# Patient Record
Sex: FEMALE | Race: WHITE | Employment: OTHER | URBAN - METROPOLITAN AREA
[De-identification: names, ages, dates, MRNs, and addresses within clinical notes are randomized per-mention and may not be internally consistent; named-entity substitution may affect disease eponyms.]

---

## 2017-01-01 ENCOUNTER — TELEPHONE (OUTPATIENT)
Dept: INTERNAL MEDICINE | Facility: CLINIC | Age: 77
End: 2017-01-01

## 2017-01-01 ENCOUNTER — DOCUMENTATION ONLY (OUTPATIENT)
Dept: CARE COORDINATION | Facility: CLINIC | Age: 77
End: 2017-01-01

## 2017-01-01 ENCOUNTER — ANTICOAGULATION THERAPY VISIT (OUTPATIENT)
Dept: NURSING | Facility: CLINIC | Age: 77
End: 2017-01-01

## 2017-01-01 ENCOUNTER — TELEPHONE (OUTPATIENT)
Dept: FAMILY MEDICINE | Facility: CLINIC | Age: 77
End: 2017-01-01

## 2017-01-01 ENCOUNTER — CARE COORDINATION (OUTPATIENT)
Dept: NEUROLOGY | Facility: CLINIC | Age: 77
End: 2017-01-01

## 2017-01-01 ENCOUNTER — APPOINTMENT (OUTPATIENT)
Dept: OCCUPATIONAL THERAPY | Facility: CLINIC | Age: 77
DRG: 092 | End: 2017-01-01
Payer: COMMERCIAL

## 2017-01-01 ENCOUNTER — CARE COORDINATION (OUTPATIENT)
Dept: ONCOLOGY | Facility: CLINIC | Age: 77
End: 2017-01-01

## 2017-01-01 ENCOUNTER — TELEPHONE (OUTPATIENT)
Dept: PHARMACY | Facility: OTHER | Age: 77
End: 2017-01-01

## 2017-01-01 ENCOUNTER — MEDICAL CORRESPONDENCE (OUTPATIENT)
Dept: HEALTH INFORMATION MANAGEMENT | Facility: CLINIC | Age: 77
End: 2017-01-01

## 2017-01-01 ENCOUNTER — APPOINTMENT (OUTPATIENT)
Dept: GENERAL RADIOLOGY | Facility: CLINIC | Age: 77
DRG: 092 | End: 2017-01-01
Attending: EMERGENCY MEDICINE
Payer: COMMERCIAL

## 2017-01-01 ENCOUNTER — TELEPHONE (OUTPATIENT)
Dept: ONCOLOGY | Facility: CLINIC | Age: 77
End: 2017-01-01

## 2017-01-01 ENCOUNTER — CARE COORDINATION (OUTPATIENT)
Dept: CASE MANAGEMENT | Facility: CLINIC | Age: 77
End: 2017-01-01

## 2017-01-01 ENCOUNTER — DOCUMENTATION ONLY (OUTPATIENT)
Dept: NEUROLOGY | Facility: CLINIC | Age: 77
End: 2017-01-01

## 2017-01-01 ENCOUNTER — APPOINTMENT (OUTPATIENT)
Dept: LAB | Facility: CLINIC | Age: 77
End: 2017-01-01
Attending: PHYSICIAN ASSISTANT
Payer: COMMERCIAL

## 2017-01-01 ENCOUNTER — TELEPHONE (OUTPATIENT)
Dept: NEUROLOGY | Facility: CLINIC | Age: 77
End: 2017-01-01

## 2017-01-01 ENCOUNTER — CARE COORDINATION (OUTPATIENT)
Dept: CARDIOLOGY | Facility: CLINIC | Age: 77
End: 2017-01-01

## 2017-01-01 ENCOUNTER — TRANSFERRED RECORDS (OUTPATIENT)
Dept: HEALTH INFORMATION MANAGEMENT | Facility: CLINIC | Age: 77
End: 2017-01-01

## 2017-01-01 ENCOUNTER — TELEPHONE (OUTPATIENT)
Dept: PEDIATRICS | Facility: CLINIC | Age: 77
End: 2017-01-01

## 2017-01-01 ENCOUNTER — APPOINTMENT (OUTPATIENT)
Dept: PHYSICAL THERAPY | Facility: CLINIC | Age: 77
DRG: 092 | End: 2017-01-01
Payer: COMMERCIAL

## 2017-01-01 ENCOUNTER — MYC MEDICAL ADVICE (OUTPATIENT)
Dept: ONCOLOGY | Facility: CLINIC | Age: 77
End: 2017-01-01

## 2017-01-01 ENCOUNTER — CARE COORDINATION (OUTPATIENT)
Dept: CARE COORDINATION | Facility: CLINIC | Age: 77
End: 2017-01-01

## 2017-01-01 ENCOUNTER — ANTICOAGULATION THERAPY VISIT (OUTPATIENT)
Dept: NURSING | Facility: CLINIC | Age: 77
End: 2017-01-01
Payer: COMMERCIAL

## 2017-01-01 ENCOUNTER — ONCOLOGY VISIT (OUTPATIENT)
Dept: ONCOLOGY | Facility: CLINIC | Age: 77
End: 2017-01-01
Attending: PHYSICIAN ASSISTANT
Payer: COMMERCIAL

## 2017-01-01 ENCOUNTER — APPOINTMENT (OUTPATIENT)
Dept: LAB | Facility: CLINIC | Age: 77
End: 2017-01-01
Attending: INTERNAL MEDICINE
Payer: COMMERCIAL

## 2017-01-01 ENCOUNTER — PRE VISIT (OUTPATIENT)
Dept: NEUROLOGY | Facility: CLINIC | Age: 77
End: 2017-01-01

## 2017-01-01 ENCOUNTER — HOSPITAL ENCOUNTER (OUTPATIENT)
Facility: CLINIC | Age: 77
Setting detail: OBSERVATION
Discharge: HOME | End: 2017-05-04
Attending: EMERGENCY MEDICINE | Admitting: INTERNAL MEDICINE
Payer: COMMERCIAL

## 2017-01-01 ENCOUNTER — OFFICE VISIT (OUTPATIENT)
Dept: NEUROLOGY | Facility: CLINIC | Age: 77
End: 2017-01-01

## 2017-01-01 ENCOUNTER — TELEPHONE (OUTPATIENT)
Dept: CARDIOLOGY | Facility: CLINIC | Age: 77
End: 2017-01-01

## 2017-01-01 ENCOUNTER — MYC MEDICAL ADVICE (OUTPATIENT)
Dept: FAMILY MEDICINE | Facility: CLINIC | Age: 77
End: 2017-01-01

## 2017-01-01 ENCOUNTER — APPOINTMENT (OUTPATIENT)
Dept: PHYSICAL THERAPY | Facility: CLINIC | Age: 77
End: 2017-01-01
Payer: COMMERCIAL

## 2017-01-01 ENCOUNTER — APPOINTMENT (OUTPATIENT)
Dept: PHYSICAL THERAPY | Facility: CLINIC | Age: 77
End: 2017-01-01
Attending: NURSE PRACTITIONER
Payer: COMMERCIAL

## 2017-01-01 ENCOUNTER — HOSPITAL ENCOUNTER (OUTPATIENT)
Dept: PET IMAGING | Facility: CLINIC | Age: 77
Discharge: HOME | End: 2017-05-22
Attending: PHYSICIAN ASSISTANT | Admitting: PHYSICIAN ASSISTANT
Payer: COMMERCIAL

## 2017-01-01 ENCOUNTER — HOSPITAL ENCOUNTER (OUTPATIENT)
Facility: CLINIC | Age: 77
Discharge: HOME | End: 2017-09-08
Attending: PSYCHIATRY & NEUROLOGY | Admitting: PSYCHIATRY & NEUROLOGY
Payer: COMMERCIAL

## 2017-01-01 ENCOUNTER — ANTICOAGULATION THERAPY VISIT (OUTPATIENT)
Dept: FAMILY MEDICINE | Facility: CLINIC | Age: 77
End: 2017-01-01
Payer: COMMERCIAL

## 2017-01-01 ENCOUNTER — APPOINTMENT (OUTPATIENT)
Dept: CT IMAGING | Facility: CLINIC | Age: 77
DRG: 092 | End: 2017-01-01
Payer: COMMERCIAL

## 2017-01-01 ENCOUNTER — ONCOLOGY VISIT (OUTPATIENT)
Dept: ONCOLOGY | Facility: CLINIC | Age: 77
End: 2017-01-01
Attending: INTERNAL MEDICINE
Payer: COMMERCIAL

## 2017-01-01 ENCOUNTER — DOCUMENTATION ONLY (OUTPATIENT)
Dept: OTHER | Facility: CLINIC | Age: 77
End: 2017-01-01

## 2017-01-01 ENCOUNTER — APPOINTMENT (OUTPATIENT)
Dept: CT IMAGING | Facility: CLINIC | Age: 77
End: 2017-01-01
Attending: NURSE PRACTITIONER
Payer: COMMERCIAL

## 2017-01-01 ENCOUNTER — APPOINTMENT (OUTPATIENT)
Dept: CT IMAGING | Facility: CLINIC | Age: 77
End: 2017-01-01
Attending: EMERGENCY MEDICINE
Payer: COMMERCIAL

## 2017-01-01 ENCOUNTER — APPOINTMENT (OUTPATIENT)
Dept: CARDIOLOGY | Facility: CLINIC | Age: 77
End: 2017-01-01
Attending: NURSE PRACTITIONER
Payer: COMMERCIAL

## 2017-01-01 ENCOUNTER — APPOINTMENT (OUTPATIENT)
Dept: CARDIOLOGY | Facility: CLINIC | Age: 77
End: 2017-01-01
Attending: INTERNAL MEDICINE
Payer: COMMERCIAL

## 2017-01-01 ENCOUNTER — HOSPITAL ENCOUNTER (INPATIENT)
Facility: CLINIC | Age: 77
LOS: 8 days | Discharge: HOSPICE | DRG: 092 | End: 2017-10-07
Attending: EMERGENCY MEDICINE | Admitting: PSYCHIATRY & NEUROLOGY
Payer: COMMERCIAL

## 2017-01-01 ENCOUNTER — HOSPITAL ENCOUNTER (OUTPATIENT)
Facility: CLINIC | Age: 77
Setting detail: OBSERVATION
Discharge: NURSING, REHAB OR LONG-TERM CARE | End: 2017-06-26
Attending: EMERGENCY MEDICINE | Admitting: EMERGENCY MEDICINE
Payer: COMMERCIAL

## 2017-01-01 ENCOUNTER — APPOINTMENT (OUTPATIENT)
Dept: MEDSURG UNIT | Facility: CLINIC | Age: 77
End: 2017-01-01
Attending: PSYCHIATRY & NEUROLOGY
Payer: COMMERCIAL

## 2017-01-01 ENCOUNTER — ANTICOAGULATION THERAPY VISIT (OUTPATIENT)
Dept: FAMILY MEDICINE | Facility: CLINIC | Age: 77
End: 2017-01-01

## 2017-01-01 ENCOUNTER — ALLIED HEALTH/NURSE VISIT (OUTPATIENT)
Dept: NEUROLOGY | Facility: CLINIC | Age: 77
End: 2017-01-01
Attending: PSYCHIATRY & NEUROLOGY
Payer: COMMERCIAL

## 2017-01-01 VITALS
OXYGEN SATURATION: 98 % | WEIGHT: 182.1 LBS | DIASTOLIC BLOOD PRESSURE: 61 MMHG | BODY MASS INDEX: 33.31 KG/M2 | RESPIRATION RATE: 16 BRPM | HEART RATE: 88 BPM | SYSTOLIC BLOOD PRESSURE: 98 MMHG | TEMPERATURE: 98.2 F

## 2017-01-01 VITALS — HEIGHT: 62 IN | SYSTOLIC BLOOD PRESSURE: 94 MMHG | HEART RATE: 60 BPM | DIASTOLIC BLOOD PRESSURE: 67 MMHG

## 2017-01-01 VITALS
WEIGHT: 179 LBS | TEMPERATURE: 97.7 F | HEART RATE: 68 BPM | BODY MASS INDEX: 32.94 KG/M2 | OXYGEN SATURATION: 97 % | HEIGHT: 62 IN | RESPIRATION RATE: 16 BRPM | SYSTOLIC BLOOD PRESSURE: 166 MMHG | DIASTOLIC BLOOD PRESSURE: 95 MMHG

## 2017-01-01 VITALS
BODY MASS INDEX: 32.94 KG/M2 | SYSTOLIC BLOOD PRESSURE: 100 MMHG | HEIGHT: 62 IN | RESPIRATION RATE: 15 BRPM | WEIGHT: 179 LBS | HEART RATE: 78 BPM | TEMPERATURE: 98 F | DIASTOLIC BLOOD PRESSURE: 69 MMHG | OXYGEN SATURATION: 97 %

## 2017-01-01 VITALS
BODY MASS INDEX: 32.58 KG/M2 | DIASTOLIC BLOOD PRESSURE: 70 MMHG | RESPIRATION RATE: 16 BRPM | HEIGHT: 62 IN | TEMPERATURE: 99.3 F | OXYGEN SATURATION: 97 % | SYSTOLIC BLOOD PRESSURE: 104 MMHG | HEART RATE: 60 BPM | WEIGHT: 177.03 LBS

## 2017-01-01 VITALS
BODY MASS INDEX: 32.86 KG/M2 | TEMPERATURE: 97 F | DIASTOLIC BLOOD PRESSURE: 73 MMHG | WEIGHT: 178.57 LBS | SYSTOLIC BLOOD PRESSURE: 132 MMHG | HEIGHT: 62 IN | OXYGEN SATURATION: 96 % | HEART RATE: 57 BPM | RESPIRATION RATE: 16 BRPM

## 2017-01-01 VITALS
TEMPERATURE: 98.1 F | RESPIRATION RATE: 16 BRPM | WEIGHT: 180.13 LBS | SYSTOLIC BLOOD PRESSURE: 166 MMHG | OXYGEN SATURATION: 96 % | BODY MASS INDEX: 32.95 KG/M2 | HEART RATE: 67 BPM | DIASTOLIC BLOOD PRESSURE: 86 MMHG

## 2017-01-01 VITALS
RESPIRATION RATE: 18 BRPM | TEMPERATURE: 98.8 F | HEART RATE: 68 BPM | DIASTOLIC BLOOD PRESSURE: 60 MMHG | WEIGHT: 171.6 LBS | HEIGHT: 62 IN | BODY MASS INDEX: 31.58 KG/M2 | OXYGEN SATURATION: 97 % | SYSTOLIC BLOOD PRESSURE: 102 MMHG

## 2017-01-01 VITALS
OXYGEN SATURATION: 98 % | RESPIRATION RATE: 24 BRPM | HEIGHT: 62 IN | HEART RATE: 56 BPM | BODY MASS INDEX: 32.76 KG/M2 | WEIGHT: 178 LBS | DIASTOLIC BLOOD PRESSURE: 69 MMHG | TEMPERATURE: 98.4 F | SYSTOLIC BLOOD PRESSURE: 110 MMHG

## 2017-01-01 VITALS
OXYGEN SATURATION: 95 % | DIASTOLIC BLOOD PRESSURE: 89 MMHG | WEIGHT: 183.9 LBS | BODY MASS INDEX: 33.64 KG/M2 | SYSTOLIC BLOOD PRESSURE: 132 MMHG | TEMPERATURE: 98.7 F | HEART RATE: 73 BPM

## 2017-01-01 VITALS
DIASTOLIC BLOOD PRESSURE: 85 MMHG | RESPIRATION RATE: 18 BRPM | HEART RATE: 63 BPM | SYSTOLIC BLOOD PRESSURE: 119 MMHG | OXYGEN SATURATION: 95 % | TEMPERATURE: 98.1 F

## 2017-01-01 DIAGNOSIS — C50.919 MALIGNANT NEOPLASM OF FEMALE BREAST (H): Primary | ICD-10-CM

## 2017-01-01 DIAGNOSIS — C50.919 METASTATIC BREAST CANCER: ICD-10-CM

## 2017-01-01 DIAGNOSIS — Z17.0 MALIGNANT NEOPLASM OF BREAST IN FEMALE, ESTROGEN RECEPTOR POSITIVE, UNSPECIFIED LATERALITY, UNSPECIFIED SITE OF BREAST (H): Primary | ICD-10-CM

## 2017-01-01 DIAGNOSIS — C50.919 MALIGNANT NEOPLASM OF FEMALE BREAST (H): ICD-10-CM

## 2017-01-01 DIAGNOSIS — I63.9 CEREBRAL INFARCTION (H): ICD-10-CM

## 2017-01-01 DIAGNOSIS — Z79.01 LONG-TERM (CURRENT) USE OF ANTICOAGULANTS: ICD-10-CM

## 2017-01-01 DIAGNOSIS — J91.0 MALIGNANT PLEURAL EFFUSION (H): ICD-10-CM

## 2017-01-01 DIAGNOSIS — R29.6 FALLS FREQUENTLY: ICD-10-CM

## 2017-01-01 DIAGNOSIS — I63.9 CEREBRAL INFARCTION, UNSPECIFIED MECHANISM (H): ICD-10-CM

## 2017-01-01 DIAGNOSIS — C50.919 MALIGNANT NEOPLASM OF FEMALE BREAST, UNSPECIFIED LATERALITY, UNSPECIFIED SITE OF BREAST: ICD-10-CM

## 2017-01-01 DIAGNOSIS — Z86.73 HISTORY OF STROKE: ICD-10-CM

## 2017-01-01 DIAGNOSIS — J90 PLEURAL EFFUSION: Primary | ICD-10-CM

## 2017-01-01 DIAGNOSIS — Z86.73 PERSONAL HISTORY OF TRANSIENT CEREBRAL ISCHEMIA: ICD-10-CM

## 2017-01-01 DIAGNOSIS — C50.519 MALIGNANT NEOPLASM OF LOWER-OUTER QUADRANT OF FEMALE BREAST, ESTROGEN RECEPTOR NEGATIVE, UNSPECIFIED LATERALITY (H): ICD-10-CM

## 2017-01-01 DIAGNOSIS — R53.81 PHYSICAL DECONDITIONING: ICD-10-CM

## 2017-01-01 DIAGNOSIS — I95.1 ORTHOSTATIC HYPOTENSION: ICD-10-CM

## 2017-01-01 DIAGNOSIS — I63.9 CEREBRAL INFARCTION, UNSPECIFIED MECHANISM (H): Primary | ICD-10-CM

## 2017-01-01 DIAGNOSIS — M79.621 PAIN OF RIGHT UPPER ARM: ICD-10-CM

## 2017-01-01 DIAGNOSIS — N17.9 ACUTE RENAL FAILURE SUPERIMPOSED ON CHRONIC KIDNEY DISEASE, UNSPECIFIED CKD STAGE, UNSPECIFIED ACUTE RENAL FAILURE TYPE: ICD-10-CM

## 2017-01-01 DIAGNOSIS — Z17.1 MALIGNANT NEOPLASM OF LOWER-OUTER QUADRANT OF FEMALE BREAST, ESTROGEN RECEPTOR NEGATIVE, UNSPECIFIED LATERALITY (H): ICD-10-CM
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
## 2017-01-01 DIAGNOSIS — G40.109 SPECIAL SENSORY ATTACKS (H): ICD-10-CM

## 2017-01-01 DIAGNOSIS — Z79.01 LONG-TERM (CURRENT) USE OF ANTICOAGULANTS: Primary | ICD-10-CM

## 2017-01-01 DIAGNOSIS — I10 HYPERTENSION GOAL BP (BLOOD PRESSURE) < 140/90: Primary | ICD-10-CM

## 2017-01-01 DIAGNOSIS — J90 PLEURAL EFFUSION: ICD-10-CM

## 2017-01-01 DIAGNOSIS — G90.1 DYSAUTONOMIA (H): ICD-10-CM

## 2017-01-01 DIAGNOSIS — R42 DIZZINESS: Primary | ICD-10-CM

## 2017-01-01 DIAGNOSIS — K59.00 CONSTIPATION, UNSPECIFIED CONSTIPATION TYPE: Primary | ICD-10-CM

## 2017-01-01 DIAGNOSIS — I16.0 HYPERTENSIVE URGENCY: ICD-10-CM

## 2017-01-01 DIAGNOSIS — N18.9 CHRONIC KIDNEY DISEASE, UNSPECIFIED: ICD-10-CM

## 2017-01-01 DIAGNOSIS — N30.00 ACUTE CYSTITIS WITHOUT HEMATURIA: ICD-10-CM

## 2017-01-01 DIAGNOSIS — G90.1 DYSAUTONOMIA (H): Primary | ICD-10-CM

## 2017-01-01 DIAGNOSIS — I89.0 LYMPHEDEMA: ICD-10-CM

## 2017-01-01 DIAGNOSIS — C50.919 MALIGNANT NEOPLASM OF BREAST IN FEMALE, ESTROGEN RECEPTOR POSITIVE, UNSPECIFIED LATERALITY, UNSPECIFIED SITE OF BREAST (H): Primary | ICD-10-CM

## 2017-01-01 DIAGNOSIS — K59.00 CONSTIPATION: ICD-10-CM

## 2017-01-01 DIAGNOSIS — C50.919 MALIGNANT NEOPLASM OF FEMALE BREAST, UNSPECIFIED LATERALITY, UNSPECIFIED SITE OF BREAST: Primary | ICD-10-CM

## 2017-01-01 DIAGNOSIS — R06.02 SOB (SHORTNESS OF BREATH): Chronic | ICD-10-CM

## 2017-01-01 DIAGNOSIS — E78.5 HYPERLIPIDEMIA LDL GOAL <100: ICD-10-CM

## 2017-01-01 DIAGNOSIS — Z79.01 LONG TERM (CURRENT) USE OF ANTICOAGULANTS: ICD-10-CM

## 2017-01-01 DIAGNOSIS — R42 DIZZINESS AND GIDDINESS: ICD-10-CM

## 2017-01-01 DIAGNOSIS — R42 DIZZINESS: ICD-10-CM

## 2017-01-01 DIAGNOSIS — C50.919 MALIGNANT NEOPLASM OF FEMALE BREAST, UNSPECIFIED ESTROGEN RECEPTOR STATUS, UNSPECIFIED LATERALITY, UNSPECIFIED SITE OF BREAST (H): ICD-10-CM

## 2017-01-01 DIAGNOSIS — N18.9 ACUTE RENAL FAILURE SUPERIMPOSED ON CHRONIC KIDNEY DISEASE, UNSPECIFIED CKD STAGE, UNSPECIFIED ACUTE RENAL FAILURE TYPE: ICD-10-CM

## 2017-01-01 DIAGNOSIS — Z71.89 ADVANCED DIRECTIVES, COUNSELING/DISCUSSION: Chronic | ICD-10-CM

## 2017-01-01 DIAGNOSIS — R53.1 WEAKNESS: ICD-10-CM

## 2017-01-01 DIAGNOSIS — I95.1 DYSAUTONOMIA ORTHOSTATIC HYPOTENSION SYNDROME: ICD-10-CM

## 2017-01-01 DIAGNOSIS — R82.90 ABNORMAL URINALYSIS: ICD-10-CM

## 2017-01-01 DIAGNOSIS — H10.31 ACUTE CONJUNCTIVITIS OF RIGHT EYE, UNSPECIFIED ACUTE CONJUNCTIVITIS TYPE: Primary | ICD-10-CM

## 2017-01-01 DIAGNOSIS — E11.8 TYPE 2 DIABETES MELLITUS WITH COMPLICATION, WITHOUT LONG-TERM CURRENT USE OF INSULIN (H): ICD-10-CM

## 2017-01-01 DIAGNOSIS — I10 ESSENTIAL HYPERTENSION, MALIGNANT: ICD-10-CM

## 2017-01-01 DIAGNOSIS — R29.6 FALLING: ICD-10-CM

## 2017-01-01 LAB
ALBUMIN SERPL-MCNC: 3.3 G/DL (ref 3.4–5)
ALBUMIN SERPL-MCNC: 3.4 G/DL (ref 3.4–5)
ALBUMIN SERPL-MCNC: 3.4 G/DL (ref 3.4–5)
ALBUMIN SERPL-MCNC: 3.5 G/DL (ref 3.4–5)
ALBUMIN SERPL-MCNC: 3.7 G/DL (ref 3.4–5)
ALBUMIN SERPL-MCNC: 3.9 G/DL (ref 3.4–5)
ALBUMIN UR-MCNC: 10 MG/DL
ALBUMIN UR-MCNC: 30 MG/DL
ALBUMIN UR-MCNC: NEGATIVE MG/DL
ALP SERPL-CCNC: 36 U/L (ref 40–150)
ALP SERPL-CCNC: 37 U/L (ref 40–150)
ALP SERPL-CCNC: 37 U/L (ref 40–150)
ALP SERPL-CCNC: 40 U/L (ref 40–150)
ALP SERPL-CCNC: 40 U/L (ref 40–150)
ALP SERPL-CCNC: 41 U/L (ref 40–150)
ALP SERPL-CCNC: 43 U/L (ref 40–150)
ALP SERPL-CCNC: 44 U/L (ref 40–150)
ALT SERPL W P-5'-P-CCNC: 12 U/L (ref 0–50)
ALT SERPL W P-5'-P-CCNC: 14 U/L (ref 0–50)
ALT SERPL W P-5'-P-CCNC: 15 U/L (ref 0–50)
ALT SERPL W P-5'-P-CCNC: 16 U/L (ref 0–50)
ANION GAP SERPL CALCULATED.3IONS-SCNC: 11 MMOL/L (ref 3–14)
ANION GAP SERPL CALCULATED.3IONS-SCNC: 12 MMOL/L (ref 3–14)
ANION GAP SERPL CALCULATED.3IONS-SCNC: 13 MMOL/L (ref 3–14)
ANION GAP SERPL CALCULATED.3IONS-SCNC: 2 MMOL/L (ref 3–14)
ANION GAP SERPL CALCULATED.3IONS-SCNC: 6 MMOL/L (ref 3–14)
ANION GAP SERPL CALCULATED.3IONS-SCNC: 7 MMOL/L (ref 3–14)
ANION GAP SERPL CALCULATED.3IONS-SCNC: 8 MMOL/L (ref 3–14)
ANISOCYTOSIS BLD QL SMEAR: SLIGHT
APPEARANCE UR: ABNORMAL
APPEARANCE UR: CLEAR
APPEARANCE UR: CLEAR
AST SERPL W P-5'-P-CCNC: 10 U/L (ref 0–45)
AST SERPL W P-5'-P-CCNC: 12 U/L (ref 0–45)
AST SERPL W P-5'-P-CCNC: 13 U/L (ref 0–45)
AST SERPL W P-5'-P-CCNC: 13 U/L (ref 0–45)
AST SERPL W P-5'-P-CCNC: 15 U/L (ref 0–45)
AST SERPL W P-5'-P-CCNC: 16 U/L (ref 0–45)
AST SERPL W P-5'-P-CCNC: 17 U/L (ref 0–45)
AST SERPL W P-5'-P-CCNC: 18 U/L (ref 0–45)
BACTERIA #/AREA URNS HPF: ABNORMAL /HPF
BACTERIA #/AREA URNS HPF: ABNORMAL /HPF
BACTERIA SPEC CULT: ABNORMAL
BACTERIA SPEC CULT: NO GROWTH
BACTERIA SPEC CULT: NORMAL
BASOPHILS # BLD AUTO: 0 10E9/L (ref 0–0.2)
BASOPHILS NFR BLD AUTO: 0.2 %
BASOPHILS NFR BLD AUTO: 0.4 %
BASOPHILS NFR BLD AUTO: 0.5 %
BASOPHILS NFR BLD AUTO: 0.5 %
BASOPHILS NFR BLD AUTO: 0.7 %
BASOPHILS NFR BLD AUTO: 0.8 %
BASOPHILS NFR BLD AUTO: 0.9 %
BASOPHILS NFR BLD AUTO: 1.1 %
BILIRUB DIRECT SERPL-MCNC: 0.1 MG/DL (ref 0–0.2)
BILIRUB SERPL-MCNC: 0.4 MG/DL (ref 0.2–1.3)
BILIRUB SERPL-MCNC: 0.5 MG/DL (ref 0.2–1.3)
BILIRUB SERPL-MCNC: 0.6 MG/DL (ref 0.2–1.3)
BILIRUB SERPL-MCNC: 0.6 MG/DL (ref 0.2–1.3)
BILIRUB SERPL-MCNC: 0.7 MG/DL (ref 0.2–1.3)
BILIRUB SERPL-MCNC: 0.8 MG/DL (ref 0.2–1.3)
BILIRUB SERPL-MCNC: 1 MG/DL (ref 0.2–1.3)
BILIRUB SERPL-MCNC: 1.5 MG/DL (ref 0.2–1.3)
BILIRUB UR QL STRIP: NEGATIVE
BUN SERPL-MCNC: 17 MG/DL (ref 7–30)
BUN SERPL-MCNC: 17 MG/DL (ref 7–30)
BUN SERPL-MCNC: 18 MG/DL (ref 7–30)
BUN SERPL-MCNC: 18 MG/DL (ref 7–30)
BUN SERPL-MCNC: 19 MG/DL (ref 7–30)
BUN SERPL-MCNC: 19 MG/DL (ref 7–30)
BUN SERPL-MCNC: 21 MG/DL (ref 7–30)
BUN SERPL-MCNC: 22 MG/DL (ref 7–30)
BUN SERPL-MCNC: 23 MG/DL (ref 7–30)
BUN SERPL-MCNC: 23 MG/DL (ref 7–30)
BUN SERPL-MCNC: 24 MG/DL (ref 7–30)
BUN SERPL-MCNC: 28 MG/DL (ref 7–30)
CALCIUM SERPL-MCNC: 10 MG/DL (ref 8.5–10.1)
CALCIUM SERPL-MCNC: 10.3 MG/DL
CALCIUM SERPL-MCNC: 10.3 MG/DL (ref 8.5–10.1)
CALCIUM SERPL-MCNC: 8.6 MG/DL (ref 8.5–10.1)
CALCIUM SERPL-MCNC: 9 MG/DL (ref 8.5–10.1)
CALCIUM SERPL-MCNC: 9.1 MG/DL (ref 8.5–10.1)
CALCIUM SERPL-MCNC: 9.2 MG/DL (ref 8.5–10.1)
CALCIUM SERPL-MCNC: 9.4 MG/DL (ref 8.5–10.1)
CALCIUM SERPL-MCNC: 9.4 MG/DL (ref 8.5–10.1)
CANCER AG27-29 SERPL-ACNC: 31 U/ML (ref 0–39)
CANCER AG27-29 SERPL-ACNC: 35 U/ML (ref 0–39)
CANCER AG27-29 SERPL-ACNC: 35 U/ML (ref 0–39)
CANCER AG27-29 SERPL-ACNC: 36 U/ML (ref 0–39)
CANCER AG27-29 SERPL-ACNC: 40 U/ML (ref 0–39)
CEA SERPL-MCNC: 1.8 UG/L (ref 0–2.5)
CEA SERPL-MCNC: 1.9 UG/L (ref 0–2.5)
CEA SERPL-MCNC: 1.9 UG/L (ref 0–2.5)
CEA SERPL-MCNC: 2.1 UG/L (ref 0–2.5)
CEA SERPL-MCNC: 2.2 UG/L (ref 0–2.5)
CHLORIDE SERPL-SCNC: 102 MMOL/L (ref 94–109)
CHLORIDE SERPL-SCNC: 103 MMOL/L (ref 94–109)
CHLORIDE SERPL-SCNC: 104 MMOL/L (ref 94–109)
CHLORIDE SERPL-SCNC: 105 MMOL/L (ref 94–109)
CHLORIDE SERPL-SCNC: 105 MMOL/L (ref 94–109)
CHLORIDE SERPL-SCNC: 106 MMOL/L (ref 94–109)
CHLORIDE SERPL-SCNC: 106 MMOL/L (ref 94–109)
CHLORIDE SERPL-SCNC: 108 MMOL/L (ref 94–109)
CHLORIDE SERPL-SCNC: 110 MMOL/L (ref 94–109)
CO2 SERPL-SCNC: 22 MMOL/L (ref 20–32)
CO2 SERPL-SCNC: 22 MMOL/L (ref 20–32)
CO2 SERPL-SCNC: 25 MMOL/L (ref 20–32)
CO2 SERPL-SCNC: 26 MMOL/L (ref 20–32)
CO2 SERPL-SCNC: 26 MMOL/L (ref 20–32)
CO2 SERPL-SCNC: 27 MMOL/L (ref 20–32)
CO2 SERPL-SCNC: 27 MMOL/L (ref 20–32)
CO2 SERPL-SCNC: 28 MMOL/L (ref 20–32)
CO2 SERPL-SCNC: 28 MMOL/L (ref 20–32)
CO2 SERPL-SCNC: 29 MMOL/L (ref 20–32)
CO2 SERPL-SCNC: 30 MMOL/L (ref 20–32)
CO2 SERPL-SCNC: 31 MMOL/L (ref 20–32)
COLOR UR AUTO: ABNORMAL
COLOR UR AUTO: YELLOW
COLOR UR AUTO: YELLOW
CREAT BLD-MCNC: 1.1 MG/DL (ref 0.52–1.04)
CREAT SERPL-MCNC: 0.88 MG/DL (ref 0.52–1.04)
CREAT SERPL-MCNC: 0.99 MG/DL (ref 0.52–1.04)
CREAT SERPL-MCNC: 1.02 MG/DL (ref 0.52–1.04)
CREAT SERPL-MCNC: 1.03 MG/DL (ref 0.52–1.04)
CREAT SERPL-MCNC: 1.11 MG/DL (ref 0.52–1.04)
CREAT SERPL-MCNC: 1.13 MG/DL (ref 0.52–1.04)
CREAT SERPL-MCNC: 1.15 MG/DL (ref 0.52–1.04)
CREAT SERPL-MCNC: 1.2 MG/DL (ref 0.52–1.04)
CREAT SERPL-MCNC: 1.21 MG/DL (ref 0.52–1.04)
CREAT SERPL-MCNC: 1.21 MG/DL (ref 0.52–1.04)
CREAT SERPL-MCNC: 1.27 MG/DL (ref 0.52–1.04)
CREAT SERPL-MCNC: 1.33 MG/DL (ref 0.52–1.04)
DIFFERENTIAL METHOD BLD: ABNORMAL
DIFFERENTIAL METHOD BLD: NORMAL
EOSINOPHIL # BLD AUTO: 0 10E9/L (ref 0–0.7)
EOSINOPHIL # BLD AUTO: 0.1 10E9/L (ref 0–0.7)
EOSINOPHIL NFR BLD AUTO: 0 %
EOSINOPHIL NFR BLD AUTO: 0.4 %
EOSINOPHIL NFR BLD AUTO: 0.5 %
EOSINOPHIL NFR BLD AUTO: 0.8 %
EOSINOPHIL NFR BLD AUTO: 0.8 %
EOSINOPHIL NFR BLD AUTO: 1.5 %
ERYTHROCYTE [DISTWIDTH] IN BLOOD BY AUTOMATED COUNT: 13.4 % (ref 10–15)
ERYTHROCYTE [DISTWIDTH] IN BLOOD BY AUTOMATED COUNT: 14.1 % (ref 10–15)
ERYTHROCYTE [DISTWIDTH] IN BLOOD BY AUTOMATED COUNT: 14.5 % (ref 10–15)
ERYTHROCYTE [DISTWIDTH] IN BLOOD BY AUTOMATED COUNT: 14.5 % (ref 10–15)
ERYTHROCYTE [DISTWIDTH] IN BLOOD BY AUTOMATED COUNT: 15.1 % (ref 10–15)
ERYTHROCYTE [DISTWIDTH] IN BLOOD BY AUTOMATED COUNT: 15.2 % (ref 10–15)
ERYTHROCYTE [DISTWIDTH] IN BLOOD BY AUTOMATED COUNT: 15.8 % (ref 10–15)
ERYTHROCYTE [DISTWIDTH] IN BLOOD BY AUTOMATED COUNT: 16.4 % (ref 10–15)
ERYTHROCYTE [DISTWIDTH] IN BLOOD BY AUTOMATED COUNT: 16.7 % (ref 10–15)
ERYTHROCYTE [DISTWIDTH] IN BLOOD BY AUTOMATED COUNT: 16.8 % (ref 10–15)
ERYTHROCYTE [DISTWIDTH] IN BLOOD BY AUTOMATED COUNT: 17.9 % (ref 10–15)
ERYTHROCYTE [DISTWIDTH] IN BLOOD BY AUTOMATED COUNT: NORMAL % (ref 10–15)
GFR SERPL CREATININE-BSD FRML MDRD: 39 ML/MIN/1.7M2
GFR SERPL CREATININE-BSD FRML MDRD: 41 ML/MIN/1.7M2
GFR SERPL CREATININE-BSD FRML MDRD: 43 ML/MIN/1.7M2
GFR SERPL CREATININE-BSD FRML MDRD: 43 ML/MIN/1.7M2
GFR SERPL CREATININE-BSD FRML MDRD: 44 ML/MIN/1.7M2
GFR SERPL CREATININE-BSD FRML MDRD: 46 ML/MIN/1.7M2
GFR SERPL CREATININE-BSD FRML MDRD: 47 ML/MIN/1.7M2
GFR SERPL CREATININE-BSD FRML MDRD: 48 ML/MIN/1.7M2
GFR SERPL CREATININE-BSD FRML MDRD: 48 ML/MIN/1.7M2
GFR SERPL CREATININE-BSD FRML MDRD: 52 ML/MIN/1.7M2
GFR SERPL CREATININE-BSD FRML MDRD: 53 ML/MIN/1.7M2
GFR SERPL CREATININE-BSD FRML MDRD: 54 ML/MIN/1.7M2
GFR SERPL CREATININE-BSD FRML MDRD: 62 ML/MIN/1.7M2
GLUCOSE BLDC GLUCOMTR-MCNC: 128 MG/DL (ref 70–99)
GLUCOSE BLDC GLUCOMTR-MCNC: 128 MG/DL (ref 70–99)
GLUCOSE BLDC GLUCOMTR-MCNC: 134 MG/DL (ref 70–99)
GLUCOSE BLDC GLUCOMTR-MCNC: 134 MG/DL (ref 70–99)
GLUCOSE BLDC GLUCOMTR-MCNC: 142 MG/DL (ref 70–99)
GLUCOSE BLDC GLUCOMTR-MCNC: 145 MG/DL (ref 70–99)
GLUCOSE BLDC GLUCOMTR-MCNC: 167 MG/DL (ref 70–99)
GLUCOSE BLDC GLUCOMTR-MCNC: 191 MG/DL (ref 70–99)
GLUCOSE BLDC GLUCOMTR-MCNC: 199 MG/DL (ref 70–99)
GLUCOSE SERPL-MCNC: 133 MG/DL (ref 70–99)
GLUCOSE SERPL-MCNC: 133 MG/DL (ref 70–99)
GLUCOSE SERPL-MCNC: 146 MG/DL (ref 70–99)
GLUCOSE SERPL-MCNC: 151 MG/DL (ref 70–99)
GLUCOSE SERPL-MCNC: 152 MG/DL (ref 70–99)
GLUCOSE SERPL-MCNC: 156 MG/DL (ref 70–99)
GLUCOSE SERPL-MCNC: 162 MG/DL (ref 70–99)
GLUCOSE SERPL-MCNC: 179 MG/DL (ref 70–99)
GLUCOSE SERPL-MCNC: 180 MG/DL (ref 70–99)
GLUCOSE SERPL-MCNC: 182 MG/DL (ref 70–99)
GLUCOSE SERPL-MCNC: 194 MG/DL (ref 70–99)
GLUCOSE SERPL-MCNC: 208 MG/DL (ref 70–99)
GLUCOSE UR STRIP-MCNC: 70 MG/DL
GLUCOSE UR STRIP-MCNC: NEGATIVE MG/DL
GLUCOSE UR STRIP-MCNC: NEGATIVE MG/DL
GRAN CASTS #/AREA URNS LPF: 1 /LPF
HBA1C MFR BLD: 6.4 % (ref 4.3–6)
HCT VFR BLD AUTO: 29.1 % (ref 35–47)
HCT VFR BLD AUTO: 29.3 % (ref 35–47)
HCT VFR BLD AUTO: 29.4 % (ref 35–47)
HCT VFR BLD AUTO: 30.4 % (ref 35–47)
HCT VFR BLD AUTO: 30.9 % (ref 35–47)
HCT VFR BLD AUTO: 31.3 % (ref 35–47)
HCT VFR BLD AUTO: 31.4 % (ref 35–47)
HCT VFR BLD AUTO: 31.8 % (ref 35–47)
HCT VFR BLD AUTO: 32.7 % (ref 35–47)
HCT VFR BLD AUTO: 34.1 % (ref 35–47)
HCT VFR BLD AUTO: 35.5 % (ref 35–47)
HCT VFR BLD AUTO: NORMAL % (ref 35–47)
HGB BLD-MCNC: 10.1 G/DL (ref 11.7–15.7)
HGB BLD-MCNC: 10.2 G/DL (ref 11.7–15.7)
HGB BLD-MCNC: 10.4 G/DL (ref 11.7–15.7)
HGB BLD-MCNC: 10.4 G/DL (ref 11.7–15.7)
HGB BLD-MCNC: 10.8 G/DL (ref 11.7–15.7)
HGB BLD-MCNC: 10.9 G/DL (ref 11.7–15.7)
HGB BLD-MCNC: 11.3 G/DL (ref 11.7–15.7)
HGB BLD-MCNC: 9.3 G/DL (ref 11.7–15.7)
HGB BLD-MCNC: 9.6 G/DL (ref 11.7–15.7)
HGB BLD-MCNC: 9.8 G/DL (ref 11.7–15.7)
HGB BLD-MCNC: 9.8 G/DL (ref 11.7–15.7)
HGB BLD-MCNC: NORMAL G/DL (ref 11.7–15.7)
HGB UR QL STRIP: ABNORMAL
HGB UR QL STRIP: NEGATIVE
HGB UR QL STRIP: NEGATIVE
HYALINE CASTS #/AREA URNS LPF: 6 /LPF (ref 0–2)
IMM GRANULOCYTES # BLD: 0 10E9/L (ref 0–0.4)
IMM GRANULOCYTES # BLD: 0.1 10E9/L (ref 0–0.4)
IMM GRANULOCYTES NFR BLD: 0 %
IMM GRANULOCYTES NFR BLD: 0.4 %
IMM GRANULOCYTES NFR BLD: 0.8 %
INR PPP: 2
INR PPP: 2
INR PPP: 2.1
INR PPP: 2.1 (ref 0.86–1.14)
INR PPP: 2.1 (ref 0.86–1.14)
INR PPP: 2.12 (ref 0.86–1.14)
INR PPP: 2.2
INR PPP: 2.25 (ref 0.86–1.14)
INR PPP: 2.3
INR PPP: 2.3 (ref 0.86–1.14)
INR PPP: 2.3 (ref 0.86–1.14)
INR PPP: 2.33 (ref 0.86–1.14)
INR PPP: 2.35 (ref 0.86–1.14)
INR PPP: 2.35 (ref 0.86–1.14)
INR PPP: 2.38 (ref 0.86–1.14)
INR PPP: 2.4
INR PPP: 2.42 (ref 0.86–1.14)
INR PPP: 2.42 (ref 0.86–1.14)
INR PPP: 2.46 (ref 0.86–1.14)
INR PPP: 2.47 (ref 0.86–1.14)
INR PPP: 2.48 (ref 0.86–1.14)
INR PPP: 2.5
INR PPP: 2.54 (ref 0.86–1.14)
INR PPP: 2.56 (ref 0.86–1.14)
INR PPP: 2.6
INR PPP: 2.62 (ref 0.86–1.14)
INR PPP: 3.1
INR PPP: NORMAL (ref 0.86–1.14)
INTERPRETATION ECG - MUSE: NORMAL
INTERPRETATION ECG - MUSE: NORMAL
KETONES UR STRIP-MCNC: 40 MG/DL
KETONES UR STRIP-MCNC: 5 MG/DL
KETONES UR STRIP-MCNC: NEGATIVE MG/DL
LACTATE BLD-SCNC: 1.7 MMOL/L (ref 0.7–2.1)
LEUKOCYTE ESTERASE UR QL STRIP: ABNORMAL
LEUKOCYTE ESTERASE UR QL STRIP: ABNORMAL
LEUKOCYTE ESTERASE UR QL STRIP: NEGATIVE
LYMPHOCYTES # BLD AUTO: 0.6 10E9/L (ref 0.8–5.3)
LYMPHOCYTES # BLD AUTO: 0.7 10E9/L (ref 0.8–5.3)
LYMPHOCYTES # BLD AUTO: 0.9 10E9/L (ref 0.8–5.3)
LYMPHOCYTES # BLD AUTO: 0.9 10E9/L (ref 0.8–5.3)
LYMPHOCYTES # BLD AUTO: 1 10E9/L (ref 0.8–5.3)
LYMPHOCYTES # BLD AUTO: 1.1 10E9/L (ref 0.8–5.3)
LYMPHOCYTES # BLD AUTO: 1.2 10E9/L (ref 0.8–5.3)
LYMPHOCYTES # BLD AUTO: 1.3 10E9/L (ref 0.8–5.3)
LYMPHOCYTES NFR BLD AUTO: 17.2 %
LYMPHOCYTES NFR BLD AUTO: 23.1 %
LYMPHOCYTES NFR BLD AUTO: 27.9 %
LYMPHOCYTES NFR BLD AUTO: 31.9 %
LYMPHOCYTES NFR BLD AUTO: 32.4 %
LYMPHOCYTES NFR BLD AUTO: 40 %
LYMPHOCYTES NFR BLD AUTO: 41.3 %
LYMPHOCYTES NFR BLD AUTO: 41.7 %
Lab: NORMAL
MACROCYTES BLD QL SMEAR: PRESENT
MAGNESIUM SERPL-MCNC: 1.9 MG/DL (ref 1.6–2.3)
MAGNESIUM SERPL-MCNC: 2 MG/DL (ref 1.6–2.3)
MCH RBC QN AUTO: 32.8 PG (ref 26.5–33)
MCH RBC QN AUTO: 32.9 PG (ref 26.5–33)
MCH RBC QN AUTO: 33.2 PG (ref 26.5–33)
MCH RBC QN AUTO: 33.6 PG (ref 26.5–33)
MCH RBC QN AUTO: 34.2 PG (ref 26.5–33)
MCH RBC QN AUTO: 34.3 PG (ref 26.5–33)
MCH RBC QN AUTO: 34.4 PG (ref 26.5–33)
MCH RBC QN AUTO: 35.4 PG (ref 26.5–33)
MCH RBC QN AUTO: 36.2 PG (ref 26.5–33)
MCH RBC QN AUTO: 36.6 PG (ref 26.5–33)
MCH RBC QN AUTO: 37 PG (ref 26.5–33)
MCH RBC QN AUTO: NORMAL PG (ref 26.5–33)
MCHC RBC AUTO-ENTMCNC: 31.6 G/DL (ref 31.5–36.5)
MCHC RBC AUTO-ENTMCNC: 31.8 G/DL (ref 31.5–36.5)
MCHC RBC AUTO-ENTMCNC: 32 G/DL (ref 31.5–36.5)
MCHC RBC AUTO-ENTMCNC: 32.2 G/DL (ref 31.5–36.5)
MCHC RBC AUTO-ENTMCNC: 32.5 G/DL (ref 31.5–36.5)
MCHC RBC AUTO-ENTMCNC: 32.7 G/DL (ref 31.5–36.5)
MCHC RBC AUTO-ENTMCNC: 32.7 G/DL (ref 31.5–36.5)
MCHC RBC AUTO-ENTMCNC: 33 G/DL (ref 31.5–36.5)
MCHC RBC AUTO-ENTMCNC: 33 G/DL (ref 31.5–36.5)
MCHC RBC AUTO-ENTMCNC: 33.2 G/DL (ref 31.5–36.5)
MCHC RBC AUTO-ENTMCNC: 33.4 G/DL (ref 31.5–36.5)
MCHC RBC AUTO-ENTMCNC: NORMAL G/DL (ref 31.5–36.5)
MCV RBC AUTO: 100 FL (ref 78–100)
MCV RBC AUTO: 102 FL (ref 78–100)
MCV RBC AUTO: 104 FL (ref 78–100)
MCV RBC AUTO: 105 FL (ref 78–100)
MCV RBC AUTO: 107 FL (ref 78–100)
MCV RBC AUTO: 108 FL (ref 78–100)
MCV RBC AUTO: 108 FL (ref 78–100)
MCV RBC AUTO: 111 FL (ref 78–100)
MCV RBC AUTO: 113 FL (ref 78–100)
MCV RBC AUTO: NORMAL FL (ref 78–100)
MICRO REPORT STATUS: NORMAL
MICRO REPORT STATUS: NORMAL
MISCELLANEOUS TEST: NORMAL
MONOCYTES # BLD AUTO: 0.1 10E9/L (ref 0–1.3)
MONOCYTES # BLD AUTO: 0.2 10E9/L (ref 0–1.3)
MONOCYTES # BLD AUTO: 0.3 10E9/L (ref 0–1.3)
MONOCYTES # BLD AUTO: 0.3 10E9/L (ref 0–1.3)
MONOCYTES # BLD AUTO: 0.5 10E9/L (ref 0–1.3)
MONOCYTES # BLD AUTO: 0.8 10E9/L (ref 0–1.3)
MONOCYTES NFR BLD AUTO: 10.6 %
MONOCYTES NFR BLD AUTO: 11.2 %
MONOCYTES NFR BLD AUTO: 17.5 %
MONOCYTES NFR BLD AUTO: 4.9 %
MONOCYTES NFR BLD AUTO: 5 %
MONOCYTES NFR BLD AUTO: 5 %
MONOCYTES NFR BLD AUTO: 5.3 %
MONOCYTES NFR BLD AUTO: 6.5 %
MUCOUS THREADS #/AREA URNS LPF: PRESENT /LPF
NEUTROPHILS # BLD AUTO: 1.1 10E9/L (ref 1.6–8.3)
NEUTROPHILS # BLD AUTO: 1.2 10E9/L (ref 1.6–8.3)
NEUTROPHILS # BLD AUTO: 1.5 10E9/L (ref 1.6–8.3)
NEUTROPHILS # BLD AUTO: 1.6 10E9/L (ref 1.6–8.3)
NEUTROPHILS # BLD AUTO: 3.5 10E9/L (ref 1.6–8.3)
NEUTROPHILS # BLD AUTO: 5.2 10E9/L (ref 1.6–8.3)
NEUTROPHILS NFR BLD AUTO: 40.6 %
NEUTROPHILS NFR BLD AUTO: 51.4 %
NEUTROPHILS NFR BLD AUTO: 52.3 %
NEUTROPHILS NFR BLD AUTO: 55.8 %
NEUTROPHILS NFR BLD AUTO: 61.4 %
NEUTROPHILS NFR BLD AUTO: 65.2 %
NEUTROPHILS NFR BLD AUTO: 69.4 %
NEUTROPHILS NFR BLD AUTO: 70.5 %
NITRATE UR QL: NEGATIVE
NITRATE UR QL: NEGATIVE
NITRATE UR QL: POSITIVE
NRBC # BLD AUTO: 0 10*3/UL
NRBC BLD AUTO-RTO: 0 /100
NT-PROBNP SERPL-MCNC: 815 PG/ML (ref 0–1800)
PH UR STRIP: 6 PH (ref 5–7)
PH UR STRIP: 6.5 PH (ref 5–7)
PH UR STRIP: 7 PH (ref 5–7)
PHOSPHATE SERPL-MCNC: 2.2 MG/DL (ref 2.5–4.5)
PHOSPHATE SERPL-MCNC: 2.4 MG/DL (ref 2.5–4.5)
PLATELET # BLD AUTO: 108 10E9/L (ref 150–450)
PLATELET # BLD AUTO: 113 10E9/L (ref 150–450)
PLATELET # BLD AUTO: 179 10E9/L (ref 150–450)
PLATELET # BLD AUTO: 189 10E9/L (ref 150–450)
PLATELET # BLD AUTO: 195 10E9/L (ref 150–450)
PLATELET # BLD AUTO: 219 10E9/L (ref 150–450)
PLATELET # BLD AUTO: 224 10E9/L (ref 150–450)
PLATELET # BLD AUTO: 288 10E9/L (ref 150–450)
PLATELET # BLD AUTO: 293 10E9/L (ref 150–450)
PLATELET # BLD AUTO: 382 10E9/L (ref 150–450)
PLATELET # BLD AUTO: 99 10E9/L (ref 150–450)
PLATELET # BLD AUTO: NORMAL 10E9/L (ref 150–450)
POIKILOCYTOSIS BLD QL SMEAR: SLIGHT
POTASSIUM SERPL-SCNC: 3.4 MMOL/L (ref 3.4–5.3)
POTASSIUM SERPL-SCNC: 3.4 MMOL/L (ref 3.4–5.3)
POTASSIUM SERPL-SCNC: 3.5 MMOL/L (ref 3.4–5.3)
POTASSIUM SERPL-SCNC: 3.6 MMOL/L (ref 3.4–5.3)
POTASSIUM SERPL-SCNC: 3.7 MMOL/L (ref 3.4–5.3)
POTASSIUM SERPL-SCNC: 3.7 MMOL/L (ref 3.4–5.3)
POTASSIUM SERPL-SCNC: 3.9 MMOL/L (ref 3.4–5.3)
POTASSIUM SERPL-SCNC: 4 MMOL/L (ref 3.4–5.3)
POTASSIUM SERPL-SCNC: 4.4 MMOL/L (ref 3.4–5.3)
PROT SERPL-MCNC: 6.4 G/DL (ref 6.8–8.8)
PROT SERPL-MCNC: 6.6 G/DL (ref 6.8–8.8)
PROT SERPL-MCNC: 6.8 G/DL (ref 6.8–8.8)
PROT SERPL-MCNC: 6.9 G/DL (ref 6.8–8.8)
PROT SERPL-MCNC: 7.1 G/DL (ref 6.8–8.8)
PROT SERPL-MCNC: 7.2 G/DL (ref 6.8–8.8)
PROT SERPL-MCNC: 7.2 G/DL (ref 6.8–8.8)
PROT SERPL-MCNC: 7.7 G/DL (ref 6.8–8.8)
RBC # BLD AUTO: 2.71 10E12/L (ref 3.8–5.2)
RBC # BLD AUTO: 2.71 10E12/L (ref 3.8–5.2)
RBC # BLD AUTO: 2.79 10E12/L (ref 3.8–5.2)
RBC # BLD AUTO: 2.8 10E12/L (ref 3.8–5.2)
RBC # BLD AUTO: 2.81 10E12/L (ref 3.8–5.2)
RBC # BLD AUTO: 2.98 10E12/L (ref 3.8–5.2)
RBC # BLD AUTO: 3.03 10E12/L (ref 3.8–5.2)
RBC # BLD AUTO: 3.08 10E12/L (ref 3.8–5.2)
RBC # BLD AUTO: 3.14 10E12/L (ref 3.8–5.2)
RBC # BLD AUTO: 3.24 10E12/L (ref 3.8–5.2)
RBC # BLD AUTO: 3.3 10E12/L (ref 3.8–5.2)
RBC # BLD AUTO: NORMAL 10E12/L (ref 3.8–5.2)
RBC #/AREA URNS AUTO: 4 /HPF (ref 0–2)
RBC #/AREA URNS AUTO: >182 /HPF (ref 0–2)
RESULT: NORMAL
SEND OUTS MISC TEST CODE: NORMAL
SEND OUTS MISC TEST SPECIMEN: NORMAL
SODIUM SERPL-SCNC: 138 MMOL/L (ref 133–144)
SODIUM SERPL-SCNC: 138 MMOL/L (ref 133–144)
SODIUM SERPL-SCNC: 139 MMOL/L (ref 133–144)
SODIUM SERPL-SCNC: 140 MMOL/L (ref 133–144)
SODIUM SERPL-SCNC: 141 MMOL/L (ref 133–144)
SODIUM SERPL-SCNC: 141 MMOL/L (ref 133–144)
SODIUM SERPL-SCNC: 142 MMOL/L (ref 133–144)
SODIUM SERPL-SCNC: 143 MMOL/L (ref 133–144)
SODIUM SERPL-SCNC: 144 MMOL/L (ref 133–144)
SOURCE: ABNORMAL
SP GR UR STRIP: 1.01 (ref 1–1.03)
SP GR UR STRIP: 1.01 (ref 1–1.03)
SP GR UR STRIP: 1.02 (ref 1–1.03)
SPECIMEN SOURCE: ABNORMAL
SPECIMEN SOURCE: NORMAL
SPECIMEN SOURCE: NORMAL
TEST NAME: NORMAL
TRANS CELLS #/AREA URNS HPF: 1 /HPF (ref 0–1)
TRANS CELLS #/AREA URNS HPF: 1 /HPF (ref 0–1)
TROPONIN I SERPL-MCNC: 0.02 UG/L (ref 0–0.04)
TROPONIN I SERPL-MCNC: NORMAL UG/L (ref 0–0.04)
TROPONIN I SERPL-MCNC: NORMAL UG/L (ref 0–0.04)
URN SPEC COLLECT METH UR: ABNORMAL
URN SPEC COLLECT METH UR: ABNORMAL
UROBILINOGEN UR STRIP-MCNC: NORMAL MG/DL (ref 0–2)
WBC # BLD AUTO: 2 10E9/L (ref 4–11)
WBC # BLD AUTO: 2.2 10E9/L (ref 4–11)
WBC # BLD AUTO: 2.3 10E9/L (ref 4–11)
WBC # BLD AUTO: 2.4 10E9/L (ref 4–11)
WBC # BLD AUTO: 2.7 10E9/L (ref 4–11)
WBC # BLD AUTO: 2.8 10E9/L (ref 4–11)
WBC # BLD AUTO: 2.9 10E9/L (ref 4–11)
WBC # BLD AUTO: 3 10E9/L (ref 4–11)
WBC # BLD AUTO: 3.4 10E9/L (ref 4–11)
WBC # BLD AUTO: 5 10E9/L (ref 4–11)
WBC # BLD AUTO: 7.4 10E9/L (ref 4–11)
WBC # BLD AUTO: NORMAL 10E9/L (ref 4–11)
WBC #/AREA URNS AUTO: 25 /HPF (ref 0–2)
WBC #/AREA URNS AUTO: >182 /HPF (ref 0–2)
WBC CLUMPS #/AREA URNS HPF: PRESENT /HPF

## 2017-01-01 PROCEDURE — 97162 PT EVAL MOD COMPLEX 30 MIN: CPT | Mod: GP | Performed by: PHYSICAL THERAPIST

## 2017-01-01 PROCEDURE — 25000132 ZZH RX MED GY IP 250 OP 250 PS 637: Performed by: STUDENT IN AN ORGANIZED HEALTH CARE EDUCATION/TRAINING PROGRAM

## 2017-01-01 PROCEDURE — 83520 IMMUNOASSAY QUANT NOS NONAB: CPT | Performed by: STUDENT IN AN ORGANIZED HEALTH CARE EDUCATION/TRAINING PROGRAM

## 2017-01-01 PROCEDURE — 40000193 ZZH STATISTIC PT WARD VISIT: Performed by: PHYSICAL THERAPIST

## 2017-01-01 PROCEDURE — 93010 ELECTROCARDIOGRAM REPORT: CPT | Mod: Z6 | Performed by: EMERGENCY MEDICINE

## 2017-01-01 PROCEDURE — 83735 ASSAY OF MAGNESIUM: CPT | Performed by: NURSE PRACTITIONER

## 2017-01-01 PROCEDURE — 25000132 ZZH RX MED GY IP 250 OP 250 PS 637: Performed by: INTERNAL MEDICINE

## 2017-01-01 PROCEDURE — 25000132 ZZH RX MED GY IP 250 OP 250 PS 637: Performed by: NURSE PRACTITIONER

## 2017-01-01 PROCEDURE — 25000125 ZZHC RX 250: Performed by: INTERNAL MEDICINE

## 2017-01-01 PROCEDURE — 99221 1ST HOSP IP/OBS SF/LOW 40: CPT | Mod: GC | Performed by: INTERNAL MEDICINE

## 2017-01-01 PROCEDURE — 80053 COMPREHEN METABOLIC PANEL: CPT | Performed by: NURSE PRACTITIONER

## 2017-01-01 PROCEDURE — 25000125 ZZHC RX 250: Performed by: NURSE PRACTITIONER

## 2017-01-01 PROCEDURE — 96361 HYDRATE IV INFUSION ADD-ON: CPT

## 2017-01-01 PROCEDURE — 36415 COLL VENOUS BLD VENIPUNCTURE: CPT | Performed by: STUDENT IN AN ORGANIZED HEALTH CARE EDUCATION/TRAINING PROGRAM

## 2017-01-01 PROCEDURE — 85025 COMPLETE CBC W/AUTO DIFF WBC: CPT | Performed by: PHYSICIAN ASSISTANT

## 2017-01-01 PROCEDURE — 70450 CT HEAD/BRAIN W/O DYE: CPT

## 2017-01-01 PROCEDURE — 36415 COLL VENOUS BLD VENIPUNCTURE: CPT

## 2017-01-01 PROCEDURE — 82378 CARCINOEMBRYONIC ANTIGEN: CPT | Performed by: PHYSICIAN ASSISTANT

## 2017-01-01 PROCEDURE — 80048 BASIC METABOLIC PNL TOTAL CA: CPT | Performed by: EMERGENCY MEDICINE

## 2017-01-01 PROCEDURE — 85610 PROTHROMBIN TIME: CPT | Performed by: PHYSICIAN ASSISTANT

## 2017-01-01 PROCEDURE — 96366 THER/PROPH/DIAG IV INF ADDON: CPT

## 2017-01-01 PROCEDURE — 12000008 ZZH R&B INTERMEDIATE UMMC

## 2017-01-01 PROCEDURE — 25000132 ZZH RX MED GY IP 250 OP 250 PS 637

## 2017-01-01 PROCEDURE — 84484 ASSAY OF TROPONIN QUANT: CPT | Mod: 91 | Performed by: NURSE PRACTITIONER

## 2017-01-01 PROCEDURE — 97535 SELF CARE MNGMENT TRAINING: CPT | Mod: GO

## 2017-01-01 PROCEDURE — G0179 MD RECERTIFICATION HHA PT: HCPCS | Performed by: INTERNAL MEDICINE

## 2017-01-01 PROCEDURE — 81001 URINALYSIS AUTO W/SCOPE: CPT | Performed by: EMERGENCY MEDICINE

## 2017-01-01 PROCEDURE — 85610 PROTHROMBIN TIME: CPT | Performed by: EMERGENCY MEDICINE

## 2017-01-01 PROCEDURE — 40000193 ZZH STATISTIC PT WARD VISIT

## 2017-01-01 PROCEDURE — 99285 EMERGENCY DEPT VISIT HI MDM: CPT | Mod: 25 | Performed by: EMERGENCY MEDICINE

## 2017-01-01 PROCEDURE — 25000128 H RX IP 250 OP 636: Performed by: EMERGENCY MEDICINE

## 2017-01-01 PROCEDURE — 97530 THERAPEUTIC ACTIVITIES: CPT | Mod: GP

## 2017-01-01 PROCEDURE — 80048 BASIC METABOLIC PNL TOTAL CA: CPT | Performed by: INTERNAL MEDICINE

## 2017-01-01 PROCEDURE — G0378 HOSPITAL OBSERVATION PER HR: HCPCS

## 2017-01-01 PROCEDURE — 27210995 ZZH RX 272

## 2017-01-01 PROCEDURE — 25000132 ZZH RX MED GY IP 250 OP 250 PS 637: Performed by: EMERGENCY MEDICINE

## 2017-01-01 PROCEDURE — 86300 IMMUNOASSAY TUMOR CA 15-3: CPT | Performed by: PHYSICIAN ASSISTANT

## 2017-01-01 PROCEDURE — 97116 GAIT TRAINING THERAPY: CPT | Mod: GP

## 2017-01-01 PROCEDURE — 99207 ZZC NO CHARGE NURSE ONLY: CPT | Performed by: INTERNAL MEDICINE

## 2017-01-01 PROCEDURE — 85610 PROTHROMBIN TIME: CPT | Performed by: STUDENT IN AN ORGANIZED HEALTH CARE EDUCATION/TRAINING PROGRAM

## 2017-01-01 PROCEDURE — 87086 URINE CULTURE/COLONY COUNT: CPT | Performed by: EMERGENCY MEDICINE

## 2017-01-01 PROCEDURE — 36415 COLL VENOUS BLD VENIPUNCTURE: CPT | Performed by: NURSE PRACTITIONER

## 2017-01-01 PROCEDURE — 96374 THER/PROPH/DIAG INJ IV PUSH: CPT | Performed by: EMERGENCY MEDICINE

## 2017-01-01 PROCEDURE — 99212 OFFICE O/P EST SF 10 MIN: CPT | Mod: ZF

## 2017-01-01 PROCEDURE — 34300033 ZZH RX 343: Performed by: PHYSICIAN ASSISTANT

## 2017-01-01 PROCEDURE — 71010 XR CHEST PORT 1 VW: CPT

## 2017-01-01 PROCEDURE — 84100 ASSAY OF PHOSPHORUS: CPT | Performed by: EMERGENCY MEDICINE

## 2017-01-01 PROCEDURE — 85610 PROTHROMBIN TIME: CPT | Performed by: NURSE PRACTITIONER

## 2017-01-01 PROCEDURE — 40000556 ZZH STATISTIC PERIPHERAL IV START W US GUIDANCE

## 2017-01-01 PROCEDURE — 74177 CT ABD & PELVIS W/CONTRAST: CPT | Mod: PS

## 2017-01-01 PROCEDURE — 80053 COMPREHEN METABOLIC PANEL: CPT | Performed by: PHYSICIAN ASSISTANT

## 2017-01-01 PROCEDURE — 87088 URINE BACTERIA CULTURE: CPT | Performed by: EMERGENCY MEDICINE

## 2017-01-01 PROCEDURE — 99207 ZZC NO BILLABLE SERVICE THIS VISIT: CPT | Mod: ZP | Performed by: PHYSICIAN ASSISTANT

## 2017-01-01 PROCEDURE — 97110 THERAPEUTIC EXERCISES: CPT | Mod: GP

## 2017-01-01 PROCEDURE — 95921 AUTONOMIC NRV PARASYM INERVJ: CPT

## 2017-01-01 PROCEDURE — 93005 ELECTROCARDIOGRAM TRACING: CPT | Performed by: EMERGENCY MEDICINE

## 2017-01-01 PROCEDURE — 83735 ASSAY OF MAGNESIUM: CPT | Performed by: EMERGENCY MEDICINE

## 2017-01-01 PROCEDURE — 85025 COMPLETE CBC W/AUTO DIFF WBC: CPT | Performed by: EMERGENCY MEDICINE

## 2017-01-01 PROCEDURE — 40000802 ZZH SITE CHECK

## 2017-01-01 PROCEDURE — A9552 F18 FDG: HCPCS | Performed by: PHYSICIAN ASSISTANT

## 2017-01-01 PROCEDURE — 99225 ZZC SUBSEQUENT OBSERVATION CARE,LEVEL II: CPT | Mod: GC | Performed by: INTERNAL MEDICINE

## 2017-01-01 PROCEDURE — 84100 ASSAY OF PHOSPHORUS: CPT | Performed by: NURSE PRACTITIONER

## 2017-01-01 PROCEDURE — 85027 COMPLETE CBC AUTOMATED: CPT | Performed by: INTERNAL MEDICINE

## 2017-01-01 PROCEDURE — 25500064 ZZH RX 255 OP 636: Performed by: EMERGENCY MEDICINE

## 2017-01-01 PROCEDURE — 99212 OFFICE O/P EST SF 10 MIN: CPT | Mod: 25,ZF

## 2017-01-01 PROCEDURE — 25000132 ZZH RX MED GY IP 250 OP 250 PS 637: Performed by: PSYCHIATRY & NEUROLOGY

## 2017-01-01 PROCEDURE — 87040 BLOOD CULTURE FOR BACTERIA: CPT | Performed by: INTERNAL MEDICINE

## 2017-01-01 PROCEDURE — G8979 MOBILITY GOAL STATUS: HCPCS | Mod: GP

## 2017-01-01 PROCEDURE — 85610 PROTHROMBIN TIME: CPT | Performed by: PSYCHIATRY & NEUROLOGY

## 2017-01-01 PROCEDURE — G8979 MOBILITY GOAL STATUS: HCPCS | Mod: GP,CJ | Performed by: PHYSICAL THERAPIST

## 2017-01-01 PROCEDURE — 80048 BASIC METABOLIC PNL TOTAL CA: CPT | Performed by: STUDENT IN AN ORGANIZED HEALTH CARE EDUCATION/TRAINING PROGRAM

## 2017-01-01 PROCEDURE — 99214 OFFICE O/P EST MOD 30 MIN: CPT | Mod: GC | Performed by: INTERNAL MEDICINE

## 2017-01-01 PROCEDURE — 25000128 H RX IP 250 OP 636: Performed by: NURSE PRACTITIONER

## 2017-01-01 PROCEDURE — 83605 ASSAY OF LACTIC ACID: CPT | Performed by: INTERNAL MEDICINE

## 2017-01-01 PROCEDURE — 25000128 H RX IP 250 OP 636: Performed by: PHYSICIAN ASSISTANT

## 2017-01-01 PROCEDURE — 99214 OFFICE O/P EST MOD 30 MIN: CPT | Mod: ZP | Performed by: PHYSICIAN ASSISTANT

## 2017-01-01 PROCEDURE — G8978 MOBILITY CURRENT STATUS: HCPCS | Mod: CI,GP

## 2017-01-01 PROCEDURE — 00000146 ZZHCL STATISTIC GLUCOSE BY METER IP

## 2017-01-01 PROCEDURE — 71260 CT THORAX DX C+: CPT | Mod: PS

## 2017-01-01 PROCEDURE — 99220 ZZC INITIAL OBSERVATION CARE,LEVL III: CPT | Mod: AI | Performed by: INTERNAL MEDICINE

## 2017-01-01 PROCEDURE — 97162 PT EVAL MOD COMPLEX 30 MIN: CPT | Mod: GP

## 2017-01-01 PROCEDURE — 93660 TILT TABLE EVALUATION: CPT

## 2017-01-01 PROCEDURE — 25000128 H RX IP 250 OP 636: Performed by: INTERNAL MEDICINE

## 2017-01-01 PROCEDURE — 25000128 H RX IP 250 OP 636: Performed by: STUDENT IN AN ORGANIZED HEALTH CARE EDUCATION/TRAINING PROGRAM

## 2017-01-01 PROCEDURE — 99217 ZZC OBSERVATION CARE DISCHARGE: CPT | Mod: GC | Performed by: INTERNAL MEDICINE

## 2017-01-01 PROCEDURE — 81003 URINALYSIS AUTO W/O SCOPE: CPT | Performed by: EMERGENCY MEDICINE

## 2017-01-01 PROCEDURE — G8978 MOBILITY CURRENT STATUS: HCPCS | Mod: GP,CJ | Performed by: PHYSICAL THERAPIST

## 2017-01-01 PROCEDURE — 96365 THER/PROPH/DIAG IV INF INIT: CPT | Performed by: EMERGENCY MEDICINE

## 2017-01-01 PROCEDURE — G8980 MOBILITY D/C STATUS: HCPCS | Mod: GP,CJ | Performed by: PHYSICAL THERAPIST

## 2017-01-01 PROCEDURE — 95921 AUTONOMIC NRV PARASYM INERVJ: CPT | Mod: 26 | Performed by: INTERNAL MEDICINE

## 2017-01-01 PROCEDURE — 40000264 ECHO COMPLETE WITH OPTISON

## 2017-01-01 PROCEDURE — 99217 ZZC OBSERVATION CARE DISCHARGE: CPT | Mod: Z6 | Performed by: INTERNAL MEDICINE

## 2017-01-01 PROCEDURE — 86255 FLUORESCENT ANTIBODY SCREEN: CPT | Performed by: STUDENT IN AN ORGANIZED HEALTH CARE EDUCATION/TRAINING PROGRAM

## 2017-01-01 PROCEDURE — 36415 COLL VENOUS BLD VENIPUNCTURE: CPT | Performed by: INTERNAL MEDICINE

## 2017-01-01 PROCEDURE — 82962 GLUCOSE BLOOD TEST: CPT

## 2017-01-01 PROCEDURE — 40000133 ZZH STATISTIC OT WARD VISIT

## 2017-01-01 PROCEDURE — 80076 HEPATIC FUNCTION PANEL: CPT | Performed by: EMERGENCY MEDICINE

## 2017-01-01 PROCEDURE — 84484 ASSAY OF TROPONIN QUANT: CPT | Performed by: EMERGENCY MEDICINE

## 2017-01-01 PROCEDURE — 96375 TX/PRO/DX INJ NEW DRUG ADDON: CPT

## 2017-01-01 PROCEDURE — 84999 UNLISTED CHEMISTRY PROCEDURE: CPT | Performed by: STUDENT IN AN ORGANIZED HEALTH CARE EDUCATION/TRAINING PROGRAM

## 2017-01-01 PROCEDURE — 83880 ASSAY OF NATRIURETIC PEPTIDE: CPT | Performed by: EMERGENCY MEDICINE

## 2017-01-01 PROCEDURE — 97530 THERAPEUTIC ACTIVITIES: CPT | Mod: GP | Performed by: PHYSICAL THERAPIST

## 2017-01-01 PROCEDURE — 86341 ISLET CELL ANTIBODY: CPT | Performed by: STUDENT IN AN ORGANIZED HEALTH CARE EDUCATION/TRAINING PROGRAM

## 2017-01-01 PROCEDURE — 96361 HYDRATE IV INFUSION ADD-ON: CPT | Performed by: EMERGENCY MEDICINE

## 2017-01-01 PROCEDURE — 85027 COMPLETE CBC AUTOMATED: CPT | Performed by: STUDENT IN AN ORGANIZED HEALTH CARE EDUCATION/TRAINING PROGRAM

## 2017-01-01 PROCEDURE — 40000166 ZZH STATISTIC PP CARE STAGE 1

## 2017-01-01 PROCEDURE — 83036 HEMOGLOBIN GLYCOSYLATED A1C: CPT | Performed by: NURSE PRACTITIONER

## 2017-01-01 PROCEDURE — 4A03XB1 MEASUREMENT OF ARTERIAL PRESSURE, PERIPHERAL, EXTERNAL APPROACH: ICD-10-PCS | Performed by: INTERNAL MEDICINE

## 2017-01-01 PROCEDURE — 93660 TILT TABLE EVALUATION: CPT | Mod: 26 | Performed by: INTERNAL MEDICINE

## 2017-01-01 PROCEDURE — 97165 OT EVAL LOW COMPLEX 30 MIN: CPT | Mod: GO

## 2017-01-01 PROCEDURE — 85610 PROTHROMBIN TIME: CPT | Performed by: INTERNAL MEDICINE

## 2017-01-01 PROCEDURE — 36415 COLL VENOUS BLD VENIPUNCTURE: CPT | Performed by: EMERGENCY MEDICINE

## 2017-01-01 PROCEDURE — 99214 OFFICE O/P EST MOD 30 MIN: CPT | Mod: ZP | Performed by: INTERNAL MEDICINE

## 2017-01-01 PROCEDURE — 83519 RIA NONANTIBODY: CPT | Performed by: STUDENT IN AN ORGANIZED HEALTH CARE EDUCATION/TRAINING PROGRAM

## 2017-01-01 PROCEDURE — 99225 ZZC SUBSEQUENT OBSERVATION CARE,LEVEL II: CPT | Mod: Z6 | Performed by: NURSE PRACTITIONER

## 2017-01-01 PROCEDURE — 85025 COMPLETE CBC W/AUTO DIFF WBC: CPT | Performed by: NURSE PRACTITIONER

## 2017-01-01 PROCEDURE — 96375 TX/PRO/DX INJ NEW DRUG ADDON: CPT | Performed by: EMERGENCY MEDICINE

## 2017-01-01 PROCEDURE — 40000559 ZZH STATISTIC FAILED PERIPHERAL IV START

## 2017-01-01 PROCEDURE — 93306 TTE W/DOPPLER COMPLETE: CPT | Mod: 26 | Performed by: INTERNAL MEDICINE

## 2017-01-01 PROCEDURE — 99225 ZZC SUBSEQUENT OBSERVATION CARE,LEVEL II: CPT | Mod: Z6 | Performed by: FAMILY MEDICINE

## 2017-01-01 PROCEDURE — 99220 ZZC INITIAL OBSERVATION CARE,LEVL III: CPT | Mod: Z6 | Performed by: NURSE PRACTITIONER

## 2017-01-01 PROCEDURE — 85025 COMPLETE CBC W/AUTO DIFF WBC: CPT | Performed by: STUDENT IN AN ORGANIZED HEALTH CARE EDUCATION/TRAINING PROGRAM

## 2017-01-01 PROCEDURE — 95951 ZZHC EEG VIDEO < 12 HR: CPT | Mod: 52,ZF

## 2017-01-01 PROCEDURE — 4A02XFZ MEASUREMENT OF CARDIAC RHYTHM, EXTERNAL APPROACH: ICD-10-PCS | Performed by: INTERNAL MEDICINE

## 2017-01-01 PROCEDURE — 96365 THER/PROPH/DIAG IV INF INIT: CPT

## 2017-01-01 PROCEDURE — 80053 COMPREHEN METABOLIC PANEL: CPT | Performed by: STUDENT IN AN ORGANIZED HEALTH CARE EDUCATION/TRAINING PROGRAM

## 2017-01-01 PROCEDURE — 82565 ASSAY OF CREATININE: CPT | Mod: 59

## 2017-01-01 PROCEDURE — 97161 PT EVAL LOW COMPLEX 20 MIN: CPT | Mod: GP

## 2017-01-01 PROCEDURE — 87186 SC STD MICRODIL/AGAR DIL: CPT | Performed by: EMERGENCY MEDICINE

## 2017-01-01 PROCEDURE — 99207 ZZC APP CREDIT; MD BILLING SHARED VISIT: CPT | Mod: 25 | Performed by: EMERGENCY MEDICINE

## 2017-01-01 PROCEDURE — 4A00X2Z MEASUREMENT OF CENTRAL NERVOUS CONDUCTIVITY, EXTERNAL APPROACH: ICD-10-PCS | Performed by: INTERNAL MEDICINE

## 2017-01-01 RX ORDER — NALOXONE HYDROCHLORIDE 0.4 MG/ML
.1-.4 INJECTION, SOLUTION INTRAMUSCULAR; INTRAVENOUS; SUBCUTANEOUS
Status: DISCONTINUED | OUTPATIENT
Start: 2017-01-01 | End: 2017-01-01 | Stop reason: HOSPADM

## 2017-01-01 RX ORDER — MIDODRINE HYDROCHLORIDE 5 MG/1
5 TABLET ORAL 2 TIMES DAILY
Qty: 60 TABLET | Refills: 3 | Status: SHIPPED | OUTPATIENT
Start: 2017-01-01 | End: 2017-01-01

## 2017-01-01 RX ORDER — ATROPINE SULFATE 10 MG/ML
2-4 SOLUTION/ DROPS OPHTHALMIC
Qty: 1 BOTTLE | Refills: 1 | Status: SHIPPED | OUTPATIENT
Start: 2017-01-01

## 2017-01-01 RX ORDER — WARFARIN SODIUM 2 MG/1
2 TABLET ORAL
Status: DISCONTINUED | OUTPATIENT
Start: 2017-01-01 | End: 2017-01-01 | Stop reason: HOSPADM

## 2017-01-01 RX ORDER — HYDRALAZINE HYDROCHLORIDE 10 MG/1
10 TABLET, FILM COATED ORAL EVERY 4 HOURS PRN
Status: DISCONTINUED | OUTPATIENT
Start: 2017-01-01 | End: 2017-01-01

## 2017-01-01 RX ORDER — BISACODYL 10 MG
10 SUPPOSITORY, RECTAL RECTAL 2 TIMES DAILY PRN
Qty: 30 SUPPOSITORY | Refills: 1 | Status: SHIPPED | OUTPATIENT
Start: 2017-01-01

## 2017-01-01 RX ORDER — MORPHINE SULFATE 10 MG/5ML
2.5-5 SOLUTION ORAL
Qty: 15 ML | Refills: 0 | Status: SHIPPED | OUTPATIENT
Start: 2017-01-01

## 2017-01-01 RX ORDER — ACETAMINOPHEN 650 MG/1
650 SUPPOSITORY RECTAL EVERY 4 HOURS PRN
Qty: 60 SUPPOSITORY | Refills: 1 | Status: SHIPPED | OUTPATIENT
Start: 2017-01-01

## 2017-01-01 RX ORDER — ONDANSETRON 2 MG/ML
4 INJECTION INTRAMUSCULAR; INTRAVENOUS EVERY 6 HOURS PRN
Status: DISCONTINUED | OUTPATIENT
Start: 2017-01-01 | End: 2017-01-01 | Stop reason: HOSPADM

## 2017-01-01 RX ORDER — POLYMYXIN B SULFATE AND TRIMETHOPRIM 1; 10000 MG/ML; [USP'U]/ML
1 SOLUTION OPHTHALMIC
Qty: 1 BOTTLE | Refills: 0 | Status: SHIPPED | OUTPATIENT
Start: 2017-01-01 | End: 2017-01-01

## 2017-01-01 RX ORDER — WARFARIN SODIUM 1 MG/1
1 TABLET ORAL
Status: DISCONTINUED | OUTPATIENT
Start: 2017-01-01 | End: 2017-01-01

## 2017-01-01 RX ORDER — LIDOCAINE HYDROCHLORIDE 10 MG/ML
INJECTION, SOLUTION EPIDURAL; INFILTRATION; INTRACAUDAL; PERINEURAL
Status: DISCONTINUED
Start: 2017-01-01 | End: 2017-01-01 | Stop reason: HOSPADM

## 2017-01-01 RX ORDER — DEXTROSE MONOHYDRATE 25 G/50ML
25-50 INJECTION, SOLUTION INTRAVENOUS
Status: DISCONTINUED | OUTPATIENT
Start: 2017-01-01 | End: 2017-01-01 | Stop reason: HOSPADM

## 2017-01-01 RX ORDER — WARFARIN SODIUM 2 MG/1
2 TABLET ORAL
Status: COMPLETED | OUTPATIENT
Start: 2017-01-01 | End: 2017-01-01

## 2017-01-01 RX ORDER — SENNOSIDES 8.6 MG
1-2 TABLET ORAL 2 TIMES DAILY PRN
Status: DISCONTINUED | OUTPATIENT
Start: 2017-01-01 | End: 2017-01-01

## 2017-01-01 RX ORDER — AMOXICILLIN 250 MG
1-2 CAPSULE ORAL 2 TIMES DAILY PRN
Status: DISCONTINUED | OUTPATIENT
Start: 2017-01-01 | End: 2017-01-01 | Stop reason: HOSPADM

## 2017-01-01 RX ORDER — LORAZEPAM 2 MG/ML
1 INJECTION INTRAMUSCULAR ONCE
Status: COMPLETED | OUTPATIENT
Start: 2017-01-01 | End: 2017-01-01

## 2017-01-01 RX ORDER — CEFTRIAXONE 1 G/1
1 INJECTION, POWDER, FOR SOLUTION INTRAMUSCULAR; INTRAVENOUS EVERY 24 HOURS
Status: DISCONTINUED | OUTPATIENT
Start: 2017-01-01 | End: 2017-01-01

## 2017-01-01 RX ORDER — MIDODRINE HYDROCHLORIDE 5 MG/1
10 TABLET ORAL 2 TIMES DAILY
Qty: 60 TABLET | Refills: 3 | Status: SHIPPED | OUTPATIENT
Start: 2017-01-01 | End: 2017-01-01

## 2017-01-01 RX ORDER — FLUDROCORTISONE ACETATE 0.1 MG/1
0.05 TABLET ORAL DAILY
Status: ON HOLD
Start: 2017-01-01 | End: 2017-01-01

## 2017-01-01 RX ORDER — LIDOCAINE 40 MG/G
CREAM TOPICAL
Status: DISCONTINUED | OUTPATIENT
Start: 2017-01-01 | End: 2017-01-01 | Stop reason: HOSPADM

## 2017-01-01 RX ORDER — DOCUSATE SODIUM 100 MG/1
100 CAPSULE, LIQUID FILLED ORAL 2 TIMES DAILY
Status: DISCONTINUED | OUTPATIENT
Start: 2017-01-01 | End: 2017-01-01 | Stop reason: HOSPADM

## 2017-01-01 RX ORDER — IOPAMIDOL 755 MG/ML
20-135 INJECTION, SOLUTION INTRAVASCULAR ONCE
Status: COMPLETED | OUTPATIENT
Start: 2017-01-01 | End: 2017-01-01

## 2017-01-01 RX ORDER — PYRIDOSTIGMINE BROMIDE 60 MG/1
120 TABLET ORAL 3 TIMES DAILY
Status: DISCONTINUED | OUTPATIENT
Start: 2017-01-01 | End: 2017-01-01 | Stop reason: HOSPADM

## 2017-01-01 RX ORDER — MIDODRINE HYDROCHLORIDE 5 MG/1
10 TABLET ORAL
Status: DISCONTINUED | OUTPATIENT
Start: 2017-01-01 | End: 2017-01-01

## 2017-01-01 RX ORDER — FLUDROCORTISONE ACETATE 0.1 MG/1
0.1 TABLET ORAL DAILY
Qty: 20 TABLET | Refills: 0 | Status: SHIPPED | OUTPATIENT
Start: 2017-01-01 | End: 2017-01-01

## 2017-01-01 RX ORDER — LETROZOLE 2.5 MG/1
2.5 TABLET, FILM COATED ORAL DAILY
Qty: 90 TABLET | Refills: 3 | DISCHARGE
Start: 2017-01-01

## 2017-01-01 RX ORDER — POTASSIUM CHLORIDE 750 MG/1
20-40 TABLET, EXTENDED RELEASE ORAL
Status: DISCONTINUED | OUTPATIENT
Start: 2017-01-01 | End: 2017-01-01 | Stop reason: HOSPADM

## 2017-01-01 RX ORDER — MIDODRINE HYDROCHLORIDE 5 MG/1
5 TABLET ORAL
Qty: 90 TABLET | Refills: 0 | Status: SHIPPED | OUTPATIENT
Start: 2017-01-01

## 2017-01-01 RX ORDER — PYRIDOSTIGMINE BROMIDE 60 MG/1
120 TABLET ORAL 3 TIMES DAILY
Qty: 90 TABLET | Refills: 1 | Status: SHIPPED | OUTPATIENT
Start: 2017-01-01

## 2017-01-01 RX ORDER — FLUDROCORTISONE ACETATE 0.1 MG/1
0.1 TABLET ORAL DAILY
Status: DISCONTINUED | OUTPATIENT
Start: 2017-01-01 | End: 2017-01-01 | Stop reason: HOSPADM

## 2017-01-01 RX ORDER — HYDRALAZINE HYDROCHLORIDE 20 MG/ML
10 INJECTION INTRAMUSCULAR; INTRAVENOUS ONCE
Status: COMPLETED | OUTPATIENT
Start: 2017-01-01 | End: 2017-01-01

## 2017-01-01 RX ORDER — OXYBUTYNIN CHLORIDE 5 MG/1
5 TABLET ORAL 2 TIMES DAILY
Status: DISCONTINUED | OUTPATIENT
Start: 2017-01-01 | End: 2017-01-01 | Stop reason: HOSPADM

## 2017-01-01 RX ORDER — BISACODYL 5 MG/1
5 TABLET, DELAYED RELEASE ORAL DAILY
Status: ON HOLD | COMMUNITY
End: 2017-01-01

## 2017-01-01 RX ORDER — POTASSIUM CHLORIDE 750 MG/1
TABLET, FILM COATED, EXTENDED RELEASE ORAL
Qty: 90 TABLET | Refills: 3 | Status: SHIPPED | OUTPATIENT
Start: 2017-01-01

## 2017-01-01 RX ORDER — SODIUM CHLORIDE 9 MG/ML
INJECTION, SOLUTION INTRAVENOUS CONTINUOUS
Status: CANCELLED | OUTPATIENT
Start: 2017-01-01

## 2017-01-01 RX ORDER — ONDANSETRON 4 MG/1
4 TABLET, ORALLY DISINTEGRATING ORAL EVERY 6 HOURS PRN
Status: DISCONTINUED | OUTPATIENT
Start: 2017-01-01 | End: 2017-01-01 | Stop reason: HOSPADM

## 2017-01-01 RX ORDER — SENNOSIDES 8.6 MG
1 TABLET ORAL 2 TIMES DAILY PRN
Qty: 120 EACH | Refills: 0 | Status: ON HOLD | DISCHARGE
Start: 2017-01-01 | End: 2017-01-01

## 2017-01-01 RX ORDER — POTASSIUM CHLORIDE 750 MG/1
TABLET, FILM COATED, EXTENDED RELEASE ORAL
Refills: 0 | COMMUNITY
Start: 2017-01-01 | End: 2017-01-01

## 2017-01-01 RX ORDER — SIMVASTATIN 20 MG
20 TABLET ORAL AT BEDTIME
Status: DISCONTINUED | OUTPATIENT
Start: 2017-01-01 | End: 2017-01-01 | Stop reason: HOSPADM

## 2017-01-01 RX ORDER — BISACODYL 5 MG/1
5 TABLET, DELAYED RELEASE ORAL DAILY PRN
Qty: 30 TABLET | Status: ON HOLD | DISCHARGE
Start: 2017-01-01 | End: 2017-01-01

## 2017-01-01 RX ORDER — HALOPERIDOL 0.5 MG/1
.5-1 TABLET ORAL EVERY 6 HOURS PRN
Qty: 60 TABLET | Refills: 0 | Status: SHIPPED | OUTPATIENT
Start: 2017-01-01

## 2017-01-01 RX ORDER — SIMVASTATIN 20 MG
20 TABLET ORAL AT BEDTIME
Qty: 90 TABLET | Refills: 1 | Status: ON HOLD | DISCHARGE
Start: 2017-01-01 | End: 2017-01-01

## 2017-01-01 RX ORDER — PROCHLORPERAZINE MALEATE 5 MG
5 TABLET ORAL EVERY 6 HOURS PRN
Status: DISCONTINUED | OUTPATIENT
Start: 2017-01-01 | End: 2017-01-01 | Stop reason: HOSPADM

## 2017-01-01 RX ORDER — ATROPINE SULFATE 10 MG/ML
2-4 SOLUTION/ DROPS OPHTHALMIC
Status: DISCONTINUED | OUTPATIENT
Start: 2017-01-01 | End: 2017-01-01 | Stop reason: HOSPADM

## 2017-01-01 RX ORDER — MIDODRINE HYDROCHLORIDE 5 MG/1
10 TABLET ORAL 2 TIMES DAILY
Qty: 120 TABLET | Refills: 3 | Status: ON HOLD | OUTPATIENT
Start: 2017-01-01 | End: 2017-01-01

## 2017-01-01 RX ORDER — POTASSIUM CHLORIDE 29.8 MG/ML
20 INJECTION INTRAVENOUS
Status: DISCONTINUED | OUTPATIENT
Start: 2017-01-01 | End: 2017-01-01 | Stop reason: HOSPADM

## 2017-01-01 RX ORDER — AMLODIPINE BESYLATE 5 MG/1
5 TABLET ORAL DAILY
Qty: 90 TABLET | Refills: 3 | Status: ON HOLD | OUTPATIENT
Start: 2017-01-01 | End: 2017-01-01

## 2017-01-01 RX ORDER — SODIUM CHLORIDE 9 MG/ML
1000 INJECTION, SOLUTION INTRAVENOUS CONTINUOUS
Status: DISCONTINUED | OUTPATIENT
Start: 2017-01-01 | End: 2017-01-01

## 2017-01-01 RX ORDER — ACETAMINOPHEN 325 MG/1
650 TABLET ORAL EVERY 4 HOURS PRN
Status: DISCONTINUED | OUTPATIENT
Start: 2017-01-01 | End: 2017-01-01 | Stop reason: HOSPADM

## 2017-01-01 RX ORDER — NICOTINE POLACRILEX 4 MG
15-30 LOZENGE BUCCAL
Status: DISCONTINUED | OUTPATIENT
Start: 2017-01-01 | End: 2017-01-01 | Stop reason: HOSPADM

## 2017-01-01 RX ORDER — PYRIDOSTIGMINE BROMIDE 60 MG/1
60 TABLET ORAL EVERY 8 HOURS SCHEDULED
Status: DISCONTINUED | OUTPATIENT
Start: 2017-01-01 | End: 2017-01-01

## 2017-01-01 RX ORDER — MORPHINE SULFATE 10 MG/5ML
2.5-5 SOLUTION ORAL
Status: DISCONTINUED | OUTPATIENT
Start: 2017-01-01 | End: 2017-01-01 | Stop reason: HOSPADM

## 2017-01-01 RX ORDER — CARVEDILOL 3.12 MG/1
3.12 TABLET ORAL DAILY
Qty: 30 TABLET | Refills: 4 | Status: ON HOLD | OUTPATIENT
Start: 2017-01-01 | End: 2017-01-01

## 2017-01-01 RX ORDER — HEPARIN SODIUM (PORCINE) LOCK FLUSH IV SOLN 100 UNIT/ML 100 UNIT/ML
5 SOLUTION INTRAVENOUS
Status: DISCONTINUED | OUTPATIENT
Start: 2017-01-01 | End: 2017-01-01 | Stop reason: CLARIF

## 2017-01-01 RX ORDER — WARFARIN SODIUM 1 MG/1
2 TABLET ORAL DAILY
Qty: 20 TABLET | DISCHARGE
Start: 2017-01-01

## 2017-01-01 RX ORDER — SODIUM CHLORIDE 1 G/1
1 TABLET ORAL DAILY
Qty: 30 TABLET | Refills: 3 | Status: SHIPPED | OUTPATIENT
Start: 2017-01-01

## 2017-01-01 RX ORDER — LETROZOLE 2.5 MG/1
2.5 TABLET, FILM COATED ORAL DAILY
Status: DISCONTINUED | OUTPATIENT
Start: 2017-01-01 | End: 2017-01-01 | Stop reason: HOSPADM

## 2017-01-01 RX ORDER — AMOXICILLIN 250 MG
1-2 CAPSULE ORAL 2 TIMES DAILY PRN
Qty: 100 TABLET | Refills: 1 | Status: SHIPPED | OUTPATIENT
Start: 2017-01-01

## 2017-01-01 RX ORDER — LETROZOLE 2.5 MG/1
2.5 TABLET, FILM COATED ORAL DAILY
Status: DISCONTINUED | OUTPATIENT
Start: 2017-01-01 | End: 2017-01-01

## 2017-01-01 RX ORDER — POTASSIUM CHLORIDE 7.45 MG/ML
10 INJECTION INTRAVENOUS
Status: DISCONTINUED | OUTPATIENT
Start: 2017-01-01 | End: 2017-01-01 | Stop reason: HOSPADM

## 2017-01-01 RX ORDER — MAGNESIUM SULFATE HEPTAHYDRATE 40 MG/ML
4 INJECTION, SOLUTION INTRAVENOUS EVERY 4 HOURS PRN
Status: DISCONTINUED | OUTPATIENT
Start: 2017-01-01 | End: 2017-01-01 | Stop reason: HOSPADM

## 2017-01-01 RX ORDER — CARVEDILOL 3.12 MG/1
6.25 TABLET ORAL DAILY
Qty: 30 TABLET | Refills: 4 | Status: SHIPPED | OUTPATIENT
Start: 2017-01-01 | End: 2017-01-01

## 2017-01-01 RX ORDER — SODIUM CHLORIDE 9 MG/ML
INJECTION, SOLUTION INTRAVENOUS CONTINUOUS
Status: DISCONTINUED | OUTPATIENT
Start: 2017-01-01 | End: 2017-01-01

## 2017-01-01 RX ORDER — HYDRALAZINE HCL 10 MG
5 TABLET ORAL ONCE
Status: DISCONTINUED | OUTPATIENT
Start: 2017-01-01 | End: 2017-01-01

## 2017-01-01 RX ORDER — LORAZEPAM 0.5 MG/1
.25-.5 TABLET ORAL EVERY 4 HOURS PRN
Status: DISCONTINUED | OUTPATIENT
Start: 2017-01-01 | End: 2017-01-01 | Stop reason: HOSPADM

## 2017-01-01 RX ORDER — WARFARIN SODIUM 3 MG/1
3 TABLET ORAL
Status: COMPLETED | OUTPATIENT
Start: 2017-01-01 | End: 2017-01-01

## 2017-01-01 RX ORDER — CEFTRIAXONE 1 G/1
1 INJECTION, POWDER, FOR SOLUTION INTRAMUSCULAR; INTRAVENOUS ONCE
Status: COMPLETED | OUTPATIENT
Start: 2017-01-01 | End: 2017-01-01

## 2017-01-01 RX ORDER — POTASSIUM CHLORIDE 1.5 G/1.58G
20-40 POWDER, FOR SOLUTION ORAL
Status: DISCONTINUED | OUTPATIENT
Start: 2017-01-01 | End: 2017-01-01 | Stop reason: HOSPADM

## 2017-01-01 RX ORDER — HYDRALAZINE HYDROCHLORIDE 25 MG/1
25 TABLET, FILM COATED ORAL ONCE
Status: DISCONTINUED | OUTPATIENT
Start: 2017-01-01 | End: 2017-01-01

## 2017-01-01 RX ORDER — WARFARIN SODIUM 1 MG/1
1 TABLET ORAL
Status: COMPLETED | OUTPATIENT
Start: 2017-01-01 | End: 2017-01-01

## 2017-01-01 RX ORDER — ONDANSETRON 2 MG/ML
4 INJECTION INTRAMUSCULAR; INTRAVENOUS EVERY 30 MIN PRN
Status: DISCONTINUED | OUTPATIENT
Start: 2017-01-01 | End: 2017-01-01 | Stop reason: CLARIF

## 2017-01-01 RX ORDER — POTASSIUM CL/LIDO/0.9 % NACL 10MEQ/0.1L
10 INTRAVENOUS SOLUTION, PIGGYBACK (ML) INTRAVENOUS
Status: DISCONTINUED | OUTPATIENT
Start: 2017-01-01 | End: 2017-01-01 | Stop reason: HOSPADM

## 2017-01-01 RX ORDER — AMLODIPINE BESYLATE 5 MG/1
5 TABLET ORAL DAILY
Refills: 0 | COMMUNITY
Start: 2017-01-01 | End: 2017-01-01

## 2017-01-01 RX ORDER — CEFDINIR 300 MG/1
300 CAPSULE ORAL 2 TIMES DAILY
Status: DISCONTINUED | OUTPATIENT
Start: 2017-01-01 | End: 2017-01-01 | Stop reason: HOSPADM

## 2017-01-01 RX ORDER — HYDRALAZINE HYDROCHLORIDE 25 MG/1
25 TABLET, FILM COATED ORAL EVERY 4 HOURS PRN
Status: DISCONTINUED | OUTPATIENT
Start: 2017-01-01 | End: 2017-01-01

## 2017-01-01 RX ORDER — SODIUM CHLORIDE 9 MG/ML
INJECTION, SOLUTION INTRAVENOUS CONTINUOUS
Status: DISCONTINUED | OUTPATIENT
Start: 2017-01-01 | End: 2017-01-01 | Stop reason: HOSPADM

## 2017-01-01 RX ORDER — SODIUM CHLORIDE 1 G/1
1 TABLET ORAL DAILY
Status: DISCONTINUED | OUTPATIENT
Start: 2017-01-01 | End: 2017-01-01 | Stop reason: HOSPADM

## 2017-01-01 RX ORDER — PROCHLORPERAZINE 25 MG
12.5 SUPPOSITORY, RECTAL RECTAL EVERY 12 HOURS PRN
Status: DISCONTINUED | OUTPATIENT
Start: 2017-01-01 | End: 2017-01-01 | Stop reason: HOSPADM

## 2017-01-01 RX ORDER — LORAZEPAM 0.5 MG/1
.25-.5 TABLET ORAL EVERY 4 HOURS PRN
Qty: 60 TABLET | Refills: 0 | Status: SHIPPED | OUTPATIENT
Start: 2017-01-01

## 2017-01-01 RX ORDER — BISACODYL 5 MG
5 TABLET, DELAYED RELEASE (ENTERIC COATED) ORAL DAILY
Status: DISCONTINUED | OUTPATIENT
Start: 2017-01-01 | End: 2017-01-01

## 2017-01-01 RX ORDER — ACETAMINOPHEN 650 MG/1
650 SUPPOSITORY RECTAL EVERY 4 HOURS PRN
Status: DISCONTINUED | OUTPATIENT
Start: 2017-01-01 | End: 2017-01-01 | Stop reason: HOSPADM

## 2017-01-01 RX ORDER — POTASSIUM CHLORIDE 750 MG/1
10 TABLET, EXTENDED RELEASE ORAL 2 TIMES DAILY
Status: DISCONTINUED | OUTPATIENT
Start: 2017-01-01 | End: 2017-01-01 | Stop reason: HOSPADM

## 2017-01-01 RX ORDER — HALOPERIDOL 0.5 MG/1
.5-1 TABLET ORAL EVERY 6 HOURS PRN
Status: DISCONTINUED | OUTPATIENT
Start: 2017-01-01 | End: 2017-01-01 | Stop reason: HOSPADM

## 2017-01-01 RX ORDER — WARFARIN SODIUM 2 MG/1
2 TABLET ORAL DAILY
Status: DISCONTINUED | OUTPATIENT
Start: 2017-01-01 | End: 2017-01-01

## 2017-01-01 RX ORDER — LIDOCAINE 40 MG/G
CREAM TOPICAL
Status: CANCELLED | OUTPATIENT
Start: 2017-01-01

## 2017-01-01 RX ORDER — BISACODYL 10 MG
10 SUPPOSITORY, RECTAL RECTAL 2 TIMES DAILY PRN
Status: DISCONTINUED | OUTPATIENT
Start: 2017-01-01 | End: 2017-01-01 | Stop reason: HOSPADM

## 2017-01-01 RX ORDER — SENNOSIDES 8.6 MG
1 TABLET ORAL 2 TIMES DAILY
Status: DISCONTINUED | OUTPATIENT
Start: 2017-01-01 | End: 2017-01-01 | Stop reason: HOSPADM

## 2017-01-01 RX ORDER — HYDRALAZINE HYDROCHLORIDE 20 MG/ML
10 INJECTION INTRAMUSCULAR; INTRAVENOUS EVERY 4 HOURS PRN
Status: DISCONTINUED | OUTPATIENT
Start: 2017-01-01 | End: 2017-01-01

## 2017-01-01 RX ORDER — MIDODRINE HYDROCHLORIDE 5 MG/1
5 TABLET ORAL
Status: DISCONTINUED | OUTPATIENT
Start: 2017-01-01 | End: 2017-01-01 | Stop reason: HOSPADM

## 2017-01-01 RX ADMIN — MIDODRINE HYDROCHLORIDE 5 MG: 5 TABLET ORAL at 09:11

## 2017-01-01 RX ADMIN — LETROZOLE 2.5 MG: 2.5 TABLET, FILM COATED ORAL at 09:11

## 2017-01-01 RX ADMIN — FLUDROCORTISONE ACETATE 0.1 MG: 0.1 TABLET ORAL at 08:08

## 2017-01-01 RX ADMIN — LORAZEPAM 1 MG: 2 INJECTION INTRAMUSCULAR; INTRAVENOUS at 20:45

## 2017-01-01 RX ADMIN — CEFTRIAXONE 1 G: 1 INJECTION, POWDER, FOR SOLUTION INTRAMUSCULAR; INTRAVENOUS at 01:50

## 2017-01-01 RX ADMIN — FLUDROCORTISONE ACETATE 50 MCG: 0.1 TABLET ORAL at 08:57

## 2017-01-01 RX ADMIN — CEFDINIR 300 MG: 300 CAPSULE ORAL at 20:11

## 2017-01-01 RX ADMIN — Medication 90 MG: at 17:02

## 2017-01-01 RX ADMIN — DOCUSATE SODIUM 100 MG: 100 CAPSULE, LIQUID FILLED ORAL at 21:24

## 2017-01-01 RX ADMIN — FLUDROCORTISONE ACETATE 50 MCG: 0.1 TABLET ORAL at 13:11

## 2017-01-01 RX ADMIN — Medication 120 MG: at 09:11

## 2017-01-01 RX ADMIN — WARFARIN SODIUM 2 MG: 2 TABLET ORAL at 18:05

## 2017-01-01 RX ADMIN — LETROZOLE 2.5 MG: 2.5 TABLET, FILM COATED ORAL at 09:49

## 2017-01-01 RX ADMIN — CARVEDILOL 1 MG: 25 TABLET, FILM COATED ORAL at 20:26

## 2017-01-01 RX ADMIN — LETROZOLE 2.5 MG: 2.5 TABLET, FILM COATED ORAL at 08:57

## 2017-01-01 RX ADMIN — MIDODRINE HYDROCHLORIDE 10 MG: 5 TABLET ORAL at 09:35

## 2017-01-01 RX ADMIN — DOCUSATE SODIUM 100 MG: 100 CAPSULE, LIQUID FILLED ORAL at 09:13

## 2017-01-01 RX ADMIN — MIDODRINE HYDROCHLORIDE 5 MG: 5 TABLET ORAL at 09:48

## 2017-01-01 RX ADMIN — CEFDINIR 300 MG: 300 CAPSULE ORAL at 08:10

## 2017-01-01 RX ADMIN — SIMVASTATIN 20 MG: 20 TABLET, FILM COATED ORAL at 22:11

## 2017-01-01 RX ADMIN — SIMVASTATIN 20 MG: 20 TABLET, FILM COATED ORAL at 21:53

## 2017-01-01 RX ADMIN — MIDODRINE HYDROCHLORIDE 5 MG: 5 TABLET ORAL at 12:40

## 2017-01-01 RX ADMIN — MIDODRINE HYDROCHLORIDE 5 MG: 5 TABLET ORAL at 17:15

## 2017-01-01 RX ADMIN — BISACODYL 5 MG: 5 TABLET, COATED ORAL at 09:11

## 2017-01-01 RX ADMIN — CEFDINIR 300 MG: 300 CAPSULE ORAL at 09:05

## 2017-01-01 RX ADMIN — IOPAMIDOL 98 ML: 755 INJECTION, SOLUTION INTRAVENOUS at 15:23

## 2017-01-01 RX ADMIN — LETROZOLE 2.5 MG: 2.5 TABLET, FILM COATED ORAL at 09:20

## 2017-01-01 RX ADMIN — CEFDINIR 300 MG: 300 CAPSULE ORAL at 20:09

## 2017-01-01 RX ADMIN — SODIUM CHLORIDE: 9 INJECTION, SOLUTION INTRAVENOUS at 02:11

## 2017-01-01 RX ADMIN — OXYBUTYNIN CHLORIDE 5 MG: 5 TABLET ORAL at 08:06

## 2017-01-01 RX ADMIN — WARFARIN SODIUM 2 MG: 2 TABLET ORAL at 17:34

## 2017-01-01 RX ADMIN — CEFDINIR 300 MG: 300 CAPSULE ORAL at 09:11

## 2017-01-01 RX ADMIN — DOCUSATE SODIUM 100 MG: 100 CAPSULE, LIQUID FILLED ORAL at 08:06

## 2017-01-01 RX ADMIN — SENNOSIDES 1 TABLET: 8.6 TABLET, FILM COATED ORAL at 21:24

## 2017-01-01 RX ADMIN — POTASSIUM CHLORIDE 10 MEQ: 750 TABLET, EXTENDED RELEASE ORAL at 19:26

## 2017-01-01 RX ADMIN — CEFTRIAXONE 1 G: 1 INJECTION, POWDER, FOR SOLUTION INTRAMUSCULAR; INTRAVENOUS at 22:03

## 2017-01-01 RX ADMIN — POTASSIUM CHLORIDE 10 MEQ: 750 TABLET, EXTENDED RELEASE ORAL at 20:13

## 2017-01-01 RX ADMIN — SODIUM CHLORIDE TAB 1 GM 1 G: 1 TAB at 07:54

## 2017-01-01 RX ADMIN — SODIUM CHLORIDE 500 ML: 9 INJECTION, SOLUTION INTRAVENOUS at 12:22

## 2017-01-01 RX ADMIN — Medication 120 MG: at 14:30

## 2017-01-01 RX ADMIN — SODIUM CHLORIDE 250 ML: 9 INJECTION, SOLUTION INTRAVENOUS at 02:30

## 2017-01-01 RX ADMIN — CEFDINIR 300 MG: 300 CAPSULE ORAL at 19:46

## 2017-01-01 RX ADMIN — LETROZOLE 2.5 MG: 2.5 TABLET, FILM COATED ORAL at 08:10

## 2017-01-01 RX ADMIN — WARFARIN SODIUM 2 MG: 2 TABLET ORAL at 19:16

## 2017-01-01 RX ADMIN — PYRIDOSTIGMINE BROMIDE 60 MG: 60 TABLET ORAL at 22:12

## 2017-01-01 RX ADMIN — SODIUM CHLORIDE TAB 1 GM 1 G: 1 TAB at 09:17

## 2017-01-01 RX ADMIN — CEFTRIAXONE 1 G: 1 INJECTION, POWDER, FOR SOLUTION INTRAMUSCULAR; INTRAVENOUS at 00:41

## 2017-01-01 RX ADMIN — CEFTRIAXONE 1 G: 1 INJECTION, POWDER, FOR SOLUTION INTRAMUSCULAR; INTRAVENOUS at 00:59

## 2017-01-01 RX ADMIN — CEFDINIR 300 MG: 300 CAPSULE ORAL at 07:54

## 2017-01-01 RX ADMIN — ONDANSETRON 4 MG: 2 INJECTION INTRAMUSCULAR; INTRAVENOUS at 22:13

## 2017-01-01 RX ADMIN — SODIUM CHLORIDE 500 ML: 9 INJECTION, SOLUTION INTRAVENOUS at 19:43

## 2017-01-01 RX ADMIN — MIDODRINE HYDROCHLORIDE 5 MG: 5 TABLET ORAL at 09:07

## 2017-01-01 RX ADMIN — CEFDINIR 300 MG: 300 CAPSULE ORAL at 20:53

## 2017-01-01 RX ADMIN — WARFARIN SODIUM 2 MG: 2 TABLET ORAL at 17:27

## 2017-01-01 RX ADMIN — HYDRALAZINE HYDROCHLORIDE 10 MG: 20 INJECTION INTRAMUSCULAR; INTRAVENOUS at 00:30

## 2017-01-01 RX ADMIN — POTASSIUM CHLORIDE 10 MEQ: 750 TABLET, EXTENDED RELEASE ORAL at 08:09

## 2017-01-01 RX ADMIN — WARFARIN SODIUM 1 MG: 1 TABLET ORAL at 18:40

## 2017-01-01 RX ADMIN — CEFTRIAXONE 1 G: 1 INJECTION, POWDER, FOR SOLUTION INTRAMUSCULAR; INTRAVENOUS at 23:19

## 2017-01-01 RX ADMIN — CEFDINIR 300 MG: 300 CAPSULE ORAL at 19:17

## 2017-01-01 RX ADMIN — LETROZOLE 2.5 MG: 2.5 TABLET, FILM COATED ORAL at 07:53

## 2017-01-01 RX ADMIN — CEFDINIR 300 MG: 300 CAPSULE ORAL at 19:26

## 2017-01-01 RX ADMIN — Medication 30 MG: at 14:09

## 2017-01-01 RX ADMIN — SENNOSIDES 1 TABLET: 8.6 TABLET, FILM COATED ORAL at 09:13

## 2017-01-01 RX ADMIN — MIDODRINE HYDROCHLORIDE 10 MG: 5 TABLET ORAL at 07:51

## 2017-01-01 RX ADMIN — MIDODRINE HYDROCHLORIDE 5 MG: 5 TABLET ORAL at 19:16

## 2017-01-01 RX ADMIN — SODIUM CHLORIDE TAB 1 GM 1 G: 1 TAB at 07:51

## 2017-01-01 RX ADMIN — MIDODRINE HYDROCHLORIDE 5 MG: 5 TABLET ORAL at 17:34

## 2017-01-01 RX ADMIN — SODIUM CHLORIDE TAB 1 GM 1 G: 1 TAB at 09:11

## 2017-01-01 RX ADMIN — LETROZOLE 2.5 MG: 2.5 TABLET, FILM COATED ORAL at 09:22

## 2017-01-01 RX ADMIN — DOCUSATE SODIUM 100 MG: 100 CAPSULE, LIQUID FILLED ORAL at 08:57

## 2017-01-01 RX ADMIN — LETROZOLE 2.5 MG: 2.5 TABLET, FILM COATED ORAL at 08:06

## 2017-01-01 RX ADMIN — POTASSIUM CHLORIDE 10 MEQ: 750 TABLET, EXTENDED RELEASE ORAL at 19:46

## 2017-01-01 RX ADMIN — PYRIDOSTIGMINE BROMIDE 60 MG: 60 TABLET ORAL at 15:27

## 2017-01-01 RX ADMIN — SODIUM CHLORIDE TAB 1 GM 1 G: 1 TAB at 09:48

## 2017-01-01 RX ADMIN — MIDODRINE HYDROCHLORIDE 10 MG: 5 TABLET ORAL at 08:09

## 2017-01-01 RX ADMIN — MIDODRINE HYDROCHLORIDE 5 MG: 5 TABLET ORAL at 14:29

## 2017-01-01 RX ADMIN — SODIUM CHLORIDE TAB 1 GM 1 G: 1 TAB at 09:07

## 2017-01-01 RX ADMIN — SODIUM PHOSPHATE, MONOBASIC, MONOHYDRATE AND SODIUM PHOSPHATE, DIBASIC, ANHYDROUS 15 MMOL: 276; 142 INJECTION, SOLUTION INTRAVENOUS at 15:47

## 2017-01-01 RX ADMIN — MIDODRINE HYDROCHLORIDE 10 MG: 5 TABLET ORAL at 17:52

## 2017-01-01 RX ADMIN — SIMVASTATIN 20 MG: 20 TABLET, FILM COATED ORAL at 22:32

## 2017-01-01 RX ADMIN — Medication 120 MG: at 19:26

## 2017-01-01 RX ADMIN — Medication 120 MG: at 09:15

## 2017-01-01 RX ADMIN — LETROZOLE 2.5 MG: 2.5 TABLET, FILM COATED ORAL at 09:07

## 2017-01-01 RX ADMIN — MIDODRINE HYDROCHLORIDE 5 MG: 5 TABLET ORAL at 13:14

## 2017-01-01 RX ADMIN — MIDODRINE HYDROCHLORIDE 10 MG: 5 TABLET ORAL at 18:51

## 2017-01-01 RX ADMIN — POTASSIUM CHLORIDE 10 MEQ: 750 TABLET, EXTENDED RELEASE ORAL at 07:51

## 2017-01-01 RX ADMIN — OXYBUTYNIN CHLORIDE 5 MG: 5 TABLET ORAL at 09:13

## 2017-01-01 RX ADMIN — OXYBUTYNIN CHLORIDE 5 MG: 5 TABLET ORAL at 21:24

## 2017-01-01 RX ADMIN — FLUDEOXYGLUCOSE F-18 13.58 MCI.: 500 INJECTION, SOLUTION INTRAVENOUS at 14:26

## 2017-01-01 RX ADMIN — SIMVASTATIN 20 MG: 20 TABLET, FILM COATED ORAL at 21:50

## 2017-01-01 RX ADMIN — POTASSIUM CHLORIDE 10 MEQ: 750 TABLET, EXTENDED RELEASE ORAL at 20:53

## 2017-01-01 RX ADMIN — LETROZOLE 2.5 MG: 2.5 TABLET, FILM COATED ORAL at 09:04

## 2017-01-01 RX ADMIN — Medication 30 MG: at 07:01

## 2017-01-01 RX ADMIN — FLUDROCORTISONE ACETATE 0.1 MG: 0.1 TABLET ORAL at 09:20

## 2017-01-01 RX ADMIN — MIDODRINE HYDROCHLORIDE 10 MG: 5 TABLET ORAL at 13:32

## 2017-01-01 RX ADMIN — POTASSIUM CHLORIDE 10 MEQ: 750 TABLET, EXTENDED RELEASE ORAL at 07:54

## 2017-01-01 RX ADMIN — WARFARIN SODIUM 1 MG: 1 TABLET ORAL at 17:15

## 2017-01-01 RX ADMIN — POTASSIUM CHLORIDE 10 MEQ: 750 TABLET, EXTENDED RELEASE ORAL at 08:56

## 2017-01-01 RX ADMIN — LETROZOLE 2.5 MG: 2.5 TABLET, FILM COATED ORAL at 08:08

## 2017-01-01 RX ADMIN — CEFDINIR 300 MG: 300 CAPSULE ORAL at 09:48

## 2017-01-01 RX ADMIN — CARVEDILOL 1 MG: 25 TABLET, FILM COATED ORAL at 07:51

## 2017-01-01 RX ADMIN — FLUDROCORTISONE ACETATE 0.1 MG: 0.1 TABLET ORAL at 13:51

## 2017-01-01 RX ADMIN — MIDODRINE HYDROCHLORIDE 5 MG: 5 TABLET ORAL at 18:05

## 2017-01-01 RX ADMIN — SODIUM CHLORIDE TAB 1 GM 1 G: 1 TAB at 08:57

## 2017-01-01 RX ADMIN — SENNOSIDES 1 TABLET: 8.6 TABLET, FILM COATED ORAL at 08:57

## 2017-01-01 RX ADMIN — MIDODRINE HYDROCHLORIDE 5 MG: 5 TABLET ORAL at 09:17

## 2017-01-01 RX ADMIN — POTASSIUM CHLORIDE 10 MEQ: 750 TABLET, EXTENDED RELEASE ORAL at 09:11

## 2017-01-01 RX ADMIN — POTASSIUM CHLORIDE 10 MEQ: 750 TABLET, EXTENDED RELEASE ORAL at 09:07

## 2017-01-01 RX ADMIN — POTASSIUM CHLORIDE 10 MEQ: 750 TABLET, EXTENDED RELEASE ORAL at 09:17

## 2017-01-01 RX ADMIN — WARFARIN SODIUM 2 MG: 2 TABLET ORAL at 18:51

## 2017-01-01 RX ADMIN — SODIUM CHLORIDE 1000 ML: 9 INJECTION, SOLUTION INTRAVENOUS at 20:00

## 2017-01-01 RX ADMIN — POTASSIUM CHLORIDE 10 MEQ: 750 TABLET, EXTENDED RELEASE ORAL at 09:04

## 2017-01-01 RX ADMIN — WARFARIN SODIUM 2 MG: 2 TABLET ORAL at 18:18

## 2017-01-01 RX ADMIN — WARFARIN SODIUM 2 MG: 2 TABLET ORAL at 17:52

## 2017-01-01 RX ADMIN — CARVEDILOL 1 MG: 25 TABLET, FILM COATED ORAL at 09:35

## 2017-01-01 RX ADMIN — SIMVASTATIN 20 MG: 20 TABLET, FILM COATED ORAL at 22:49

## 2017-01-01 RX ADMIN — MIDODRINE HYDROCHLORIDE 5 MG: 5 TABLET ORAL at 12:51

## 2017-01-01 RX ADMIN — POTASSIUM CHLORIDE 10 MEQ: 750 TABLET, EXTENDED RELEASE ORAL at 20:09

## 2017-01-01 RX ADMIN — MIDODRINE HYDROCHLORIDE 5 MG: 5 TABLET ORAL at 18:19

## 2017-01-01 RX ADMIN — MIDODRINE HYDROCHLORIDE 10 MG: 5 TABLET ORAL at 18:40

## 2017-01-01 RX ADMIN — POTASSIUM CHLORIDE 10 MEQ: 750 TABLET, EXTENDED RELEASE ORAL at 20:11

## 2017-01-01 RX ADMIN — LETROZOLE 2.5 MG: 2.5 TABLET, FILM COATED ORAL at 07:51

## 2017-01-01 RX ADMIN — POTASSIUM CHLORIDE 10 MEQ: 750 TABLET, EXTENDED RELEASE ORAL at 20:26

## 2017-01-01 RX ADMIN — SIMVASTATIN 20 MG: 20 TABLET, FILM COATED ORAL at 21:08

## 2017-01-01 RX ADMIN — LETROZOLE 2.5 MG: 2.5 TABLET, FILM COATED ORAL at 08:58

## 2017-01-01 RX ADMIN — SENNOSIDES 1 TABLET: 8.6 TABLET, FILM COATED ORAL at 19:43

## 2017-01-01 RX ADMIN — OXYBUTYNIN CHLORIDE 5 MG: 5 TABLET ORAL at 08:58

## 2017-01-01 RX ADMIN — WARFARIN SODIUM 2 MG: 2 TABLET ORAL at 18:00

## 2017-01-01 RX ADMIN — SIMVASTATIN 20 MG: 20 TABLET, FILM COATED ORAL at 20:54

## 2017-01-01 RX ADMIN — OXYBUTYNIN CHLORIDE 5 MG: 5 TABLET ORAL at 19:43

## 2017-01-01 RX ADMIN — DOCUSATE SODIUM 100 MG: 100 CAPSULE, LIQUID FILLED ORAL at 19:43

## 2017-01-01 RX ADMIN — SODIUM CHLORIDE 1000 ML: 9 INJECTION, SOLUTION INTRAVENOUS at 12:21

## 2017-01-01 RX ADMIN — SODIUM CHLORIDE TAB 1 GM 1 G: 1 TAB at 09:05

## 2017-01-01 RX ADMIN — BISACODYL 5 MG: 5 TABLET, COATED ORAL at 09:19

## 2017-01-01 RX ADMIN — LETROZOLE 2.5 MG: 2.5 TABLET, FILM COATED ORAL at 09:14

## 2017-01-01 RX ADMIN — SENNOSIDES 1 TABLET: 8.6 TABLET, FILM COATED ORAL at 08:06

## 2017-01-01 RX ADMIN — SIMVASTATIN 20 MG: 20 TABLET, FILM COATED ORAL at 21:40

## 2017-01-01 RX ADMIN — CEFDINIR 300 MG: 300 CAPSULE ORAL at 09:07

## 2017-01-01 RX ADMIN — SODIUM CHLORIDE TAB 1 GM 1 G: 1 TAB at 08:09

## 2017-01-01 RX ADMIN — MIDODRINE HYDROCHLORIDE 10 MG: 5 TABLET ORAL at 07:53

## 2017-01-01 RX ADMIN — POTASSIUM CHLORIDE 10 MEQ: 750 TABLET, EXTENDED RELEASE ORAL at 09:49

## 2017-01-01 RX ADMIN — HYDRALAZINE HYDROCHLORIDE 10 MG: 20 INJECTION INTRAMUSCULAR; INTRAVENOUS at 21:15

## 2017-01-01 RX ADMIN — Medication 30 MG: at 22:32

## 2017-01-01 RX ADMIN — CEFDINIR 300 MG: 300 CAPSULE ORAL at 09:17

## 2017-01-01 RX ADMIN — HUMAN ALBUMIN MICROSPHERES AND PERFLUTREN 4 ML: 10; .22 INJECTION, SOLUTION INTRAVENOUS at 09:00

## 2017-01-01 RX ADMIN — MIDODRINE HYDROCHLORIDE 5 MG: 5 TABLET ORAL at 09:05

## 2017-01-01 RX ADMIN — WARFARIN SODIUM 3 MG: 3 TABLET ORAL at 17:26

## 2017-01-01 ASSESSMENT — VISUAL ACUITY
OU: NORMAL ACUITY

## 2017-01-01 ASSESSMENT — ENCOUNTER SYMPTOMS
SPUTUM PRODUCTION: 0
EXERCISE INTOLERANCE: 0
EYE REDNESS: 0
SORE THROAT: 0
LEG SWELLING: 0
TREMORS: 0
NUMBNESS: 0
DIFFICULTY URINATING: 0
DIZZINESS: 0
HEADACHES: 0
DIZZINESS: 0
INCREASED ENERGY: 1
DIZZINESS: 1
HEARTBURN: 0
LOSS OF CONSCIOUSNESS: 1
HEMOPTYSIS: 0
SHORTNESS OF BREATH: 0
DISTURBANCES IN COORDINATION: 0
NAUSEA: 0
FLANK PAIN: 0
FEVER: 0
BLOOD IN STOOL: 0
CONSTIPATION: 0
DIFFICULTY URINATING: 0
ARTHRALGIAS: 0
PARALYSIS: 0
NAUSEA: 1
EYE WATERING: 0
LIGHT-HEADEDNESS: 1
WEAKNESS: 0
CLAUDICATION: 0
SLEEP DISTURBANCES DUE TO BREATHING: 0
PALPITATIONS: 0
HYPOTENSION: 1
HYPOTENSION: 1
TINGLING: 0
DYSURIA: 0
FLANK PAIN: 0
ACTIVITY IMPAIRMENT: IMPAIRED DUE TO WEAKNESS
HEADACHES: 0
POLYPHAGIA: 0
DIARRHEA: 0
PALPITATIONS: 0
DYSURIA: 0
EYE PAIN: 0
PALPITATIONS: 0
DIARRHEA: 0
CHILLS: 0
NECK PAIN: 0
CHILLS: 0
DOUBLE VISION: 0
COUGH: 0
SHORTNESS OF BREATH: 0
FATIGUE: 0
COLOR CHANGE: 0
BACK PAIN: 0
FEVER: 0
ALTERED TEMPERATURE REGULATION: 0
LOSS OF CONSCIOUSNESS: 0
FOCAL WEAKNESS: 0
POLYDIPSIA: 0
VOMITING: 0
HYPERTENSION: 0
TINGLING: 0
CLAUDICATION: 0
ORTHOPNEA: 0
WEAKNESS: 0
SPEECH CHANGE: 0
DOUBLE VISION: 0
ABDOMINAL PAIN: 0
WEAKNESS: 1
FEVER: 0
HEADACHES: 0
FREQUENCY: 0
NUMBNESS: 0
NECK STIFFNESS: 0
NIGHT SWEATS: 0
TINGLING: 0
SPEECH CHANGE: 0
LEG PAIN: 0
CHILLS: 0
EYE REDNESS: 0
EYE IRRITATION: 0
FEVER: 0
FEVER: 0
HEMATURIA: 0
TACHYCARDIA: 0
ABDOMINAL PAIN: 0
HEADACHES: 0
NO PATIENT REPORTED PAIN: 1
WEAKNESS: 1
STRIDOR: 0
HEMATURIA: 0
PALPITATIONS: 0
HYPERTENSION: 1
DIETARY ISSUES: ADEQUATE INTAKE
LEG PAIN: 0
MYALGIAS: 0
WHEEZING: 0
NIGHT SWEATS: 0
INCREASED ENERGY: 0
POLYPHAGIA: 0
VOMITING: 0
HEMATURIA: 0
WEIGHT LOSS: 0
ALTERED TEMPERATURE REGULATION: 0
TREMORS: 0
COUGH: 0
DECREASED APPETITE: 0
DIAPHORESIS: 0
BACK PAIN: 0
SYNCOPE: 1
ORTHOPNEA: 0
FATIGUE: 1
DISTURBANCES IN COORDINATION: 1
SLEEP DISTURBANCES DUE TO BREATHING: 0
PHOTOPHOBIA: 0
HALLUCINATIONS: 0
SHORTNESS OF BREATH: 0
COUGH: 0
NAUSEA: 0
PALPITATIONS: 0
CONSTIPATION: 0
HEADACHES: 0
DIZZINESS: 1
WEIGHT GAIN: 0
TACHYCARDIA: 0
DIZZINESS: 1
SENSORY CHANGE: 0
LEG SWELLING: 0
POLYDIPSIA: 0
DECREASED APPETITE: 0
ORTHOPNEA: 0
WEIGHT GAIN: 0
WEIGHT LOSS: 0
TREMORS: 1
NERVOUS/ANXIOUS: 0
CLAUDICATION: 0
VOMITING: 0
BLURRED VISION: 0
PARALYSIS: 0

## 2017-01-01 ASSESSMENT — PAIN SCALES - GENERAL
PAINLEVEL: NO PAIN (0)
PAINLEVEL: SEVERE PAIN (7)
PAINLEVEL: NO PAIN (0)
PAINLEVEL: NO PAIN (0)

## 2017-01-01 ASSESSMENT — LIFESTYLE VARIABLES: SUBSTANCE_ABUSE: 0

## 2017-01-10 ENCOUNTER — ANTICOAGULATION THERAPY VISIT (OUTPATIENT)
Dept: NURSING | Facility: CLINIC | Age: 77
End: 2017-01-10
Payer: COMMERCIAL

## 2017-01-10 DIAGNOSIS — Z79.01 LONG-TERM (CURRENT) USE OF ANTICOAGULANTS: Primary | ICD-10-CM

## 2017-01-10 DIAGNOSIS — I63.9 CEREBRAL INFARCTION, UNSPECIFIED MECHANISM (H): ICD-10-CM

## 2017-01-10 LAB — INR POINT OF CARE: 3.5 (ref 0.86–1.14)

## 2017-01-10 PROCEDURE — 99207 ZZC NO CHARGE NURSE ONLY: CPT

## 2017-01-10 PROCEDURE — 36416 COLLJ CAPILLARY BLOOD SPEC: CPT

## 2017-01-10 PROCEDURE — 85610 PROTHROMBIN TIME: CPT | Mod: QW

## 2017-01-10 NOTE — PROGRESS NOTES
ANTICOAGULATION FOLLOW-UP CLINIC VISIT    Patient Name:  Helen Younger  Date:  1/10/2017  Contact Type:  Face to Face    SUBJECTIVE: Patient is here in w/c with  today. She reports a little bleeding gums recently.  States she has a little cough that is not related to cold/flu.  She has 2 INR readings now above 3.0.  The warfarin dose has been the same and working for her for quite sometime, but we will decrease the dose after holding today's warfarin.  She denies changes in medications.  Reports she continues to have same chemo regimen.  States she thinks her weight is down, but  reports it is staying around 185 pounds.  We will coordinate her INR recheck with outside upcoming lab appointments as able.   to call to schedule.     Patient Findings     Positives No Problem Findings           OBJECTIVE    INR PROTIME   Date Value Ref Range Status   01/10/2017 3.5* 0.86 - 1.14 Final       ASSESSMENT / PLAN  INR assessment SUPRA    Recheck INR In: 2 WEEKS    INR Location Outside lab      Anticoagulation Summary as of 1/10/2017     INR goal 2.0-3.0   Selected INR 3.5! (1/10/2017)   Maintenance plan 2 mg (1 mg x 2) every day   Full instructions 1/10: Hold; Otherwise 2 mg every day   Weekly total 14 mg   Plan last modified Soumya Garza, RN (1/10/2017)   Next INR check 1/24/2017   Target end date     Indications   Long-term (current) use of anticoagulants [Z79.01] [Z79.01]  Cerebral infarction (H) [I63.9]         Anticoagulation Episode Summary     INR check location     Preferred lab     Send INR reminders to [facility]    Comments       Anticoagulation Care Providers     Provider Role Specialty Phone number    Arin Shahid MD  Internal Medicine [phone]            See the Encounter Report to view Anticoagulation Flowsheet and Dosing Calendar (Go to Encounters tab in chart review, and find the Anticoagulation Therapy Visit)    Dosage adjustment made based on physician directed  care plan.    Soumya Garza RN

## 2017-01-10 NOTE — MR AVS SNAPSHOT
Helen LONDON Younger   1/10/2017 2:00 PM   Anticoagulation Therapy Visit    Description:  76 year old female   Provider:  [facility]   Department:  [facility]           INR as of 1/10/2017     Selected INR 3.5! (1/10/2017)      Anticoagulation Summary as of 1/10/2017     INR goal 2.0-3.0   Selected INR 3.5! (1/10/2017)   Full instructions 1/10: Hold; Otherwise 2 mg every day   Next INR check 1/24/2017    Indications   Long-term (current) use of anticoagulants [Z79.01] [Z79.01]  Cerebral infarction (H) [I63.9]         Contact Numbers     [facility]  Please call [phone] or [phone]  to cancel and/or reschedule your appointment.   Please call [phone] with any problems or questions regarding your therapy          January 2017 Details    Sun Mon Tue Wed Thu Fri Sat     1               2               3               4               5               6               7                 8               9               10      Hold   See details      11      2 mg         12      2 mg         13      2 mg         14      2 mg           15      2 mg         16      2 mg         17      2 mg         18      2 mg         19      2 mg         20      2 mg         21      2 mg           22      2 mg         23      2 mg         24            25               26               27               28                 29               30               31                    Date Details   01/10 This INR check       Date of next INR:  1/24/2017         How to take your warfarin dose     To take:  2 mg Take 2 of the 1 mg tablets.    Hold Do not take your warfarin dose. See the Details table to the right for additional instructions.

## 2017-01-12 ENCOUNTER — MYC MEDICAL ADVICE (OUTPATIENT)
Dept: ONCOLOGY | Facility: CLINIC | Age: 77
End: 2017-01-12

## 2017-01-24 ENCOUNTER — APPOINTMENT (OUTPATIENT)
Dept: LAB | Facility: CLINIC | Age: 77
End: 2017-01-24
Attending: PHYSICIAN ASSISTANT
Payer: COMMERCIAL

## 2017-01-24 ENCOUNTER — ONCOLOGY VISIT (OUTPATIENT)
Dept: ONCOLOGY | Facility: CLINIC | Age: 77
End: 2017-01-24
Attending: PHYSICIAN ASSISTANT
Payer: COMMERCIAL

## 2017-01-24 ENCOUNTER — ANTICOAGULATION THERAPY VISIT (OUTPATIENT)
Dept: NURSING | Facility: CLINIC | Age: 77
End: 2017-01-24

## 2017-01-24 VITALS
DIASTOLIC BLOOD PRESSURE: 65 MMHG | TEMPERATURE: 99 F | HEIGHT: 62 IN | OXYGEN SATURATION: 96 % | SYSTOLIC BLOOD PRESSURE: 88 MMHG | WEIGHT: 186.3 LBS | RESPIRATION RATE: 20 BRPM | HEART RATE: 82 BPM | BODY MASS INDEX: 34.28 KG/M2

## 2017-01-24 DIAGNOSIS — Z79.01 LONG-TERM (CURRENT) USE OF ANTICOAGULANTS: Primary | ICD-10-CM

## 2017-01-24 DIAGNOSIS — I63.9 CEREBRAL INFARCTION, UNSPECIFIED MECHANISM (H): ICD-10-CM

## 2017-01-24 DIAGNOSIS — Z79.01 LONG-TERM (CURRENT) USE OF ANTICOAGULANTS: ICD-10-CM

## 2017-01-24 DIAGNOSIS — C50.919 MALIGNANT NEOPLASM OF FEMALE BREAST, UNSPECIFIED LATERALITY, UNSPECIFIED SITE OF BREAST: Primary | ICD-10-CM

## 2017-01-24 LAB
ALBUMIN SERPL-MCNC: 3.4 G/DL (ref 3.4–5)
ALP SERPL-CCNC: 38 U/L (ref 40–150)
ALT SERPL W P-5'-P-CCNC: 14 U/L (ref 0–50)
ANION GAP SERPL CALCULATED.3IONS-SCNC: 9 MMOL/L (ref 3–14)
AST SERPL W P-5'-P-CCNC: 11 U/L (ref 0–45)
BASOPHILS # BLD AUTO: 0.1 10E9/L (ref 0–0.2)
BASOPHILS NFR BLD AUTO: 1.4 %
BILIRUB SERPL-MCNC: 0.6 MG/DL (ref 0.2–1.3)
BUN SERPL-MCNC: 18 MG/DL (ref 7–30)
CALCIUM SERPL-MCNC: 9 MG/DL (ref 8.5–10.1)
CANCER AG27-29 SERPL-ACNC: 34 U/ML (ref 0–39)
CEA SERPL-MCNC: 1.9 UG/L (ref 0–2.5)
CHLORIDE SERPL-SCNC: 106 MMOL/L (ref 94–109)
CO2 SERPL-SCNC: 25 MMOL/L (ref 20–32)
CREAT SERPL-MCNC: 1.08 MG/DL (ref 0.52–1.04)
DIFFERENTIAL METHOD BLD: ABNORMAL
EOSINOPHIL # BLD AUTO: 0 10E9/L (ref 0–0.7)
EOSINOPHIL NFR BLD AUTO: 0.6 %
ERYTHROCYTE [DISTWIDTH] IN BLOOD BY AUTOMATED COUNT: 15.1 % (ref 10–15)
GFR SERPL CREATININE-BSD FRML MDRD: 49 ML/MIN/1.7M2
GLUCOSE SERPL-MCNC: 178 MG/DL (ref 70–99)
HCT VFR BLD AUTO: 31 % (ref 35–47)
HGB BLD-MCNC: 10.5 G/DL (ref 11.7–15.7)
IMM GRANULOCYTES # BLD: 0 10E9/L (ref 0–0.4)
IMM GRANULOCYTES NFR BLD: 0.3 %
INR PPP: 2.69
INR PPP: 2.69 (ref 0.86–1.14)
LYMPHOCYTES # BLD AUTO: 0.8 10E9/L (ref 0.8–5.3)
LYMPHOCYTES NFR BLD AUTO: 22 %
MCH RBC QN AUTO: 36.1 PG (ref 26.5–33)
MCHC RBC AUTO-ENTMCNC: 33.9 G/DL (ref 31.5–36.5)
MCV RBC AUTO: 107 FL (ref 78–100)
MONOCYTES # BLD AUTO: 0.2 10E9/L (ref 0–1.3)
MONOCYTES NFR BLD AUTO: 4.4 %
NEUTROPHILS # BLD AUTO: 2.6 10E9/L (ref 1.6–8.3)
NEUTROPHILS NFR BLD AUTO: 71.3 %
NRBC # BLD AUTO: 0 10*3/UL
NRBC BLD AUTO-RTO: 0 /100
PLATELET # BLD AUTO: 211 10E9/L (ref 150–450)
POTASSIUM SERPL-SCNC: 3.6 MMOL/L (ref 3.4–5.3)
PROT SERPL-MCNC: 6.8 G/DL (ref 6.8–8.8)
RBC # BLD AUTO: 2.91 10E12/L (ref 3.8–5.2)
SODIUM SERPL-SCNC: 141 MMOL/L (ref 133–144)
WBC # BLD AUTO: 3.6 10E9/L (ref 4–11)

## 2017-01-24 PROCEDURE — 99212 OFFICE O/P EST SF 10 MIN: CPT

## 2017-01-24 PROCEDURE — 85025 COMPLETE CBC W/AUTO DIFF WBC: CPT | Performed by: PHYSICIAN ASSISTANT

## 2017-01-24 PROCEDURE — 80053 COMPREHEN METABOLIC PANEL: CPT | Performed by: PHYSICIAN ASSISTANT

## 2017-01-24 PROCEDURE — 85610 PROTHROMBIN TIME: CPT | Performed by: PHYSICIAN ASSISTANT

## 2017-01-24 PROCEDURE — 36415 COLL VENOUS BLD VENIPUNCTURE: CPT

## 2017-01-24 PROCEDURE — 82378 CARCINOEMBRYONIC ANTIGEN: CPT | Performed by: PHYSICIAN ASSISTANT

## 2017-01-24 PROCEDURE — 99214 OFFICE O/P EST MOD 30 MIN: CPT | Mod: ZP | Performed by: PHYSICIAN ASSISTANT

## 2017-01-24 PROCEDURE — 86300 IMMUNOASSAY TUMOR CA 15-3: CPT | Performed by: PHYSICIAN ASSISTANT

## 2017-01-24 NOTE — NURSING NOTE
"Helen Younger is a 76 year old female who presents for:  Chief Complaint   Patient presents with     Blood Draw     lab drawn from right arm by RN and vitals taken by [facility]      Oncology Clinic Visit     1 mo F/U        Initial Vitals:  BP 88/65 mmHg  Pulse 82  Temp(Src) 99  F (37.2  C) (Oral)  Resp 20  Ht 1.575 m (5' 2.01\")  Wt 84.505 kg (186 lb 4.8 oz)  BMI 34.07 kg/m2  SpO2 96% Estimated body mass index is 34.07 kg/(m^2) as calculated from the following:    Height as of this encounter: 1.575 m (5' 2.01\").    Weight as of this encounter: 84.505 kg (186 lb 4.8 oz).. Body surface area is 1.92 meters squared. BP completed using cuff size: NA (Not Taken)  Data Unavailable No LMP recorded. Patient is postmenopausal. Allergies and medications reviewed.     Medications: MEDICATION REFILLS NEEDED TODAY.  Pharmacy name entered into Review[facility]:    [id number] - [place], [place] - [address]  [facility] - [place], [place] - [address] [facility] - [place], [place] - [address] [facility]    Comments: Pt would like a refill of the IBRANCE only if that is the path she is continuing on.    7 minutes for nursing intake (face to face time)   Annamaria Arora MA          "

## 2017-01-24 NOTE — PROGRESS NOTES
HEMATOLOGY/ONCOLOGY PROGRESS NOTE  Jan 24, 2017    REASON FOR VISIT: follow-up of metastatic breast cancer    DIAGNOSIS:   The patient is a 76-year-old woman who, in March of 2006, was diagnosed with a left-sided breast cancer. She presented with pain in the left breast, and a diagnostic mammogram demonstrated abnormalities in the left breast. The region of abnormality was biopsied, and she was found to have an infiltrating ductal carcinoma. She subsequently went on to undergo a left-sided lumpectomy and sentinel lymph node biopsy at the [facility] by Dr. Salas Cook in April of 2006. The patient had a 2.5 x 2.1 cm tumor excised. It was a grade 2 infiltrating ductal carcinoma. Her sentinel lymph node biopsy was negative on frozen section, and final pathology did demonstrate micrometastasis measuring 0.2 cm. The tumor itself was ER-positive, AZ-positive, and HER2-negative. The patient did have a positive margin for which she underwent a reexcision in June of 2006. The subsequent pathology was negative.     The patient was then seen by Dr. Jong Dacosta, and she was encouraged to receive adjuvant chemotherapy. She received three cycles of FEC every three weeks followed by Taxotere single-agent administered every three weeks for three cycles. She completed chemotherapy in mid to late October of 2006. Her only complication was neutropenic fever following her last dose, for which she was briefly hospitalized. She otherwise had no issues with residual neuropathies or any other known complications from her chemo.     The patient was started on adjuvant Arimidex on November 6, 2006. She also received whole breast radiotherapy with boost to the left breast and tumor bed from November 15, 2006, through January 5, 2007. This was performed by Dr. Nicholas at [facility]. It is unknown if she received kevon radiation. The patient' portacath was removed in July of 2007. She completed five years of arimidex and  remained off therapy.     She represented in December 2015 with shortness of breath and pleural effusion, requiring hospitalization. She had three thoracenteses while inpatient, with cytology revealing for a metastatic breast cancer, ER/OH-positive, HER-2 negative. On her workup, she also has disease in her bones after CT scan of the chest, abdomen and pelvis. She met with Dr. Landis on 1/11/2016 and was started on endocrine therapy with AI. She started on Ibrance on 2/3/2016. She was dose reduced after cycle 1 to 100 mg for cytopenias.    INTERVAL HISTORY:   Helen presents today with her  for follow-up. No new concerns for Helen today. She feels she is managing very well at home. The nurse no longer comes to their home. She has been eating and drinking well, German is making the meals. Breathing has been stable and she hasn't found herself more SOB. No cough. Getting around the house with use of the walker. She has a rare fall, which is not new for her. She just reports her legs are weak and sometimes she just needs to stop, so she falls. It is usually just a situation where she stops, assists to the ground, and then is able to get up on her own. She hasn't had chest pains, dizziness, lightheadedness, or falls to the head. Both patient and  agree this hasn't increased in frequency over the last year. Otherwise, Helen feels at her baseline today. She continues to tolerate Ibrance well without side effects. She denies any fevers, chills, mouth sores, nausea, vomiting, pain anywhere, abdominal pain, diarrhea, constipation, swelling, bleeding.    Otherwise, 10 point ROS negative     Current Outpatient Prescriptions   Medication Sig Dispense Refill     palbociclib (IBRANCE) 100 MG capsule CHEMO Take 1 capsule (100 mg) by mouth daily with food Take for 21 days, then 7 days off. Avoid grapefruit. Do not open/crush/chew capsule. 21 capsule 0     palbociclib (IBRANCE) 100 MG capsule Take 1 capsule (100 mg) by  "mouth daily with food Take for 21 days, then 7 days off. Avoid grapefruit. Do not open/crush/chew capsule. 21 capsule 0     letrozole (FEMARA) 2.5 MG tablet Take 1 tablet (2.5 mg) by mouth daily 90 tablet 3     palbociclib (IBRANCE) 100 MG capsule Take 1 capsule (100 mg) by mouth daily with food Take for 21 days, then 7 days off. Avoid grapefruit. Do not open/crush/chew capsule. 21 capsule 0     Nystatin (NYSTOP) 677566 UNIT/GM POWD daily as needed       oxybutynin (DITROPAN) 5 MG tablet TAKE 1 TABLET (5 MG) BY MOUTH 2 TIMES DAILY  11     order for DME Equipment being ordered:  Incontinence pads   Patient uses these tid 100 each 3     palbociclib (IBRANCE) 100 MG capsule Take 1 capsule (100 mg) by mouth daily with food Take for 21 days, then 7 days off. Avoid grapefruit. Do not open/crush/chew capsule. 21 capsule 0     order for DME Equipment being ordered: wheeled walker 1 each 0     warfarin (JANTOVEN) 1 MG tablet As directed by INR Clinic (2mg MWF / 3mg TuThSaSu) (Patient taking differently: As directed by INR Clinic (2mg MWFSaSu / 3mg TuTh)) 400 tablet 3     simvastatin (ZOCOR) 20 MG tablet Take 1 tablet (20 mg) by mouth At Bedtime 90 tablet 3     DOCUSATE SODIUM 1 tablet 2 times daily        Multiple Vitamins-Minerals (OCUVITE ADULT FORMULA PO) Take 1 tablet by mouth daily.       VIACTIV OR Take 1 chew tab by mouth 2 times daily. One in the morning and one at bedtime.             Allergies   Allergen Reactions     Diuretic      Don't remember side effect     Zyrtec [Cetirizine Hcl]      Elevated blood pressure       PHYSICAL EXAMINATION  BP 88/65 mmHg  Pulse 82  Temp(Src) 99  F (37.2  C) (Oral)  Resp 20  Ht 1.575 m (5' 2.01\")  Wt 84.505 kg (186 lb 4.8 oz)  BMI 34.07 kg/m2  SpO2 96%   Wt Readings from Last 4 Encounters:   01/24/17 84.505 kg (186 lb 4.8 oz)   12/26/16 83.915 kg (185 lb)   11/30/16 83.326 kg (183 lb 11.2 oz)   10/26/16 84.142 kg (185 lb 8 oz)     Constitutional: Alert, oriented female in no " visible distress. +Expressive aphasia. Examined in wheelchair today.  Eyes: PERRL. Anicteric sclerae.  ENT/Mouth: OM moist and pink without thrush.  CV: RRR, no murmurs appreciated.  Resp: Decreased breath sounds on the left base, stable. Breathing comfortably without accessory muscle usage.  Abdomen: Soft, non-tender, non-distended.  Extremities: No lower extremity edema appreciated.   Skin: Warm, dry.   Lymph: No cervical or supraclavicular lymphadenopathy appreciated.   Neuro: CN II-XII grossly intact.    LABS:  Results for RYLEE PURCELL (MRN [id number]) as of 2017 14:39   Ref. Range 2017 14:23   WBC Latest Ref Range: 4.0-11.0 10e9/L 3.6 (L)   Hemoglobin Latest Ref Range: 11.7-15.7 g/dL 10.5 (L)   Hematocrit Latest Ref Range: 35.0-47.0 % 31.0 (L)   Platelet Count Latest Ref Range: 150-450 10e9/L 211     Results for RYLEE PURCELL (MRN [id number]) as of 2017 15:08   Ref. Range 2017 14:23   Sodium Latest Ref Range: 133-144 mmol/L 141   Potassium Latest Ref Range: 3.4-5.3 mmol/L 3.6   Chloride Latest Ref Range:  mmol/L 106   Carbon Dioxide Latest Ref Range: 20-32 mmol/L 25   Urea Nitrogen Latest Ref Range: 7-30 mg/dL 18   Creatinine Latest Ref Range: 0.52-1.04 mg/dL 1.08 (H)   GFR Estimate Latest Ref Range: >60 mL/min/1.7m2 49 (L)   GFR Estimate If Black Latest Ref Range: >60 mL/min/1.7m2 60 (L)   Calcium Latest Ref Range: 8.5-10.1 mg/dL 9.0   Anion Gap Latest Ref Range: 3-14 mmol/L 9   Albumin Latest Ref Range: 3.4-5.0 g/dL 3.4   Protein Total Latest Ref Range: 6.8-8.8 g/dL 6.8   Bilirubin Total Latest Ref Range: 0.2-1.3 mg/dL 0.6   Alkaline Phosphatase Latest Ref Range:  U/L 38 (L)   ALT Latest Ref Range: 0-50 U/L 14   AST Latest Ref Range: 0-45 U/L 11   Glucose Latest Ref Range: 70-99 mg/dL 178 (H)     IMAGING:  PET/CT 2016  IMPRESSION: In this patient with breast carcinoma, restagin. No hypermetabolic changes within the body to indicate metastatic  disease.  2. Old  left MCA stroke, unchanged.  3. No hypermetabolic activity noted within the adrenal glands at this  time.  4. Left-sided pleural effusion with presumed left lower lobe collapse  which is chronic and unchanged.    5. See dedicated neuroradiology report for the results of the high  resolution PET CT of the neck.         IMPRESSION/PLAN:  Helen Younger is a 75 year old female with history of ER/CT-positive, HER-2 negative breast cancer treated with lumpectomy, adjuvant chemotherapy with Taxotere, whole breast radiotherapy, and 5 years of adjuvant Arimidex, completed in 2012. She represented in December 2015 with shortness of breath and pleural effusion, found to have developed metastatic disease involving the bones and pleural cavity.    Metastatic breast cancer: Currently on Letrozole and palbociclib with overall stable disease on imaging 12/23/2016. She tolerates this regimen well. She will continue with scans every 4-5 months as long as her tumor markers remain stable, and she doesn't had any signs of progressive disease.    Bone mets: Previously on monthly Xgeva, switched to every 3 months due to large co-pay, last received 11/30/2016. Due in Feb.    Pleural effusions:  This has been stable on subsequent imaging, asymptomatic. Exam is stable today.    CKD/FEN: Encouraged adequate hydration with about 64 oz fluids daily. Creat, lytes stable. She has been doing better with eating and drinking.    Hypotension: now off all anti-hypertensives.No dizziness or lightheadedness. BP continues to be softer, but stable. She was advised to continue to strive for 64 oz fluids daily.    Urinary incontinence: trial of oxybutinin as recommended by PCP, this has been helpful.    Social: Helen and  no longer have the RN coming to their home once weekly. They feel they are managing well at this time.     Deconditioning: Using walker at home. She continues to have a rare mechanical falls, not new for her, and not preceded by any  prodromal symptoms. Not increasing in frequency. Helen remains uninterested in PT.    Jess Avalos PA-C  Hematology, Oncology, and Transplant  [facility] [address] [address] [phone]

## 2017-01-24 NOTE — Clinical Note
1/24/2017      RE: Helen Machadoirina  [address]  [facility] [address]       HEMATOLOGY/ONCOLOGY PROGRESS NOTE  Jan 24, 2017    REASON FOR VISIT: follow-up of metastatic breast cancer    DIAGNOSIS:   The patient is a 76-year-old woman who, in March of 2006, was diagnosed with a left-sided breast cancer. She presented with pain in the left breast, and a diagnostic mammogram demonstrated abnormalities in the left breast. The region of abnormality was biopsied, and she was found to have an infiltrating ductal carcinoma. She subsequently went on to undergo a left-sided lumpectomy and sentinel lymph node biopsy at the [facility] by Dr. Salas Cook in April of 2006. The patient had a 2.5 x 2.1 cm tumor excised. It was a grade 2 infiltrating ductal carcinoma. Her sentinel lymph node biopsy was negative on frozen section, and final pathology did demonstrate micrometastasis measuring 0.2 cm. The tumor itself was ER-positive, MN-positive, and HER2-negative. The patient did have a positive margin for which she underwent a reexcision in June of 2006. The subsequent pathology was negative.     The patient was then seen by Dr. Jong Dacosta, and she was encouraged to receive adjuvant chemotherapy. She received three cycles of FEC every three weeks followed by Taxotere single-agent administered every three weeks for three cycles. She completed chemotherapy in mid to late October of 2006. Her only complication was neutropenic fever following her last dose, for which she was briefly hospitalized. She otherwise had no issues with residual neuropathies or any other known complications from her chemo.     The patient was started on adjuvant Arimidex on November 6, 2006. She also received whole breast radiotherapy with boost to the left breast and tumor bed from November 15, 2006, through January 5, 2007. This was performed by Dr. Nicholas at [facility]. It is unknown if she received kevon radiation. The  patient' portacath was removed in July of 2007. She completed five years of arimidex and remained off therapy.     She represented in December 2015 with shortness of breath and pleural effusion, requiring hospitalization. She had three thoracenteses while inpatient, with cytology revealing for a metastatic breast cancer, ER/NE-positive, HER-2 negative. On her workup, she also has disease in her bones after CT scan of the chest, abdomen and pelvis. She met with Dr. Landis on 1/11/2016 and was started on endocrine therapy with AI. She started on Ibrance on 2/3/2016. She was dose reduced after cycle 1 to 100 mg for cytopenias.    INTERVAL HISTORY:   Helen presents today with her  for follow-up. No new concerns for Helen today. She feels she is managing very well at home. The nurse no longer comes to their home. She has been eating and drinking well, German is making the meals. Breathing has been stable and she hasn't found herself more SOB. No cough. Getting around the house with use of the walker. She has a rare fall, which is not new for her. She just reports her legs are weak and sometimes she just needs to stop, so she falls. It is usually just a situation where she stops, assists to the ground, and then is able to get up on her own. She hasn't had chest pains, dizziness, lightheadedness, or falls to the head. Both patient and  agree this hasn't increased in frequency over the last year. Otherwise, Helen feels at her baseline today. She continues to tolerate Ibrance well without side effects. She denies any fevers, chills, mouth sores, nausea, vomiting, pain anywhere, abdominal pain, diarrhea, constipation, swelling, bleeding.    Otherwise, 10 point ROS negative     Current Outpatient Prescriptions   Medication Sig Dispense Refill     palbociclib (IBRANCE) 100 MG capsule CHEMO Take 1 capsule (100 mg) by mouth daily with food Take for 21 days, then 7 days off. Avoid grapefruit. Do not open/crush/chew  "capsule. 21 capsule 0     palbociclib (IBRANCE) 100 MG capsule Take 1 capsule (100 mg) by mouth daily with food Take for 21 days, then 7 days off. Avoid grapefruit. Do not open/crush/chew capsule. 21 capsule 0     letrozole (FEMARA) 2.5 MG tablet Take 1 tablet (2.5 mg) by mouth daily 90 tablet 3     palbociclib (IBRANCE) 100 MG capsule Take 1 capsule (100 mg) by mouth daily with food Take for 21 days, then 7 days off. Avoid grapefruit. Do not open/crush/chew capsule. 21 capsule 0     Nystatin (NYSTOP) 344126 UNIT/GM POWD daily as needed       oxybutynin (DITROPAN) 5 MG tablet TAKE 1 TABLET (5 MG) BY MOUTH 2 TIMES DAILY  11     order for DME Equipment being ordered:  Incontinence pads   Patient uses these tid 100 each 3     palbociclib (IBRANCE) 100 MG capsule Take 1 capsule (100 mg) by mouth daily with food Take for 21 days, then 7 days off. Avoid grapefruit. Do not open/crush/chew capsule. 21 capsule 0     order for DME Equipment being ordered: wheeled walker 1 each 0     warfarin (JANTOVEN) 1 MG tablet As directed by INR Clinic (2mg MWF / 3mg TuThSaSu) (Patient taking differently: As directed by INR Clinic (2mg MWFSaSu / 3mg TuTh)) 400 tablet 3     simvastatin (ZOCOR) 20 MG tablet Take 1 tablet (20 mg) by mouth At Bedtime 90 tablet 3     DOCUSATE SODIUM 1 tablet 2 times daily        Multiple Vitamins-Minerals (OCUVITE ADULT FORMULA PO) Take 1 tablet by mouth daily.       VIACTIV OR Take 1 chew tab by mouth 2 times daily. One in the morning and one at bedtime.             Allergies   Allergen Reactions     Diuretic      Don't remember side effect     Zyrtec [Cetirizine Hcl]      Elevated blood pressure       PHYSICAL EXAMINATION  BP 88/65 mmHg  Pulse 82  Temp(Src) 99  F (37.2  C) (Oral)  Resp 20  Ht 1.575 m (5' 2.01\")  Wt 84.505 kg (186 lb 4.8 oz)  BMI 34.07 kg/m2  SpO2 96%   Wt Readings from Last 4 Encounters:   01/24/17 84.505 kg (186 lb 4.8 oz)   12/26/16 83.915 kg (185 lb)   11/30/16 83.326 kg (183 lb " 11.2 oz)   10/26/16 84.142 kg (185 lb 8 oz)     Constitutional: Alert, oriented female in no visible distress. +Expressive aphasia. Examined in wheelchair today.  Eyes: PERRL. Anicteric sclerae.  ENT/Mouth: OM moist and pink without thrush.  CV: RRR, no murmurs appreciated.  Resp: Decreased breath sounds on the left base, stable. Breathing comfortably without accessory muscle usage.  Abdomen: Soft, non-tender, non-distended.  Extremities: No lower extremity edema appreciated.   Skin: Warm, dry.   Lymph: No cervical or supraclavicular lymphadenopathy appreciated.   Neuro: CN II-XII grossly intact.    LABS:  Results for RYLEE PURCELL (MRN [id number]) as of 1/24/2017 14:39   Ref. Range 1/24/2017 14:23   WBC Latest Ref Range: 4.0-11.0 10e9/L 3.6 (L)   Hemoglobin Latest Ref Range: 11.7-15.7 g/dL 10.5 (L)   Hematocrit Latest Ref Range: 35.0-47.0 % 31.0 (L)   Platelet Count Latest Ref Range: 150-450 10e9/L 211     Results for RYLEE PURCELL (MRN [id number]) as of 1/24/2017 15:08   Ref. Range 1/24/2017 14:23   Sodium Latest Ref Range: 133-144 mmol/L 141   Potassium Latest Ref Range: 3.4-5.3 mmol/L 3.6   Chloride Latest Ref Range:  mmol/L 106   Carbon Dioxide Latest Ref Range: 20-32 mmol/L 25   Urea Nitrogen Latest Ref Range: 7-30 mg/dL 18   Creatinine Latest Ref Range: 0.52-1.04 mg/dL 1.08 (H)   GFR Estimate Latest Ref Range: >60 mL/min/1.7m2 49 (L)   GFR Estimate If Black Latest Ref Range: >60 mL/min/1.7m2 60 (L)   Calcium Latest Ref Range: 8.5-10.1 mg/dL 9.0   Anion Gap Latest Ref Range: 3-14 mmol/L 9   Albumin Latest Ref Range: 3.4-5.0 g/dL 3.4   Protein Total Latest Ref Range: 6.8-8.8 g/dL 6.8   Bilirubin Total Latest Ref Range: 0.2-1.3 mg/dL 0.6   Alkaline Phosphatase Latest Ref Range:  U/L 38 (L)   ALT Latest Ref Range: 0-50 U/L 14   AST Latest Ref Range: 0-45 U/L 11   Glucose Latest Ref Range: 70-99 mg/dL 178 (H)     IMAGING:  PET/CT 12/23/2016  IMPRESSION: In this patient with breast carcinoma,  restagin. No hypermetabolic changes within the body to indicate metastatic  disease.  2. Old left MCA stroke, unchanged.  3. No hypermetabolic activity noted within the adrenal glands at this  time.  4. Left-sided pleural effusion with presumed left lower lobe collapse  which is chronic and unchanged.    5. See dedicated neuroradiology report for the results of the high  resolution PET CT of the neck.         IMPRESSION/PLAN:  Helen Younger is a 75 year old female with history of ER/IL-positive, HER-2 negative breast cancer treated with lumpectomy, adjuvant chemotherapy with Taxotere, whole breast radiotherapy, and 5 years of adjuvant Arimidex, completed in . She represented in 2015 with shortness of breath and pleural effusion, found to have developed metastatic disease involving the bones and pleural cavity.    Metastatic breast cancer: Currently on Letrozole and palbociclib with overall stable disease on imaging 2016. She tolerates this regimen well. She will continue with scans every 4-5 months as long as her tumor markers remain stable, and she doesn't had any signs of progressive disease.    Bone mets: Previously on monthly Xgeva, switched to every 3 months due to large co-pay, last received 2016. Due in Feb.    Pleural effusions:  This has been stable on subsequent imaging, asymptomatic. Exam is stable today.    CKD/FEN: Encouraged adequate hydration with about 64 oz fluids daily. Creat, lytes stable. She has been doing better with eating and drinking.    Hypotension: now off all anti-hypertensives.No dizziness or lightheadedness. BP continues to be softer, but stable. She was advised to continue to strive for 64 oz fluids daily.    Urinary incontinence: trial of oxybutinin as recommended by PCP, this has been helpful.    Social: Helen and  no longer have the RN coming to their home once weekly. They feel they are managing well at this time.     Deconditioning: Using  walker at home. She continues to have a rare mechanical falls, not new for her, and not preceded by any prodromal symptoms. Not increasing in frequency. Helen remains uninterested in PT.    Jess Avalos PA-C  Hematology, Oncology, and Transplant  [facility]  [address] [place] [id number] [phone]

## 2017-01-24 NOTE — NURSING NOTE
Chief Complaint   Patient presents with     Blood Draw     lab drawn from right arm by RN and vitals taken by DEBRA      Regular cuff size was used to obtain blood pressure.     Jaelyn Bell CMA January 24, 2017

## 2017-01-24 NOTE — PROGRESS NOTES
ANTICOAGULATION FOLLOW-UP CLINIC VISIT    Patient Name:  Helen Youngre  Date:  1/24/2017  Contact Type:  Telephone/ called patient / spouse in follow up to INR drawn at outside lab.  plan next INR with future labs and/or sooner if changes to medications or problems.    SUBJECTIVE:     Patient Findings     Positives No Problem Findings           OBJECTIVE    INR   Date Value Ref Range Status   01/24/2017 2.69* 0.86 - 1.14 Final       ASSESSMENT / PLAN  INR assessment THER    Recheck INR In: 4 WEEKS    INR Location Outside lab      Anticoagulation Summary as of 1/24/2017     INR goal 2.0-3.0   Selected INR 2.69 (1/24/2017)   Maintenance plan 2 mg (1 mg x 2) every day   Full instructions 2 mg every day   Weekly total 14 mg   No change documented Soumya Garza RN   Plan last modified Soumya Garza RN (1/10/2017)   Next INR check 2/27/2017   [facility] end date     Indications   Long-term (current) use of anticoagulants [Z79.01] [Z79.01]  Cerebral infarction (H) [I63.9]         Anticoagulation Episode Summary     INR check location     Preferred lab     Send INR reminders to [facility]    Comments       Anticoagulation Care Providers     Provider Role Specialty Phone number    Arin Shahid MD  Internal Medicine [phone]            See the Encounter Report to view Anticoagulation Flowsheet and Dosing Calendar (Go to Encounters tab in chart review, and find the Anticoagulation Therapy Visit)    Dosage adjustment made based on physician directed care plan.    Soumya Garza RN

## 2017-01-25 DIAGNOSIS — J90 PLEURAL EFFUSION: Primary | ICD-10-CM

## 2017-01-25 DIAGNOSIS — C50.919 MALIGNANT NEOPLASM OF FEMALE BREAST, UNSPECIFIED LATERALITY, UNSPECIFIED SITE OF BREAST: ICD-10-CM

## 2017-02-20 ENCOUNTER — TELEPHONE (OUTPATIENT)
Dept: ONCOLOGY | Facility: CLINIC | Age: 77
End: 2017-02-20

## 2017-02-20 NOTE — TELEPHONE ENCOUNTER
Oral Chemotherapy Monitoring Program    Primary Oncologist: Dr. Landis  Primary Oncology Clinic: [facility]  Cancer Diagnosis: Breast     Therapy History: Confirmed patient takes Ibrance 100mg once daily for 21 days then 7 days off, cycle q28 days  Start Date: 2/3/16       Drug Interaction Assessment: No new drug interactions.        Lab Monitoring Plan               Monitoring Plan:      CBC, CMP   C2D1+   Call, CBC, CMP   C3D1+   Call, CBC, CMP   C4D1+   Call, CBC, CMP   C5D1+   Call, CBC, CMP   C6D1+   Call, CBC, CMP        C2D8+      C3D8+      C4D8+      C5D8+      C6D8+        CBC   C2D15+   CBC   C3D15+      C4D15+      C5D15+      C6D15+           C2D22+      C3D22+      C4D22+      C5D22+      C6D22+                            Subjective/Objective:  Helen Younger is a 76 year old female contacted by phone for a follow-up visit for oral chemotherapy.  I spoke with her  German. He reports she feels increased fatigue towards the end of the cycle. She has a good appetite. We discussed drinking fluids during the day, eating smaller and more frequent meals, and not drinking fluids about 30 minutes before eating. She denies all other side effects and no missed doses or medication changes. MPR = 0.95, 12 fills in 12 months, patient is adherent.     ORAL CHEMOTHERAPY 2/10/2016 4/27/2016 5/26/2016 7/7/2016 10/6/2016 2/20/2017   Drug Name (No Data) Ibrance (Palbociclib) Ibrance (Palbociclib) Ibrance (Palbociclib) Ibrance (Palbociclib) Ibrance (Palbociclib)   Current Dosage (No Data) 100mg 100mg 100mg 100mg 100mg   Current Schedule Daily Daily Daily Daily Daily Daily   Cycle Details 3 weeks on 1 week off 3 weeks on 1 week off 3 weeks on 1 week off 3 weeks on 1 week off 3 weeks on 1 week off 3 weeks on 1 week off   Start Date of Last Cycle 2/3/2016 4/12/2016 5/11/2016 7/7/2016 9/29/2016 2/2/2017   Planned next cycle start date - - - - - 3/2/2017   Doses missed in last 2 weeks 0 0 0 0 0 0   Adherence Assessment - - - -  "- Adherent   Adverse Effects None Mucositis/mouth sores/ mouth pain Mucositis/mouth sores/ mouth pain Mucositis/mouth sores/ mouth pain Oral mucositis;Fatigue Fatigue   Oral Mucositis - mild mild mild - -   Fatigue - - - - - Grade 1   Pharmacist Intervention(fatigue) - - - - - Yes   Intervention(s) - - - - - Patient education   Home BPs not needed not needed not done - not needed not needed   Any new drug interactions? - - - - - No   Is the dose as ordered appropriate for the patient? - - - - - Yes   Is the patient currently in pain? - - - - - No   Has the patient been assessed within the past 6 months for depression? - - - - - Yes   Has the patient missed any days of school, work, or other routine activity? - - - - - No       Last PHQ-2 Score on record:   PHQ-2 ( 1999 [employer]) 12/26/2016 10/26/2016   Q1: Little interest or pleasure in doing things 0 0   Q2: Feeling down, depressed or hopeless 0 0   PHQ-2 Score 0 0       Patient does not report depression symptoms.      Vitals:  BP:   BP Readings from Last 1 Encounters:   01/24/17 (!) 88/65     Wt Readings from Last 1 Encounters:   01/24/17 84.5 kg (186 lb 4.8 oz)     Estimated body surface area is 1.92 meters squared as calculated from the following:    Height as of 1/24/17: 1.575 m (5' 2.01\").    Weight as of 1/24/17: 84.5 kg (186 lb 4.8 oz).    Labs:  Lab Results   Component Value Date     01/24/2017      Lab Results   Component Value Date    POTASSIUM 3.6 01/24/2017     Lab Results   Component Value Date    SHERYL 9.0 01/24/2017     Lab Results   Component Value Date    ALBUMIN 3.4 01/24/2017     Lab Results   Component Value Date    MAG 2.4 01/03/2016     No results found for: PHOS  Lab Results   Component Value Date    BUN 18 01/24/2017     Lab Results   Component Value Date    CR 1.08 01/24/2017       Lab Results   Component Value Date    AST 11 01/24/2017     Lab Results   Component Value Date    ALT 14 01/24/2017     Lab Results   Component Value Date    " BILITOTAL 0.6 01/24/2017       Lab Results   Component Value Date    WBC 3.6 01/24/2017     Lab Results   Component Value Date    HGB 10.5 01/24/2017     Lab Results   Component Value Date     01/24/2017     Lab Results   Component Value Date    ANEU 2.6 01/24/2017           Assessment:  Helen is tolerating Ibrance well and does not require any pharmacological interventions at this time.     Plan:  Continue Ibrance therapy, next cycle starts 3/2/17    Follow-Up:  Clinic appointment + labs 2/27/17  Oral chemotherapy management in 4 weeks     Refill Due:  Next Rx release date 2/20/17    Rio Freeman, PharmD  Therapy Management Oncology Pharmacist  [facility]   Phone: [phone]

## 2017-02-22 DIAGNOSIS — E78.5 HYPERLIPIDEMIA LDL GOAL <100: ICD-10-CM

## 2017-02-22 RX ORDER — SIMVASTATIN 20 MG
20 TABLET ORAL AT BEDTIME
Qty: 90 TABLET | Refills: 1 | Status: ON HOLD | OUTPATIENT
Start: 2017-02-22 | End: 2017-01-01

## 2017-02-22 NOTE — TELEPHONE ENCOUNTER
simvastatin (ZOCOR) 20 MG tablet     Last Written Prescription Date: 1-13-16  Last Fill Quantity: 90, # refills: 3  Last Office Visit with G, P or [facility] prescribing provider: 7-14-16       Lab Results   Component Value Date    CHOL 126 07/14/2016     Lab Results   Component Value Date    HDL 50 07/14/2016     Lab Results   Component Value Date    LDL 51 07/14/2016     Lab Results   Component Value Date    TRIG 125 07/14/2016     Lab Results   Component Value Date    CHOLHDLRATIO 2.3 01/13/2015

## 2017-02-22 NOTE — TELEPHONE ENCOUNTER
Prescription approved per [facility] Refill Protocol.     Meredith Sherwood RN   February 22, 2017 2:57 PM  [facility] Triage   [phone]

## 2017-02-23 ENCOUNTER — TELEPHONE (OUTPATIENT)
Dept: ONCOLOGY | Facility: CLINIC | Age: 77
End: 2017-02-23

## 2017-02-23 DIAGNOSIS — K59.00 CONSTIPATION: Primary | ICD-10-CM

## 2017-02-23 RX ORDER — SENNOSIDES 8.6 MG
1 TABLET ORAL 2 TIMES DAILY
Qty: 120 EACH | Refills: 0 | Status: ON HOLD | OUTPATIENT
Start: 2017-02-23 | End: 2017-01-01

## 2017-02-23 NOTE — TELEPHONE ENCOUNTER
Spoke with patient's spouse today, whom calls concerned as his wife has not had a  Bowel movement for 4 days.   She generally has regular bowel movements and managed with docusate 1-2 tabs a day.  He figures about 6 months ago, she had an incident of constipation.  Today, patient is experiencing some discomfort on her bottom when sitting.  Spoke with Jess Avalos PA-C,  Whom advised patient to take a dose of Milk of Magnesia today,  And can add in Senna tabs, start with 1 tab twice daily and increase if necessary.  Jess Avalos mentions patient's mobility is somewhat limited.  Spouse verbalized understanding and will go to local [employer] to  medication.  Advised spouse to call in no movement within 24 hours.

## 2017-02-27 ENCOUNTER — APPOINTMENT (OUTPATIENT)
Dept: LAB | Facility: CLINIC | Age: 77
End: 2017-02-27
Attending: PHYSICIAN ASSISTANT
Payer: COMMERCIAL

## 2017-02-27 ENCOUNTER — ONCOLOGY VISIT (OUTPATIENT)
Dept: ONCOLOGY | Facility: CLINIC | Age: 77
End: 2017-02-27
Attending: PHYSICIAN ASSISTANT
Payer: COMMERCIAL

## 2017-02-27 ENCOUNTER — ANTICOAGULATION THERAPY VISIT (OUTPATIENT)
Dept: NURSING | Facility: CLINIC | Age: 77
End: 2017-02-27

## 2017-02-27 ENCOUNTER — CARE COORDINATION (OUTPATIENT)
Dept: ONCOLOGY | Facility: CLINIC | Age: 77
End: 2017-02-27

## 2017-02-27 VITALS
OXYGEN SATURATION: 96 % | BODY MASS INDEX: 34.85 KG/M2 | RESPIRATION RATE: 18 BRPM | WEIGHT: 190.6 LBS | TEMPERATURE: 97.4 F | SYSTOLIC BLOOD PRESSURE: 107 MMHG | DIASTOLIC BLOOD PRESSURE: 60 MMHG | HEART RATE: 67 BPM

## 2017-02-27 DIAGNOSIS — Z86.73 HISTORY OF STROKE: ICD-10-CM

## 2017-02-27 DIAGNOSIS — C50.919 MALIGNANT NEOPLASM OF FEMALE BREAST, UNSPECIFIED LATERALITY, UNSPECIFIED SITE OF BREAST: Primary | ICD-10-CM

## 2017-02-27 DIAGNOSIS — Z79.01 LONG-TERM (CURRENT) USE OF ANTICOAGULANTS: ICD-10-CM

## 2017-02-27 DIAGNOSIS — R53.81 PHYSICAL DECONDITIONING: ICD-10-CM

## 2017-02-27 DIAGNOSIS — J90 PLEURAL EFFUSION: ICD-10-CM

## 2017-02-27 DIAGNOSIS — K59.00 CONSTIPATION, UNSPECIFIED CONSTIPATION TYPE: ICD-10-CM

## 2017-02-27 DIAGNOSIS — H11.32 SUBCONJUNCTIVAL HEMORRHAGE, LEFT: ICD-10-CM

## 2017-02-27 DIAGNOSIS — I63.9 CEREBRAL INFARCTION, UNSPECIFIED MECHANISM (H): ICD-10-CM

## 2017-02-27 LAB
ALBUMIN SERPL-MCNC: 3.4 G/DL (ref 3.4–5)
ALP SERPL-CCNC: 34 U/L (ref 40–150)
ALT SERPL W P-5'-P-CCNC: 13 U/L (ref 0–50)
ANION GAP SERPL CALCULATED.3IONS-SCNC: 9 MMOL/L (ref 3–14)
AST SERPL W P-5'-P-CCNC: 12 U/L (ref 0–45)
BASOPHILS # BLD AUTO: 0 10E9/L (ref 0–0.2)
BASOPHILS NFR BLD AUTO: 1.8 %
BILIRUB SERPL-MCNC: 0.6 MG/DL (ref 0.2–1.3)
BUN SERPL-MCNC: 24 MG/DL (ref 7–30)
CALCIUM SERPL-MCNC: 9 MG/DL (ref 8.5–10.1)
CANCER AG27-29 SERPL-ACNC: 38 U/ML (ref 0–39)
CEA SERPL-MCNC: 1.9 UG/L (ref 0–2.5)
CHLORIDE SERPL-SCNC: 107 MMOL/L (ref 94–109)
CO2 SERPL-SCNC: 27 MMOL/L (ref 20–32)
CREAT SERPL-MCNC: 1.09 MG/DL (ref 0.52–1.04)
DIFFERENTIAL METHOD BLD: ABNORMAL
EOSINOPHIL # BLD AUTO: 0 10E9/L (ref 0–0.7)
EOSINOPHIL NFR BLD AUTO: 0 %
ERYTHROCYTE [DISTWIDTH] IN BLOOD BY AUTOMATED COUNT: 14.6 % (ref 10–15)
GFR SERPL CREATININE-BSD FRML MDRD: 49 ML/MIN/1.7M2
GLUCOSE SERPL-MCNC: 180 MG/DL (ref 70–99)
HCT VFR BLD AUTO: 31.1 % (ref 35–47)
HGB BLD-MCNC: 10.4 G/DL (ref 11.7–15.7)
INR PPP: 2.94
INR PPP: 2.94 (ref 0.86–1.14)
LYMPHOCYTES # BLD AUTO: 0.7 10E9/L (ref 0.8–5.3)
LYMPHOCYTES NFR BLD AUTO: 26.3 %
MACROCYTES BLD QL SMEAR: PRESENT
MCH RBC QN AUTO: 36.6 PG (ref 26.5–33)
MCHC RBC AUTO-ENTMCNC: 33.4 G/DL (ref 31.5–36.5)
MCV RBC AUTO: 110 FL (ref 78–100)
MONOCYTES # BLD AUTO: 0.1 10E9/L (ref 0–1.3)
MONOCYTES NFR BLD AUTO: 2.6 %
NEUTROPHILS # BLD AUTO: 1.8 10E9/L (ref 1.6–8.3)
NEUTROPHILS NFR BLD AUTO: 69.3 %
OVALOCYTES BLD QL SMEAR: ABNORMAL
PLATELET # BLD AUTO: 155 10E9/L (ref 150–450)
POIKILOCYTOSIS BLD QL SMEAR: ABNORMAL
POTASSIUM SERPL-SCNC: 3.7 MMOL/L (ref 3.4–5.3)
PROT SERPL-MCNC: 6.7 G/DL (ref 6.8–8.8)
RBC # BLD AUTO: 2.84 10E12/L (ref 3.8–5.2)
SODIUM SERPL-SCNC: 144 MMOL/L (ref 133–144)
WBC # BLD AUTO: 2.6 10E9/L (ref 4–11)

## 2017-02-27 PROCEDURE — 96372 THER/PROPH/DIAG INJ SC/IM: CPT

## 2017-02-27 PROCEDURE — 82378 CARCINOEMBRYONIC ANTIGEN: CPT | Performed by: PHYSICIAN ASSISTANT

## 2017-02-27 PROCEDURE — 36415 COLL VENOUS BLD VENIPUNCTURE: CPT

## 2017-02-27 PROCEDURE — 86300 IMMUNOASSAY TUMOR CA 15-3: CPT | Performed by: PHYSICIAN ASSISTANT

## 2017-02-27 PROCEDURE — 99212 OFFICE O/P EST SF 10 MIN: CPT | Mod: ZF

## 2017-02-27 PROCEDURE — 85025 COMPLETE CBC W/AUTO DIFF WBC: CPT | Performed by: PHYSICIAN ASSISTANT

## 2017-02-27 PROCEDURE — 96401 CHEMO ANTI-NEOPL SQ/IM: CPT

## 2017-02-27 PROCEDURE — 25000128 H RX IP 250 OP 636: Mod: ZF | Performed by: PHYSICIAN ASSISTANT

## 2017-02-27 PROCEDURE — 80053 COMPREHEN METABOLIC PANEL: CPT | Performed by: PHYSICIAN ASSISTANT

## 2017-02-27 PROCEDURE — 99214 OFFICE O/P EST MOD 30 MIN: CPT | Mod: ZP | Performed by: PHYSICIAN ASSISTANT

## 2017-02-27 PROCEDURE — 85610 PROTHROMBIN TIME: CPT | Performed by: PHYSICIAN ASSISTANT

## 2017-02-27 RX ORDER — LETROZOLE 2.5 MG/1
2.5 TABLET, FILM COATED ORAL DAILY
Qty: 90 TABLET | Refills: 3 | Status: ON HOLD | OUTPATIENT
Start: 2017-02-27 | End: 2017-01-01

## 2017-02-27 RX ORDER — WARFARIN SODIUM 1 MG/1
TABLET ORAL
Qty: 400 TABLET | Refills: 3 | Status: ON HOLD | OUTPATIENT
Start: 2017-02-27 | End: 2017-03-27

## 2017-02-27 RX ADMIN — DENOSUMAB 120 MG: 120 INJECTION SUBCUTANEOUS at 15:48

## 2017-02-27 NOTE — PROGRESS NOTES
ANTICOAGULATION FOLLOW-UP CLINIC VISIT    Patient Name:  Helen Younger  Date:  2/27/2017  Contact Type:  Telephone/ Jolie from Oncology called to report below findings for patient.  INR in range, however; with reported symptoms plan to decrease warfarin weekly dose and recheck in 2 weeks.  call placed to patient / spouse.    SUBJECTIVE:     Patient Findings     Positives Bleeding gums (Jolie called from Oncology to report bleeding gums and left eye blood spot.)           OBJECTIVE    INR   Date Value Ref Range Status   02/27/2017 2.94 (H) 0.86 - 1.14 Final       ASSESSMENT / PLAN  INR assessment THER    Recheck INR In: 2 WEEKS    INR Location Clinic      Anticoagulation Summary as of 2/27/2017     INR goal 2.0-3.0   Today's INR    Maintenance plan 1 mg (1 mg x 1) on Mon, Wed; 2 mg (1 mg x 2) all other days   Full instructions 1 mg on Mon, Wed; 2 mg all other days   Weekly total 12 mg   Plan last modified Soumya Garza RN (2/27/2017)   Next INR check 3/13/2017   [facility] end date     Indications   Long-term (current) use of anticoagulants [Z79.01] [Z79.01]  Cerebral infarction (H) [I63.9]         Anticoagulation Episode Summary     INR check location     Preferred lab     Send INR reminders to [facility]    Comments       Anticoagulation Care Providers     Provider Role Specialty Phone number    Arin Shahid MD  Internal Medicine [phone]            See the Encounter Report to view Anticoagulation Flowsheet and Dosing Calendar (Go to Encounters tab in chart review, and find the Anticoagulation Therapy Visit)    Dosage adjustment made based on physician directed care plan.    Soumya Garza RN

## 2017-02-27 NOTE — MR AVS SNAPSHOT
Helen Yoomaxine   2/27/2017   Anticoagulation Therapy Visit    Description:  76 year old female   Provider:  Arin Shahid MD   Department:  [facility]           INR as of 2/27/2017     Today's INR 2.94      Anticoagulation Summary as of 2/27/2017     INR goal 2.0-3.0   Today's INR 2.94   Full instructions 2 mg every day   Next INR check 3/27/2017    Indications   Long-term (current) use of anticoagulants [Z79.01] [Z79.01]  Cerebral infarction (H) [I63.9]         Contact Numbers     [facility]  Please call [phone] or [phone]  to cancel and/or reschedule your appointment.   Please call [phone] with any problems or questions regarding your therapy          February 2017 Details    Sun Mon Tue Wed Thu Fri Sat        1               2               3               4                 5               6               7               8               9               10               11                 12               13               14               15               16               17               18                 19               20               21               22               23               24               25                 26               27      2 mg   See details      28      2 mg              Date Details   02/27 This INR check               How to take your warfarin dose     To take:  2 mg Take 2 of the 1 mg tablets.           March 2017 Details    Sun Mon Tue Wed Thu Fri Sat        1      2 mg         2      2 mg         3      2 mg         4      2 mg           5      2 mg         6      2 mg         7      2 mg         8      2 mg         9      2 mg         10      2 mg         11      2 mg           12      2 mg         13      2 mg         14      2 mg         15      2 mg         16      2 mg         17      2 mg         18      2 mg           19      2 mg         20      2 mg         21      2 mg         22      2 mg         23      2 mg         24      2 mg          25      2 mg           26      2 mg         27            28               29               30               31                 Date Details   No additional details    Date of next INR:  3/27/2017         How to take your warfarin dose     To take:  2 mg Take 2 of the 1 mg tablets.

## 2017-02-27 NOTE — MR AVS SNAPSHOT
Helen LONDON Younger   2/27/2017   Anticoagulation Therapy Visit    Description:  76 year old female   Provider:  Arin Shahid MD   Department:  [facility]           INR as of 2/27/2017     Today's INR 2.94      Anticoagulation Summary as of 2/27/2017     INR goal 2.0-3.0   Today's INR 2.94   Full instructions 1 mg on Mon, Wed; 2 mg all other days   Next INR check 3/13/2017    Indications   Long-term (current) use of anticoagulants [Z79.01] [Z79.01]  Cerebral infarction (H) [I63.9]         Your next Anticoagulation Clinic appointment(s)     Mar 13, 2017  2:00 PM CDT   [facility] with CP [facility]    [address] [facility] [address] [phone]              Contact Numbers     [facility]  Please call [phone] or [phone]  to cancel and/or reschedule your appointment.   Please call [phone] with any problems or questions regarding your therapy          February 2017 Details    Sun Mon Tue Wed Thu Fri Sat        1               2               3               4                 5               6               7               8               9               10               11                 12               13               14               15               16               17               18                 19               20               21               22               23               24               25                 26               27      1 mg   See details      28      2 mg              Date Details   02/27 This INR check               How to take your warfarin dose     To take:  1 mg Take 1 of the 1 mg tablets.    To take:  2 mg Take 2 of the 1 mg tablets.           March 2017 Details    Sun Mon Tue Wed Thu Fri Sat        1      1 mg         2      2 mg         3      2 mg         4      2 mg           5      2 mg         6      1 mg         7      2 mg          8      1 mg         9      2 mg         10      2 mg         11      2 mg           12      2 mg         13            14               15               16               17               18                 19               20               21               22               23               24               25                 26               27               28               29               30               31                 Date Details   No additional details    Date of next INR:  3/13/2017         How to take your warfarin dose     To take:  1 mg Take 1 of the 1 mg tablets.    To take:  2 mg Take 2 of the 1 mg tablets.

## 2017-02-27 NOTE — MR AVS SNAPSHOT
After Visit Summary   2/27/2017    Helen Younger    MRN: [id number]           Patient Information     Date Of Birth          1940        Visit Information        Provider Department      2/27/2017 2:40 PM Padmini Brunson PA-C [facility]        Today's Diagnoses     Malignant neoplasm of female breast, unspecified laterality, unspecified site of breast (H)    -  1    Long-term (current) use of anticoagulants        Pleural effusion        Physical deconditioning        Constipation, unspecified constipation type        Subconjunctival hemorrhage, left           Follow-ups after your visit        Additional Services     HOME CARE NURSING REFERRAL       **Order classes of: [facility], [facility] and [facility] will route to the Home Care and Hospice Referral Pool.  Home Care or Hospice will then contact the patient to schedule their appointment.**    If you do not hear from Home Care and Hospice, or you would like to call to schedule, please call the referring place of service that your provider has listed below.  ______________________________________________________________________    Your provider has referred you to: [facility]: [facility] [phone]   [email or web address]    Extended Emergency Contact Information  Primary Emergency Contact: BINHARLEEN  Address: [address] [place] [address] [facility]  Home Phone: [phone]  Mobile Phone: [phone]  Relation: Spouse  Secondary Emergency Contact: MARNIKASIARADHAINEZ           [facility]  Home Phone: [phone]  Mobile Phone: [phone]  Relation: Daughter    Patient Anticipated Discharge Date: unti kathy notice   RN, PT, HHA to begin 24 - 48 hours after discharge.  PLEASE EVALUATE AND TREAT (Evaluation timeline is 24 - 48 hrs. Please call if there is need for a variance to this  timeline).    REASON FOR REFERRAL: Assessment & Treatment: PT    ADDITIONAL SERVICES NEEDED: none    OTHER PERTINENT INFORMATION: Patient was last seen by provider on 2/27/17 for metastatic breast cancer.    Current Outpatient Prescriptions:  warfarin (JANTOVEN) 1 MG tablet, Take 2-3 tablets (2-3mg) by mouth daily as directed by INR, Disp: 400 tablet, Rfl: 3  palbociclib (IBRANCE) 100 MG capsule CHEMO, Take 1 capsule (100 mg) by mouth daily with food Take for 21 days, then 7 days off. Avoid grapefruit. Do not open/crush/chew capsule., Disp: 21 capsule, Rfl: 0  letrozole (FEMARA) 2.5 MG tablet, Take 1 tablet (2.5 mg) by mouth daily, Disp: 90 tablet, Rfl: 3  magnesium hydroxide (MILK OF MAGNESIA) 400 MG/5ML suspension, Take 30-60 mLs by mouth daily as needed for constipation or heartburn, Disp: 360 mL, Rfl: 0  sennosides (SENOKOT) 8.6 MG tablet, Take 1 tablet by mouth 2 times daily May increase to 2 tabs twice daily if needed, Disp: 120 each, Rfl: 0  simvastatin (ZOCOR) 20 MG tablet, Take 1 tablet (20 mg) by mouth At Bedtime, Disp: 90 tablet, Rfl: 1  palbociclib (IBRANCE) 100 MG capsule CHEMO, Take 1 capsule (100 mg) by mouth daily with food Take for 21 days, then 7 days off. Avoid grapefruit. Do not open/crush/chew capsule., Disp: 21 capsule, Rfl: 0  palbociclib (IBRANCE) 100 MG capsule, Take 1 capsule (100 mg) by mouth daily with food Take for 21 days, then 7 days off. Avoid grapefruit. Do not open/crush/chew capsule., Disp: 21 capsule, Rfl: 0  palbociclib (IBRANCE) 100 MG capsule, Take 1 capsule (100 mg) by mouth daily with food Take for 21 days, then 7 days off. Avoid grapefruit. Do not open/crush/chew capsule., Disp: 21 capsule, Rfl: 0  Nystatin (NYSTOP) 874519 UNIT/GM POWD, daily as needed, Disp: , Rfl:   oxybutynin (DITROPAN) 5 MG tablet, TAKE 1 TABLET (5 MG) BY MOUTH 2 TIMES DAILY, Disp: , Rfl: 11  order for DME, Equipment being ordered:  Incontinence pads   Patient uses these tid, Disp: 100 each, Rfl:  3  palbociclib (IBRANCE) 100 MG capsule, Take 1 capsule (100 mg) by mouth daily with food Take for 21 days, then 7 days off. Avoid grapefruit. Do not open/crush/chew capsule., Disp: 21 capsule, Rfl: 0  order for DME, Equipment being ordered: wheeled walker, Disp: 1 each, Rfl: 0  DOCUSATE SODIUM, 1 tablet 2 times daily , Disp: , Rfl:   Multiple Vitamins-Minerals (OCUVITE ADULT FORMULA PO), Take 1 tablet by mouth daily., Disp: , Rfl:   VIACTIV OR, Take 1 chew tab by mouth 2 times daily. One in the morning and one at bedtime. , Disp: , Rfl:   [DISCONTINUED] letrozole (FEMARA) 2.5 MG tablet, Take 1 tablet (2.5 mg) by mouth daily, Disp: 90 tablet, Rfl: 3  [DISCONTINUED] warfarin (JANTOVEN) 1 MG tablet, As directed by INR Clinic (2mg MWF / 3mg TuThSaSu) (Patient taking differently: As directed by INR Clinic (2mg MWFSaSu / 3mg TuTh)), Disp: 400 tablet, Rfl: 3      Patient Active Problem List:     Hyperlipidemia LDL goal <100     Breast cancer,left     Cerebral infarction (H)     Hypertension goal BP (blood pressure) < 140/90     Advance Care Planning     CKD (chronic kidney disease) stage 3, GFR 30-59 ml/min     Health Care Home     Cataract, mild, ou     Melanocytic nevus     Type 2 diabetes, HbA1C goal < 8% (H)     Macular degeneration (senile) of retina     Posterior vitreous detachment     Peripheral vascular disease (H)     Obesity     Type 2 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)     Type 2 diabetes mellitus with autonomic neuropathy (H)     SOB (shortness of breath)     Pleural effusion on left     Pleural effusion     Malignant neoplasm of female breast, unspecified laterality, unspecified site of breast (H)     Malignant pleural effusion     Long-term (current) use of anticoagulants [Z79.01]      Documentation of Face to Face and Certification for Home Health Services    I certify that patient, Helen Younger is under my care and that I, or a Nurse Practitioner or Physician's Assistant working  with me, had a face-to-face encounter that meets the physician face-to-face encounter requirements with this patient on: 2/27/2017.    This encounter with the patient was in whole, or in part, for the following medical condition, which is the primary reason for Home Health Care: deconditioning and mechanical falls.    I certify that, based on my findings, the following services are medically necessary Home Health Services: Physical Therapy    My clinical findings support the need for the above services because: Physical Therapy Services are needed to assess and treat the following functional impairments: weakness leading to mechanical falls, deconditioning.    Further, I certify that my clinical findings support that this patient is homebound (i.e. absences from home require considerable and taxing effort and are for medical reasons or [employer] services or infrequently or of short duration when for other reasons) because: Requires assistance of another person or specialized equipment to access medical services because patient:  Requires supervision to leave the home-she uses a walker for short distances and is prone to falls, she uses a wheelchair for distances and when leaving the home    Based on the above findings, I certify that this patient is confined to the home and needs intermittent skilled nursing care, physical therapy and/or speech therapy.  The patient is under my care, and I have initiated the establishment of the plan of care.  This patient will be followed by a physician who will periodically review the plan of care.    Physician/Provider to provide follow up care: Arin Shahid certified Physician at time of discharge: Padmini Brunson PA-C     Please be aware that coverage of these services is subject to the terms and limitations of your health insurance plan.  Call member services at your health plan with any benefit or coverage questions.                  Your next 10  appointments already scheduled     Mar 13, 2017  2:00 PM CDT   Anticoagulation Visit with CP ANTI COAG   [facility]    [address] [facility] [id number] [phone]            Mar 27, 2017  1:45 PM CDT   [facility] Draw with  [facility] DRAW   [facility] ([facility])    [address] [id number] [phone]            Mar 27, 2017  2:30 PM CDT   (Arrive by 2:15 PM)   Return Visit with Keisha Landis MD   [facility] ([facility])    [address] [address] [phone]              Future tests that were ordered for you today     Open Future Orders        Priority Expected Expires Ordered    *CBC with platelets differential Routine 3/27/2017 4/27/2017 2/27/2017    Comprehensive metabolic panel Routine 3/27/2017 4/27/2017 2/27/2017    CEA Routine 3/27/2017 4/27/2017 2/27/2017    CA27.29  BREAST TUMOR MARKER Routine 3/27/2017 4/27/2017 2/27/2017            Who to contact     If you have questions or need follow up information about today's clinic visit or your schedule please contact [facility] directly at [phone].  Normal or non-critical lab and imaging results will be communicated to you by MyChart, letter or phone within 4 business days after the clinic has received the results. If you do not hear from us within 7 days, please contact the clinic through [employer] or phone. If you have a critical or abnormal lab result, we will notify you by phone as soon as possible.  Submit refill requests through needmade or call your pharmacy and they will forward the refill request to us. Please allow 3 business days for your refill to be completed.          Additional Information About Your Visit        [employer] Information     needmade gives you secure  access to your electronic health record. If you see a primary care provider, you can also send messages to your care team and make appointments. If you have questions, please call your primary care clinic.  If you do not have a primary care provider, please call [phone] and they will assist you.        Care EveryWhere ID     This is your Care EveryWhere ID. This could be used by other organizations to access your [place] medical records  [id number]        Your Vitals Were     Pulse Temperature Respirations Pulse Oximetry Breastfeeding? BMI (Body Mass Index)    67 97.4  F (36.3  C) (Oral) 18 96% No 34.85 kg/m2       Blood Pressure from Last 3 Encounters:   02/27/17 107/60   01/24/17 (!) 88/65   12/26/16 116/63    Weight from Last 3 Encounters:   02/27/17 86.5 kg (190 lb 9.6 oz)   01/24/17 84.5 kg (186 lb 4.8 oz)   12/26/16 83.9 kg (185 lb)              We Performed the Following     Ca27.29  breast tumor marker     CBC with platelets differential     CEA     Comprehensive metabolic panel     HOME CARE NURSING REFERRAL     INR          Today's Medication Changes          These changes are accurate as of: 2/27/17  6:22 PM.  If you have any questions, ask your nurse or doctor.               These medicines have changed or have updated prescriptions.        Dose/Directions    warfarin 1 MG tablet   Commonly known as:  JANTOVEN   This may have changed:  additional instructions   Used for:  History of stroke   Changed by:  Arin Shahid MD        Take 2-3 tablets (2-3mg) by mouth daily as directed by INR   Quantity:  400 tablet   Refills:  3            Where to get your medicines      These medications were sent to [place] [id number] [facility] - [facility]  [address]  [address] [facility] [address]     Phone:  [phone]     warfarin 1 MG tablet         These medications were sent to [facility] - [place], [place] - [address]   [address] [facility] [address]     Hours:  Mon-Fri 8:30am-5:00pm Toll Free [phone] Phone:  [phone]     letrozole 2.5 MG tablet    palbociclib 100 MG capsule CHEMO                Primary Care Provider Office Phone # Fax #    Arin Shahid [phone]       [facility] [address] [facility] [address]        Thank you!     Thank you for choosing [facility]  for your care. Our goal is always to provide you with excellent care. Hearing back from our patients is one way we can continue to improve our services. Please take a few minutes to complete the written survey that you may receive in the mail after your visit with us. Thank you!             Your Updated Medication List - Protect others around you: Learn how to safely use, store and throw away your medicines at www.disposemymeds.org.          This list is accurate as of: 2/27/17  6:22 PM.  Always use your most recent med list.                   Brand Name Dispense Instructions for use    DOCUSATE SODIUM      1 tablet 2 times daily       letrozole 2.5 MG tablet    FEMARA    90 tablet    Take 1 tablet (2.5 mg) by mouth daily       magnesium hydroxide 400 MG/5ML suspension    MILK OF MAGNESIA    360 mL    Take 30-60 mLs by mouth daily as needed for constipation or heartburn       NYSTOP 559409 UNIT/GM Powd powder      daily as needed       OCUVITE ADULT FORMULA PO      Take 1 tablet by mouth daily.       * order for DME     1 each    Equipment being ordered: wheeled walker       * order for DME     100 each    Equipment being ordered:  Incontinence pads   Patient uses these tid       oxybutynin 5 MG tablet    DITROPAN     TAKE 1 TABLET (5 MG) BY MOUTH 2 TIMES DAILY       * palbociclib 100 MG capsule CHEMO    IBRANCE    21 capsule    Take 1 capsule (100 mg) by mouth daily with food Take for 21 days, then 7 days off. Avoid grapefruit. Do not open/crush/chew capsule.       * palbociclib 100 MG capsule CHEMO    IBRANCE     21 capsule    Take 1 capsule (100 mg) by mouth daily with food Take for 21 days, then 7 days off. Avoid grapefruit. Do not open/crush/chew capsule.       * palbociclib 100 MG capsule CHEMO    IBRANCE    21 capsule    Take 1 capsule (100 mg) by mouth daily with food Take for 21 days, then 7 days off. Avoid grapefruit. Do not open/crush/chew capsule.       * palbociclib 100 MG capsule CHEMO    IBRANCE    21 capsule    Take 1 capsule (100 mg) by mouth daily with food Take for 21 days, then 7 days off. Avoid grapefruit. Do not open/crush/chew capsule.       * palbociclib 100 MG capsule CHEMO    IBRANCE    21 capsule    Take 1 capsule (100 mg) by mouth daily with food Take for 21 days, then 7 days off. Avoid grapefruit. Do not open/crush/chew capsule.       sennosides 8.6 MG tablet    SENOKOT    120 each    Take 1 tablet by mouth 2 times daily May increase to 2 tabs twice daily if needed       simvastatin 20 MG tablet    ZOCOR    90 tablet    Take 1 tablet (20 mg) by mouth At Bedtime       VIACTIV PO      Take 1 chew tab by mouth 2 times daily. One in the morning and one at bedtime.       warfarin 1 MG tablet    JANTOVEN    400 tablet    Take 2-3 tablets (2-3mg) by mouth daily as directed by INR       * Notice:  This list has 7 medication(s) that are the same as other medications prescribed for you. Read the directions carefully, and ask your doctor or other care provider to review them with you.

## 2017-02-27 NOTE — TELEPHONE ENCOUNTER
Anticoagulation Monitoring INR Value INR Value INR Goal Range   Latest Ref Rng & Units 0.86 - 1.14 0.86 - 1.14    1/24/2017  2.69 (H) 2.0-3.0     Anticoagulation Monitoring Therapeutic? Last Week Total Next Week Total   Latest Ref Rng & Units      1/24/2017  14 mg 14 mg     Anticoagulation Monitoring Weekly Dose Return Date Recheck Instructions   Latest Ref Rng & Units       1/24/2017 2/27/2017  2 mg every day     Anticoagulation Monitoring Instructions INR location Visit Report   Latest Ref Rng & Units      1/24/2017        warfarin (JANTOVEN) 1 MG tablet 400 tablet 3 1/29/2016  No   Sig: As directed by INR Clinic (2mg MWF / 3mg TuThSaSu)   Patient taking differently: As directed by INR Clinic (2mg MWFSaSu / 3mg TuTh)          Patient is [facility] at [facility].  Patient is compliant with INR blood tests.  Chart review shows she has been taking 1mg warfarin/jantoven tablets per above schedule.

## 2017-02-27 NOTE — TELEPHONE ENCOUNTER
warfarin (JANTOVEN) 1 MG tablet    Last Written Prescription Date: 1-29-16  Last Fill Qty: 400, # refills: 3  Last Office Visit with G, P or [facility] prescribing provider: 7-14-16       Date and Result of Last PT/INR:   Lab Results   Component Value Date    INR 2.69 01/24/2017    INR 2.69 01/24/2017    INR 3.5 01/10/2017    INR 2.3 11/25/2016    INR 1.0 02/18/2014    PT NA 09/03/2010    PT NA 07/02/2010

## 2017-02-27 NOTE — NURSING NOTE
Chief Complaint   Patient presents with     Lab Only     venipuncture blood draw    Vitals done and labs drawn peripherally by ultrasound RN

## 2017-02-27 NOTE — PROGRESS NOTES
HEMATOLOGY/ONCOLOGY PROGRESS NOTE  Feb 27, 2017    REASON FOR VISIT: follow-up of metastatic breast cancer    DIAGNOSIS:   The patient is a 76-year-old woman who, in March of 2006, was diagnosed with a left-sided breast cancer. She presented with pain in the left breast, and a diagnostic mammogram demonstrated abnormalities in the left breast. The region of abnormality was biopsied, and she was found to have an infiltrating ductal carcinoma. She subsequently went on to undergo a left-sided lumpectomy and sentinel lymph node biopsy at the [facility] by Dr. Salas Cook in April of 2006. The patient had a 2.5 x 2.1 cm tumor excised. It was a grade 2 infiltrating ductal carcinoma. Her sentinel lymph node biopsy was negative on frozen section, and final pathology did demonstrate micrometastasis measuring 0.2 cm. The tumor itself was ER-positive, CA-positive, and HER2-negative. The patient did have a positive margin for which she underwent a reexcision in June of 2006. The subsequent pathology was negative.     The patient was then seen by Dr. Jong Dacosta, and she was encouraged to receive adjuvant chemotherapy. She received three cycles of FEC every three weeks followed by Taxotere single-agent administered every three weeks for three cycles. She completed chemotherapy in mid to late October of 2006. Her only complication was neutropenic fever following her last dose, for which she was briefly hospitalized. She otherwise had no issues with residual neuropathies or any other known complications from her chemo.     The patient was started on adjuvant Arimidex on November 6, 2006. She also received whole breast radiotherapy with boost to the left breast and tumor bed from November 15, 2006, through January 5, 2007. This was performed by Dr. Nicholas at [facility]. It is unknown if she received kevon radiation. The patient' portacath was removed in July of 2007. She completed five years of arimidex and  remained off therapy.     She represented in December 2015 with shortness of breath and pleural effusion, requiring hospitalization. She had three thoracenteses while inpatient, with cytology revealing for a metastatic breast cancer, ER/WV-positive, HER-2 negative. On her workup, she also has disease in her bones after CT scan of the chest, abdomen and pelvis. She met with Dr. Landis on 1/11/2016 and was started on endocrine therapy with AI. She started on Ibrance on 2/3/2016. She was dose reduced after cycle 1 to 100 mg for cytopenias.    INTERVAL HISTORY: Ms. Younger is here with her  today. There have not been any real changes this past month. Two mornings ago she work up with a bloodshot eye. She has not pain or vision changes. She is on coumadin for her hx of stroke and is having an INR today. She has also had some gum bleeding periodically this past week, primarily at the top R tooth. She denies any other bleeding. She reports continuing to have periodic falls. About 2x/month. The falls have been occurring for several months without change and have been described as mechanical falls where her legs will start to give out and she slowly lowers to the floor. The most recent episode happened this past Sunday. She was in the bathroom and her  was not with her. She landed on her bottom and backside and did not sustain any injuries or hit her head. She has declined PT in the past. She gets around using a wheeled walker at home and uses a wheelchair for long distances. Denies HA or dizziness. Reports an episode of constipation last week which resolved with pushing fluids and adding prune juice and milk of mag to her regimen of docusate 2 tab daily. Last BM was on Sunday. Eating okay. Has been drinking more fluids.     No fevers/chills/sweats, HA, dizziness, cough, congestion, sore throat, chest pain, SOB, vision changes, HUERTA, n/v, abdominal pain, urinary changes, swelling, rash, pain, change in  activity.    Current Outpatient Prescriptions   Medication Sig Dispense Refill     warfarin (JANTOVEN) 1 MG tablet Take 2-3 tablets (2-3mg) by mouth daily as directed by  tablet 3     palbociclib (IBRANCE) 100 MG capsule CHEMO Take 1 capsule (100 mg) by mouth daily with food Take for 21 days, then 7 days off. Avoid grapefruit. Do not open/crush/chew capsule. 21 capsule 0     letrozole (FEMARA) 2.5 MG tablet Take 1 tablet (2.5 mg) by mouth daily 90 tablet 3     magnesium hydroxide (MILK OF MAGNESIA) 400 MG/5ML suspension Take 30-60 mLs by mouth daily as needed for constipation or heartburn 360 mL 0     sennosides (SENOKOT) 8.6 MG tablet Take 1 tablet by mouth 2 times daily May increase to 2 tabs twice daily if needed 120 each 0     simvastatin (ZOCOR) 20 MG tablet Take 1 tablet (20 mg) by mouth At Bedtime 90 tablet 1     palbociclib (IBRANCE) 100 MG capsule CHEMO Take 1 capsule (100 mg) by mouth daily with food Take for 21 days, then 7 days off. Avoid grapefruit. Do not open/crush/chew capsule. 21 capsule 0     palbociclib (IBRANCE) 100 MG capsule Take 1 capsule (100 mg) by mouth daily with food Take for 21 days, then 7 days off. Avoid grapefruit. Do not open/crush/chew capsule. 21 capsule 0     palbociclib (IBRANCE) 100 MG capsule Take 1 capsule (100 mg) by mouth daily with food Take for 21 days, then 7 days off. Avoid grapefruit. Do not open/crush/chew capsule. 21 capsule 0     Nystatin (NYSTOP) 936511 UNIT/GM POWD daily as needed       oxybutynin (DITROPAN) 5 MG tablet TAKE 1 TABLET (5 MG) BY MOUTH 2 TIMES DAILY  11     order for DME Equipment being ordered:  Incontinence pads   Patient uses these tid 100 each 3     palbociclib (IBRANCE) 100 MG capsule Take 1 capsule (100 mg) by mouth daily with food Take for 21 days, then 7 days off. Avoid grapefruit. Do not open/crush/chew capsule. 21 capsule 0     order for DME Equipment being ordered: wheeled walker 1 each 0     DOCUSATE SODIUM 1 tablet 2 times daily         Multiple Vitamins-Minerals (OCUVITE ADULT FORMULA PO) Take 1 tablet by mouth daily.       VIACTIV OR Take 1 chew tab by mouth 2 times daily. One in the morning and one at bedtime.        [DISCONTINUED] letrozole (FEMARA) 2.5 MG tablet Take 1 tablet (2.5 mg) by mouth daily 90 tablet 3     [DISCONTINUED] warfarin (JANTOVEN) 1 MG tablet As directed by INR Clinic (2mg MWF / 3mg TuThSaSu) (Patient taking differently: As directed by INR Clinic (2mg MWFSaSu / 3mg TuTh)) 400 tablet 3          Allergies   Allergen Reactions     Diuretic      Don't remember side effect     Zyrtec [Cetirizine Hcl]      Elevated blood pressure       PHYSICAL EXAMINATION  /60 (BP Location: Left arm, Cuff Size: Adult Regular)  Pulse 67  Temp 97.4  F (36.3  C) (Oral)  Resp 18  Wt 86.5 kg (190 lb 9.6 oz)  SpO2 96%  Breastfeeding? No  BMI 34.85 kg/m2   Wt Readings from Last 4 Encounters:   02/27/17 86.5 kg (190 lb 9.6 oz)   01/24/17 84.5 kg (186 lb 4.8 oz)   12/26/16 83.9 kg (185 lb)   11/30/16 83.3 kg (183 lb 11.2 oz)     Constitutional: Alert, oriented female in no visible distress. +Expressive aphasia. Examined in wheelchair today.  Eyes: PERRL. Anicteric sclerae. L eye with subconjunctival hemorrhage  ENT/Mouth: OM moist and pink without thrush. Some bleeding near gum of R front tooth, no clear gum inflammation and minimally tender to the touch   CV: RRR, no murmurs appreciated.  Resp: Decreased breath sounds on the left base, stable. Breathing comfortably without accessory muscle usage.  Abdomen: Soft, non-tender, non-distended.  Extremities: No lower extremity edema appreciated.   Skin: Warm, dry.   Lymph: No cervical or supraclavicular lymphadenopathy appreciated.   Neuro: CN II-XII grossly intact.    LABS:  Results for RYLEE PURCELL (MRN [id number]) as of 2/27/2017 18:05   Ref. Range 1/24/2017 14:23 2/27/2017 00:00 2/27/2017 14:17   Sodium Latest Ref Range: 133 - 144 mmol/L 141  144   Potassium Latest Ref Range: 3.4 - 5.3 mmol/L  3.6  3.7   Chloride Latest Ref Range: 94 - 109 mmol/L 106  107   Carbon Dioxide Latest Ref Range: 20 - 32 mmol/L 25  27   Urea Nitrogen Latest Ref Range: 7 - 30 mg/dL 18  24   Creatinine Latest Ref Range: 0.52 - 1.04 mg/dL 1.08 (H)  1.09 (H)   GFR Estimate Latest Ref Range: >60 mL/min/1.7m2 49 (L)  49 (L)   GFR Estimate If Black Latest Ref Range: >60 mL/min/1.7m2 60 (L)  59 (L)   Calcium Latest Ref Range: 8.5 - 10.1 mg/dL 9.0  9.0   Anion Gap Latest Ref Range: 3 - 14 mmol/L 9  9   Albumin Latest Ref Range: 3.4 - 5.0 g/dL 3.4  3.4   Protein Total Latest Ref Range: 6.8 - 8.8 g/dL 6.8  6.7 (L)   Bilirubin Total Latest Ref Range: 0.2 - 1.3 mg/dL 0.6  0.6   Alkaline Phosphatase Latest Ref Range: 40 - 150 U/L 38 (L)  34 (L)   ALT Latest Ref Range: 0 - 50 U/L 14  13   AST Latest Ref Range: 0 - 45 U/L 11  12   CA 27-29 Latest Ref Range: 0 - 39 U/mL 34  38   Glucose Latest Ref Range: 70 - 99 mg/dL 178 (H)  180 (H)   WBC Latest Ref Range: 4.0 - 11.0 10e9/L 3.6 (L)  2.6 (L)   Hemoglobin Latest Ref Range: 11.7 - 15.7 g/dL 10.5 (L)  10.4 (L)   Hematocrit Latest Ref Range: 35.0 - 47.0 % 31.0 (L)  31.1 (L)   Platelet Count Latest Ref Range: 150 - 450 10e9/L 211  155   RBC Count Latest Ref Range: 3.8 - 5.2 10e12/L 2.91 (L)  2.84 (L)   MCV Latest Ref Range: 78 - 100 fl 107 (H)  110 (H)   MCH Latest Ref Range: 26.5 - 33.0 pg 36.1 (H)  36.6 (H)   MCHC Latest Ref Range: 31.5 - 36.5 g/dL 33.9  33.4   RDW Latest Ref Range: 10.0 - 15.0 % 15.1 (H)  14.6   Diff Method Unknown Automated Method  Manual Differential   % Neutrophils Latest Units: % 71.3  69.3   % Lymphocytes Latest Units: % 22.0  26.3   % Monocytes Latest Units: % 4.4  2.6   % Eosinophils Latest Units: % 0.6  0.0   % Basophils Latest Units: % 1.4  1.8   % Immature Granulocytes Latest Units: % 0.3     Nucleated RBCs Latest Ref Range: 0 /100 0     Absolute Neutrophil Latest Ref Range: 1.6 - 8.3 10e9/L 2.6  1.8   Absolute Lymphocytes Latest Ref Range: 0.8 - 5.3 10e9/L 0.8  0.7 (L)    Absolute Monocytes Latest Ref Range: 0.0 - 1.3 10e9/L 0.2  0.1   Absolute Eosinophils Latest Ref Range: 0.0 - 0.7 10e9/L 0.0  0.0   Absolute Basophils Latest Ref Range: 0.0 - 0.2 10e9/L 0.1  0.0   Abs Immature Granulocytes Latest Ref Range: 0 - 0.4 10e9/L 0.0     Absolute Nucleated RBC Unknown 0.0     Poikilocytosis Unknown   Moderate   Ovalocytes Unknown   Moderate   Macrocytes Unknown   Present   INR Latest Ref Range: 0.86 - 1.14  2.69 (H) 2.94 2.94 (H)   CEA Latest Ref Range: 0 - 2.5 ug/L 1.9  1.9         IMPRESSION/PLAN:  Helen Younger is a 75 year old female with history of ER/FL-positive, HER-2 negative breast cancer treated with lumpectomy, adjuvant chemotherapy with Taxotere, whole breast radiotherapy, and 5 years of adjuvant Arimidex, completed in 2012. She represented in December 2015 with shortness of breath and pleural effusion, found to have developed metastatic disease involving the bones and pleural cavity.    Metastatic breast cancer: Currently on Letrozole and palbociclib with overall stable disease on imaging 12/23/2016. She tolerates this regimen well. She will continue with scans every 4-5 months as long as her tumor markers remain stable, and she doesn't had any signs of progressive disease. Refilled both meds today. Next cycle of palbociclib to start on Wednesday. She will return to see Dr. Landis in one month unless interval concerns arise.     Bone mets: Previously on monthly Xgeva, switched to every 3 months due to large co-pay, last received 11/30/2016. Due again today. She is taking calcium and vitamin D supplementation.     Pleural effusions:  This has been stable on subsequent imaging, asymptomatic. Exam is stable today.    CKD/FEN: Encouraged adequate hydration with about 64 oz fluids daily. Creat, lytes stable. She has been doing better with eating and drinking. BP is improved today compared to prior, although still low.     Hypotension: now off all anti-hypertensives.No dizziness or  lightheadedness. BP continues to be softer, but stable. She was advised to continue to strive for 64 oz fluids daily.    Deconditioning: Using walker at home. She continues to have a rare mechanical falls, not new for her, and not preceded by any prodromal symptoms. Not increasing in frequency. Helen has previously been uninterested in PT. We discussed this at length today. Discussed her and her  may need to consider activity limitations-her not being alone in a room, not using the bathroom alone etc. She was agreeable to trying home PT. Also reminded her to push fluids-not sure whether any of this could be from hypotension at times as historically her BP is quite low, although improved today.     Coumadin: on coumadin for hx of stroke. INR managed by local clinic and drawn today at 2.94. Has new L subconjunctival hemorrhage, stable since it started yesterday. Also with some gum bleeding with manipulation near R front tooth, no clear gum inflammation. My nurse called the coumadin clinic to alert them to this as I recommend her INR be keep at the lower end of therapeutic with these symptoms. To monitor the eye-has happened previously and was self limited. Also to start mouth rinses per her dentist and if bleeding intermittently continues she will make a dental appt for the end of the week.     Constipation: push fluids. Continued docusate 2 tabs daily and consider adding senna or daily prune juice to keep her regular.     It is my privilege to be involved in the care of the above patient.     Padmini Brunson PA-C  [facility]  [address] [place] [id number]  [phone]

## 2017-02-27 NOTE — PROGRESS NOTES
Padmini Brunson recommended after seeing patient today and having left eye bleeding of sclera and bleeding gums with an INR of 2.94. Spoke to Soumya in the [place] [facility] clinic patient is followed by with symptoms observed today and will contact patient to lower the INR. Jolie Farley RN, BSN

## 2017-02-27 NOTE — NURSING NOTE
"Helen Younger is a 76 year old female who presents for:  Chief Complaint   Patient presents with     Lab Only     venipuncture blood draw      Oncology Clinic Visit     Pt is here for a FU appt 1 month check        Initial Vitals:  /60 (BP Location: Left arm, Cuff Size: Adult Regular)  Pulse 67  Temp 97.4  F (36.3  C) (Oral)  Resp 18  Wt 86.5 kg (190 lb 9.6 oz)  SpO2 96%  Breastfeeding? No  BMI 34.85 kg/m2 Estimated body mass index is 34.85 kg/(m^2) as calculated from the following:    Height as of 1/24/17: 1.575 m (5' 2.01\").    Weight as of this encounter: 86.5 kg (190 lb 9.6 oz).. Body surface area is 1.95 meters squared. BP completed using cuff size: NA (Not Taken)  Data Unavailable No LMP recorded. Patient is postmenopausal. Allergies and medications reviewed.     Medications: Medication refills not needed today.  Pharmacy name entered into [employer]:    [facility]  [id number] - [place], [place] - [address]  [facility] - [place], [place] - [address] [facility] - [place], [place] - [address]     Comments: Pt vitals taken in lab.    6 minutes for nursing intake (face to face time)   Mounika Kamara CMA        "

## 2017-02-27 NOTE — LETTER
2/27/2017      RE: Helen CALLAHAN Aren  [address]  [facility] [address]          HEMATOLOGY/ONCOLOGY PROGRESS NOTE  Feb 27, 2017    REASON FOR VISIT: follow-up of metastatic breast cancer    DIAGNOSIS:   The patient is a 76-year-old woman who, in March of 2006, was diagnosed with a left-sided breast cancer. She presented with pain in the left breast, and a diagnostic mammogram demonstrated abnormalities in the left breast. The region of abnormality was biopsied, and she was found to have an infiltrating ductal carcinoma. She subsequently went on to undergo a left-sided lumpectomy and sentinel lymph node biopsy at the [facility] by Dr. Salas Cook in April of 2006. The patient had a 2.5 x 2.1 cm tumor excised. It was a grade 2 infiltrating ductal carcinoma. Her sentinel lymph node biopsy was negative on frozen section, and final pathology did demonstrate micrometastasis measuring 0.2 cm. The tumor itself was ER-positive, MN-positive, and HER2-negative. The patient did have a positive margin for which she underwent a reexcision in June of 2006. The subsequent pathology was negative.     The patient was then seen by Dr. Jong Dacosta, and she was encouraged to receive adjuvant chemotherapy. She received three cycles of FEC every three weeks followed by Taxotere single-agent administered every three weeks for three cycles. She completed chemotherapy in mid to late October of 2006. Her only complication was neutropenic fever following her last dose, for which she was briefly hospitalized. She otherwise had no issues with residual neuropathies or any other known complications from her chemo.     The patient was started on adjuvant Arimidex on November 6, 2006. She also received whole breast radiotherapy with boost to the left breast and tumor bed from November 15, 2006, through January 5, 2007. This was performed by Dr. Nicholas at [facility]. It is unknown if she received kevon radiation. The  patient' portacath was removed in July of 2007. She completed five years of arimidex and remained off therapy.     She represented in December 2015 with shortness of breath and pleural effusion, requiring hospitalization. She had three thoracenteses while inpatient, with cytology revealing for a metastatic breast cancer, ER/GA-positive, HER-2 negative. On her workup, she also has disease in her bones after CT scan of the chest, abdomen and pelvis. She met with Dr. Landis on 1/11/2016 and was started on endocrine therapy with AI. She started on Ibrance on 2/3/2016. She was dose reduced after cycle 1 to 100 mg for cytopenias.    INTERVAL HISTORY: Ms. Younger is here with her  today. There have not been any real changes this past month. Two mornings ago she work up with a bloodshot eye. She has not pain or vision changes. She is on coumadin for her hx of stroke and is having an INR today. She has also had some gum bleeding periodically this past week, primarily at the top R tooth. She denies any other bleeding. She reports continuing to have periodic falls. About 2x/month. The falls have been occurring for several months without change and have been described as mechanical falls where her legs will start to give out and she slowly lowers to the floor. The most recent episode happened this past Sunday. She was in the bathroom and her  was not with her. She landed on her bottom and backside and did not sustain any injuries or hit her head. She has declined PT in the past. She gets around using a wheeled walker at home and uses a wheelchair for long distances. Denies HA or dizziness. Reports an episode of constipation last week which resolved with pushing fluids and adding prune juice and milk of mag to her regimen of docusate 2 tab daily. Last BM was on Sunday. Eating okay. Has been drinking more fluids.     No fevers/chills/sweats, HA, dizziness, cough, congestion, sore throat, chest pain, SOB, vision  changes, HUERTA, n/v, abdominal pain, urinary changes, swelling, rash, pain, change in activity.    Current Outpatient Prescriptions   Medication Sig Dispense Refill     warfarin (JANTOVEN) 1 MG tablet Take 2-3 tablets (2-3mg) by mouth daily as directed by  tablet 3     palbociclib (IBRANCE) 100 MG capsule CHEMO Take 1 capsule (100 mg) by mouth daily with food Take for 21 days, then 7 days off. Avoid grapefruit. Do not open/crush/chew capsule. 21 capsule 0     letrozole (FEMARA) 2.5 MG tablet Take 1 tablet (2.5 mg) by mouth daily 90 tablet 3     magnesium hydroxide (MILK OF MAGNESIA) 400 MG/5ML suspension Take 30-60 mLs by mouth daily as needed for constipation or heartburn 360 mL 0     sennosides (SENOKOT) 8.6 MG tablet Take 1 tablet by mouth 2 times daily May increase to 2 tabs twice daily if needed 120 each 0     simvastatin (ZOCOR) 20 MG tablet Take 1 tablet (20 mg) by mouth At Bedtime 90 tablet 1     palbociclib (IBRANCE) 100 MG capsule CHEMO Take 1 capsule (100 mg) by mouth daily with food Take for 21 days, then 7 days off. Avoid grapefruit. Do not open/crush/chew capsule. 21 capsule 0     palbociclib (IBRANCE) 100 MG capsule Take 1 capsule (100 mg) by mouth daily with food Take for 21 days, then 7 days off. Avoid grapefruit. Do not open/crush/chew capsule. 21 capsule 0     palbociclib (IBRANCE) 100 MG capsule Take 1 capsule (100 mg) by mouth daily with food Take for 21 days, then 7 days off. Avoid grapefruit. Do not open/crush/chew capsule. 21 capsule 0     Nystatin (NYSTOP) 786252 UNIT/GM POWD daily as needed       oxybutynin (DITROPAN) 5 MG tablet TAKE 1 TABLET (5 MG) BY MOUTH 2 TIMES DAILY  11     order for DME Equipment being ordered:  Incontinence pads   Patient uses these tid 100 each 3     palbociclib (IBRANCE) 100 MG capsule Take 1 capsule (100 mg) by mouth daily with food Take for 21 days, then 7 days off. Avoid grapefruit. Do not open/crush/chew capsule. 21 capsule 0     order for DME Equipment  being ordered: wheeled walker 1 each 0     DOCUSATE SODIUM 1 tablet 2 times daily        Multiple Vitamins-Minerals (OCUVITE ADULT FORMULA PO) Take 1 tablet by mouth daily.       VIACTIV OR Take 1 chew tab by mouth 2 times daily. One in the morning and one at bedtime.        [DISCONTINUED] letrozole (FEMARA) 2.5 MG tablet Take 1 tablet (2.5 mg) by mouth daily 90 tablet 3     [DISCONTINUED] warfarin (JANTOVEN) 1 MG tablet As directed by INR Clinic (2mg MWF / 3mg TuThSaSu) (Patient taking differently: As directed by INR Clinic (2mg MWFSaSu / 3mg TuTh)) 400 tablet 3          Allergies   Allergen Reactions     Diuretic      Don't remember side effect     Zyrtec [Cetirizine Hcl]      Elevated blood pressure       PHYSICAL EXAMINATION  /60 (BP Location: Left arm, Cuff Size: Adult Regular)  Pulse 67  Temp 97.4  F (36.3  C) (Oral)  Resp 18  Wt 86.5 kg (190 lb 9.6 oz)  SpO2 96%  Breastfeeding? No  BMI 34.85 kg/m2   Wt Readings from Last 4 Encounters:   02/27/17 86.5 kg (190 lb 9.6 oz)   01/24/17 84.5 kg (186 lb 4.8 oz)   12/26/16 83.9 kg (185 lb)   11/30/16 83.3 kg (183 lb 11.2 oz)     Constitutional: Alert, oriented female in no visible distress. +Expressive aphasia. Examined in wheelchair today.  Eyes: PERRL. Anicteric sclerae. L eye with subconjunctival hemorrhage  ENT/Mouth: OM moist and pink without thrush. Some bleeding near gum of R front tooth, no clear gum inflammation and minimally tender to the touch   CV: RRR, no murmurs appreciated.  Resp: Decreased breath sounds on the left base, stable. Breathing comfortably without accessory muscle usage.  Abdomen: Soft, non-tender, non-distended.  Extremities: No lower extremity edema appreciated.   Skin: Warm, dry.   Lymph: No cervical or supraclavicular lymphadenopathy appreciated.   Neuro: CN II-XII grossly intact.    LABS:  Results for RYLEE PURCELL (MRN [id number]) as of 2/27/2017 18:05   Ref. Range 1/24/2017 14:23 2/27/2017 00:00 2/27/2017 14:17   Sodium Latest  Ref Range: 133 - 144 mmol/L 141  144   Potassium Latest Ref Range: 3.4 - 5.3 mmol/L 3.6  3.7   Chloride Latest Ref Range: 94 - 109 mmol/L 106  107   Carbon Dioxide Latest Ref Range: 20 - 32 mmol/L 25  27   Urea Nitrogen Latest Ref Range: 7 - 30 mg/dL 18  24   Creatinine Latest Ref Range: 0.52 - 1.04 mg/dL 1.08 (H)  1.09 (H)   GFR Estimate Latest Ref Range: >60 mL/min/1.7m2 49 (L)  49 (L)   GFR Estimate If Black Latest Ref Range: >60 mL/min/1.7m2 60 (L)  59 (L)   Calcium Latest Ref Range: 8.5 - 10.1 mg/dL 9.0  9.0   Anion Gap Latest Ref Range: 3 - 14 mmol/L 9  9   Albumin Latest Ref Range: 3.4 - 5.0 g/dL 3.4  3.4   Protein Total Latest Ref Range: 6.8 - 8.8 g/dL 6.8  6.7 (L)   Bilirubin Total Latest Ref Range: 0.2 - 1.3 mg/dL 0.6  0.6   Alkaline Phosphatase Latest Ref Range: 40 - 150 U/L 38 (L)  34 (L)   ALT Latest Ref Range: 0 - 50 U/L 14  13   AST Latest Ref Range: 0 - 45 U/L 11  12   CA 27-29 Latest Ref Range: 0 - 39 U/mL 34  38   Glucose Latest Ref Range: 70 - 99 mg/dL 178 (H)  180 (H)   WBC Latest Ref Range: 4.0 - 11.0 10e9/L 3.6 (L)  2.6 (L)   Hemoglobin Latest Ref Range: 11.7 - 15.7 g/dL 10.5 (L)  10.4 (L)   Hematocrit Latest Ref Range: 35.0 - 47.0 % 31.0 (L)  31.1 (L)   Platelet Count Latest Ref Range: 150 - 450 10e9/L 211  155   RBC Count Latest Ref Range: 3.8 - 5.2 10e12/L 2.91 (L)  2.84 (L)   MCV Latest Ref Range: 78 - 100 fl 107 (H)  110 (H)   MCH Latest Ref Range: 26.5 - 33.0 pg 36.1 (H)  36.6 (H)   MCHC Latest Ref Range: 31.5 - 36.5 g/dL 33.9  33.4   RDW Latest Ref Range: 10.0 - 15.0 % 15.1 (H)  14.6   Diff Method Unknown Automated Method  Manual Differential   % Neutrophils Latest Units: % 71.3  69.3   % Lymphocytes Latest Units: % 22.0  26.3   % Monocytes Latest Units: % 4.4  2.6   % Eosinophils Latest Units: % 0.6  0.0   % Basophils Latest Units: % 1.4  1.8   % Immature Granulocytes Latest Units: % 0.3     Nucleated RBCs Latest Ref Range: 0 /100 0     Absolute Neutrophil Latest Ref Range: 1.6 - 8.3  10e9/L 2.6  1.8   Absolute Lymphocytes Latest Ref Range: 0.8 - 5.3 10e9/L 0.8  0.7 (L)   Absolute Monocytes Latest Ref Range: 0.0 - 1.3 10e9/L 0.2  0.1   Absolute Eosinophils Latest Ref Range: 0.0 - 0.7 10e9/L 0.0  0.0   Absolute Basophils Latest Ref Range: 0.0 - 0.2 10e9/L 0.1  0.0   Abs Immature Granulocytes Latest Ref Range: 0 - 0.4 10e9/L 0.0     Absolute Nucleated RBC Unknown 0.0     Poikilocytosis Unknown   Moderate   Ovalocytes Unknown   Moderate   Macrocytes Unknown   Present   INR Latest Ref Range: 0.86 - 1.14  2.69 (H) 2.94 2.94 (H)   CEA Latest Ref Range: 0 - 2.5 ug/L 1.9  1.9         IMPRESSION/PLAN:  Helen Younger is a 75 year old female with history of ER/DC-positive, HER-2 negative breast cancer treated with lumpectomy, adjuvant chemotherapy with Taxotere, whole breast radiotherapy, and 5 years of adjuvant Arimidex, completed in 2012. She represented in December 2015 with shortness of breath and pleural effusion, found to have developed metastatic disease involving the bones and pleural cavity.    Metastatic breast cancer: Currently on Letrozole and palbociclib with overall stable disease on imaging 12/23/2016. She tolerates this regimen well. She will continue with scans every 4-5 months as long as her tumor markers remain stable, and she doesn't had any signs of progressive disease. Refilled both meds today. Next cycle of palbociclib to start on Wednesday. She will return to see Dr. Landis in one month unless interval concerns arise.     Bone mets: Previously on monthly Xgeva, switched to every 3 months due to large co-pay, last received 11/30/2016. Due again today. She is taking calcium and vitamin D supplementation.     Pleural effusions:  This has been stable on subsequent imaging, asymptomatic. Exam is stable today.    CKD/FEN: Encouraged adequate hydration with about 64 oz fluids daily. Creat, lytes stable. She has been doing better with eating and drinking. BP is improved today compared to  prior, although still low.     Hypotension: now off all anti-hypertensives.No dizziness or lightheadedness. BP continues to be softer, but stable. She was advised to continue to strive for 64 oz fluids daily.    Deconditioning: Using walker at home. She continues to have a rare mechanical falls, not new for her, and not preceded by any prodromal symptoms. Not increasing in frequency. Helen has previously been uninterested in PT. We discussed this at length today. Discussed her and her  may need to consider activity limitations-her not being alone in a room, not using the bathroom alone etc. She was agreeable to trying home PT. Also reminded her to push fluids-not sure whether any of this could be from hypotension at times as historically her BP is quite low, although improved today.     Coumadin: on coumadin for hx of stroke. INR managed by local clinic and drawn today at 2.94. Has new L subconjunctival hemorrhage, stable since it started yesterday. Also with some gum bleeding with manipulation near R front tooth, no clear gum inflammation. My nurse called the coumadin clinic to alert them to this as I recommend her INR be keep at the lower end of therapeutic with these symptoms. To monitor the eye-has happened previously and was self limited. Also to start mouth rinses per her dentist and if bleeding intermittently continues she will make a dental appt for the end of the week.     Constipation: push fluids. Continued docusate 2 tabs daily and consider adding senna or daily prune juice to keep her regular.     It is my privilege to be involved in the care of the above patient.     Padmini Brunson PA-C  [facility]  [address] [place] [id number]  [phone]

## 2017-02-27 NOTE — PROGRESS NOTES
ANTICOAGULATION FOLLOW-UP CLINIC VISIT    Patient Name:  Helen Younger  Date:  2/27/2017  Contact Type:  Telephone/ called patient / spouse and left detailed voicemail with results and plan of care.    SUBJECTIVE:        OBJECTIVE    INR   Date Value Ref Range Status   02/27/2017 2.94 (H) 0.86 - 1.14 Final       ASSESSMENT / PLAN  INR assessment THER    Recheck INR In: 4 WEEKS    INR Location Outside lab      Anticoagulation Summary as of 2/27/2017     INR goal 2.0-3.0   Today's INR 2.94   Maintenance plan 2 mg (1 mg x 2) every day   Full instructions 2 mg every day   Weekly total 14 mg   Plan last modified Soumya Garza, RN (2/27/2017)   Next INR check 3/27/2017   Target end date     Indications   Long-term (current) use of anticoagulants [Z79.01] [Z79.01]  Cerebral infarction (H) [I63.9]         Anticoagulation Episode Summary     INR check location     Preferred lab     Send INR reminders to [facility]    Comments       Anticoagulation Care Providers     Provider Role Specialty Phone number    Arin Shahid MD  Internal Medicine [phone]            See the Encounter Report to view Anticoagulation Flowsheet and Dosing Calendar (Go to Encounters tab in chart review, and find the Anticoagulation Therapy Visit)    Dosage adjustment made based on physician directed care plan.    Soumya Garza, RN

## 2017-02-27 NOTE — NURSING NOTE
1 Xgeva injection given in stomach subcutaneously. Patient tolerated injection well.  Mounika Kamara, CMA - 2/27/2017 3:51 PM

## 2017-02-28 ENCOUNTER — TELEPHONE (OUTPATIENT)
Dept: INTERNAL MEDICINE | Facility: CLINIC | Age: 77
End: 2017-02-28

## 2017-02-28 NOTE — TELEPHONE ENCOUNTER
SKN ordered as requested per Nyssa Dyad 5 standing orders for Home Care, Assisted Living or Nursing Home Evaluations and Treatments, Mammogram (Reflex diagnostic), FIT test, Colonoscopy.  Called [facility] and advised of VO, left RN Triage line # in case of questions.    Kamla Fofana, RN  [facility]

## 2017-02-28 NOTE — TELEPHONE ENCOUNTER
Reason for Call: Request for an order or referral:    Order or referral being requested: [facility] calling for an order for SN Start of Care.    Date needed: as soon as possible    Has the patient been seen by the PCP for this problem? Not Applicable    Additional comments:     Phone number Patient can be reached at:  Other phone number:  [phone]    Best Time:  any    Can we leave a detailed message on this number?  YES    Call taken on 2/28/2017 at 10:13 AM by Romina Pierce

## 2017-03-02 ENCOUNTER — DOCUMENTATION ONLY (OUTPATIENT)
Dept: CARE COORDINATION | Facility: CLINIC | Age: 77
End: 2017-03-02

## 2017-03-02 NOTE — PROGRESS NOTES
[facility] now requests orders and shares plan of care/discharge summaries for some patients through [employer].  Please REPLY TO THIS MESSAGE in order to give authorization for orders when needed.  This is considered a verbal order, you will still receive a faxed copy of orders for signature.  Thank you for your assistance in improving collaboration for our patients.    ORDER  SN 1xwx1, 2xwx3, 1xwx3, 3 PRN and PT eval and treat.  Pt had recent fall and has large bruise on L hip. Pt c/o mild left hip pain. CMS intact and swelling per baseline. Pt able to bear weight on LLE.      Thank you,  Blessing Hinojosa, RN  [phone]

## 2017-03-03 ENCOUNTER — TELEPHONE (OUTPATIENT)
Dept: INTERNAL MEDICINE | Facility: CLINIC | Age: 77
End: 2017-03-03

## 2017-03-03 NOTE — TELEPHONE ENCOUNTER
Forms received from: [facility]   Phone number listed: [phone]   Fax listed: [phone]  Date received: 03/03/2017  Form description: Orders-SN 1 Day 2, INR, RN to eval need for PT eval and treat  Once forms are completed, please return to [facility] via fax.  Is patient requesting to be contacted when forms are completed: NA    Form placed: In provider's basket  Romina Pierce

## 2017-03-07 ENCOUNTER — TELEPHONE (OUTPATIENT)
Dept: INTERNAL MEDICINE | Facility: CLINIC | Age: 77
End: 2017-03-07

## 2017-03-07 NOTE — TELEPHONE ENCOUNTER
If BP elevated above 160/90 persistently by tomorrow, then start amlodipine.  If it still persists over the next few days, then start losartan.     Offer BP follow up appt for this in a week or two, bring BP diary.

## 2017-03-07 NOTE — TELEPHONE ENCOUNTER
Called and spoke with Rony, who takes care of patient's medications.  RN advised of message as below.  He verbalized understanding.    He declined scheduling appt, he states they have home health RNs that are coming in the next 3 weeks, so they will utilize this service.    Called Luh @ [phone], left detailed message on confidential line explaining plan of care.  Gave RN Triage line # in case of call back/questions.    Kamla Fofana RN  [facility]

## 2017-03-07 NOTE — TELEPHONE ENCOUNTER
Called and spoke with Rony.  Helen is currently not experiencing any s/sx of HTN still.  She does have some supply of meds:    Amlodipine 10mg  Metoprolol 25mg  Losartan 50mg    Routed to PCP.    Kamla Fofana RN  [facility]

## 2017-03-07 NOTE — TELEPHONE ENCOUNTER
Called and spoke with Luh.  Patient denies red flag symptoms such as chest pain, SOB, dizziness/lightheadedness, headache, nosebleed, vision changes.  No other symptoms are noted.      Patient does have BP cuff at home, she will take BP again in 4 hours and another 4 hours after that.  If readings continue to be high or if patient experiences any s/sx of hypertension she is to call the clinic back.    Routed to PCP for FYI.    Kamla Fofana RN  [facility]

## 2017-03-07 NOTE — TELEPHONE ENCOUNTER
Reason for call:  Patient reporting a symptom    Symptom or request: Luh with [facility] calling to report a blood pressure 1st reading at 1125 160/100, 2nd time at 1140 168/97. No headaches, no dizziness, patient not having any pain.     Duration (how long have symptoms been present): Today    Have you been treated for this before? No    Additional comments: Patient uses [facility] in [place]    Phone Number patient can be reached at:  Other phone number:  [phone]    Best Time:  any    Can we leave a detailed message on this number:  YES    Call taken on 3/7/2017 at 11:48 AM by Romina Pierce

## 2017-03-10 ENCOUNTER — TELEPHONE (OUTPATIENT)
Dept: INTERNAL MEDICINE | Facility: CLINIC | Age: 77
End: 2017-03-10

## 2017-03-10 ENCOUNTER — TELEPHONE (OUTPATIENT)
Dept: FAMILY MEDICINE | Facility: CLINIC | Age: 77
End: 2017-03-10

## 2017-03-10 DIAGNOSIS — R55 PRE-SYNCOPE: ICD-10-CM

## 2017-03-10 DIAGNOSIS — R00.1 BRADYCARDIA: Primary | ICD-10-CM

## 2017-03-10 NOTE — TELEPHONE ENCOUNTER
Team 1;  Please help coordinate cardiac monitor placement for patient .  (ZioPatch order)        MD spoke with Jeremy: agreeable to another Ziopatch.  During the monitoring in 2015 patient was not symptomatic

## 2017-03-10 NOTE — TELEPHONE ENCOUNTER
"Jeremy called back.      She will be walking around and will feel very weak and tired, then she needs to sit down.  It appears her pulse drops during these moments, then will come back up WNL fairly quickly.      First time was at [facility] 8-10 months ago.  This has been going on for at least a year and a half.  It almost appears like she goes into \"seizure-like\" mode.    They've been to a Cardio, Neuro, Onco, ER with no improvement.  PT has been started again, which is what brings it on.      What should they do about this rapid drop in pulse d/t exertion.  He is looking for an explanation of why this happens.        Routed to PCP, Jeremy would like to speak with you.    Kamla Fofana RN  [facility]    "

## 2017-03-10 NOTE — TELEPHONE ENCOUNTER
Called [phone], no answer.  Left message to call RN Triage line.  Will also route to PCP.    Kamla Fofana RN  [facility]

## 2017-03-10 NOTE — TELEPHONE ENCOUNTER
Forms received from: [facility]   Phone number listed: [phone]   Fax listed: [phone]  Date received: 03/10/2017  Form description: PT 2 Week 1, 3 Week 1, 2 Week 2  Once forms are completed, please return to [facility] via fax.  Is patient requesting to be contacted when forms are completed: NA    Form placed: In provider's basket  Romina Pierce

## 2017-03-10 NOTE — TELEPHONE ENCOUNTER
MD left message with patient and .  Watch VS over w/e.  If symptoms develop, go to ED    TEAM 1:  Please hold message and then call patient on Monday for update. Thank you

## 2017-03-10 NOTE — TELEPHONE ENCOUNTER
Patients  is calling clinic back after having spoken with patients pcp. The patient symptoms do not get worse, they ease up in between. Patients pulse drops then recovers several minutes later. Please advise ASAP.      . Roxanna Willis  Patient Representative

## 2017-03-13 ENCOUNTER — TELEPHONE (OUTPATIENT)
Dept: INTERNAL MEDICINE | Facility: CLINIC | Age: 77
End: 2017-03-13

## 2017-03-13 NOTE — TELEPHONE ENCOUNTER
Reason for Call:  Other     Detailed comments: [facility] RN calling in to update on blood pressure results of 112/72 Heart rate 60, patient wanted to report results from 3/10 /80 hear rate 67 at 10am, please call Mahendra with any questions    Phone Number Patient can be reached at: Other phone number:      Best Time: anytime    Can we leave a detailed message on this number? Not Applicable    Call taken on 3/13/2017 at 2:21 PM by Rea Bates

## 2017-03-13 NOTE — TELEPHONE ENCOUNTER
TC contacted [facility] and spoke with Skye. She will contact the patient to schedule the Ziopatch.

## 2017-03-14 ENCOUNTER — ALLIED HEALTH/NURSE VISIT (OUTPATIENT)
Dept: NURSING | Facility: CLINIC | Age: 77
End: 2017-03-14
Payer: COMMERCIAL

## 2017-03-14 PROCEDURE — 0298T ZZC EXT ECG > 48HR TO 21 DAY REVIEW AND INTERPRETATN: CPT | Performed by: INTERNAL MEDICINE

## 2017-03-23 ENCOUNTER — TELEPHONE (OUTPATIENT)
Dept: INTERNAL MEDICINE | Facility: CLINIC | Age: 77
End: 2017-03-23

## 2017-03-23 ENCOUNTER — TELEPHONE (OUTPATIENT)
Dept: FAMILY MEDICINE | Facility: CLINIC | Age: 77
End: 2017-03-23

## 2017-03-23 NOTE — TELEPHONE ENCOUNTER
Luh called and wondered if provider had addressed this.  She has not yet.  uLh states patient also has a sore tooth so could be bleeding from there, apparently she had blood dried to side of face/lips when she awoke this am, had not been brushing her teeth.  Routed to covering provider to advise on early INR, patient not due for scheduled INR until Monday 3/27.  Ani Burnett RN  [facility]

## 2017-03-23 NOTE — TELEPHONE ENCOUNTER
Reason for Call: Request for an order or referral:    Order or referral being requested: INR    Date needed: as soon as possible    Additional comments: Requesting to check INR tomorrow. Reason: patient is having bleeding through her gums.    Phone number Patient can be reached at:    [facility] [phone]         Best Time:  anytime    Can we leave a detailed message on this number?  YES    Call taken on 3/23/2017 at 3:41 PM by Sondra Rodriguez

## 2017-03-23 NOTE — TELEPHONE ENCOUNTER
Reason for Call:  Other     Detailed comments: Luh from [facility] is calling to report patients decreased heart rate, heart rate 46 and then 55 -5 minutes apart; post activity patient was walking legs gave out and when sat down increased shortness of breath with decreased heart rate.    Phone Number Patient can be reached at: Other phone number:  Luh/[facility]--[phone]    Best Time: anytime    Can we leave a detailed message on this number? YES    Call taken on 3/23/2017 at 11:22 AM by Sherry Coley

## 2017-03-23 NOTE — TELEPHONE ENCOUNTER
I see recent issues with heart rate, was to have heart rate monitor (Ziopatch) started 3/14/17.  We had update from home care on 3/13/17.   Pulse 60-67 with normal BP.    I called Luh who reports this is an ongoing issue, she has the Ziopatch still in place, due to turn this monitor on 3/28/17 for analysis.  She states the episodes as reported below are somewhat frequent.  Patient uses a walker and has not fainted or fallen.  Her BP today was 130/80, sp02 92 or more.     Patient felt better after pulse increased.    I advised Luh that if patient has severe weakness, falling, fainting, shortness of breath not resolving quickly, then needs to go to ER.  I do see patient is DNR/DNI, Luh is not aware of this.  I looked at POLST, patient does not have check by one of two required boxes to indicate DNI or trial of intubation choice, POLST not valid.    I sent message to [facility] liason to advise.    Routed note to [facility] as AMBAR.  Ain Burnett RN  [facility]

## 2017-03-23 NOTE — TELEPHONE ENCOUNTER
Routed to [facility] to address request for INR check tomorrow for bleeding gums.  Ani Burnett RN  [facility]

## 2017-03-24 ENCOUNTER — TELEPHONE (OUTPATIENT)
Dept: ONCOLOGY | Facility: CLINIC | Age: 77
End: 2017-03-24

## 2017-03-24 ENCOUNTER — ANTICOAGULATION THERAPY VISIT (OUTPATIENT)
Dept: INTERNAL MEDICINE | Facility: CLINIC | Age: 77
End: 2017-03-24
Payer: COMMERCIAL

## 2017-03-24 DIAGNOSIS — I63.9 CEREBRAL INFARCTION (H): ICD-10-CM

## 2017-03-24 DIAGNOSIS — Z79.01 LONG-TERM (CURRENT) USE OF ANTICOAGULANTS: ICD-10-CM

## 2017-03-24 LAB — INR POINT OF CARE: 2.1 (ref 0.86–1.14)

## 2017-03-24 PROCEDURE — 85610 PROTHROMBIN TIME: CPT | Mod: QW | Performed by: INTERNAL MEDICINE

## 2017-03-24 PROCEDURE — 36416 COLLJ CAPILLARY BLOOD SPEC: CPT | Performed by: INTERNAL MEDICINE

## 2017-03-24 PROCEDURE — 99207 ZZC NO CHARGE NURSE ONLY: CPT | Performed by: INTERNAL MEDICINE

## 2017-03-24 NOTE — TELEPHONE ENCOUNTER
"Luh returned call, they have not yet done the INR as her schedule is full.   They may or may not be able to get someone out there today, they definitely could do it tomorrow (at which point the on-call internal medicine provider will need to address result).  Luh states the bleeding gums is a new issue.  I spoke with [facility] who advised okay for actual order:  \"okay to check INR today 3/24 or tomorrow 3/25\".  Would prefer it be done today.    I called Luh back, they will try to do this today, if unable to staff will have it done tomorrow.  Ani Burnett RN  [facility]          "

## 2017-03-24 NOTE — TELEPHONE ENCOUNTER
Called Sua at number listed below and left a detailed message on her confidential VM that patient can do an early INR and if it had been checked yet. Left RN triage line number to call back to discuss.    Clarita Rose RN  [facility]

## 2017-03-24 NOTE — PROGRESS NOTES
ANTICOAGULATION FOLLOW-UP CLINIC VISIT    Patient Name:  Helen Younger  Date:  3/24/2017  Contact Type:  Telephone/ [place] [phone]    SUBJECTIVE:        OBJECTIVE    INR Protime   Date Value Ref Range Status   03/24/2017 2.1 (A) 0.86 - 1.14 Final       ASSESSMENT / PLAN  INR assessment THER    Recheck INR In: 4 DAYS    INR Location Clinic      Anticoagulation Summary as of 3/24/2017     INR goal 2.0-3.0   Today's INR 2.1   Maintenance plan 1 mg (1 mg x 1) on Mon, Wed, Fri; 2 mg (1 mg x 2) all other days   Full instructions 1 mg on Mon, Wed, Fri; 2 mg all other days   Weekly total 11 mg   No change documented Kamla Fofana RN   Plan last modified Soumya Garza RN (3/13/2017)   Next INR check 3/27/2017   Target end date     Indications   Long-term (current) use of anticoagulants [Z79.01] [Z79.01]  Cerebral infarction (H) [I63.9]         Anticoagulation Episode Summary     INR check location     Preferred lab     Send INR reminders to [facility]    Comments       Anticoagulation Care Providers     Provider Role Specialty Phone number    Arin Shahid MD  Internal Medicine [phone]            See the Encounter Report to view Anticoagulation Flowsheet and Dosing Calendar (Go to Encounters tab in chart review, and find the Anticoagulation Therapy Visit)    Dosage adjustment made based on physician directed care plan.    Kamla Fofana RN

## 2017-03-24 NOTE — TELEPHONE ENCOUNTER
"Oral Chemotherapy Monitoring Program    Primary Oncologist: Dr. Landis  Primary Oncology Clinic: [facility]  Cancer Diagnosis: Breast      Therapy History: Confirmed patient takes Ibrance 100mg once daily for 21 days then 7 days off, cycle q28 days  Start Date: 2/3/16        Drug Interaction Assessment: No new drug interactions.      Lab Monitoring Plan    Monitoring Plan:      CBC, CMP   C2D1+   Call, CBC, CMP   C3D1+   Call, CBC, CMP   C4D1+   Call, CBC, CMP   C5D1+   Call, CBC, CMP   C6D1+   Call, CBC, CMP        C2D8+      C3D8+      C4D8+      C5D8+      C6D8+        CBC   C2D15+   CBC   C3D15+      C4D15+      C5D15+      C6D15+           C2D22+      C3D22+      C4D22+      C5D22+      C6D22+            Subjective/Objective:  Helen Younger is a 76 year old female contacted by phone for a follow-up visit for oral chemotherapy.  Spoke with  German. When Helen is \"moving spiritedly pulse drops down to 40's and gets fatigued, then bounces back.\" This occurs when walking between multiple rooms. Ziopatch still in place, due to turn this monitor on 3/28/17 for analysis. She does not experience dizziness when she stands after laying down or sitting. She has some increased fatigue towards the end of the cycle, but this has been ongoing throughout the treatment. He does not have questions or concerns about therapy and appreciate the phone call.    ORAL CHEMOTHERAPY 2/10/2016 4/27/2016 5/26/2016 7/7/2016 10/6/2016 2/20/2017 3/24/2017   Drug Name (No Data) Ibrance (Palbociclib) Ibrance (Palbociclib) Ibrance (Palbociclib) Ibrance (Palbociclib) Ibrance (Palbociclib) Ibrance (Palbociclib)   Current Dosage (No Data) 100mg 100mg 100mg 100mg 100mg 100mg   Current Schedule Daily Daily Daily Daily Daily Daily Daily   Cycle Details 3 weeks on 1 week off 3 weeks on 1 week off 3 weeks on 1 week off 3 weeks on 1 week off 3 weeks on 1 week off 3 weeks on 1 week off 3 weeks on 1 week off   Start Date of Last Cycle 2/3/2016 4/12/2016 " "5/11/2016 7/7/2016 9/29/2016 2/2/2017 3/2/2017   Planned next cycle start date - - - - - 3/2/2017 3/30/2017   Doses missed in last 2 weeks 0 0 0 0 0 0 0   Adherence Assessment - - - - - Adherent Adherent   Adverse Effects None Mucositis/mouth sores/ mouth pain Mucositis/mouth sores/ mouth pain Mucositis/mouth sores/ mouth pain Oral mucositis;Fatigue Fatigue Fatigue;Other (see note for details)   Oral Mucositis - mild mild mild - - -   Fatigue - - - - - Grade 1 Grade 1   Pharmacist Intervention(fatigue) - - - - - Yes Yes   Intervention(s) - - - - - Patient education Patient education   Other (see note for details) - - - - - - Low pulse   Pharmacist intervention? - - - - - - No   Home BPs not needed not needed not done - not needed not needed all BPs<140/90   Any new drug interactions? - - - - - No No   Is the dose as ordered appropriate for the patient? - - - - - Yes Yes   Is the patient currently in pain? - - - - - No No   Has the patient been assessed within the past 6 months for depression? - - - - - Yes -   Has the patient missed any days of school, work, or other routine activity? - - - - - No No       Last PHQ-2 Score on record:   PHQ-2 ( 1999 [employer]) 2/27/2017 12/26/2016   Q1: Little interest or pleasure in doing things 0 0   Q2: Feeling down, depressed or hopeless 0 0   PHQ-2 Score 0 0       Patient does not report depression symptoms.      Vitals:  BP:   BP Readings from Last 1 Encounters:   02/27/17 107/60     Wt Readings from Last 1 Encounters:   02/27/17 86.5 kg (190 lb 9.6 oz)     Estimated body surface area is 1.95 meters squared as calculated from the following:    Height as of 1/24/17: 1.575 m (5' 2.01\").    Weight as of 2/27/17: 86.5 kg (190 lb 9.6 oz).    Labs:  Lab Results   Component Value Date     02/27/2017      Lab Results   Component Value Date    POTASSIUM 3.7 02/27/2017     Lab Results   Component Value Date    SHERYL 9.0 02/27/2017     Lab Results   Component Value Date    ALBUMIN 3.4 " 02/27/2017     Lab Results   Component Value Date    MAG 2.4 01/03/2016     No results found for: PHOS  Lab Results   Component Value Date    BUN 24 02/27/2017     Lab Results   Component Value Date    CR 1.09 02/27/2017       Lab Results   Component Value Date    AST 12 02/27/2017     Lab Results   Component Value Date    ALT 13 02/27/2017     Lab Results   Component Value Date    BILITOTAL 0.6 02/27/2017       Lab Results   Component Value Date    WBC 2.6 02/27/2017     Lab Results   Component Value Date    HGB 10.4 02/27/2017     Lab Results   Component Value Date     02/27/2017     Lab Results   Component Value Date    ANEU 1.8 02/27/2017           Assessment:  Helen is tolerating Ibrance well and does not require any pharmacological interventions at this time.      Plan:  Continue Ibrance therapy, next 28-day cycle starts 3/30/17     Follow-Up:  Clinic appointment + labs 3/27/17  Oral chemotherapy management in 4 weeks      Refill Due:  Next Rx release date 3/27/17     Thank you for the opportunity to participate in the care of the above patient,    Rio Freeman, PharmD  Therapy Management Oncology Pharmacist  [facility]   Phone: [phone]

## 2017-03-24 NOTE — MR AVS SNAPSHOT
Helen Younger   3/24/2017   Anticoagulation Therapy Visit    Description:  76 year old female   Provider:  Arin Shahid MD   Department:  [facility]           INR as of 3/24/2017     Today's INR 2.1      Anticoagulation Summary as of 3/24/2017     INR goal 2.0-3.0   Today's INR 2.1   Full instructions 1 mg on Mon, Wed, Fri; 2 mg all other days   Next INR check 3/27/2017    Indications   Long-term (current) use of anticoagulants [Z79.01] [Z79.01]  Cerebral infarction (H) [I63.9]         March 2017 Details    Sun Mon Tue Wed Thu Fri Sat        1               2               3               4                 5               6               7               8               9               10               11                 12               13               14               15               16               17               18                 19               20               21               22               23               24      1 mg   See details      25      2 mg           26      2 mg         27            28               29               30               31                 Date Details   03/24 This INR check       Date of next INR:  3/27/2017         How to take your warfarin dose     To take:  1 mg Take 1 of the 1 mg tablets.    To take:  2 mg Take 2 of the 1 mg tablets.

## 2017-03-25 ENCOUNTER — HOSPITAL ENCOUNTER (INPATIENT)
Facility: CLINIC | Age: 77
LOS: 2 days | Discharge: HOME HEALTH | DRG: 193 | End: 2017-03-27
Attending: EMERGENCY MEDICINE | Admitting: INTERNAL MEDICINE
Payer: COMMERCIAL

## 2017-03-25 ENCOUNTER — APPOINTMENT (OUTPATIENT)
Dept: GENERAL RADIOLOGY | Facility: CLINIC | Age: 77
DRG: 193 | End: 2017-03-25
Attending: EMERGENCY MEDICINE
Payer: COMMERCIAL

## 2017-03-25 ENCOUNTER — APPOINTMENT (OUTPATIENT)
Dept: CT IMAGING | Facility: CLINIC | Age: 77
DRG: 193 | End: 2017-03-25
Attending: EMERGENCY MEDICINE
Payer: COMMERCIAL

## 2017-03-25 DIAGNOSIS — Z86.73 HISTORY OF STROKE: ICD-10-CM

## 2017-03-25 DIAGNOSIS — J91.0 MALIGNANT PLEURAL EFFUSION (H): ICD-10-CM

## 2017-03-25 DIAGNOSIS — Z79.01 LONG-TERM (CURRENT) USE OF ANTICOAGULANTS: Primary | ICD-10-CM

## 2017-03-25 DIAGNOSIS — R35.0 URINARY FREQUENCY: ICD-10-CM

## 2017-03-25 LAB
ALBUMIN SERPL-MCNC: 3.4 G/DL (ref 3.4–5)
ALBUMIN UR-MCNC: 10 MG/DL
ALP SERPL-CCNC: 40 U/L (ref 40–150)
ALT SERPL W P-5'-P-CCNC: 13 U/L (ref 0–50)
AMMONIA PLAS-SCNC: 11 UMOL/L (ref 10–50)
ANION GAP SERPL CALCULATED.3IONS-SCNC: 8 MMOL/L (ref 3–14)
APPEARANCE UR: CLEAR
AST SERPL W P-5'-P-CCNC: 10 U/L (ref 0–45)
BASOPHILS # BLD AUTO: 0 10E9/L (ref 0–0.2)
BASOPHILS NFR BLD AUTO: 1.3 %
BILIRUB SERPL-MCNC: 0.6 MG/DL (ref 0.2–1.3)
BILIRUB UR QL STRIP: NEGATIVE
BUN SERPL-MCNC: 21 MG/DL (ref 7–30)
CALCIUM SERPL-MCNC: 9.1 MG/DL (ref 8.5–10.1)
CHLORIDE SERPL-SCNC: 103 MMOL/L (ref 94–109)
CO2 BLDCOV-SCNC: 23 MMOL/L (ref 21–28)
CO2 SERPL-SCNC: 29 MMOL/L (ref 20–32)
COLOR UR AUTO: YELLOW
CREAT SERPL-MCNC: 1.23 MG/DL (ref 0.52–1.04)
CRP SERPL-MCNC: 4.3 MG/L (ref 0–8)
DIFFERENTIAL METHOD BLD: ABNORMAL
EOSINOPHIL # BLD AUTO: 0 10E9/L (ref 0–0.7)
EOSINOPHIL NFR BLD AUTO: 0 %
ERYTHROCYTE [DISTWIDTH] IN BLOOD BY AUTOMATED COUNT: 14.3 % (ref 10–15)
GFR SERPL CREATININE-BSD FRML MDRD: 43 ML/MIN/1.7M2
GLUCOSE SERPL-MCNC: 232 MG/DL (ref 70–99)
GLUCOSE UR STRIP-MCNC: 70 MG/DL
HCT VFR BLD AUTO: 30.5 % (ref 35–47)
HGB BLD-MCNC: 10.3 G/DL (ref 11.7–15.7)
HGB UR QL STRIP: NEGATIVE
HYALINE CASTS #/AREA URNS LPF: 14 /LPF (ref 0–2)
IMM GRANULOCYTES # BLD: 0 10E9/L (ref 0–0.4)
IMM GRANULOCYTES NFR BLD: 0.4 %
INR PPP: 1.79 (ref 0.86–1.14)
KETONES UR STRIP-MCNC: NEGATIVE MG/DL
LACTATE BLD-SCNC: 2.4 MMOL/L (ref 0.7–2.1)
LEUKOCYTE ESTERASE UR QL STRIP: ABNORMAL
LYMPHOCYTES # BLD AUTO: 0.6 10E9/L (ref 0.8–5.3)
LYMPHOCYTES NFR BLD AUTO: 25 %
MCH RBC QN AUTO: 36.3 PG (ref 26.5–33)
MCHC RBC AUTO-ENTMCNC: 33.8 G/DL (ref 31.5–36.5)
MCV RBC AUTO: 107 FL (ref 78–100)
MONOCYTES # BLD AUTO: 0.2 10E9/L (ref 0–1.3)
MONOCYTES NFR BLD AUTO: 6.4 %
MUCOUS THREADS #/AREA URNS LPF: PRESENT /LPF
NEUTROPHILS # BLD AUTO: 1.6 10E9/L (ref 1.6–8.3)
NEUTROPHILS NFR BLD AUTO: 66.9 %
NITRATE UR QL: NEGATIVE
NRBC # BLD AUTO: 0 10*3/UL
NRBC BLD AUTO-RTO: 0 /100
NT-PROBNP SERPL-MCNC: 1103 PG/ML (ref 0–900)
PCO2 BLDV: 22 MM HG (ref 40–50)
PH BLDV: 7.61 PH (ref 7.32–7.43)
PH UR STRIP: 6.5 PH (ref 5–7)
PLATELET # BLD AUTO: 166 10E9/L (ref 150–450)
PO2 BLDV: 77 MM HG (ref 25–47)
POTASSIUM SERPL-SCNC: 3.6 MMOL/L (ref 3.4–5.3)
PROT SERPL-MCNC: 6.9 G/DL (ref 6.8–8.8)
RBC # BLD AUTO: 2.84 10E12/L (ref 3.8–5.2)
RBC #/AREA URNS AUTO: 3 /HPF (ref 0–2)
SAO2 % BLDV FROM PO2: 98 %
SODIUM SERPL-SCNC: 140 MMOL/L (ref 133–144)
SP GR UR STRIP: 1.01 (ref 1–1.03)
SQUAMOUS #/AREA URNS AUTO: <1 /HPF (ref 0–1)
TROPONIN I SERPL-MCNC: NORMAL UG/L (ref 0–0.04)
TSH SERPL DL<=0.005 MIU/L-ACNC: 1.97 MU/L (ref 0.4–4)
URN SPEC COLLECT METH UR: ABNORMAL
UROBILINOGEN UR STRIP-MCNC: NORMAL MG/DL (ref 0–2)
WBC # BLD AUTO: 2.4 10E9/L (ref 4–11)
WBC #/AREA URNS AUTO: 7 /HPF (ref 0–2)

## 2017-03-25 PROCEDURE — 80053 COMPREHEN METABOLIC PANEL: CPT | Performed by: EMERGENCY MEDICINE

## 2017-03-25 PROCEDURE — 83880 ASSAY OF NATRIURETIC PEPTIDE: CPT | Performed by: EMERGENCY MEDICINE

## 2017-03-25 PROCEDURE — 40000556 ZZH STATISTIC PERIPHERAL IV START W US GUIDANCE

## 2017-03-25 PROCEDURE — 82803 BLOOD GASES ANY COMBINATION: CPT

## 2017-03-25 PROCEDURE — 85610 PROTHROMBIN TIME: CPT | Performed by: EMERGENCY MEDICINE

## 2017-03-25 PROCEDURE — 99285 EMERGENCY DEPT VISIT HI MDM: CPT | Mod: 25 | Performed by: EMERGENCY MEDICINE

## 2017-03-25 PROCEDURE — 99285 EMERGENCY DEPT VISIT HI MDM: CPT | Mod: Z6 | Performed by: EMERGENCY MEDICINE

## 2017-03-25 PROCEDURE — 83605 ASSAY OF LACTIC ACID: CPT

## 2017-03-25 PROCEDURE — 87040 BLOOD CULTURE FOR BACTERIA: CPT | Performed by: EMERGENCY MEDICINE

## 2017-03-25 PROCEDURE — 84443 ASSAY THYROID STIM HORMONE: CPT | Performed by: EMERGENCY MEDICINE

## 2017-03-25 PROCEDURE — 93010 ELECTROCARDIOGRAM REPORT: CPT | Mod: Z6 | Performed by: EMERGENCY MEDICINE

## 2017-03-25 PROCEDURE — 12000008 ZZH R&B INTERMEDIATE UMMC

## 2017-03-25 PROCEDURE — 85025 COMPLETE CBC W/AUTO DIFF WBC: CPT | Performed by: EMERGENCY MEDICINE

## 2017-03-25 PROCEDURE — 84484 ASSAY OF TROPONIN QUANT: CPT | Performed by: EMERGENCY MEDICINE

## 2017-03-25 PROCEDURE — 70450 CT HEAD/BRAIN W/O DYE: CPT

## 2017-03-25 PROCEDURE — 82140 ASSAY OF AMMONIA: CPT | Performed by: EMERGENCY MEDICINE

## 2017-03-25 PROCEDURE — 81001 URINALYSIS AUTO W/SCOPE: CPT | Performed by: EMERGENCY MEDICINE

## 2017-03-25 PROCEDURE — 71020 XR CHEST 2 VW: CPT

## 2017-03-25 PROCEDURE — 86140 C-REACTIVE PROTEIN: CPT | Performed by: EMERGENCY MEDICINE

## 2017-03-25 RX ORDER — LEVOFLOXACIN 5 MG/ML
750 INJECTION, SOLUTION INTRAVENOUS ONCE
Status: COMPLETED | OUTPATIENT
Start: 2017-03-25 | End: 2017-03-26

## 2017-03-25 ASSESSMENT — ENCOUNTER SYMPTOMS
VOMITING: 0
NECK STIFFNESS: 0
FREQUENCY: 1
HEADACHES: 0
NAUSEA: 0
ABDOMINAL PAIN: 0
SHORTNESS OF BREATH: 0
FEVER: 0
DIFFICULTY URINATING: 0
EYE REDNESS: 0
ARTHRALGIAS: 0
DIARRHEA: 0
COLOR CHANGE: 0

## 2017-03-25 NOTE — IP AVS SNAPSHOT
[address] [address]    Phone:  [phone]                                       After Visit Summary   3/25/2017    Helen Younger    MRN: [id number]           After Visit Summary Signature Page     I have received my discharge instructions, and my questions have been answered. I have discussed any challenges I see with this plan with the nurse or doctor.    ..........................................................................................................................................  Patient/Patient Representative Signature      ..........................................................................................................................................  Patient Representative Print Name and Relationship to Patient    ..................................................               ................................................  Date                                            Time    ..........................................................................................................................................  Reviewed by Signature/Title    ...................................................              ..............................................  Date                                                            Time

## 2017-03-25 NOTE — IP AVS SNAPSHOT
MRN:[id number]                      After Visit Summary   3/25/2017    Helen Younger    MRN: [id number]           Thank you!     Thank you for choosing [facility] for your care. Our goal is always to provide you with excellent care. Hearing back from our patients is one way we can continue to improve our services. Please take a few minutes to complete the written survey that you may receive in the mail after you visit with us. Thank you!        Patient Information     Date Of Birth          1940        About your hospital stay     You were admitted on:  March 26, 2017 You last received care in the:  Unit [facility]    You were discharged on:  March 27, 2017        Reason for your hospital stay       Admitted for worsening weakness and fall at home.                  Who to Call     For medical emergencies, please call 911.  For non-urgent questions about your medical care, please call your primary care provider or clinic, [phone]          Attending Provider     Provider Specialty    Erma Lanza MD Emergency Medicine    [facility]       Primary Care Provider Office Phone # Fax #    Arin Shahid [phone]       [facility] [address] [facility] [address]         When to contact your care team       [facility] triage line at [phone] for temp >100.4, uncontrolled nausea/vomiting/diarrhea/constipation, unrelieved pain, bleeding not relieved with pressure, dizziness, chest pain, shortness of breath, loss of consciousness, and any new or concerning symptoms.                  After Care Instructions     Activity       Your activity upon discharge: activity as tolerated with assistance            Diet       Follow this diet upon discharge: Regular                  Follow-up Appointments     Follow Up and recommended labs and tests       Follow up as scheduled below:                   Your next 10 appointments already scheduled     Mar 30, 2017  1:15 PM CDT   [facility] with  [facility]   [facility] ([facility])    [address] [address] [phone]            Mar 30, 2017  1:50 PM CDT   (Arrive by 1:35 PM)   Return Visit with Padmini Brunson PA-C   [facility] ([facility])    [address] [place] [address] [phone]              Additional Services     Home Care PT Referral for Hospital Discharge       PT to eval and treat    _______________________  [facility]  Phone  [phone]  Fax  [phone]  ______________________      Your provider has ordered home care - physical therapy. If you have not been contacted within 2 days of your discharge please call the department phone number listed on the top of this document.            Home care nursing referral       Resumption of services.    The following homecare is recommended:  RN skilled nursing visit. RN to assess vital signs and weight, respiratory and cardiac status, pain level and activity tolerance, hydration, nutrition and bowel status and home safety.  RN to teach medication management.    _______________________  [facility]  Phone  [phone]  Fax  [phone]  ______________________      Your provider has ordered home care nursing services. If you have not been contacted within 2 days of your discharge please call the inpatient department phone number at [phone] .                  Future tests that were ordered for you     INR                 Warfarin Instruction     You have started taking a medicine called warfarin. This is a blood-thinning medicine (anticoagulant). It helps prevent and treat blood clots.      Before leaving the hospital, make sure you know how much to take and how long to take it.      You will need regular blood  "tests to make sure your blood is clotting safely. It is very important to see your doctor for regular blood tests.    Talk to your doctor before taking any new medicine (this includes over-the-counter drugs and herbal products). Many medicines can interact with warfarin. This may cause more bleeding or too much clotting.     Eating a lot of vitamin K--found in green, leafy vegetables--can change the way warfarin works in your body. Do NOT avoid these foods. Instead, try to eat the same amount each day.     Bleeding is the most common side-effect of warfarin. You may notice bleeding gums, a bloody nose, bruises and bleeding longer when you cut yourself. See a doctor at once if:   o You cough up blood  o You find blood in your stool (poop)  o You have a deep cut, or a cut that bleeds longer than 10 minutes   o You have a bad cut, hard fall, accident or hit your head (go to urgent care or the emergency room).    For women who can get pregnant: This medicine can harm an unborn baby. Be very careful not to get pregnant while taking this medicine. If you think you might be pregnant, call your doctor right away.    For more information, read \"Guide to Warfarin Therapy,  the booklet you received in the hospital.        Pending Results     Date and Time Order Name Status Description    3/27/2017 0936 Folate RBC In process     3/26/2017 0751 Urine Culture Aerobic Bacterial Preliminary     3/25/2017 2101 Blood culture Preliminary     3/25/2017 2101 Blood culture Preliminary             Statement of Approval     Ordered          03/27/17 1407  I have reviewed and agree with all the recommendations and orders detailed in this document.  EFFECTIVE NOW     Approved and electronically signed by:  Caterina Bush PA-C             Admission Information     Date & Time Provider Department Dept. Phone    3/25/2017 Justine Webber MD Unit 7D [facility] [phone]      Your Vitals Were     Blood Pressure Pulse Temperature " "Respirations Height Weight    145/75 (BP Location: Right arm) 69 98.3  F (36.8  C) (Oral) 20 1.575 m (5' 2\") 85.4 kg (188 lb 4.8 oz)    Pulse Oximetry BMI (Body Mass Index)                94% 34.44 kg/m2          [employer] Information     [employer] gives you secure access to your electronic health record. If you see a primary care provider, you can also send messages to your care team and make appointments. If you have questions, please call your primary care clinic.  If you do not have a primary care provider, please call [phone] and they will assist you.        Care EveryWhere ID     This is your Care EveryWhere ID. This could be used by other organizations to access your [facility] medical records  [id number]           Review of your medicines      START taking        Dose / Directions    levofloxacin 750 MG tablet   Commonly known as:  LEVAQUIN   Indication:  Community Acquired Pneumonia   Used for:  Urinary frequency        Dose:  750 mg   Take 1 tablet (750 mg) by mouth daily   Quantity:  5 tablet   Refills:  0         CONTINUE these medicines which may have CHANGED, or have new prescriptions. If we are uncertain of the size of tablets/capsules you have at home, strength may be listed as something that might have changed.        Dose / Directions    warfarin 1 MG tablet   Commonly known as:  JANTOVEN   This may have changed:  additional instructions   Used for:  History of stroke        Take 1 tablet (1mg) by mouth on Monday, Wednesday, Friday. Take 2 tablets (2 mg) by mouth on Tuesday and Thursday.   Quantity:  30 tablet   Refills:  0         CONTINUE these medicines which have NOT CHANGED        Dose / Directions    DOCUSATE SODIUM        Dose:  1 tablet   1 tablet 2 times daily   Refills:  0       letrozole 2.5 MG tablet   Commonly known as:  FEMARA   Used for:  Malignant neoplasm of female breast, unspecified laterality, unspecified site of breast (H), Pleural effusion        Dose:  2.5 mg   Take 1 tablet " (2.5 mg) by mouth daily   Quantity:  90 tablet   Refills:  3       magnesium hydroxide 400 MG/5ML suspension   Commonly known as:  MILK OF MAGNESIA   Used for:  Constipation        Dose:  30-60 mL   Take 30-60 mLs by mouth daily as needed for constipation or heartburn   Quantity:  360 mL   Refills:  0       NYSTOP 609749 UNIT/GM Powd powder        daily as needed   Refills:  0       OCUVITE ADULT FORMULA PO        Dose:  1 tablet   Take 1 tablet by mouth daily.   Refills:  0       * order for DME   Used for:  Malignant neoplasm of female breast, unspecified laterality, unspecified site of breast (H), Pleural effusion        Equipment being ordered: wheeled walker   Quantity:  1 each   Refills:  0       * order for DME   Used for:  Mixed incontinence        Equipment being ordered:  Incontinence pads   Patient uses these tid   Quantity:  100 each   Refills:  3       oxybutynin 5 MG tablet   Commonly known as:  DITROPAN        TAKE 1 TABLET (5 MG) BY MOUTH 2 TIMES DAILY   Refills:  11       palbociclib 100 MG capsule CHEMO   Commonly known as:  IBRANCE   Used for:  Pleural effusion, Malignant neoplasm of female breast, unspecified laterality, unspecified site of breast (H)        Dose:  100 mg   Take 1 capsule (100 mg) by mouth daily with food Take for 21 days, then 7 days off. Avoid grapefruit. Do not open/crush/chew capsule.   Quantity:  21 capsule   Refills:  0       sennosides 8.6 MG tablet   Commonly known as:  SENOKOT   Used for:  Constipation        Dose:  1 tablet   Take 1 tablet by mouth 2 times daily May increase to 2 tabs twice daily if needed   Quantity:  120 each   Refills:  0       simvastatin 20 MG tablet   Commonly known as:  ZOCOR   Used for:  Hyperlipidemia LDL goal <100        Dose:  20 mg   Take 1 tablet (20 mg) by mouth At Bedtime   Quantity:  90 tablet   Refills:  1       VIACTIV PO   Used for:  Chest pain        Dose:  1 chew tab   Take 1 chew tab by mouth 2 times daily. One in the morning and  one at bedtime.   Refills:  0       * Notice:  This list has 2 medication(s) that are the same as other medications prescribed for you. Read the directions carefully, and ask your doctor or other care provider to review them with you.         Where to get your medicines      These medications were sent to [facility] - [place], [place] - [address] [facility]  [address] [facility] [address]     Phone:  [phone]     levofloxacin 750 MG tablet    warfarin 1 MG tablet                Protect others around you: Learn how to safely use, store and throw away your medicines at www.disposemymeds.org.             Medication List: This is a list of all your medications and when to take them. Check marks below indicate your daily home schedule. Keep this list as a reference.      Medications           Morning Afternoon Evening Bedtime As Needed    DOCUSATE SODIUM   1 tablet 2 times daily                                letrozole 2.5 MG tablet   Commonly known as:  FEMARA   Take 1 tablet (2.5 mg) by mouth daily   Last time this was given:  2.5 mg on 3/27/2017 12:56 PM                                levofloxacin 750 MG tablet   Commonly known as:  LEVAQUIN   Take 1 tablet (750 mg) by mouth daily   Last time this was given:  750 mg on 3/27/2017  2:37 PM                                magnesium hydroxide 400 MG/5ML suspension   Commonly known as:  MILK OF MAGNESIA   Take 30-60 mLs by mouth daily as needed for constipation or heartburn   Last time this was given:  30 mLs on 3/26/2017  8:32 PM                                NYSTOP [id number] UNIT/GM Powd powder   daily as needed                                OCUVITE ADULT FORMULA PO   Take 1 tablet by mouth daily.                                * order for DME   Equipment being ordered: wheeled walker                                * order for DME   Equipment being ordered:  Incontinence pads   Patient uses these tid                                oxybutynin  5 MG tablet   Commonly known as:  DITROPAN   TAKE 1 TABLET (5 MG) BY MOUTH 2 TIMES DAILY                                palbociclib 100 MG capsule CHEMO   Commonly known as:  IBRANCE   Take 1 capsule (100 mg) by mouth daily with food Take for 21 days, then 7 days off. Avoid grapefruit. Do not open/crush/chew capsule.                                sennosides 8.6 MG tablet   Commonly known as:  SENOKOT   Take 1 tablet by mouth 2 times daily May increase to 2 tabs twice daily if needed   Last time this was given:  1 tablet on 3/27/2017  9:11 AM                                simvastatin 20 MG tablet   Commonly known as:  ZOCOR   Take 1 tablet (20 mg) by mouth At Bedtime                                VIACTIV PO   Take 1 chew tab by mouth 2 times daily. One in the morning and one at bedtime.                                warfarin 1 MG tablet   Commonly known as:  JANTOVEN   Take 1 tablet (1mg) by mouth on Monday, Wednesday, Friday. Take 2 tablets (2 mg) by mouth on Tuesday and Thursday.   Last time this was given:  2 mg on 3/26/2017  6:12 PM                                * Notice:  This list has 2 medication(s) that are the same as other medications prescribed for you. Read the directions carefully, and ask your doctor or other care provider to review them with you.

## 2017-03-25 NOTE — LETTER
Transition Communication Hand-off for Care Transitions to Next Level of Care Provider    Name: Helen Younger  MRN #: [id number]  Primary Care Provider: Arin Shahid     Primary Clinic: [address] [address]     Reason for Hospitalization:  Urinary frequency [R35.0]  Admit Date/Time: 3/25/2017  8:12 PM  Discharge Date: 03/27/17    76 year old woman with mets breast cancer and h/o CVA admitted with weakness, fall at home. Infectious and cardiac w/u. Improved.  -Discharging this afternoon.  Discharge Needs Assessment:  Needs       Most Recent Value    Equipment Currently Used at Home bath bench, walker, rolling, grab bar    Transportation Available family or friend will provide    Home Care [facility] [phone], Fax: [phone] [RN/PT]        Follow-up plan:  Future Appointments  Date Time Provider Department Center   3/30/2017 1:15 PM [facility] DRAW [facility]   3/30/2017 1:50 PM Padmini Brunson PA-C [facility]     Referrals     Future Labs/Procedures    Home care nursing referral     Comments:    Resumption of services.    The following homecare is recommended:  RN skilled nursing visit. RN to assess vital signs and weight, respiratory and cardiac status, pain level and activity tolerance, hydration, nutrition and bowel status and home safety.  RN to teach medication management.    _______________________  [facility]  Phone  [phone]  Fax  [phone]  ______________________      Your provider has ordered home care nursing services. If you have not been contacted within 2 days of your discharge please call the inpatient department phone number at [phone] .    Home Care PT Referral for Hospital Discharge     Comments:    PT to eval and treat    _______________________  [facility]  Phone  [phone]  Fax  [phone]  ______________________      Your provider has ordered home care - physical  therapy. If you have not been contacted within 2 days of your discharge please call the department phone number listed on the top of this document.          ________________________________________  Stephanie Tinoco RN, BSN    7D/Oncology Care Coordinator  Pager  [phone]  Phone [phone]

## 2017-03-26 ENCOUNTER — APPOINTMENT (OUTPATIENT)
Dept: OCCUPATIONAL THERAPY | Facility: CLINIC | Age: 77
DRG: 193 | End: 2017-03-26
Attending: STUDENT IN AN ORGANIZED HEALTH CARE EDUCATION/TRAINING PROGRAM
Payer: COMMERCIAL

## 2017-03-26 PROBLEM — J18.9 PNEUMONIA: Status: ACTIVE | Noted: 2017-03-26

## 2017-03-26 LAB
ANION GAP SERPL CALCULATED.3IONS-SCNC: 8 MMOL/L (ref 3–14)
BASOPHILS # BLD AUTO: 0 10E9/L (ref 0–0.2)
BASOPHILS NFR BLD AUTO: 0.8 %
BUN SERPL-MCNC: 22 MG/DL (ref 7–30)
CALCIUM SERPL-MCNC: 8.7 MG/DL (ref 8.5–10.1)
CHLORIDE SERPL-SCNC: 107 MMOL/L (ref 94–109)
CO2 SERPL-SCNC: 28 MMOL/L (ref 20–32)
CREAT SERPL-MCNC: 1.21 MG/DL (ref 0.52–1.04)
DIFFERENTIAL METHOD BLD: ABNORMAL
EOSINOPHIL # BLD AUTO: 0 10E9/L (ref 0–0.7)
EOSINOPHIL NFR BLD AUTO: 0.8 %
ERYTHROCYTE [DISTWIDTH] IN BLOOD BY AUTOMATED COUNT: 14.3 % (ref 10–15)
GFR SERPL CREATININE-BSD FRML MDRD: 43 ML/MIN/1.7M2
GLUCOSE SERPL-MCNC: 108 MG/DL (ref 70–99)
HCT VFR BLD AUTO: 29.4 % (ref 35–47)
HGB BLD-MCNC: 9.7 G/DL (ref 11.7–15.7)
IMM GRANULOCYTES # BLD: 0 10E9/L (ref 0–0.4)
IMM GRANULOCYTES NFR BLD: 0 %
INR PPP: 1.94 (ref 0.86–1.14)
INTERPRETATION ECG - MUSE: NORMAL
LACTATE BLD-SCNC: 2 MMOL/L (ref 0.7–2.1)
LYMPHOCYTES # BLD AUTO: 1.1 10E9/L (ref 0.8–5.3)
LYMPHOCYTES NFR BLD AUTO: 46.5 %
MAGNESIUM SERPL-MCNC: 2 MG/DL (ref 1.6–2.3)
MCH RBC QN AUTO: 35.9 PG (ref 26.5–33)
MCHC RBC AUTO-ENTMCNC: 33 G/DL (ref 31.5–36.5)
MCV RBC AUTO: 109 FL (ref 78–100)
MONOCYTES # BLD AUTO: 0.2 10E9/L (ref 0–1.3)
MONOCYTES NFR BLD AUTO: 8.6 %
NEUTROPHILS # BLD AUTO: 1.1 10E9/L (ref 1.6–8.3)
NEUTROPHILS NFR BLD AUTO: 43.3 %
NRBC # BLD AUTO: 0 10*3/UL
NRBC BLD AUTO-RTO: 0 /100
PHOSPHATE SERPL-MCNC: 2.8 MG/DL (ref 2.5–4.5)
PLATELET # BLD AUTO: 158 10E9/L (ref 150–450)
POTASSIUM SERPL-SCNC: 3.6 MMOL/L (ref 3.4–5.3)
RBC # BLD AUTO: 2.7 10E12/L (ref 3.8–5.2)
SODIUM SERPL-SCNC: 142 MMOL/L (ref 133–144)
WBC # BLD AUTO: 2.4 10E9/L (ref 4–11)

## 2017-03-26 PROCEDURE — 97535 SELF CARE MNGMENT TRAINING: CPT | Mod: GO

## 2017-03-26 PROCEDURE — 25000128 H RX IP 250 OP 636: Performed by: EMERGENCY MEDICINE

## 2017-03-26 PROCEDURE — 99233 SBSQ HOSP IP/OBS HIGH 50: CPT | Performed by: INTERNAL MEDICINE

## 2017-03-26 PROCEDURE — 40000133 ZZH STATISTIC OT WARD VISIT

## 2017-03-26 PROCEDURE — 97166 OT EVAL MOD COMPLEX 45 MIN: CPT | Mod: GO

## 2017-03-26 PROCEDURE — 25000132 ZZH RX MED GY IP 250 OP 250 PS 637: Performed by: INTERNAL MEDICINE

## 2017-03-26 PROCEDURE — 12000008 ZZH R&B INTERMEDIATE UMMC

## 2017-03-26 PROCEDURE — 96374 THER/PROPH/DIAG INJ IV PUSH: CPT | Performed by: EMERGENCY MEDICINE

## 2017-03-26 PROCEDURE — 83735 ASSAY OF MAGNESIUM: CPT | Performed by: STUDENT IN AN ORGANIZED HEALTH CARE EDUCATION/TRAINING PROGRAM

## 2017-03-26 PROCEDURE — 85610 PROTHROMBIN TIME: CPT | Performed by: STUDENT IN AN ORGANIZED HEALTH CARE EDUCATION/TRAINING PROGRAM

## 2017-03-26 PROCEDURE — 36415 COLL VENOUS BLD VENIPUNCTURE: CPT | Performed by: STUDENT IN AN ORGANIZED HEALTH CARE EDUCATION/TRAINING PROGRAM

## 2017-03-26 PROCEDURE — 84100 ASSAY OF PHOSPHORUS: CPT | Performed by: STUDENT IN AN ORGANIZED HEALTH CARE EDUCATION/TRAINING PROGRAM

## 2017-03-26 PROCEDURE — 87086 URINE CULTURE/COLONY COUNT: CPT | Performed by: NURSE PRACTITIONER

## 2017-03-26 PROCEDURE — 85025 COMPLETE CBC W/AUTO DIFF WBC: CPT | Performed by: STUDENT IN AN ORGANIZED HEALTH CARE EDUCATION/TRAINING PROGRAM

## 2017-03-26 PROCEDURE — 25000128 H RX IP 250 OP 636: Performed by: STUDENT IN AN ORGANIZED HEALTH CARE EDUCATION/TRAINING PROGRAM

## 2017-03-26 PROCEDURE — 25000132 ZZH RX MED GY IP 250 OP 250 PS 637: Performed by: STUDENT IN AN ORGANIZED HEALTH CARE EDUCATION/TRAINING PROGRAM

## 2017-03-26 PROCEDURE — 25000132 ZZH RX MED GY IP 250 OP 250 PS 637

## 2017-03-26 PROCEDURE — 83605 ASSAY OF LACTIC ACID: CPT | Performed by: INTERNAL MEDICINE

## 2017-03-26 PROCEDURE — 80048 BASIC METABOLIC PNL TOTAL CA: CPT | Performed by: STUDENT IN AN ORGANIZED HEALTH CARE EDUCATION/TRAINING PROGRAM

## 2017-03-26 PROCEDURE — 36415 COLL VENOUS BLD VENIPUNCTURE: CPT | Performed by: INTERNAL MEDICINE

## 2017-03-26 RX ORDER — WARFARIN SODIUM 2 MG/1
2 TABLET ORAL ONCE
Status: COMPLETED | OUTPATIENT
Start: 2017-03-26 | End: 2017-03-26

## 2017-03-26 RX ORDER — SODIUM CHLORIDE 9 MG/ML
INJECTION, SOLUTION INTRAVENOUS CONTINUOUS
Status: DISCONTINUED | OUTPATIENT
Start: 2017-03-26 | End: 2017-03-27

## 2017-03-26 RX ORDER — LEVOFLOXACIN 750 MG/1
750 TABLET, FILM COATED ORAL
Status: DISCONTINUED | OUTPATIENT
Start: 2017-03-27 | End: 2017-03-27

## 2017-03-26 RX ORDER — WARFARIN SODIUM 2 MG/1
2 TABLET ORAL
Status: COMPLETED | OUTPATIENT
Start: 2017-03-26 | End: 2017-03-26

## 2017-03-26 RX ORDER — SENNOSIDES 8.6 MG
1 TABLET ORAL 2 TIMES DAILY
Status: DISCONTINUED | OUTPATIENT
Start: 2017-03-26 | End: 2017-03-27 | Stop reason: HOSPADM

## 2017-03-26 RX ADMIN — SENNOSIDES 1 TABLET: 8.6 TABLET, FILM COATED ORAL at 08:43

## 2017-03-26 RX ADMIN — SENNOSIDES 1 TABLET: 8.6 TABLET, FILM COATED ORAL at 20:32

## 2017-03-26 RX ADMIN — WARFARIN SODIUM 2 MG: 2 TABLET ORAL at 02:14

## 2017-03-26 RX ADMIN — SODIUM CHLORIDE: 9 INJECTION, SOLUTION INTRAVENOUS at 20:32

## 2017-03-26 RX ADMIN — SODIUM CHLORIDE: 9 INJECTION, SOLUTION INTRAVENOUS at 02:14

## 2017-03-26 RX ADMIN — MAGNESIUM HYDROXIDE 30 ML: 400 SUSPENSION ORAL at 20:32

## 2017-03-26 RX ADMIN — WARFARIN SODIUM 2 MG: 2 TABLET ORAL at 18:12

## 2017-03-26 RX ADMIN — LEVOFLOXACIN 750 MG: 5 INJECTION, SOLUTION INTRAVENOUS at 00:44

## 2017-03-26 ASSESSMENT — ACTIVITIES OF DAILY LIVING (ADL)
AMBULATION: 2-->ASSISTIVE PERSON
EATING: 0-->INDEPENDENT
WHICH_OF_THE_ABOVE_FUNCTIONAL_RISKS_HAD_A_RECENT_ONSET_OR_CHANGE?: AMBULATION;TRANSFERRING;TOILETING;BATHING;DRESSING
COGNITION: 0 - NO COGNITION ISSUES REPORTED
TOILETING: 0-->INDEPENDENT
NUMBER_OF_TIMES_PATIENT_HAS_FALLEN_WITHIN_LAST_SIX_MONTHS: 8
BATHING: 2-->ASSISTIVE PERSON
DRESS: 2-->ASSISTIVE PERSON
COMMUNICATION: 2-->DIFFICULTY SPEAKING (NOT RELATED TO LANGUAGE BARRIER)
DRESS: 0-->INDEPENDENT
BATHING: 2-->ASSISTIVE PERSON
RETIRED_EATING: 0-->INDEPENDENT
SWALLOWING: 0-->SWALLOWS FOODS/LIQUIDS WITHOUT DIFFICULTY
TRANSFERRING: 2-->ASSISTIVE PERSON
TRANSFERRING: 1-->ASSISTIVE EQUIPMENT
TOILETING: 2-->ASSISTIVE PERSON
FALL_HISTORY_WITHIN_LAST_SIX_MONTHS: YES
RETIRED_COMMUNICATION: 2-->DIFFICULTY SPEAKING (NOT RELATED TO LANGUAGE BARRIER)
SWALLOWING: 0-->SWALLOWS FOODS/LIQUIDS WITHOUT DIFFICULTY
AMBULATION: 1-->ASSISTIVE EQUIPMENT

## 2017-03-26 NOTE — PROGRESS NOTES
03/26/17 0700   Quick Adds   Type of Visit Initial Occupational Therapy Evaluation   Living Environment   Lives With spouse   Living Arrangements house   Home Accessibility bed and bath on same level;bed and bath are not on the first floor;stairs to enter home;stairs within home   Number of Stairs to Enter Home 16   Number of Stairs Within Home 10   Stair Railings at Home outside, present on right side;inside, present on right side   Transportation Available family or friend will provide   Living Environment Comment Pt reports can and does get help from    Self-Care   Dominant Hand right   Usual Activity Tolerance moderate   Current Activity Tolerance poor   Regular Exercise no   Equipment Currently Used at Home bath bench;grab bar;walker, rolling   Activity/Exercise/Self-Care Comment Pt reports has AD at home and can use this.   Functional Level Prior   Ambulation 1-->assistive equipment   Transferring 1-->assistive equipment   Toileting 0-->independent   Bathing 2-->assistive person   Dressing 0-->independent   Eating 0-->independent   Communication 2-->difficulty speaking (not related to language barrier)   Swallowing 0-->swallows foods/liquids without difficulty   Cognition 0 - no cognition issues reported   Fall history within last six months yes   Number of times patient has fallen within last six months 4       Present no   Language english   General Information   Onset of Illness/Injury or Date of Surgery - Date 03/25/17   Referring Physician Lorrie Wagner MD   Patient/Family Goals Statement to return home   Additional Occupational Profile Info/Pertinent History of Current Problem 76 year old female who presents with urinary frequency, progressively worsening generalized weakness for the past week, and spell of confusion. She has a history of prior CVA in 2000 with aphasia, metastatic breast cancer with lung involvement and prior left pleural effusion, hypertension,  hyperlipidemia   Precautions/Limitations fall precautions   Weight-Bearing Status - LUE full weight-bearing   Weight-Bearing Status - RUE full weight-bearing   Weight-Bearing Status - LLE full weight-bearing   Weight-Bearing Status - RLE full weight-bearing   General Info Comments Pt with expressive aphasia, mod difficulty communicating prior to admission. Pt works best with yes/no questions.    Cognitive Status Examination   Orientation person;place   Level of Consciousness alert   Able to Follow Commands WNL/WFL   Cognitive Comment Pt appears mild impairment with cognition. Difficult to assess d/t challenges verbalizing needs/wants.    Visual Perception   Visual Perception Wears glasses   Visual Perception Comments Glasses at all times at baseline   Sensory Examination   Sensory Quick Adds No deficits were identified   Pain Assessment   Patient Currently in Pain Yes, see Vital Sign flowsheet   Integumentary/Edema   Integumentary/Edema no deficits were identifed   Range of Motion (ROM)   ROM Comment BUE grossly WFL, limitations in shoulder flexion end range   Strength   Strength Comments BUE grossly 4/5 in all planes.    Hand Strength   Hand Strength Comments BUE grossly 4/5.    Coordination   Coordination Comments Mild incoordination noted with grasp of functional objects and opposition of thumb/fingers   Instrumental Activities of Daily Living (IADL)   Previous Responsibilities (Pt defers to  and daughter. )   IADL Comments Daughter comes 1x/week to help   Activities of Daily Living Analysis   Impairments Contributing to Impaired Activities of Daily Living balance impaired;cognition impaired;coordination impaired;pain;ROM decreased;strength decreased   General Therapy Interventions   Planned Therapy Interventions ADL retraining;balance training;bed mobility training;cognition;ROM;strengthening;transfer training;home program guidelines;progressive activity/exercise;risk factor education;neuromuscular  "re-education   Clinical Impression   Criteria for Skilled Therapeutic Interventions Met yes, treatment indicated   OT Diagnosis Decreased ADL independence.    Influenced by the following impairments weakness, decreased endurance, pain, cognition, communication, and impaired balance.    Assessment of Occupational Performance 3-5 Performance Deficits   Identified Performance Deficits Dressing, tolieting, bathing, transfers   Clinical Decision Making (Complexity) Moderate complexity   Therapy Frequency daily   Predicted Duration of Therapy Intervention (days/wks) 4/2/17   Risks and Benefits of Treatment have been explained. Yes   Patient, Family & other staff in agreement with plan of care Yes   Clinical Impression Comments Pt with good support system at home, however appears physical environment (stairs may be challenge.)   [facility] [employer] \"6 Clicks\"   2016, Trustees of [facility], under license to [employer].  All rights reserved.   6 Clicks Short Forms Daily Activity Inpatient Short Form   [facility] [employer]  \"6 Clicks\" Daily Activity Inpatient Short Form   1. Putting on and taking off regular lower body clothing? 2 - A Lot   2. Bathing (including washing, rinsing, drying)? 2 - A Lot   3. Toileting, which includes using toilet, bedpan or urinal? 3 - A Little   4. Putting on and taking off regular upper body clothing? 3 - A Little   5. Taking care of personal grooming such as brushing teeth? 3 - A Little   6. Eating meals? 3 - A Little   Daily Activity Raw Score (Score out of 24.Lower scores equate to lower levels of function) 16   Total Evaluation Time   Total Evaluation Time (Minutes) 10     "

## 2017-03-26 NOTE — PROGRESS NOTES
[facility], [place]    Hematology / Oncology Progress Note    Date of Admission: 3/25/2017  Hospital Day #: 0   Date of Service (when I saw the patient): 03/26/2017     Assessment & Plan   Helen Younger is a 76 year old woman with PMHx significant for metastatic breast cancer (to bone and b/l malignant pleural effusions), h/o CVA with resulting expressive aphasia, and intermittent bradycardia (currently on ZioPatch monitoring that was set up outpt). She is admitted with worsening weakness/fall at home.     #Weakness, fall at home. Sounds like a chronic issue that was perhaps exacerbated by some event. Has had multiple falls at home. No trauma with most recent fall (legs felt weak and she lower self to ground).   ?Infectious vs cardiac in nature. Did have mild lactic acidosis on admission with additional findings as below.   -Pyuria on UA. UC pending. CXR with b/l retrocardiac opacities most likely atelectasis but can't completely r/o infection. Will treat empirically for UTI and PNA with levaquin (renally dosed qod).   -Trop negative, TSH wnl, BNP mildly elevated. EKG demonstrates SR with mod voltage criteria for LVH.   -Has ZioPatch in place to monitor for arrhythmia d/t bradycardia outpt. Currently normotensive with regular HR ranging 68-71. Monitor on tele.   -Daily CBC, BMP, lytes.   -OT and PT consulted for eval/treat.     #CKD. Creat elevated to 1.2 on admission but on chart review appears to have chronically elevated creat since at least Jan 2016, with baseline creat ~1.1-1.3. Gentle IV hydration and continue to monitor on BMP.     #H/o CVA. Head CT this admission demonstrates suspected new focal loss of gray-white matter differentiation in the right middle frontal gyrus, concerning for recent infarction; stable chronic left MCA territory infarct; stable 5mm posterior parafalcine calcified meningioma.   -Could consider further w/u with brain MRI if no improvement of sx but pt  already looking and feeling better. Defer for now.   -Continue coumadin. Pharmacy consulted to follow.     #Metastatic breast cancer. Has been stable on palbociclib and letrozole.    FEN:  -NS at 50cc/hr  -PRN lyte replacement  -RDAT    Code status: DNR/DNI  Disposition: Anticipate d/c home vs TCU in next 1-2 days pending infection w/u and cardiac monitoring.     Serena Juarez DNP, APRN, CNP  Hematology/Oncology  Pager: [phone]    Interval History   Helen is sitting comfortably in chair eating breakfast. Communication is slow but able to express self and answer questions. She reports feeling fine. She hasn't really been out of bed much since admission but is not feeling particularly weak or fatigued right now. She slept well, had a good appetite and breakfast this AM. She denies HA, dizziness, LHness, vision changes, fever, chills, SOB, chest pain/pressure, abd pain, N/V/D/C, leg swelling, dysuria, oliguria, urinary frequency or difficulty starting stream. She seems to have no concerns at this time.     Physical Exam   Temp: 97.4  F (36.3  C) Temp src: Oral BP: 146/74 Pulse: 68 Heart Rate: 66 Resp: 18 SpO2: 94 % O2 Device: None (Room air)    Vitals:    03/25/17 2012 03/26/17 0048   Weight: 83.9 kg (185 lb) 86.8 kg (191 lb 4.8 oz)     Vital Signs with Ranges  Temp:  [97.4  F (36.3  C)-98.7  F (37.1  C)] 97.4  F (36.3  C)  Pulse:  [68-74] 68  Heart Rate:  [66-71] 66  Resp:  [18-28] 18  BP: (123-152)/() 146/74  SpO2:  [94 %-98 %] 94 %  I/O last 3 completed shifts:  In: 400 [I.V.:400]  Out: 50 [Urine:50]    Constitutional: Pleasant woman seen sitting comfortably in chair in NAD. Alert and interactive. Slow communication d/t expressive aphasia with h/o CVA.   HEENT: NCAT. PERRL, EOMI, anicteric sclera. Oral mucosa pink and moist with no lesions or thrush.  Respiratory: Non-labored breathing on RA. Breath sounds diminished in b/l bases with soft crackles, no rhonchi, cough, or wheezing appreciated.    Cardiovascular: Regular rate and rhythm. No murmur or rub. ZioPatch on L upper chest.   GI: Normoactive bowel sounds. Abdomen soft, non-distended, and non-tender.   Skin: Warm and dry. No concerning lesions or rash on exposed surfaces.  Musculoskeletal: Extremities grossly normal, non-tender, trace non-pitting BLE edema.   Neurologic: A&O. Conversant with slow, deliberate speech that is somewhat hard to understand at times. Face symmetric, tongue midline. CNs 2-12 grossly intact.     Medications   Current Facility-Administered Medications   Medication     magnesium hydroxide (MILK OF MAGNESIA) suspension 30-60 mL     sennosides (SENOKOT) tablet 1 tablet     Warfarin Therapy Reminder (Check START DATE - warfarin may be starting in the FUTURE)     [START ON 3/27/2017] levofloxacin (LEVAQUIN) tablet 750 mg     Medication Instruction     0.9% sodium chloride infusion     warfarin (COUMADIN) tablet 2 mg       Data   CBC  Recent Labs  Lab 03/26/17 0633 03/25/17 2054   WBC 2.4* 2.4*   RBC 2.70* 2.84*   HGB 9.7* 10.3*   HCT 29.4* 30.5*   * 107*   MCH 35.9* 36.3*   MCHC 33.0 33.8   RDW 14.3 14.3    166     CMP  Recent Labs  Lab 03/26/17 0633 03/25/17 2054    140   POTASSIUM 3.6 3.6   CHLORIDE 107 103   CO2 28 29   ANIONGAP 8 8   * 232*   BUN 22 21   CR 1.21* 1.23*   GFRESTIMATED 43* 43*   GFRESTBLACK 53* 52*   SHERYL 8.7 9.1   MAG 2.0  --    PHOS 2.8  --    PROTTOTAL  --  6.9   ALBUMIN  --  3.4   BILITOTAL  --  0.6   ALKPHOS  --  40   AST  --  10   ALT  --  13     INR  Recent Labs  Lab 03/26/17 0633 03/25/17 2054 03/24/17   INR 1.94* 1.79* 2.1*       Results for orders placed or performed during the hospital encounter of 03/25/17   XR Chest 2 Views    Narrative    Exam: XR CHEST 2 VW, 3/25/2017 9:28 PM    Indication: delirium, met CA    Comparison: 2/22/2016    Findings: PA and lateral views of the chest are obtained. Low lung  volumes. Bilateral perihilar opacities increased from previous.  Stable  left pleural effusion and retrocardiac opacities. No pneumothorax.  Nonspecific upper abdominal bowel gas pattern.      Impression    Impression:   1. Stable moderate left pleural effusion.  2. Low lung volumes.  3. Increasing perihilar and stable retrocardiac opacities. Given low  lung volumes, findings most likely represent atelectasis, though  pulmonary edema and superimposed infection remain within the  differential.    I have personally reviewed the examination and initial interpretation  and I agree with the findings.    DAT VELEZ MD   CT Head w/o Contrast    Narrative    CT HEAD W/O CONTRAST 3/25/2017 9:47 PM    Provided History: Fall 2 weeks ago, delirium, anticoagulated.  Metastatic breast cancer.    Comparison: Brain MRI 10/18/2016, PET CT 12/23/2016.    Technique: Using multidetector thin collimation helical acquisition  technique, axial, coronal and sagittal CT images from the skull base  to the vertex were obtained without intravenous contrast.     Findings:    Images are degraded by patient motion artifact.    Suspected focal loss of gray-white matter differentiation in the  region of the right middle frontal gyrus (series 3, image 20) without  corresponding abnormality on prior MRI 10/18/2016.    Stable encephalomalacia involving the left frontal and parietal lobes,  compatible with prior MCA territory infarct. Mild leukoaraiosis. No  intracranial hemorrhage, midline shift or abnormal extra axial fluid  collection.    Stable 5 mm calcified right posterior parafalcine dural based lesion  (series 3, image 19), which demonstrated enhancement on prior PET/CT  12/23/2016, compatible with a meningioma.    Left sphenoid sinus mucosal thickening. Mastoid air cells are clear.       Impression    Impression:   1. Motion degraded examination. Suspected new focal loss of gray-white  matter differentiation in the right middle frontal gyrus, concerning  for recent infarction. If clinically  indicated, consider MRI for  further evaluation.  2. Stable chronic left MCA territory infarct.  3. Stable 5 mm posterior parafalcine calcified meningioma.    I have personally reviewed the examination and initial interpretation  and I agree with the findings.    BRIDGETTE STAFFORD MD

## 2017-03-26 NOTE — PLAN OF CARE
Problem: Infection, Risk/Actual (Adult)  Goal: Infection Prevention/Resolution  Patient will demonstrate the desired outcomes by discharge/transition of care.   Pt arrived to the floor from ER around 0030.  and daughter here to help answer admission questions. Seen in ER with complaints of urinary frequency, weakness and some confusion. Had BC's, UA, head CT and chest x-ray done. Pharmacy dosed coumadin. Pt given a 2 mg dose x 1, and pharmacy will monitor daily INR's for any adjustments. Appears oriented but does have expressive aphasia due to hx of CVA. Also has hx of breast cancer, pleural effusion, HTN and hyperlipidemia. VSS. Afebrile. Weak. Needs one assist to commode or 2 to walk to the bathroom. Denies pain. Has not voided since leaving a urine sample down in the ER. Bladder scanned @ 0610 for 211 ml. Patient has a ziopatch on her upper left chest. Bed alarm on due to weakness and falling at home.

## 2017-03-26 NOTE — H&P
"[facility] History and Physical    Helen Younger MRN# [id number]   Age: 76 year old YOB: 1940     Date of Admission:  3/25/2017    Primary care provider: Arin Shahid          Assessment and Plan:   77 yo woman with metastatic breast cancer, weakness, and intermittent bradycardia at home. Her weakness is likely multifactorial and may be complicated by community acquired pna and/or UTI. Head CT with evidence of prior stroke, unclear if this is contributing.    PNA/UTI:  - favor PNA as source of infection but I am not entirely convinced that she is infected although she has a very mild lactic acidosis. Also NS at 50 cc/hr given lactate  - continue levofloxacin 750 mg QD that was started in ED and await cx results    Weakness:   - likely multifactorial. Will treat for infection as above  - has Zeopatch in place for arryhtmia monitoring that was started as out-pt  - add on Mg and Ph  - PT/OT    Breast cancer:   - on palbociclib and letrozole    Prior stroke:   - pharm consult for warfarin    FEN: regular  Ppx: on warfarin  Code: DNR/DNI    Lorrie Wagner MD         Chief Complaint:   \"I went to get up and I couldn't.'     History is obtained from the patient          History of Present Illness:   This patient is a 76 year old woman with metastatic ER/MD+, HER2-kelly negative breast cancer, hx of stroke, and diabetes who presented to the ED with her family d/t weakness. She is undergoing outpt w/u for arrhyhtmia d/t bradyacardia at home and is currently wearing a patch to monitor her heart rhythm. It seems like her weakness is not new based on her 's report. Today her legs buckled when trying to transfer. She denies head trauma, SOB, cough, dysuria. Reports good appetite and bowel function. Her speech is very slow and deliberate but this is not new per her family.     In the ED her UA showed pyuria. Her CXR was consistent with atelectasis vs infection. Her head CT showed prior infarction " but no acute pathology. Levofloxacin was started. Her case was discussed with cards and neurology per the report given to me by the oncology fellow.             Past Medical History:     Past Medical History:   Diagnosis Date     CVA (cerebral infarction) 03/01/00    slurred speech, right facial droop partial hemiplagia     Diabetes mellitus      Fibromuscular dysplasia of renal artery (H)      Malignant neoplasm of breast (female), unspecified site 01/03    Breast cancer     MVR (mitral valve repair)      Unspecified essential hypertension              Past Surgical History:      Past Surgical History:   Procedure Laterality Date     BREAST LUMPECTOMY, RT/LT  04/06    Breast Lumpectomy RT/LT     BREAST LUMPECTOMY, RT/LT  06/06    redo for margins with radiation and chemo     C NONSPECIFIC PROCEDURE  1998    left knee surg torn ligament             Social History:     Social History   Substance Use Topics     Smoking status: Never Smoker     Smokeless tobacco: Never Used     Alcohol use No             Family History:     Family History   Problem Relation Age of Onset     Hypertension Mother      Cardiovascular Mother      Prostate Cancer Father      Hypertension Father      Breast Cancer Maternal Grandmother      Cardiovascular Paternal Grandfather      Hypertension Brother      Depression Daughter      Hypertension Daughter      Psychotic Disorder Daughter      bipolar     Family history reviewed          Allergies:     Allergies   Allergen Reactions     Diuretic      Don't remember side effect     Zyrtec [Cetirizine Hcl]      Elevated blood pressure             Medications:     Prescriptions Prior to Admission   Medication Sig Dispense Refill Last Dose     warfarin (JANTOVEN) 1 MG tablet Take 2-3 tablets (2-3mg) by mouth daily as directed by  tablet 3 3/24/2017 at Unknown time     palbociclib (IBRANCE) 100 MG capsule CHEMO Take 1 capsule (100 mg) by mouth daily with food Take for 21 days, then 7 days off.  Avoid grapefruit. Do not open/crush/chew capsule. 21 capsule 0 3/24/2017 at Unknown time     letrozole (FEMARA) 2.5 MG tablet Take 1 tablet (2.5 mg) by mouth daily 90 tablet 3 3/24/2017 at Unknown time     magnesium hydroxide (MILK OF MAGNESIA) 400 MG/5ML suspension Take 30-60 mLs by mouth daily as needed for constipation or heartburn 360 mL 0 3/24/2017 at Unknown time     sennosides (SENOKOT) 8.6 MG tablet Take 1 tablet by mouth 2 times daily May increase to 2 tabs twice daily if needed 120 each 0 3/24/2017 at Unknown time     simvastatin (ZOCOR) 20 MG tablet Take 1 tablet (20 mg) by mouth At Bedtime 90 tablet 1 3/24/2017 at Unknown time     Nystatin (NYSTOP) 965626 UNIT/GM POWD daily as needed   3/24/2017 at Unknown time     oxybutynin (DITROPAN) 5 MG tablet TAKE 1 TABLET (5 MG) BY MOUTH 2 TIMES DAILY  11 3/24/2017 at Unknown time     DOCUSATE SODIUM 1 tablet 2 times daily    3/24/2017 at Unknown time     Multiple Vitamins-Minerals (OCUVITE ADULT FORMULA PO) Take 1 tablet by mouth daily.   3/24/2017 at Unknown time     VIACTIV OR Take 1 chew tab by mouth 2 times daily. One in the morning and one at bedtime.    3/24/2017 at Unknown time     order for DME Equipment being ordered:  Incontinence pads   Patient uses these tid 100 each 3 Taking     order for DME Equipment being ordered: wheeled walker 1 each 0 Taking             Review of Systems:   The Review of Systems is negative other than noted in the HPI           Physical Exam:   Vitals were reviewed  Temp: 98.6  F (37  C) Temp src: Oral BP: 152/90 Pulse: 68 Heart Rate: 68 Resp: 18 SpO2: 98 % O2 Device: None (Room air)    .General: NAD  HEENT: No scleral icterus, no oral lesions  Pulm:  No air movement in the bases, scattered rhonchi  CV: RRR, no m/r/g  Abd: soft, nontender  Extremities: trace edema in the legs  Skin: No lesions evident  Neuro: Alert, conversant but speech slow and deliberate, MSK 3/5 in all extremities, sits up with minimal assistance  Psych:  flat affect         Data:     Results for orders placed or performed during the hospital encounter of 03/25/17 (from the past 24 hour(s))   EKG 12-lead, tracing only   Result Value Ref Range    Interpretation ECG Click View Image link to view waveform and result    CBC with platelets differential   Result Value Ref Range    WBC 2.4 (L) 4.0 - 11.0 10e9/L    RBC Count 2.84 (L) 3.8 - 5.2 10e12/L    Hemoglobin 10.3 (L) 11.7 - 15.7 g/dL    Hematocrit 30.5 (L) 35.0 - 47.0 %     (H) 78 - 100 fl    MCH 36.3 (H) 26.5 - 33.0 pg    MCHC 33.8 31.5 - 36.5 g/dL    RDW 14.3 10.0 - 15.0 %    Platelet Count 166 150 - 450 10e9/L    Diff Method Automated Method     % Neutrophils 66.9 %    % Lymphocytes 25.0 %    % Monocytes 6.4 %    % Eosinophils 0.0 %    % Basophils 1.3 %    % Immature Granulocytes 0.4 %    Nucleated RBCs 0 0 /100    Absolute Neutrophil 1.6 1.6 - 8.3 10e9/L    Absolute Lymphocytes 0.6 (L) 0.8 - 5.3 10e9/L    Absolute Monocytes 0.2 0.0 - 1.3 10e9/L    Absolute Eosinophils 0.0 0.0 - 0.7 10e9/L    Absolute Basophils 0.0 0.0 - 0.2 10e9/L    Abs Immature Granulocytes 0.0 0 - 0.4 10e9/L    Absolute Nucleated RBC 0.0    INR   Result Value Ref Range    INR 1.79 (H) 0.86 - 1.14   Comprehensive metabolic panel   Result Value Ref Range    Sodium 140 133 - 144 mmol/L    Potassium 3.6 3.4 - 5.3 mmol/L    Chloride 103 94 - 109 mmol/L    Carbon Dioxide 29 20 - 32 mmol/L    Anion Gap 8 3 - 14 mmol/L    Glucose 232 (H) 70 - 99 mg/dL    Urea Nitrogen 21 7 - 30 mg/dL    Creatinine 1.23 (H) 0.52 - 1.04 mg/dL    GFR Estimate 43 (L) >60 mL/min/1.7m2    GFR Estimate If Black 52 (L) >60 mL/min/1.7m2    Calcium 9.1 8.5 - 10.1 mg/dL    Bilirubin Total 0.6 0.2 - 1.3 mg/dL    Albumin 3.4 3.4 - 5.0 g/dL    Protein Total 6.9 6.8 - 8.8 g/dL    Alkaline Phosphatase 40 40 - 150 U/L    ALT 13 0 - 50 U/L    AST 10 0 - 45 U/L   TSH with free T4 reflex   Result Value Ref Range    TSH 1.97 0.40 - 4.00 mU/L   CRP inflammation   Result Value Ref  Range    CRP Inflammation 4.3 0.0 - 8.0 mg/L   Troponin I   Result Value Ref Range    Troponin I ES  0.000 - 0.045 ug/L     <0.015  The 99th percentile for upper reference range is 0.045 ug/L.  Troponin values in   the range of 0.045 - 0.120 ug/L may be associated with risks of adverse   clinical events.     Nt probnp inpatient (BNP)   Result Value Ref Range    N-Terminal Pro BNP Inpatient 1103 (H) 0 - 900 pg/mL   Blood culture   Result Value Ref Range    Specimen Description Blood Left Arm     Culture Micro Pending     Micro Report Status Pending    ISTAT gases lactate deng POCT   Result Value Ref Range    Ph Venous 7.61 (HH) 7.32 - 7.43 pH    PCO2 Venous 22 (L) 40 - 50 mm Hg    PO2 Venous 77 (H) 25 - 47 mm Hg    Bicarbonate Venous 23 21 - 28 mmol/L    O2 Sat Venous 98 %    Lactic Acid 2.4 (H) 0.7 - 2.1 mmol/L   Ammonia   Result Value Ref Range    Ammonia 11 10 - 50 umol/L   XR Chest 2 Views    Narrative    Exam: XR CHEST 2 VW, 3/25/2017 9:28 PM    Indication: delirium, met CA    Comparison: 2/22/2016    Findings: PA and lateral views of the chest are obtained. Low lung  volumes. Bilateral perihilar opacities increased from previous. Stable  left pleural effusion and retrocardiac opacities. No pneumothorax.  Nonspecific upper abdominal bowel gas pattern.      Impression    Impression:   1. Stable moderate left pleural effusion.  2. Low lung volumes.  3. Increasing perihilar and stable retrocardiac opacities. Given low  lung volumes, findings most likely represent atelectasis, though  pulmonary edema and superimposed infection remain within the  differential.    I have personally reviewed the examination and initial interpretation  and I agree with the findings.    DAT VELEZ MD   CT Head w/o Contrast    Narrative    CT HEAD W/O CONTRAST 3/25/2017 9:47 PM    Provided History: Fall 2 weeks ago, delirium, anticoagulated.  Metastatic breast cancer.    Comparison: Brain MRI 10/18/2016, PET CT  12/23/2016.    Technique: Using multidetector thin collimation helical acquisition  technique, axial, coronal and sagittal CT images from the skull base  to the vertex were obtained without intravenous contrast.     Findings:    Images are degraded by patient motion artifact.    Suspected focal loss of gray-white matter differentiation in the  region of the right middle frontal gyrus (series 3, image 20) without  corresponding abnormality on prior MRI 10/18/2016.    Stable encephalomalacia involving the left frontal and parietal lobes,  compatible with prior MCA territory infarct. Mild leukoaraiosis. No  intracranial hemorrhage, midline shift or abnormal extra axial fluid  collection.    Stable 5 mm calcified right posterior parafalcine dural based lesion  (series 3, image 19), which demonstrated enhancement on prior PET/CT  12/23/2016, compatible with a meningioma.    Left sphenoid sinus mucosal thickening. Mastoid air cells are clear.       Impression    Impression:   1. Motion degraded examination. Suspected new focal loss of gray-white  matter differentiation in the right middle frontal gyrus, concerning  for recent infarction. If clinically indicated, consider MRI for  further evaluation.  2. Stable chronic left MCA territory infarct.  3. Stable 5 mm posterior parafalcine calcified meningioma.    I have personally reviewed the examination and initial interpretation  and I agree with the findings.    BRIDGETTE STAFFORD MD   Blood culture   Result Value Ref Range    Specimen Description Blood Right Hand     Culture Micro Pending     Micro Report Status Pending    UA with Microscopic reflex to Culture   Result Value Ref Range    Color Urine Yellow     Appearance Urine Clear     Glucose Urine 70 (A) NEG mg/dL    Bilirubin Urine Negative NEG    Ketones Urine Negative NEG mg/dL    Specific Gravity Urine 1.014 1.003 - 1.035    Blood Urine Negative NEG    pH Urine 6.5 5.0 - 7.0 pH    Protein Albumin Urine 10 (A) NEG  mg/dL    Urobilinogen mg/dL Normal 0.0 - 2.0 mg/dL    Nitrite Urine Negative NEG    Leukocyte Esterase Urine Small (A) NEG    Source Midstream Urine     WBC Urine 7 (H) 0 - 2 /HPF    RBC Urine 3 (H) 0 - 2 /HPF    Squamous Epithelial /HPF Urine <1 0 - 1 /HPF    Mucous Urine Present (A) NEG /LPF    Hyaline Casts 14 (H) 0 - 2 /LPF       Lorrie Wagner MD

## 2017-03-26 NOTE — PLAN OF CARE
Problem: Goal Outcome Summary  Goal: Goal Outcome Summary     Up in chair for dinner now. Eating approximately 75% of meal. Lactic acid re-draw was 2.0. LBM 2 days ago. MOM ordered from pharmacy. Scleral hemorrhage noted to left eye- pt's  states this is new today but she has had them in the past. Denies visual changes.

## 2017-03-26 NOTE — ED PROVIDER NOTES
"    [place] EMERGENCY DEPARTMENT ([facility])  March 25, 2017  ED 3 8:23 PM   History   No chief complaint on file.    The history is provided by the patient, medical records, the spouse and a relative (daughter).     Helen Younger is a 76 year old female who presents with urinary frequency, progressively worsening generalized weakness for the past week, and spell of confusion. 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 Per  patient has been dealing with mobility issues for the past 3 years she is status post stroke. The stroke was reportedly in April 2000. She was diagnosed with breast cancer in 2006.     Last year, family brought her to hospital because she had shortness of breath. She was found to have lung mets. Family have been dealing with this for over a year now. He states that every once in a  while she gets very weak, with the last time like this being found to have a UTI, but that those symptoms and then the generalized weakness and general \"spells\" improved with antibiotics.      Over the past few days he has had increased weakness. He notes that she gets up in the middle of the night to go to the bathroom and has noticed that she has been voiding more frequently. Tonight she had difficulty moving from dinner table to couch because she had a spell of disorientation and generalized weakness. This was at around 630-7pm. He noted that she seems to have spells where her pulse drops to low 40s and becomes disoriented, and wonders if this occurred again today, and it is for spells like this in the past that she currently has a Ziopatch in place. Daughter observed her today and noticed that claudio she sat down to void she seemed worn out completely and has had increased urinary frequency. Nothing else seems to make symptoms worse, seems to improve if sits down, but progressively " getting worse over the last few days.     She has had multiple falls at home over the last number of years, last fall was 2 weeks ago where she lost her balance in the middle if the night after going to the bathroom and fell on her buttocks.  notes that she did not injure her head or extremities with these falls and she tends to land on her buttocks. No loss of consciousness. No injuries sustained or pain thereafter. No preceding symptoms prior to this most recent fall.    No fevers, chills. no shortness of breath. No cough.   No diarrhea.  states patient was constipated for a little while but this improved 2 days ago with prune juice. No chest pain, lightheadedness or dizziness. No rashes. No abdominal pain, nausea or vomiting. Patient is on anticoagulation due to stroke. 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 Patient has a ziopatch which has been monitoring since 3/14.     No other new symptoms or complaints at this time, see ROS for further details.     She is accompanied by  and daughter who provide collateral history.    I have reviewed the Medications, Allergies, Past Medical and Surgical History, and Social History in the [employer] system.  PAST MEDICAL HISTORY:   Past Medical History:   Diagnosis Date     CVA (cerebral infarction) 03/01/00    slurred speech, right facial droop partial hemiplagia     Diabetes mellitus      Fibromuscular dysplasia of renal artery (H)      Malignant neoplasm of breast (female), unspecified site 01/03    Breast cancer     MVR (mitral valve repair)      Unspecified essential hypertension        PAST SURGICAL HISTORY:   Past Surgical History:   Procedure Laterality Date     BREAST LUMPECTOMY, RT/LT  04/06    Breast Lumpectomy RT/LT     BREAST LUMPECTOMY, RT/LT  06/06    redo for margins with radiation and chemo     C NONSPECIFIC PROCEDURE  1998    left knee surg  torn ligament       FAMILY HISTORY:   Family History   Problem Relation Age of Onset     Hypertension Mother      Cardiovascular Mother      Prostate Cancer Father      Hypertension Father      Breast Cancer Maternal Grandmother      Cardiovascular Paternal Grandfather      Hypertension Brother      Depression Daughter      Hypertension Daughter      Psychotic Disorder Daughter      bipolar       SOCIAL HISTORY:   Social History   Substance Use Topics     Smoking status: Never Smoker     Smokeless tobacco: Never Used     Alcohol use No       Patient's Medications   New Prescriptions    No medications on file   Previous Medications    DOCUSATE SODIUM    1 tablet 2 times daily     LETROZOLE (FEMARA) 2.5 MG TABLET    Take 1 tablet (2.5 mg) by mouth daily    MAGNESIUM HYDROXIDE (MILK OF MAGNESIA) 400 MG/5ML SUSPENSION    Take 30-60 mLs by mouth daily as needed for constipation or heartburn    MULTIPLE VITAMINS-MINERALS (OCUVITE ADULT FORMULA PO)    Take 1 tablet by mouth daily.    NYSTATIN (NYSTOP) 109672 UNIT/GM POWD    daily as needed    ORDER FOR DME    Equipment being ordered: wheeled walker    ORDER FOR DME    Equipment being ordered:  Incontinence pads   Patient uses these tid    OXYBUTYNIN (DITROPAN) 5 MG TABLET    TAKE 1 TABLET (5 MG) BY MOUTH 2 TIMES DAILY    PALBOCICLIB (IBRANCE) 100 MG CAPSULE CHEMO    Take 1 capsule (100 mg) by mouth daily with food Take for 21 days, then 7 days off. Avoid grapefruit. Do not open/crush/chew capsule.    SENNOSIDES (SENOKOT) 8.6 MG TABLET    Take 1 tablet by mouth 2 times daily May increase to 2 tabs twice daily if needed    SIMVASTATIN (ZOCOR) 20 MG TABLET    Take 1 tablet (20 mg) by mouth At Bedtime    VIACTIV OR    Take 1 chew tab by mouth 2 times daily. One in the morning and one at bedtime.     WARFARIN (JANTOVEN) 1 MG TABLET    Take 2-3 tablets (2-3mg) by mouth daily as directed by INR   Modified Medications    No medications on file   Discontinued Medications    No  "medications on file          Allergies   Allergen Reactions     Diuretic      Don't remember side effect     Zyrtec [Cetirizine Hcl]      Elevated blood pressure      Review of Systems   Constitutional: Positive for fatigue. Negative for fever.   HENT: Negative for congestion, sore throat and trouble swallowing.    Eyes: Negative for redness and visual disturbance.   Respiratory: Negative for cough and shortness of breath.    Cardiovascular: Negative for chest pain and palpitations.   Gastrointestinal: Negative for abdominal pain, diarrhea, nausea and vomiting.   Endocrine: Positive for polyuria. Negative for polydipsia.   Genitourinary: Positive for frequency. Negative for difficulty urinating, dysuria and hematuria.   Musculoskeletal: Negative for arthralgias and neck stiffness.   Skin: Negative for color change and rash.   Allergic/Immunologic: Negative for immunocompromised state.        Medication allergies reviewed   Neurological: Positive for weakness (generalized). Negative for syncope, facial asymmetry, light-headedness, numbness and headaches.       Physical Exam   BP: (!) 140/92  Pulse: 74  Temp: 98.6  F (37  C)  Resp: 18  Height: 157.5 cm (5' 2\")  Weight: 83.9 kg (185 lb)    Physical Exam  CONSTITUTIONAL: Well-developed and well-nourished. Awake and alert. Non-toxic appearance. No acute distress.   HENT:   - Head: Normocephalic and atraumatic.   - Ears: Hearing and external ear grossly normal.   - Nose: Nose normal. No rhinorrhea. No epistaxis.   - Mouth/Throat: Oropharynx is clear, MMM  EYES: Conjunctivae and lids are normal. No scleral icterus. PERRL.   NECK: Normal range of motion and phonation normal. Neck supple. No JVD present. No tracheal deviation, no stridor. No edema or erythema noted.  CARDIOVASCULAR: Normal rate, regular rhythm and no appreciable abnormal heart sounds.  PULMONARY/CHEST: Effort normal. No accessory muscle usage or stridor. No respiratory distress.  No appreciable abnormal " "breath sounds.  ABDOMEN: Soft, non-distended. No tenderness. No rigidity, rebound or guarding. No peritoneal findings.   MUSCULOSKELETAL: Extremities warm and seemingly well perfused. No edema or calf tenderness.   NEUROLOGIC: Awake, alert. No seizure activity. Has residual sx's from previous stroke (mild dysarthria/expressive aphasia), no obvious new acute focal deficits.   SKIN: Skin is warm and dry. No rash noted. No diaphoresis. No pallor. Regular color tone per family.  PSYCHIATRIC: No acute findings. Baseline per family.    ED Course     ED Course   Comment By Time   Discussed w/ Neuro stroke (as soon as Radiology called): They recommend admission to IM or Onc and they will consult. Erma Lanza MD 03/25 4300   Discussed w/ Heme/Onc: They will admit. Erma Lanza MD 03/25 2820     Procedures             EKG Interpretation:      Interpreted by Erma Lanza MD  Time reviewed: 9:14 PM   Symptoms at time of EKG: generalized weakness, spell   Rhythm: normal sinus   Rate: 73  Axis: normal  Ectopy: none  Conduction: moderate voltage criteria for LVH  ST Segments/ T Waves: No ST-T wave changes  Q Waves: none  Comparison to prior: No significant changes compared to prior EKGs on 3-Emery-2016 and 30-Dec-2015  Clinical Impression: no significant changes from previous           Labs Ordered and Resulted from Time of ED Arrival Up to the Time of Departure from the ED - No data to display    Assessments & Plan (with Medical Decision Making)   IMPRESSION: 76-year-old female, PMH notable for metastatic breast cancer, DM2, HTN, HLP, CKD, previous stroke, peripheral vascular disease, previous malignant pleural effusion, previous mitral valve repair, presents today for evaluation of increased urinary frequency, concern of a \"spell,\" and increased fatigue as described for the above in HPI/ROS.  Clinically, patient appears nontoxic.  Vital signs grossly WNL.  Otherwise on examination, patient seems to have a quite heart " murmur which may not be atypical given her previous MV repair, lungs are generally clear, abdomen is benign, no leg pain or swelling.  She has some expressive aphasia type of symptoms but this is consistent with previous and she is reportedly mentating at baseline currently. Ziopatch is in place over the left chest.    Some ways it sounds like there may be a degree of delirium or other similar type of process currently. DDx includes, but is not limited to: occult infection such as UTI which I think to be fairly likely given her increased urinary frequency despite any history of dysuria, etc.  Other potential causes such as respiratory source, bacteremia, considered. Metabolic derangement, medication effect, delayed head bleed from mild fall 2 weeks ago (despite no head traum with that), such as SDH given anticoagulated,and in addition she's been having concern for reported low HR episodes and poor perfusion could certainly also cause similar type symptoms, et al. considered.     PLAN: ECG, Labs, Urine, CXR, Head CT, discuss w/ Bruce, et al.     RESULTS:  - See ED Course above for pertinent results and correlating MDM  - Labs: Slight elevation of lactate, respiratory alkalosis without change and bicarb. Hgb of 10.3 is not significantly changed from previous (no acute anemia for cause of weakness),   - Urine: Possible UTI, but not definitive  - Imaging: Images and written preliminary reports reviewed by myself. See EMR for full reports. Stable effusion, increased opacities (edema vs. Infection vs. Atelectasis), head CT generally stable but had some motion degradation, possible recent infarct/stroke (Called by Radiology, will discuss with Neuro Stroke)    INTERVENTIONS:   - Levaquin    RE-EVALUATION:  - The patient continued to look well in ED     DISCUSSIONS:   I discussed the care of the patient with Bruce Fellow, Admitting team  - Cards Fellow: I discussed the case with Cards fellow who stated that there is no way  to pull any data, including incidents, from ZIO Patch while it is attached. It is in her best interest for us to leave it on so she can have full investigation.   - Neuro Stroke Fellow: Discussed CT findings. They report they're happy to consult as part of the admission, but have no immediate recommendations. They did not think she needed emergent MR immediately.  - Onc: They will admit. Discussed findings and other potential factors at play. They will F/U and continue evaluation/management. They would like us to order at least 1 dose of Abx.   - I have reviewed the available findings, plan with the patient and her family. They expressed understanding and agreement with this plan. All questions answered to the best of our ability at this time.       DISPOSITION/PLANNING:  - FINAL IMPRESSION: Generalized weakness, urinary frequency (possible UTI), abnormal head CT, moderate left pleural effusion (stable), increasing opacities )that could be atelectasis, pulmonary edema, or superimposed infection)  - DISPOSITION: Admit to Onc  --- Pending: Blood cultures  --- Recommendations: Continue to look for other causes of symptoms, F/U Ziopatch, cardiac monitoring, Cards and Neuro consults      ______________________________________________________________________________    - I have reviewed the available nursing notes.          New Prescriptions    No medications on file       Final diagnoses:   None     I, Jennifer Childs, am serving as a trained medical scribe to document services personally performed by Erma Lanza MD, based on the provider's statements to me.  This document has been checked and approved by Dr. Myla PITTS, Erma Lanza MD, was physically present and have reviewed and verified the accuracy of this note documented by Jennifer Childs medical scribe.       3/25/2017   [facility], [place], EMERGENCY DEPARTMENT     Erma Lanza MD  03/28/17 0142

## 2017-03-26 NOTE — PLAN OF CARE
Problem: Goal Outcome Summary  Goal: Goal Outcome Summary     Telemetry monitoring started this am with HR in the 60's - 70's with PVCs. Lactic acid was 2.4 yesterday and re-draw was ordered per policy for this afternoon. Has been getting up to the commode and chair with 1 person SBA. UC still needed. Not tolerating BP via machine so manual pressure obtained. Ate 100% of her late breakfast and declined lunch. Family here now.

## 2017-03-26 NOTE — PHARMACY-ANTICOAGULATION SERVICE
Clinical Pharmacy - Warfarin Dosing Consult     Pharmacy has been consulted to manage this patient s warfarin therapy.  Indication: Atrial Fibrillation  Therapy Goal: INR 2-3  Warfarin Prior to Admission: Yes  Warfarin PTA Regimen: 2-3 mg daily as directed per med rec (patient unable to confirm dose)  Significant drug interactions: levofloxacin    INR   Date Value Ref Range Status   03/25/2017 1.79 (H) 0.86 - 1.14 Final     INR Protime   Date Value Ref Range Status   03/24/2017 2.1 (A) 0.86 - 1.14 Final       Recommend warfarin 2 mg now since patient did not receive a dose on 3/25 and INR is subtherapeutic.  Pharmacy will monitor Helen Younger daily and order warfarin doses to achieve specified goal.      Amada James, Pharm.D.      Please contact pharmacy as soon as possible if the warfarin needs to be held for a procedure or if the warfarin goals change.

## 2017-03-26 NOTE — PLAN OF CARE
Problem: Goal Outcome Summary  Goal: Goal Outcome Summary  OT 7D: Eval complete, treatment initiated. Pt completes functional transfers with VCs, min A d/t fatigue at end of activity. Pt with expressive aphasia, mod difficulty with communication. Pt requires increase time for communication and fatigues quickly with OOB activity.      REC: Pt d/c to TCU vs home with assist pending progress in therapies and availability of family support at home. Pt limited by fatigue, impaired communication, weakness, and home accessibility.

## 2017-03-26 NOTE — ED NOTES
Pt to er via ems with c/o generalized weakness over the last couple hrs, family sts may have a uti, also family sts concerned with going up and down stairs at home

## 2017-03-27 ENCOUNTER — APPOINTMENT (OUTPATIENT)
Dept: OCCUPATIONAL THERAPY | Facility: CLINIC | Age: 77
DRG: 193 | End: 2017-03-27
Payer: COMMERCIAL

## 2017-03-27 ENCOUNTER — APPOINTMENT (OUTPATIENT)
Dept: PHYSICAL THERAPY | Facility: CLINIC | Age: 77
DRG: 193 | End: 2017-03-27
Payer: COMMERCIAL

## 2017-03-27 ENCOUNTER — TELEPHONE (OUTPATIENT)
Dept: INTERNAL MEDICINE | Facility: CLINIC | Age: 77
End: 2017-03-27

## 2017-03-27 VITALS
HEIGHT: 62 IN | TEMPERATURE: 98.3 F | RESPIRATION RATE: 20 BRPM | DIASTOLIC BLOOD PRESSURE: 75 MMHG | OXYGEN SATURATION: 94 % | BODY MASS INDEX: 34.65 KG/M2 | SYSTOLIC BLOOD PRESSURE: 145 MMHG | HEART RATE: 69 BPM | WEIGHT: 188.3 LBS

## 2017-03-27 LAB
ANION GAP SERPL CALCULATED.3IONS-SCNC: 6 MMOL/L (ref 3–14)
BACTERIA SPEC CULT: NO GROWTH
BASOPHILS # BLD AUTO: 0.1 10E9/L (ref 0–0.2)
BASOPHILS # BLD AUTO: 0.1 10E9/L (ref 0–0.2)
BASOPHILS NFR BLD AUTO: 2.1 %
BASOPHILS NFR BLD AUTO: 2.5 %
BUN SERPL-MCNC: 20 MG/DL (ref 7–30)
CALCIUM SERPL-MCNC: 8.3 MG/DL (ref 8.5–10.1)
CHLORIDE SERPL-SCNC: 111 MMOL/L (ref 94–109)
CO2 SERPL-SCNC: 25 MMOL/L (ref 20–32)
COPATH REPORT: NORMAL
CREAT SERPL-MCNC: 0.98 MG/DL (ref 0.52–1.04)
DIFFERENTIAL METHOD BLD: ABNORMAL
DIFFERENTIAL METHOD BLD: ABNORMAL
EOSINOPHIL # BLD AUTO: 0 10E9/L (ref 0–0.7)
EOSINOPHIL # BLD AUTO: 0 10E9/L (ref 0–0.7)
EOSINOPHIL NFR BLD AUTO: 0.8 %
EOSINOPHIL NFR BLD AUTO: 0.9 %
ERYTHROCYTE [DISTWIDTH] IN BLOOD BY AUTOMATED COUNT: 14.6 % (ref 10–15)
ERYTHROCYTE [DISTWIDTH] IN BLOOD BY AUTOMATED COUNT: 14.7 % (ref 10–15)
GFR SERPL CREATININE-BSD FRML MDRD: 55 ML/MIN/1.7M2
GLUCOSE SERPL-MCNC: 149 MG/DL (ref 70–99)
HCT VFR BLD AUTO: 27.6 % (ref 35–47)
HCT VFR BLD AUTO: 28.5 % (ref 35–47)
HGB BLD-MCNC: 9 G/DL (ref 11.7–15.7)
HGB BLD-MCNC: 9.2 G/DL (ref 11.7–15.7)
IMM GRANULOCYTES # BLD: 0 10E9/L (ref 0–0.4)
IMM GRANULOCYTES # BLD: 0 10E9/L (ref 0–0.4)
IMM GRANULOCYTES NFR BLD: 0 %
IMM GRANULOCYTES NFR BLD: 0 %
INR PPP: 1.92 (ref 0.86–1.14)
LYMPHOCYTES # BLD AUTO: 0.9 10E9/L (ref 0.8–5.3)
LYMPHOCYTES # BLD AUTO: 1.1 10E9/L (ref 0.8–5.3)
LYMPHOCYTES NFR BLD AUTO: 39.7 %
LYMPHOCYTES NFR BLD AUTO: 45 %
Lab: NORMAL
MAGNESIUM SERPL-MCNC: 2.3 MG/DL (ref 1.6–2.3)
MCH RBC QN AUTO: 35.2 PG (ref 26.5–33)
MCH RBC QN AUTO: 36 PG (ref 26.5–33)
MCHC RBC AUTO-ENTMCNC: 32.3 G/DL (ref 31.5–36.5)
MCHC RBC AUTO-ENTMCNC: 32.6 G/DL (ref 31.5–36.5)
MCV RBC AUTO: 109 FL (ref 78–100)
MCV RBC AUTO: 110 FL (ref 78–100)
MICRO REPORT STATUS: NORMAL
MONOCYTES # BLD AUTO: 0.2 10E9/L (ref 0–1.3)
MONOCYTES # BLD AUTO: 0.2 10E9/L (ref 0–1.3)
MONOCYTES NFR BLD AUTO: 7.5 %
MONOCYTES NFR BLD AUTO: 8.1 %
NEUTROPHILS # BLD AUTO: 1.1 10E9/L (ref 1.6–8.3)
NEUTROPHILS # BLD AUTO: 1.2 10E9/L (ref 1.6–8.3)
NEUTROPHILS NFR BLD AUTO: 44.2 %
NEUTROPHILS NFR BLD AUTO: 49.2 %
NRBC # BLD AUTO: 0 10*3/UL
NRBC # BLD AUTO: 0 10*3/UL
NRBC BLD AUTO-RTO: 0 /100
NRBC BLD AUTO-RTO: 0 /100
PHOSPHATE SERPL-MCNC: 2 MG/DL (ref 2.5–4.5)
PLATELET # BLD AUTO: 143 10E9/L (ref 150–450)
PLATELET # BLD AUTO: 147 10E9/L (ref 150–450)
POTASSIUM SERPL-SCNC: 3.9 MMOL/L (ref 3.4–5.3)
RBC # BLD AUTO: 2.5 10E12/L (ref 3.8–5.2)
RBC # BLD AUTO: 2.61 10E12/L (ref 3.8–5.2)
RETICS # AUTO: 41.3 10E9/L (ref 25–95)
RETICS/RBC NFR AUTO: 1.7 % (ref 0.5–2)
SODIUM SERPL-SCNC: 142 MMOL/L (ref 133–144)
SPECIMEN SOURCE: NORMAL
VIT B12 SERPL-MCNC: 327 PG/ML (ref 193–986)
WBC # BLD AUTO: 2.3 10E9/L (ref 4–11)
WBC # BLD AUTO: 2.4 10E9/L (ref 4–11)

## 2017-03-27 PROCEDURE — 40000611 ZZHCL STATISTIC MORPHOLOGY W/INTERP HEMEPATH TC 85060: Performed by: PHYSICIAN ASSISTANT

## 2017-03-27 PROCEDURE — 83735 ASSAY OF MAGNESIUM: CPT | Performed by: NURSE PRACTITIONER

## 2017-03-27 PROCEDURE — 82607 VITAMIN B-12: CPT | Performed by: PHYSICIAN ASSISTANT

## 2017-03-27 PROCEDURE — 36415 COLL VENOUS BLD VENIPUNCTURE: CPT | Performed by: STUDENT IN AN ORGANIZED HEALTH CARE EDUCATION/TRAINING PROGRAM

## 2017-03-27 PROCEDURE — 99238 HOSP IP/OBS DSCHRG MGMT 30/<: CPT | Performed by: INTERNAL MEDICINE

## 2017-03-27 PROCEDURE — 84100 ASSAY OF PHOSPHORUS: CPT | Performed by: NURSE PRACTITIONER

## 2017-03-27 PROCEDURE — 25000132 ZZH RX MED GY IP 250 OP 250 PS 637: Performed by: STUDENT IN AN ORGANIZED HEALTH CARE EDUCATION/TRAINING PROGRAM

## 2017-03-27 PROCEDURE — 85610 PROTHROMBIN TIME: CPT | Performed by: STUDENT IN AN ORGANIZED HEALTH CARE EDUCATION/TRAINING PROGRAM

## 2017-03-27 PROCEDURE — 40000133 ZZH STATISTIC OT WARD VISIT

## 2017-03-27 PROCEDURE — 36415 COLL VENOUS BLD VENIPUNCTURE: CPT | Performed by: PHYSICIAN ASSISTANT

## 2017-03-27 PROCEDURE — 97530 THERAPEUTIC ACTIVITIES: CPT | Mod: GP | Performed by: PHYSICAL THERAPIST

## 2017-03-27 PROCEDURE — 80048 BASIC METABOLIC PNL TOTAL CA: CPT | Performed by: NURSE PRACTITIONER

## 2017-03-27 PROCEDURE — 82747 ASSAY OF FOLIC ACID RBC: CPT | Performed by: PHYSICIAN ASSISTANT

## 2017-03-27 PROCEDURE — 97162 PT EVAL MOD COMPLEX 30 MIN: CPT | Mod: GP | Performed by: PHYSICAL THERAPIST

## 2017-03-27 PROCEDURE — 97535 SELF CARE MNGMENT TRAINING: CPT | Mod: GO

## 2017-03-27 PROCEDURE — 85025 COMPLETE CBC W/AUTO DIFF WBC: CPT | Performed by: PHYSICIAN ASSISTANT

## 2017-03-27 PROCEDURE — 85025 COMPLETE CBC W/AUTO DIFF WBC: CPT | Performed by: STUDENT IN AN ORGANIZED HEALTH CARE EDUCATION/TRAINING PROGRAM

## 2017-03-27 PROCEDURE — 40000193 ZZH STATISTIC PT WARD VISIT: Performed by: PHYSICAL THERAPIST

## 2017-03-27 PROCEDURE — 85045 AUTOMATED RETICULOCYTE COUNT: CPT | Performed by: PHYSICIAN ASSISTANT

## 2017-03-27 PROCEDURE — 25000132 ZZH RX MED GY IP 250 OP 250 PS 637: Performed by: PHYSICIAN ASSISTANT

## 2017-03-27 PROCEDURE — 97116 GAIT TRAINING THERAPY: CPT | Mod: GP | Performed by: PHYSICAL THERAPIST

## 2017-03-27 RX ORDER — ACETAMINOPHEN 325 MG/1
325-650 TABLET ORAL EVERY 4 HOURS PRN
Status: DISCONTINUED | OUTPATIENT
Start: 2017-03-27 | End: 2017-03-27 | Stop reason: HOSPADM

## 2017-03-27 RX ORDER — WARFARIN SODIUM 1 MG/1
TABLET ORAL
Qty: 30 TABLET | Refills: 0 | Status: ON HOLD | OUTPATIENT
Start: 2017-03-27 | End: 2017-01-01

## 2017-03-27 RX ORDER — LEVOFLOXACIN 750 MG/1
750 TABLET, FILM COATED ORAL DAILY
Status: DISCONTINUED | OUTPATIENT
Start: 2017-03-27 | End: 2017-03-27 | Stop reason: HOSPADM

## 2017-03-27 RX ORDER — LEVOFLOXACIN 750 MG/1
750 TABLET, FILM COATED ORAL DAILY
Qty: 5 TABLET | Refills: 0 | Status: SHIPPED | OUTPATIENT
Start: 2017-03-27 | End: 2017-04-04

## 2017-03-27 RX ORDER — LETROZOLE 2.5 MG/1
2.5 TABLET, FILM COATED ORAL DAILY
Status: DISCONTINUED | OUTPATIENT
Start: 2017-03-27 | End: 2017-03-27 | Stop reason: HOSPADM

## 2017-03-27 RX ORDER — WARFARIN SODIUM 1 MG/1
1 TABLET ORAL
Status: DISCONTINUED | OUTPATIENT
Start: 2017-03-27 | End: 2017-03-27 | Stop reason: HOSPADM

## 2017-03-27 RX ADMIN — LEVOFLOXACIN 750 MG: 750 TABLET, FILM COATED ORAL at 14:37

## 2017-03-27 RX ADMIN — LETROZOLE 2.5 MG: 2.5 TABLET, FILM COATED ORAL at 12:56

## 2017-03-27 RX ADMIN — SENNOSIDES 1 TABLET: 8.6 TABLET, FILM COATED ORAL at 09:11

## 2017-03-27 NOTE — DISCHARGE SUMMARY
[facility], [place]    Discharge Summary  Hematology / Oncology    Date of Admission:  3/25/2017  Date of Discharge:  3/27/2017  Discharging Provider: Caterian Bush  Date of Service (when I saw the patient): 03/27/17    Discharge Diagnoses   Chronic Kidney Disease-Stable  CVA-h/o with suspected recent infarction  Metastatic Breast Cancer     History of Present Illness   Helen Younger is a 76 year old woman with PMHx significant for metastatic breast cancer (to bone and b/l malignant pleural effusions), h/o CVA with resulting expressive aphasia, and intermittent bradycardia (currently on ZioPatch monitoring that was set up outpt). She is admitted with worsening weakness/fall at home.     Hospital Course   Helen Younger was admitted on 3/25/2017.  The following problems were addressed during her hospitalization:    #Weakness, fall at home. Sounds like a chronic issue that was perhaps exacerbated by some event. Has had multiple falls at home. No trauma with most recent fall (legs felt weak and she lower self to ground). Investigated infectious vs. Cardiac cause. No infectious source identified. Did have mild lactic acidosis on admission. Trop negative, TSH wnl, BNP mildly elevated. EKG demonstrates SR with mod voltage criteria for LVH. No abnormality identified. Has ZioPatch in place to monitor for arrhythmia d/t bradycardia outpt. Started on Levaquin, renally dosed.   -Labs ordered today: B12, RBC folate, and peripheral smear to assess Macrocytosis.  -PT/OT recs home w/ assist vs. TCU, patient elects discharge home,  is comfortable with this.    #ID. Questionable infectious cause of weakness/fall (see above differential). Started on Levaquin 750 mg qod yesterday. Will change to daily dosing as renal function improved/WNL. Patient to complete a 7 day course. Last dose 4/1.     #CKD. Creat elevated to 1.2 on admission but on chart review appears to have chronically elevated creat  since at least Jan 2016, with baseline creat ~1.1-1.3. Received gentle IV hydration, Cr improved 0.98 on discharge.     #H/o CVA. Head CT this admission demonstrates suspected new focal loss of gray-white matter differentiation in the right middle frontal gyrus, concerning for recent infarction; stable chronic left MCA territory infarct; stable 5mm posterior parafalcine calcified meningioma. Considered further w/u with brain MRI if no improvement of sx, but patient improving, will defer.   -Continue on Coumadin. Follow up on INR in clinic.   -Patient on Levaquin which can affect the INR. Reevaluate INR and therapeutic effect of Coumadin post-completion of Levaquin.     #Metastatic breast cancer. Has been stable on palbociclib and letrozole. Palbo held. Resumed Letrozole today. Follows with Dr. Landis, will f/u in clinic at the end of this week for hospital folllow up and to determine resume date for palbo.    #Mild chemo-induced pancytopenia: Hemoglobin 10.3 but likely concentrated due to dehydration, was 9.7 the following morning, ANC 1600/WBC 2.4, plt 166 c/w mild pancytopenia secondary to chemo. Palbociclib held as above. No significant changes in counts during hospitalization.     Caterina Bush PA-C  Hematology/Oncology  [phone]    Pending Results   Unresulted Labs Ordered in the Past 30 Days of this Admission     Date and Time Order Name Status Description    3/27/2017 0936 Blood Morphology Pathologist Review In process     3/27/2017 0936 Folate RBC In process     3/27/2017 0936 Vitamin B12 In process     3/26/2017 0751 Urine Culture Aerobic Bacterial Preliminary     3/25/2017 2101 Blood culture Preliminary     3/25/2017 2101 Blood culture Preliminary           Code Status   DNR / DNI    Primary Care Physician   Arin Shahid    Physical Exam   Temp: 98.1  F (36.7  C) Temp src: Oral BP: 140/80 Pulse: 78 Heart Rate: 61 Resp: 18 SpO2: 96 % O2 Device: None (Room air)    Vitals:    03/25/17 2012 03/26/17  0048 03/27/17 0741   Weight: 83.9 kg (185 lb) 86.8 kg (191 lb 4.8 oz) 85.4 kg (188 lb 4.8 oz)     Vital Signs with Ranges  Temp:  [98.1  F (36.7  C)-99.2  F (37.3  C)] 98.1  F (36.7  C)  Pulse:  [69-78] 78  Heart Rate:  [61-65] 61  Resp:  [18-20] 18  BP: (139-176)/(66-92) 140/80  SpO2:  [94 %-96 %] 96 %  I/O last 3 completed shifts:  In: 1585 [P.O.:360; I.V.:1225]  Out: 1725 [Urine:1725]    Constitutional: Pleasant elderly female seen lying in bed, in NAD. Was uncomfortable when having BP checked with cuff.  HEENT: NCAT. Anicteric sclerae.  Respiratory: Non-labored breathing. Good air exchange. Slightly diminished lung sounds at the bases, otherwise clear to auscultation with no crackles, wheezes, or rhonchi.  Cardiovascular: RRR, slight murmur appreciated at RYAN sternal boarder, no rubs. ZioPatch on L upper chest in place.  GI: Normoactive BS. Abdomen is soft, nontender, and nondistended. No masses or organomegaly appreciated.  Skin: Warm and dry. No rashes or lesions on exposed skin.  Musculoskeletal: Extremities grossly normal. No pain or edema noted.  Neurologic: A&O. Interactive and appropriately responsive with slow, deliberate speech, somewhat difficult to understand at times. Answers questions and responds appropriately. Face symmetric.    Discharge Disposition   Discharged to home with assistance   Condition at discharge: Stable    Consultations This Hospital Stay   VASCULAR ACCESS CARE ADULT IP CONSULT  PHARMACY TO DOSE WARFARIN  PHYSICAL THERAPY ADULT IP CONSULT  OCCUPATIONAL THERAPY ADULT IP CONSULT    Discharge Orders   No discharge procedures on file.  Discharge Medications   Current Discharge Medication List      CONTINUE these medications which have NOT CHANGED    Details   warfarin (JANTOVEN) 1 MG tablet Take 2-3 tablets (2-3mg) by mouth daily as directed by INR  Qty: 400 tablet, Refills: 3    Associated Diagnoses: History of stroke      palbociclib (IBRANCE) 100 MG capsule CHEMO Take 1 capsule (100  mg) by mouth daily with food Take for 21 days, then 7 days off. Avoid grapefruit. Do not open/crush/chew capsule.  Qty: 21 capsule, Refills: 0    Associated Diagnoses: Pleural effusion; Malignant neoplasm of female breast, unspecified laterality, unspecified site of breast (H)      letrozole (FEMARA) 2.5 MG tablet Take 1 tablet (2.5 mg) by mouth daily  Qty: 90 tablet, Refills: 3    Associated Diagnoses: Malignant neoplasm of female breast, unspecified laterality, unspecified site of breast (H); Pleural effusion      magnesium hydroxide (MILK OF MAGNESIA) 400 MG/5ML suspension Take 30-60 mLs by mouth daily as needed for constipation or heartburn  Qty: 360 mL, Refills: 0    Associated Diagnoses: Constipation      sennosides (SENOKOT) 8.6 MG tablet Take 1 tablet by mouth 2 times daily May increase to 2 tabs twice daily if needed  Qty: 120 each, Refills: 0    Associated Diagnoses: Constipation      simvastatin (ZOCOR) 20 MG tablet Take 1 tablet (20 mg) by mouth At Bedtime  Qty: 90 tablet, Refills: 1    Associated Diagnoses: Hyperlipidemia LDL goal <100      Nystatin (NYSTOP) 996172 UNIT/GM POWD daily as needed      oxybutynin (DITROPAN) 5 MG tablet TAKE 1 TABLET (5 MG) BY MOUTH 2 TIMES DAILY  Refills: 11      DOCUSATE SODIUM 1 tablet 2 times daily       Multiple Vitamins-Minerals (OCUVITE ADULT FORMULA PO) Take 1 tablet by mouth daily.      VIACTIV OR Take 1 chew tab by mouth 2 times daily. One in the morning and one at bedtime.     Associated Diagnoses: Chest pain      !! order for DME Equipment being ordered:  Incontinence pads   Patient uses these tid  Qty: 100 each, Refills: 3    Associated Diagnoses: Mixed incontinence      !! order for DME Equipment being ordered: wheeled walker  Qty: 1 each, Refills: 0    Associated Diagnoses: Malignant neoplasm of female breast, unspecified laterality, unspecified site of breast (H); Pleural effusion       !! - Potential duplicate medications found. Please discuss with provider.         Allergies   Allergies   Allergen Reactions     Diuretic      Don't remember side effect     Zyrtec [Cetirizine Hcl]      Elevated blood pressure     Data   Most Recent 3 CBC's:  Recent Labs   Lab Test  03/27/17   1041  03/27/17 0552 03/26/17 0633   WBC  2.3*  2.4*  2.4*   HGB  9.0*  9.2*  9.7*   MCV  110*  109*  109*   PLT  143*  147*  158      Most Recent 3 BMP's:  Recent Labs   Lab Test  03/27/17   0552 03/26/17 0633  03/25/17 2054   NA  142  142  140   POTASSIUM  3.9  3.6  3.6   CHLORIDE  111*  107  103   CO2  25  28  29   BUN  20  22  21   CR  0.98  1.21*  1.23*   ANIONGAP  6  8  8   SHERYL  8.3*  8.7  9.1   GLC  149*  108*  232*     Most Recent 2 LFT's:  Recent Labs   Lab Test  03/25/17 2054 02/27/17   1417   AST  10  12   ALT  13  13   ALKPHOS  40  34*   BILITOTAL  0.6  0.6     Most Recent INR's and Anticoagulation Dosing History:  Anticoagulation Dose History     Recent Dosing and Labs Latest Ref Rng & Units 3/13/2017 3/24/2017 3/25/2017 3/26/2017 3/26/2017 3/26/2017 3/27/2017    Warfarin 2 mg - - - - 2 mg - 2 mg -    INR 0.86 - 1.14 2.8 - 1.79(H) - 1.94(H) - 1.92(H)    INR 0.86 - 1.14 - 2.1(A) - - - - -    INR Point of Care 0.86 - 1.14 - - - - - - -        Most Recent 3 Troponin's:  Recent Labs   Lab Test  03/25/17   2054  04/13/10   2010   TROPI  <0.015  The 99th percentile for upper reference range is 0.045 ug/L.  Troponin values in   the range of 0.045 - 0.120 ug/L may be associated with risks of adverse   clinical events.    <0.012     Most Recent Cholesterol Panel:  Recent Labs   Lab Test  07/14/16   0906   CHOL  126   LDL  51   HDL  50   TRIG  125     Most Recent 6 Bacteria Isolates From Any Culture (See EPIC Reports for Culture Details):  Recent Labs   Lab Test  03/26/17   1556  03/25/17   2150  03/25/17   2054  10/18/16   2040  05/31/16   1311  12/30/15   1204   CULT  Culture negative < 24 hours, reincubate  No growth after 2 days  No growth after 2 days  <10,000 colonies/mL  urogenital geoff  Susceptibility testing not routinely done    10,000 to 50,000 colonies/mL mixed urogenital geoff  Susceptibility testing not routinely done    No growth     Most Recent TSH, T4 and A1c Labs:  Recent Labs   Lab Test  03/25/17 2054  07/14/16   0906   TSH  1.97   --    A1C   --   5.9     Results for orders placed or performed during the hospital encounter of 03/25/17   XR Chest 2 Views    Narrative    Exam: XR CHEST 2 VW, 3/25/2017 9:28 PM    Indication: delirium, met CA    Comparison: 2/22/2016    Findings: PA and lateral views of the chest are obtained. Low lung  volumes. Bilateral perihilar opacities increased from previous. Stable  left pleural effusion and retrocardiac opacities. No pneumothorax.  Nonspecific upper abdominal bowel gas pattern.      Impression    Impression:   1. Stable moderate left pleural effusion.  2. Low lung volumes.  3. Increasing perihilar and stable retrocardiac opacities. Given low  lung volumes, findings most likely represent atelectasis, though  pulmonary edema and superimposed infection remain within the  differential.    I have personally reviewed the examination and initial interpretation  and I agree with the findings.    DAT VELEZ MD   CT Head w/o Contrast    Narrative    CT HEAD W/O CONTRAST 3/25/2017 9:47 PM    Provided History: Fall 2 weeks ago, delirium, anticoagulated.  Metastatic breast cancer.    Comparison: Brain MRI 10/18/2016, PET CT 12/23/2016.    Technique: Using multidetector thin collimation helical acquisition  technique, axial, coronal and sagittal CT images from the skull base  to the vertex were obtained without intravenous contrast.     Findings:    Images are degraded by patient motion artifact.    Suspected focal loss of gray-white matter differentiation in the  region of the right middle frontal gyrus (series 3, image 20) without  corresponding abnormality on prior MRI 10/18/2016.    Stable encephalomalacia involving the left  frontal and parietal lobes,  compatible with prior MCA territory infarct. Mild leukoaraiosis. No  intracranial hemorrhage, midline shift or abnormal extra axial fluid  collection.    Stable 5 mm calcified right posterior parafalcine dural based lesion  (series 3, image 19), which demonstrated enhancement on prior PET/CT  12/23/2016, compatible with a meningioma.    Left sphenoid sinus mucosal thickening. Mastoid air cells are clear.       Impression    Impression:   1. Motion degraded examination. Suspected new focal loss of gray-white  matter differentiation in the right middle frontal gyrus, concerning  for recent infarction. If clinically indicated, consider MRI for  further evaluation.  2. Stable chronic left MCA territory infarct.  3. Stable 5 mm posterior parafalcine calcified meningioma.    I have personally reviewed the examination and initial interpretation  and I agree with the findings.    BRIDGETTE STAFFORD MD

## 2017-03-27 NOTE — PLAN OF CARE
Problem: Goal Outcome Summary  Goal: Goal Outcome Summary  Outcome: Improving  Pt slow to respond with expressive aphasia from CVA (history). Left eye continue with scleral hemorrhage. Up with 1 assist & walker to the commode. Up seating in the chair at meal times. Inpatient telemetry monitoring d/c. Outpt Ziopatch cardiac monitor still on. Continue to take BP manually. Plan to be discharge to home today.  is awared of discharge plans.

## 2017-03-27 NOTE — PLAN OF CARE
Focus: Discharge  D: DC orders prepared and signed by provider. DC meds ready at DC pharmacy. Pt's spouse on unit ready to transport pt home.   I: Removed pt's peripheral IV. Assisted pt to gather all personal belongings. Called security to check if pt had belongings or meds stored but none held. Pt's spouse confirmed she didn't bring meds to the hospital and she had all her personal possessions that she was admitted with. Reviewed DC instructions with pt and her spouse. Pt and spouse verbalized understanding of DC instructions and signed to confirm.  A/P: Pt left 7D via wheelchair with her spouse and all personal belongings. DC to home with home nursing and PT. Follow up in clinic.

## 2017-03-27 NOTE — PLAN OF CARE
Problem: Infection, Risk/Actual (Adult)  Goal: Infection Prevention/Resolution  Patient will demonstrate the desired outcomes by discharge/transition of care.   11p-7a: VSS. Afebrile. Slept on and off during the night. Using the bedside commode with one assist. Telemetry shows SR with ectopy (PVC's and PAC's). HR 60s-70s.

## 2017-03-27 NOTE — PROGRESS NOTES
03/27/17 0900   Quick Adds   Type of Visit Initial PT Evaluation   Living Environment   Lives With spouse   Living Arrangements house   Home Accessibility bed and bath are not on the first floor;bed and bath on same level;stairs (1 railing present);stairs within home;stairs to enter home   Number of Stairs to Enter Home 16   Number of Stairs Within Home 10   Stair Railings at Home outside, present on right side   Transportation Available family or friend will provide   Self-Care   Dominant Hand right   Usual Activity Tolerance moderate   Current Activity Tolerance poor   Regular Exercise no   Equipment Currently Used at Home bath bench;walker, rolling;grab bar   Functional Level Prior   Ambulation 1-->assistive equipment   Transferring 1-->assistive equipment   Toileting 0-->independent   Bathing 2-->assistive person   Dressing 0-->independent   Eating 0-->independent   Communication 2-->difficulty speaking (not related to language barrier)   Swallowing 0-->swallows foods/liquids without difficulty   Cognition 0 - no cognition issues reported   Fall history within last six months yes   Number of times patient has fallen within last six months 6   Prior Functional Level Comment Patient reports she does moderate amounts of walking (couple hundred feet) with rolling walker at home.  helps with bathing activities, she can walk without his assistance or supervision.    General Information   Onset of Illness/Injury or Date of Surgery - Date 03/25/17   Referring Physician Lorrie Wagner MD    Patient/Family Goals Statement Patient wants to get stronger and return home to living with her .    Pertinent History of Current Problem (include personal factors and/or comorbidities that impact the POC) Helen Younger is a 76 year old woman with PMHx significant for metastatic breast cancer (to bone and b/l malignant pleural effusions), h/o CVA with resulting expressive aphasia, and intermittent bradycardia  (currently on ZioPatch monitoring that was set up outpt). She is admitted with worsening weakness/fall at home. -per NP note   Precautions/Limitations fall precautions   Weight-Bearing Status - LUE full weight-bearing   Weight-Bearing Status - RUE full weight-bearing   Weight-Bearing Status - LLE full weight-bearing   Weight-Bearing Status - RLE full weight-bearing   Heart Disease Risk Factors High blood pressure;Lack of physical activity;Overweight;Family history;Medical history;Age   Cognitive Status Examination   Orientation orientation to person, place and time   Level of Consciousness alert   Follows Commands and Answers Questions 100% of the time;able to follow single-step instructions   Personal Safety and Judgment impaired   Memory intact   Cognitive Comment Patient appears to have some aphasia per PMH of CVA. Patient requires increased time to process questions and respond appropriately.    Pain Assessment   Patient Currently in Pain No   Posture    Posture Forward head position;Protracted shoulders;Kyphosis   Range of Motion (ROM)   ROM Comment Bilateral UE/LE AROM is WFL.    Strength   Strength Comments Bilateral UE grossly 3+/5. R LE grossly 3/5, L LE grossly 3+/5. Noticable different in quad and tibialis anterior strength in right versus left sides.    Bed Mobility   Bed Mobility Bed mobility skill: Supine to sit;Bed mobility skill: Sit to supine   Bed Mobility Skill: Sit to Supine   Level of [place]: Sit/Supine contact guard   Physical Assist/Nonphysical Assist: Sit/Supine supervision;set-up required   Bed Mobility Skill: Supine to Sit   Level of [place]: Supine/Sit contact guard   Physical Assist/Nonphysical Assist: Supine/Sit supervision;set-up required   Transfer Skills   Transfer Transfer Skill: Bed to Chair/Chair to Bed;Transfer Skill: Sit to Stand;Transfer Skill:  Toilet Transfer   Transfer Skill: Bed/Chair   Level of [place]: Bed/Chair contact guard   Physical/Nonphysical Assist:  Bed/Chair supervision;verbal cues;nonverbal cues (demo/gestures)   Assistive Device: Bed/Chair standard walker   Transfer Skill:  Sit to Stand   Level of [place]: Sit/Stand contact guard   Assistive Device for Transfer: Sit/Stand standard walker   Transfer Skill: Toilet Transfer   Level of [place]: Toilet minimum assist (75% patients effort)   Physical Assist/Nonphysical Assist: Toilet verbal cues;1 person assist;set-up required;supervision;nonverbal cues (demo/gestures)   Assistive Device standard walker   Gait   Gait Comments Patient ambulated 50 feet x 2 with 2WW and CGA. As patient fatiuged right toe clearance decreased, and she needed verbal cues to  her right foot higher. Patient also demonstarted a veering pattern to the left, requring self-corrected to the right after getting near objects. She also required verbal cues to watch out for obstables on the left side.    Balance   Balance Comments Fair + sitting balance noted, patient was able to put on socks without loss of balance. Fair standing balance for 10 seconds without holding onto walker.    Sensory Examination   Sensory Perception no deficits were identified   General Therapy Interventions   Planned Therapy Interventions balance training;bed mobility training;gait training;motor coordination training;neuromuscular re-education;ROM;strengthening;transfer training;home program guidelines   Clinical Impression   Criteria for Skilled Therapeutic Intervention yes, treatment indicated   PT Diagnosis Decreased independence in bed mobility, transfers and gait.    Influenced by the following impairments Bilateral LE and UE weakness, poor motor planning skills, decreased endurance and balance.    Functional limitations due to impairments Decreased ability to complete bed mobility, transfers and gait without supervision or assitance.   Clinical Presentation Evolving/Changing   Clinical Presentation Rationale Due to patient's complex PHM of CVA,  "metastatic breast cancer, and possible UTI.    Clinical Decision Making (Complexity) Moderate complexity   Therapy Frequency` 5 times/week   Predicted Duration of Therapy Intervention (days/wks) 1 week   Anticipated Equipment Needs at Discharge other (see comments)  (Patient already owns 4WW and tub bench)   Anticipated Discharge Disposition Transitional Care Facility   Risk & Benefits of therapy have been explained Yes   Patient, Family & other staff in agreement with plan of care Yes   Clinical Impression Comments Patient is a 76 year old female with a PHM of metastatic breast cancer, CVA, and possible current UTI with history of falls at home. She demonstrates decreased strength, balance, endurance, and ability to motor plan. These lead to functional limitations in her bed mobility, transfers and gait. She previously required assistance with bathing at home.    [facility] Elevate HR TM \"6 Clicks\"   2016, Trustees of [facility], under license to [employer].  All rights reserved.   6 Clicks Short Forms Basic Mobility Inpatient Short Form   [facility]  \"6 Clicks\" V.2 Basic Mobility Inpatient Short Form   1. Turning from your back to your side while in a flat bed without using bedrails? 3 - A Little   2. Moving from lying on your back to sitting on the side of a flat bed without using bedrails? 4 - None   3. Moving to and from a bed to a chair (including a wheelchair)? 3 - A Little   4. Standing up from a chair using your arms (e.g., wheelchair, or bedside chair)? 4 - None   5. To walk in hospital room? 3 - A Little   6. Climbing 3-5 steps with a railing? 3 - A Little   Basic Mobility Raw Score (Score out of 24.Lower scores equate to lower levels of function) 20   Total Evaluation Time   Total Evaluation Time (Minutes) 14     "

## 2017-03-27 NOTE — PLAN OF CARE
Problem: Infection, Risk/Actual (Adult)  Goal: Infection Prevention/Resolution  Patient will demonstrate the desired outcomes by discharge/transition of care.   7p-11p: VSS. Afebrile. Given prn MOM tonight as LBM was a few days ago. Bed alarm on as pt is very impulsive when she has to urinate. Transfers with one assist to the bedside commode. Less weak than last night. Continues on telemetry SR with PVC's. No pain or nausea.

## 2017-03-27 NOTE — TELEPHONE ENCOUNTER
Called patients ARLEEN Guillen () [phone] (H)     Left message to return phone call to triage.  Ellen Benitez, RN CPC Triage.

## 2017-03-27 NOTE — TELEPHONE ENCOUNTER
Pt was in ER over the weekend.  Will be starting on Levaquin.  INR today was 1.92.  Huddled with [facility], patient has ONCO labs due 3/30.  Will keep on same dose until able to check those labs.    Left message for  to call back to RN Triage line.    Kamla Fofana RN  [facility], [place]

## 2017-03-27 NOTE — PLAN OF CARE
Problem: Goal Outcome Summary  Goal: Goal Outcome Summary  PT 7D - Completed initial PT eval today. Patient can sit <-> stand CGA, ambulated 50 feet x 2 CGA with 2 wheeled walker. Patient requires increased assistance and verbal cues with toileting, she struggled with turning in the bathroom and sequencing of events. Bed alarm on due to decreased safety awareness. Vitals remained appropriate during session. Therapist recommends discharge location TCU for further functional strengthening/training, however  and patient declined and wish to go home. Patient will need home therapy if this is the case.

## 2017-03-27 NOTE — PROGRESS NOTES
[facility]  Patient is currently open to home care services with [facility].  The patient is currently receiving RN and PT        services.  [facility]  and team have been notified of patient admission.  [facility] liaison will continue to follow patient during stay.  If appropriate provide orders to resume home care at time of discharge.    India Mercre RN  [facility] Liaison

## 2017-03-27 NOTE — PLAN OF CARE
Problem: Goal Outcome Summary  Goal: Goal Outcome Summary  Outcome: Therapy, progress toward functional goals as expected  OT/7D - SBA for bed mobility supine<>sitting EOB. FWW and CGA for functional mobility in room and for functional transfers (toilet and chair transfers). Requires min A to complete dressing task and mod A to complete seated sponge bath.  Pt limited by fatigue, impaired communication, weakness, and home accessibility.      REC: TCU for continued therapy. However, pt and  declining TCU and wish to return home. Recommend 24 hour supervision and assist PRN with home therapy to address remaining deficits.

## 2017-03-27 NOTE — PROGRESS NOTES
Care Coordinator- Discharge Planning     Admission Date/Time:  3/25/2017  Attending MD:  Justine Webber MD     Data  Date of initial CC assessment:  3/25  Chart reviewed, discussed with interdisciplinary team.   Patient was admitted for:   1. Long-term (current) use of anticoagulants [Z79.01]    2. Urinary frequency    3. History of stroke    4. Malignant pleural effusion         Assessment  Concerns with insurance coverage for discharge needs: None.  Current Living Situation: Patient lives with spouse.  Support System: Supportive and Involved  Services Involved: Home Care  Transportation: Family or Friend will provide  Barriers to Discharge: none      Coordination of Care and Referrals: Provided patient/family with options for Home Care.    Met with pt and spouse to review d/c plans.  Pt/spouse agree to resume home care services and AVS updated.  Reviewed hospital f/u appts.  Both verbalized agreement of plan.  Plan  Anticipated Discharge Date:  03/27/17  Anticipated Discharge Plan:  home    CTS Handoff completed:  YES    ________________________________________  Stephanie Tinoco, RN, BSN    7D/Oncology Care Coordinator  Pager  [phone]  Phone [phone]

## 2017-03-27 NOTE — PLAN OF CARE
Problem: Goal Outcome Summary  Goal: Goal Outcome Summary  Physical Therapy Discharge Summary     Reason for therapy discharge:    Discharged to home with home therapy.     Progress towards therapy goal(s). See goals on Care Plan in [facility] electronic health record for goal details.  Goals not met.  Barriers to achieving goals:   discharge from facility.     Therapy recommendation(s):    Continued therapy is recommended.  Rationale/Recommendations:  Home therapy for further strengthening, endurance and improvement in functional mobility, .

## 2017-03-28 ENCOUNTER — CARE COORDINATION (OUTPATIENT)
Dept: CARDIOLOGY | Facility: CLINIC | Age: 77
End: 2017-03-28

## 2017-03-28 ENCOUNTER — ANTICOAGULATION THERAPY VISIT (OUTPATIENT)
Dept: INTERNAL MEDICINE | Facility: CLINIC | Age: 77
End: 2017-03-28
Payer: COMMERCIAL

## 2017-03-28 ENCOUNTER — TELEPHONE (OUTPATIENT)
Dept: FAMILY MEDICINE | Facility: CLINIC | Age: 77
End: 2017-03-28

## 2017-03-28 ENCOUNTER — TELEPHONE (OUTPATIENT)
Dept: INTERNAL MEDICINE | Facility: CLINIC | Age: 77
End: 2017-03-28

## 2017-03-28 DIAGNOSIS — Z79.01 LONG-TERM (CURRENT) USE OF ANTICOAGULANTS: ICD-10-CM

## 2017-03-28 DIAGNOSIS — I63.9 CEREBRAL INFARCTION (H): ICD-10-CM

## 2017-03-28 LAB — INR PPP: 2.2

## 2017-03-28 PROCEDURE — 99207 ZZC NO CHARGE NURSE ONLY: CPT | Performed by: INTERNAL MEDICINE

## 2017-03-28 ASSESSMENT — ENCOUNTER SYMPTOMS
WEAKNESS: 1
COUGH: 0
LIGHT-HEADEDNESS: 0
FACIAL ASYMMETRY: 0
NUMBNESS: 0
FATIGUE: 1
PALPITATIONS: 0
POLYDIPSIA: 0
TROUBLE SWALLOWING: 0
SORE THROAT: 0
HEMATURIA: 0
DYSURIA: 0

## 2017-03-28 NOTE — TELEPHONE ENCOUNTER
Luh called to get new orders for the patient. PT to evalutate and treat 1 day 1, nursing to resume 2 times a week times two weeks, 1 times a week for 4 weeks and three as needed. Call with questions, okay to leave message.    Akila Koch, Patient Representative

## 2017-03-28 NOTE — TELEPHONE ENCOUNTER
Attempt # 2    Called  at home number.     Was call answered?  No.  Left message on voicemail with information to call me back.    Meredith Sherwood RN

## 2017-03-28 NOTE — MR AVS SNAPSHOT
Helenclement Younger   3/28/2017   Anticoagulation Therapy Visit    Description:  76 year old female   Provider:  Arin Shahid MD   Department:  [facility]           INR as of 3/28/2017     Today's INR 2.2      Anticoagulation Summary as of 3/28/2017     INR goal 2.0-3.0   Today's INR 2.2   Full instructions 1 mg on Mon, Wed, Fri; 2 mg all other days   Next INR check 3/30/2017    Indications   Long-term (current) use of anticoagulants [Z79.01] [Z79.01]  Cerebral infarction (H) [I63.9]         March 2017 Details    Sun Mon Tue Wed Thu Fri Sat        1               2               3               4                 5               6               7               8               9               10               11                 12               13               14               15               16               17               18                 19               20               21               22               23               24               25                 26               27               28      2 mg   See details      29      1 mg         30            31                 Date Details   03/28 This INR check       Date of next INR:  3/30/2017         How to take your warfarin dose     To take:  1 mg Take 1 of the 1 mg tablets.    To take:  2 mg Take 2 of the 1 mg tablets.

## 2017-03-28 NOTE — PROGRESS NOTES
"[facility]  \"Hello, my name is Nilda Houston , and I am calling from the [facility].  I want to check in and see how you are doing, after leaving the hospital.  You may also receive a call from your Care Coordinator (care team), but I want to make sure you don t have any urgent needs.  I have a couple questions to review with you:     Post-Discharge Outreach                                                    Helen Younger is a 76 year old female        Follow-up Appointments           Follow Up and recommended labs and tests       Follow up as scheduled below:                       Your next 10 appointments already scheduled            Mar 30, 2017 1:15 PM CDT   [facility] with  [facility]   [facility] ([facility])     [address]  [address] [facility] [address] [phone]                  Mar 30, 2017 1:50 PM CDT   (Arrive by 1:35 PM)   Return Visit with Padmini Brunson PA-C   [facility] ([facility])               Care Team:    Patient Care Team       Relationship Specialty Notifications Start End    Arin Shahid MD PCP - General Internal Medicine  3/21/11     Comment:  Primary clinic is [facility] per Patient 8/12/2014    Phone: [phone] Fax: [phone]         [facility] [address] [facility] [place] [address]    Jolie Farley, RN Nurse Coordinator Breast Oncology Admissions 1/11/16     Phone: [phone]                 Transition of Care Review                                                      Patient was contacted by an RN for post DC follow up so no duplicate post DC follow up call will be made, message was left with return call information            Meredith Sehrwood, FLORENCIO        Attempt # 2     Called  at home number.      Was call answered? No. Left message on voicemail with " information to call me back.                     Plan                                                      Thanks for your time.  Your Care Coordinator may follow-up within the next couple days.  In the meantime if you have questions, concerns or problems call your care team.        Nilda Houston

## 2017-03-28 NOTE — TELEPHONE ENCOUNTER
Talked to Luh.  Patient is having draw at [facility] on Thursday.  FYI per Luh.  Triage is aware of this also.  Will watch for lab.  Ellen Benitez RN [facility] Triage.

## 2017-03-28 NOTE — TELEPHONE ENCOUNTER
Reason for Call: Request for an order or referral:    Order or referral being requested: Home Care: Resumption of Care / skilled nursing assessment    Date needed: as soon as possible    Has the patient been seen by the PCP for this problem? YES    Additional comments: Patient was just discharged at the hospital and Rox needs orders to resume care. She sees patient at 10:30 am and needs a call back before then. She would like to know if Dr. Shahid would like patient to have an INR as well? Please call back.    Phone number Patient can be reached at:  Other phone number:  [place]: [phone]    Best Time:  Anytime    Can we leave a detailed message on this number?  YES    Call taken on 3/28/2017 at 8:00 AM by Tamara Hoskins

## 2017-03-28 NOTE — TELEPHONE ENCOUNTER
Reason for Call:  Other FYI    Detailed comments: patient is taking Lezaquin for UTI and Calcium Carbonate and there's a possibility it can interact with coumadin.      Phone Number Patient can be reached at: [phone]    Best Time: anytime    Can we leave a detailed message on this number? YES    Call taken on 3/28/2017 at 4:22 PM by Denise Toro

## 2017-03-28 NOTE — PLAN OF CARE
Problem: Goal Outcome Summary  Goal: Goal Outcome Summary  Occupational Therapy Discharge Summary     Reason for therapy discharge:    Discharged to home with home therapy.     Progress towards therapy goal(s). See goals on Care Plan in [facility] electronic health record for goal details.  Goals partially met.  Barriers to achieving goals:   discharge from facility.     Therapy recommendation(s):    Continued therapy is recommended.  Rationale/Recommendations:  TCU was recommended, but pt and  declined. Recommend home therapy to address remaining deficits for increased safety and IND with ADL/IADL..

## 2017-03-28 NOTE — TELEPHONE ENCOUNTER
This is being addressed in another encounter.  HomeCare called and INR RN gave dosing instructions.    Kamla Fofana RN  [facility]

## 2017-03-28 NOTE — TELEPHONE ENCOUNTER
Order signed per [facility] standing orders for home care, assisted living, or nursing home evaluations and treatments, mammogram (reflex diagnostics), FIT test, colonoscopy.  Notified Sua that the orders below are ok per above standing orders.  Ellen Benitez RN [facility] Triage.

## 2017-03-28 NOTE — PROGRESS NOTES
ANTICOAGULATION FOLLOW-UP CLINIC VISIT    Patient Name:  Helen Younger  Date:  3/28/2017  Contact Type:  Telephone/ Sua [phone]    SUBJECTIVE:    Pt will have INR drawn at [facility], will remind PCP to watch out for this.  Advised Home Care to recheck by 4/4.  They will let us know, they are just assessing at this point.     Patient Findings     Positives No Problem Findings           OBJECTIVE    INR   Date Value Ref Range Status   03/28/2017 2.2  Final       ASSESSMENT / PLAN  INR assessment THER    Recheck INR In: 3 DAYS    INR Location Outside lab      Anticoagulation Summary as of 3/28/2017     INR goal 2.0-3.0   Today's INR 2.2   Maintenance plan 1 mg (1 mg x 1) on Mon, Wed, Fri; 2 mg (1 mg x 2) all other days   Full instructions 1 mg on Mon, Wed, Fri; 2 mg all other days   Weekly total 11 mg   No change documented Kamla Fofana, RN   Plan last modified Soumya Garza RN (3/13/2017)   Next INR check 3/30/2017   Target end date     Indications   Long-term (current) use of anticoagulants [Z79.01] [Z79.01]  Cerebral infarction (H) [I63.9]         Anticoagulation Episode Summary     INR check location     Preferred lab     Send INR reminders to [facility]    Comments       Anticoagulation Care Providers     Provider Role Specialty Phone number    Arin Shahid MD  Internal Medicine [phone]            See the Encounter Report to view Anticoagulation Flowsheet and Dosing Calendar (Go to Encounters tab in chart review, and find the Anticoagulation Therapy Visit)    Dosage adjustment made based on physician directed care plan.    Kamla Fofana RN

## 2017-03-29 ENCOUNTER — TELEPHONE (OUTPATIENT)
Dept: INTERNAL MEDICINE | Facility: CLINIC | Age: 77
End: 2017-03-29

## 2017-03-29 ENCOUNTER — TELEPHONE (OUTPATIENT)
Dept: NURSING | Facility: CLINIC | Age: 77
End: 2017-03-29

## 2017-03-29 ENCOUNTER — CARE COORDINATION (OUTPATIENT)
Dept: CASE MANAGEMENT | Facility: CLINIC | Age: 77
End: 2017-03-29

## 2017-03-29 DIAGNOSIS — C50.919 MALIGNANT NEOPLASM OF FEMALE BREAST, UNSPECIFIED LATERALITY, UNSPECIFIED SITE OF BREAST: ICD-10-CM

## 2017-03-29 DIAGNOSIS — Z79.01 LONG-TERM (CURRENT) USE OF ANTICOAGULANTS: ICD-10-CM

## 2017-03-29 DIAGNOSIS — J91.0 MALIGNANT PLEURAL EFFUSION (H): Primary | ICD-10-CM

## 2017-03-29 DIAGNOSIS — E11.59 TYPE 2 DIABETES MELLITUS WITH OTHER CIRCULATORY COMPLICATIONS (H): ICD-10-CM

## 2017-03-29 LAB
FOLATE RBC-MCNC: 427 NG/ML
HCT VFR BLD CALC: 27.6 %

## 2017-03-29 NOTE — TELEPHONE ENCOUNTER
Forms received from: [facility]   Phone number listed: [phone]   Fax listed: [phone]  Date received: 03/29/2017  Form description: Orders-SN INR/Warfarin  Once forms are completed, please return to [facility] via fax.  Is patient requesting to be contacted when forms are completed: NA    Form placed: In provider's basket  Romina Pierce

## 2017-03-29 NOTE — TELEPHONE ENCOUNTER
I just came across this message now in the in basket (message received yesterday from home care).  Routing to PCP - home care calling with potential medication interaction.  INR yesterday was 2.2.    Panda Ellison RN

## 2017-03-29 NOTE — PROGRESS NOTES
Clinic Care Coordination Contact  Care Team Conversations    Notification received that patient discharged from [facility] on 3/27 to home with [place] home care.   Per [facility], their home care  is Desmond Henderson RN.    Secure e-mail sent to Desmond Henderson with [facility] care.  I identified my self and asked for progress updates and discharge plan when appropriate.  Writer will check on home care status in about 3 weeks..    Eve Unger

## 2017-03-29 NOTE — TELEPHONE ENCOUNTER
"See POLST scanned into media is invalid, under part B, the \"check one\" for \"do not intubate\" versus \"trial of intubation\" has no choice indicated.  I called home number, spoke to  Rony.   I told him when I'd spoken to one of Helen's home care nurses last week they were not aware of the presence of a POLST or DNR order.   I told him the POLST should be a document on yellow paper and usually is tacked on the refrigerator door so EMS would easily find it to guide response if called.   He states he is sure they have it somewhere and will look for it.  Advised him it is not complete/valid.   He verbalized understanding of the general issue regarding intubation.   Offered to schedule them to come in to see me to re-address this or can do this if seeing [facility] anytime soon.  He states they don't see [facility] since she is followed by oncology, they have appt tomorrow.   Advised him to bring this up at appointment tomorrow and see if can make new document.  I'll postpone this a few weeks to see if done, if not will plan to call to see how we can get this taken care of.  Ani Burnett RN  [facility]      "

## 2017-03-29 NOTE — TELEPHONE ENCOUNTER
Forms received from: [facility]   Phone number listed: [phone]   Fax listed: [phone]  Date received: 03/29/2017  Form description: HHC and Plan of Care  Once forms are completed, please return to [facility] via fax.  Is patient requesting to be contacted when forms are completed: NA    Form placed: In provider's nurse basket  Romina Pierce

## 2017-03-30 ENCOUNTER — APPOINTMENT (OUTPATIENT)
Dept: LAB | Facility: CLINIC | Age: 77
End: 2017-03-30
Attending: INTERNAL MEDICINE
Payer: COMMERCIAL

## 2017-03-30 ENCOUNTER — ONCOLOGY VISIT (OUTPATIENT)
Dept: ONCOLOGY | Facility: CLINIC | Age: 77
End: 2017-03-30
Attending: PHYSICIAN ASSISTANT
Payer: COMMERCIAL

## 2017-03-30 ENCOUNTER — TELEPHONE (OUTPATIENT)
Dept: INTERNAL MEDICINE | Facility: CLINIC | Age: 77
End: 2017-03-30

## 2017-03-30 VITALS
HEART RATE: 71 BPM | DIASTOLIC BLOOD PRESSURE: 66 MMHG | SYSTOLIC BLOOD PRESSURE: 99 MMHG | RESPIRATION RATE: 16 BRPM | BODY MASS INDEX: 34.68 KG/M2 | WEIGHT: 189.6 LBS | TEMPERATURE: 97.9 F | OXYGEN SATURATION: 95 %

## 2017-03-30 DIAGNOSIS — M62.81 GENERALIZED MUSCLE WEAKNESS: ICD-10-CM

## 2017-03-30 DIAGNOSIS — I49.9 IRREGULAR CARDIAC RHYTHM: ICD-10-CM

## 2017-03-30 DIAGNOSIS — C50.919 MALIGNANT NEOPLASM OF FEMALE BREAST, UNSPECIFIED LATERALITY, UNSPECIFIED SITE OF BREAST: Primary | ICD-10-CM

## 2017-03-30 DIAGNOSIS — Z86.73 HISTORY OF CVA (CEREBROVASCULAR ACCIDENT): ICD-10-CM

## 2017-03-30 DIAGNOSIS — I49.3 VENTRICULAR PREMATURE BEATS: ICD-10-CM

## 2017-03-30 DIAGNOSIS — J90 PLEURAL EFFUSION: ICD-10-CM

## 2017-03-30 LAB
ALBUMIN SERPL-MCNC: 3.5 G/DL (ref 3.4–5)
ALP SERPL-CCNC: 35 U/L (ref 40–150)
ALT SERPL W P-5'-P-CCNC: 12 U/L (ref 0–50)
ANION GAP SERPL CALCULATED.3IONS-SCNC: 7 MMOL/L (ref 3–14)
AST SERPL W P-5'-P-CCNC: 12 U/L (ref 0–45)
BASOPHILS # BLD AUTO: 0 10E9/L (ref 0–0.2)
BASOPHILS NFR BLD AUTO: 0.9 %
BILIRUB SERPL-MCNC: 0.6 MG/DL (ref 0.2–1.3)
BUN SERPL-MCNC: 26 MG/DL (ref 7–30)
CALCIUM SERPL-MCNC: 8.7 MG/DL (ref 8.5–10.1)
CHLORIDE SERPL-SCNC: 109 MMOL/L (ref 94–109)
CO2 SERPL-SCNC: 26 MMOL/L (ref 20–32)
CREAT SERPL-MCNC: 1.15 MG/DL (ref 0.52–1.04)
DIFFERENTIAL METHOD BLD: ABNORMAL
EOSINOPHIL # BLD AUTO: 0 10E9/L (ref 0–0.7)
EOSINOPHIL NFR BLD AUTO: 0 %
ERYTHROCYTE [DISTWIDTH] IN BLOOD BY AUTOMATED COUNT: 14.6 % (ref 10–15)
GFR SERPL CREATININE-BSD FRML MDRD: 46 ML/MIN/1.7M2
GLUCOSE SERPL-MCNC: 164 MG/DL (ref 70–99)
HCT VFR BLD AUTO: 30.6 % (ref 35–47)
HGB BLD-MCNC: 10.1 G/DL (ref 11.7–15.7)
INR PPP: 1.55 (ref 0.86–1.14)
LYMPHOCYTES # BLD AUTO: 0.6 10E9/L (ref 0.8–5.3)
LYMPHOCYTES NFR BLD AUTO: 25.9 %
MACROCYTES BLD QL SMEAR: PRESENT
MCH RBC QN AUTO: 36.2 PG (ref 26.5–33)
MCHC RBC AUTO-ENTMCNC: 33 G/DL (ref 31.5–36.5)
MCV RBC AUTO: 110 FL (ref 78–100)
MONOCYTES # BLD AUTO: 0.1 10E9/L (ref 0–1.3)
MONOCYTES NFR BLD AUTO: 4.5 %
NEUTROPHILS # BLD AUTO: 1.6 10E9/L (ref 1.6–8.3)
NEUTROPHILS NFR BLD AUTO: 68.7 %
OVALOCYTES BLD QL SMEAR: SLIGHT
PLATELET # BLD AUTO: 159 10E9/L (ref 150–450)
POIKILOCYTOSIS BLD QL SMEAR: SLIGHT
POTASSIUM SERPL-SCNC: 3.5 MMOL/L (ref 3.4–5.3)
PROT SERPL-MCNC: 6.9 G/DL (ref 6.8–8.8)
RBC # BLD AUTO: 2.79 10E12/L (ref 3.8–5.2)
SODIUM SERPL-SCNC: 142 MMOL/L (ref 133–144)
WBC # BLD AUTO: 2.3 10E9/L (ref 4–11)

## 2017-03-30 PROCEDURE — 85025 COMPLETE CBC W/AUTO DIFF WBC: CPT | Performed by: PHYSICIAN ASSISTANT

## 2017-03-30 PROCEDURE — 99212 OFFICE O/P EST SF 10 MIN: CPT | Mod: ZF

## 2017-03-30 PROCEDURE — 36415 COLL VENOUS BLD VENIPUNCTURE: CPT

## 2017-03-30 PROCEDURE — 85610 PROTHROMBIN TIME: CPT | Performed by: PHYSICIAN ASSISTANT

## 2017-03-30 PROCEDURE — 93010 ELECTROCARDIOGRAM REPORT: CPT | Performed by: INTERNAL MEDICINE

## 2017-03-30 PROCEDURE — 80053 COMPREHEN METABOLIC PANEL: CPT | Performed by: PHYSICIAN ASSISTANT

## 2017-03-30 PROCEDURE — 99214 OFFICE O/P EST MOD 30 MIN: CPT | Mod: ZP | Performed by: PHYSICIAN ASSISTANT

## 2017-03-30 ASSESSMENT — PAIN SCALES - GENERAL: PAINLEVEL: NO PAIN (0)

## 2017-03-30 NOTE — TELEPHONE ENCOUNTER
INR 1.55 (H) 0.86 - 1.14  Final 03/30/2017  1:58 PM 1740     Patient INR today is 1.55.  Patient is on Levaquin    Flowsheet Report      Review Flowsheet  (More Data Exists)   Anticoagulation Monitoring INR Value INR Value INR Goal Range Therapeutic? Last Week Total Next Week Total Return Date Recheck Instructions INR location Visit Report    Latest Ref Rng & Units 0.86 - 1.14 0.86 - 1.14              3/30/2017  - 1.55 ! - - - - - - - - - 3/30/2017    3/28/2017  - 2.2 - THER - - - 3 DAYS - Outside lab Report 3/28/2017    3/28/2017  - - 2.0-3.0 - 11 mg 11 mg 3/30/2017 - 1 mg on Mon, Wed, Fri; 2 mg all other days - Report 3/28/2017    3/27/2017  - 1.92 ! - THER - - - 4 DAYS - Outside lab Report 3/27/2017    3/27/2017  - - 2.0-3.0 - 11 mg 11 mg 3/30/2017 - 1 mg on Mon, Wed, Fri; 2 mg all other days - Report 3/27/2017    3/26/2017  - 1.94 ! - - - - - - - - - 3/26/2017    3/25/2017  - 1.79 ! - - - - - - - - - 3/25/2017    3/24/2017  2.1 ! - 2.0-3.0 THER 11 mg 11 mg 3/27/2017 4 DAYS 1 mg on Mon, Wed, Fri; 2 mg all other days Clinic Report 3/24/2017    3/13/2017  - 2.8 - THER - - - 2 WEEKS - Homecare INR Report 3/13/2017    3/13/2017                         Need to call  with dosing and home care to draw.    Soumya is not in and there is not anyone in INR today  Routed to PCP                Ellen Benitez, FLORENCIO C[facility] Triage.

## 2017-03-30 NOTE — Clinical Note
3/30/2017       RE: Helen Younger  [address]  [facility] [address]     Dear Colleague,    Thank you for referring your patient, Helen Younger, to the [facility]. Please see a copy of my visit note below.       HEMATOLOGY/ONCOLOGY PROGRESS NOTE  Mar 30, 2017    REASON FOR VISIT: follow-up of metastatic breast cancer    DIAGNOSIS:   The patient is a 76-year-old woman who, in March of 2006, was diagnosed with a left-sided breast cancer. She presented with pain in the left breast, and a diagnostic mammogram demonstrated abnormalities in the left breast. The region of abnormality was biopsied, and she was found to have an infiltrating ductal carcinoma. She subsequently went on to undergo a left-sided lumpectomy and sentinel lymph node biopsy at the [facility] by Dr. Salas Cook in April of 2006. The patient had a 2.5 x 2.1 cm tumor excised. It was a grade 2 infiltrating ductal carcinoma. Her sentinel lymph node biopsy was negative on frozen section, and final pathology did demonstrate micrometastasis measuring 0.2 cm. The tumor itself was ER-positive, LA-positive, and HER2-negative. The patient did have a positive margin for which she underwent a reexcision in June of 2006. The subsequent pathology was negative.     The patient was then seen by Dr. Jong Dacosta, and she was encouraged to receive adjuvant chemotherapy. She received three cycles of FEC every three weeks followed by Taxotere single-agent administered every three weeks for three cycles. She completed chemotherapy in mid to late October of 2006. Her only complication was neutropenic fever following her last dose, for which she was briefly hospitalized. She otherwise had no issues with residual neuropathies or any other known complications from her chemo.     The patient was started on adjuvant Arimidex on November 6, 2006. She also received whole breast radiotherapy with boost to the left breast and tumor bed  from November 15, 2006, through January 5, 2007. This was performed by Dr. Nicholas at [facility]. It is unknown if she received kevon radiation. The patient' portacath was removed in July of 2007. She completed five years of arimidex and remained off therapy.     She represented in December 2015 with shortness of breath and pleural effusion, requiring hospitalization. She had three thoracenteses while inpatient, with cytology revealing for a metastatic breast cancer, ER/KY-positive, HER-2 negative. On her workup, she also has disease in her bones after CT scan of the chest, abdomen and pelvis. She met with Dr. Landis on 1/11/2016 and was started on endocrine therapy with AI. She started on Ibrance on 2/3/2016. She was dose reduced after cycle 1 to 100 mg for cytopenias.    Ms. Younger was admitted from 3/25-3/27 with weakness and intermittent bradycardia She was on a ziopatch to evaluate and started on levaquin for possible infectious etiology. Head CT also deonstrated a suspected new focal loss o ffray white matter concerning for infarction. Patient was improving so no brain MRI pursued. She is here in f/u today.     INTERVAL HISTORY:   Helen is here with her  today who helps in supplementing her hx at times due to her expressive aphasia. She tells me she is just really tired. She had a dental appt two days ago and yesterday PT came out and then today's appt here, so she does not really feel like she has been given sufficient time to recover after her admission. She tells me she slept very poorly there due to all the frequent disruptions. Her  says she still seems weaker than she was a month ago, but better than she was last week at the time of her admission. She cannot walk quite as far as in the past. She ambulates with a walker and requires some assist from him at times. He still feels comfortable that he can provide for her mobility needs at home. There have been no falls since hospital  discharge.    She is eating fine, but always has trouble getting enough fluids in. Denies any dizziness and tells me she has not had much today with this appointment, but plans on doing better tomorrow. She is taking her antibiotic as prescribed. She is on coumadin without any bleeding, but has a resolving L subconjunctival hemorrhage. Denies any new n/t, focal weakness, HA, vision changes, dizziness, confusion, trouble swallowing. She     No fevers/chills/sweats, HA, dizziness, cough, congestion, sore throat, chest pain, SOB, vision changes, HUERTA, n/v, abdominal pain, urinary changes, swelling, rash, pain, change in activity.    Current Outpatient Prescriptions   Medication Sig Dispense Refill     levofloxacin (LEVAQUIN) 750 MG tablet Take 1 tablet (750 mg) by mouth daily 5 tablet 0     warfarin (JANTOVEN) 1 MG tablet Take 1 tablet (1mg) by mouth on Monday, Wednesday, Friday. Take 2 tablets (2 mg) by mouth on Tuesday and Thursday. 30 tablet 0     palbociclib (IBRANCE) 100 MG capsule CHEMO Take 1 capsule (100 mg) by mouth daily with food Take for 21 days, then 7 days off. Avoid grapefruit. Do not open/crush/chew capsule. 21 capsule 0     letrozole (FEMARA) 2.5 MG tablet Take 1 tablet (2.5 mg) by mouth daily 90 tablet 3     magnesium hydroxide (MILK OF MAGNESIA) 400 MG/5ML suspension Take 30-60 mLs by mouth daily as needed for constipation or heartburn 360 mL 0     sennosides (SENOKOT) 8.6 MG tablet Take 1 tablet by mouth 2 times daily May increase to 2 tabs twice daily if needed 120 each 0     simvastatin (ZOCOR) 20 MG tablet Take 1 tablet (20 mg) by mouth At Bedtime 90 tablet 1     Nystatin (NYSTOP) 915739 UNIT/GM POWD daily as needed       oxybutynin (DITROPAN) 5 MG tablet TAKE 1 TABLET (5 MG) BY MOUTH 2 TIMES DAILY  11     order for DME Equipment being ordered:  Incontinence pads   Patient uses these tid 100 each 3     order for DME Equipment being ordered: wheeled walker 1 each 0     DOCUSATE SODIUM 1 tablet 2  times daily        Multiple Vitamins-Minerals (OCUVITE ADULT FORMULA PO) Take 1 tablet by mouth daily.       VIACTIV OR Take 1 chew tab by mouth 2 times daily. One in the morning and one at bedtime.             Allergies   Allergen Reactions     Diuretic      Don't remember side effect     Zyrtec [Cetirizine Hcl]      Elevated blood pressure       PHYSICAL EXAMINATION  BP 99/66  Pulse 71  Temp 97.9  F (36.6  C) (Oral)  Resp 16  Wt 86 kg (189 lb 9.5 oz)  SpO2 95%  BMI 34.68 kg/m2   Wt Readings from Last 4 Encounters:   03/30/17 86 kg (189 lb 9.5 oz)   03/27/17 85.4 kg (188 lb 4.8 oz)   02/27/17 86.5 kg (190 lb 9.6 oz)   01/24/17 84.5 kg (186 lb 4.8 oz)     Constitutional: Alert, oriented female in no visible distress. +Expressive aphasia. Examined in wheelchair today.  Eyes: PERRL. Anicteric sclerae. L eye with resolving subconjunctival hemorrhage  ENT/Mouth: OM moist and pink without thrush.   CV: irregular rhythm, no murmurs appreciated.  Resp: Decreased breath sounds on the left base, stable. Breathing comfortably without accessory muscle usage.  Abdomen: Soft, non-tender, non-distended.  Extremities: No lower extremity edema appreciated.   Skin: Warm, dry.   Lymph: No cervical or supraclavicular lymphadenopathy appreciated.   Neuro: CN II-XII grossly intact. Motor and strength intact in lower extremities     LABS:  Results for RYLEE PURCELL (MRN [id number]) as of 3/31/2017 06:59   Ref. Range 3/26/2017 06:33 3/26/2017 16:10 3/27/2017 05:52 3/27/2017 10:41 3/30/2017 13:58   Sodium Latest Ref Range: 133 - 144 mmol/L 142  142  142   Potassium Latest Ref Range: 3.4 - 5.3 mmol/L 3.6  3.9  3.5   Chloride Latest Ref Range: 94 - 109 mmol/L 107  111 (H)  109   Carbon Dioxide Latest Ref Range: 20 - 32 mmol/L 28  25  26   Urea Nitrogen Latest Ref Range: 7 - 30 mg/dL 22  20  26   Creatinine Latest Ref Range: 0.52 - 1.04 mg/dL 1.21 (H)  0.98  1.15 (H)   GFR Estimate Latest Ref Range: >60 mL/min/1.7m2 43 (L)  55 (L)  46  (L)   GFR Estimate If Black Latest Ref Range: >60 mL/min/1.7m2 53 (L)  67  56 (L)   Calcium Latest Ref Range: 8.5 - 10.1 mg/dL 8.7  8.3 (L)  8.7   Anion Gap Latest Ref Range: 3 - 14 mmol/L 8  6  7   Magnesium Latest Ref Range: 1.6 - 2.3 mg/dL 2.0  2.3     Phosphorus Latest Ref Range: 2.5 - 4.5 mg/dL 2.8  2.0 (L)     Albumin Latest Ref Range: 3.4 - 5.0 g/dL     3.5   Protein Total Latest Ref Range: 6.8 - 8.8 g/dL     6.9   Bilirubin Total Latest Ref Range: 0.2 - 1.3 mg/dL     0.6   Alkaline Phosphatase Latest Ref Range: 40 - 150 U/L     35 (L)   ALT Latest Ref Range: 0 - 50 U/L     12   AST Latest Ref Range: 0 - 45 U/L     12   Results for RYLEE PURCELL (MRN [id number]) as of 3/31/2017 06:59   Ref. Range 3/26/2017 06:33 3/27/2017 05:52 3/30/2017 13:58   Glucose Latest Ref Range: 70 - 99 mg/dL 108 (H) 149 (H) 164 (H)   Results for RYLEE PURCELL (MRN [id number]) as of 3/31/2017 06:59   Ref. Range 3/27/2017 10:41 3/30/2017 13:58   WBC Latest Ref Range: 4.0 - 11.0 10e9/L 2.3 (L) 2.3 (L)   Hemoglobin Latest Ref Range: 11.7 - 15.7 g/dL 9.0 (L) 10.1 (L)   Hematocrit Latest Ref Range: 35.0 - 47.0 % 27.6 (L) 30.6 (L)   Platelet Count Latest Ref Range: 150 - 450 10e9/L 143 (L) 159   RBC Count Latest Ref Range: 3.8 - 5.2 10e12/L 2.50 (L) 2.79 (L)   MCV Latest Ref Range: 78 - 100 fl 110 (H) 110 (H)   MCH Latest Ref Range: 26.5 - 33.0 pg 36.0 (H) 36.2 (H)   MCHC Latest Ref Range: 31.5 - 36.5 g/dL 32.6 33.0   RDW Latest Ref Range: 10.0 - 15.0 % 14.7 14.6   Diff Method Unknown Automated Method Manual Differential   % Neutrophils Latest Units: % 49.2 68.7   % Lymphocytes Latest Units: % 39.7 25.9   % Monocytes Latest Units: % 8.1 4.5   % Eosinophils Latest Units: % 0.9 0.0   % Basophils Latest Units: % 2.1 0.9   % Immature Granulocytes Latest Units: % 0.0    Nucleated RBCs Latest Ref Range: 0 /100 0    Absolute Neutrophil Latest Ref Range: 1.6 - 8.3 10e9/L 1.2 (L) 1.6   Absolute Lymphocytes Latest Ref Range: 0.8 - 5.3 10e9/L 0.9  0.6 (L)   Absolute Monocytes Latest Ref Range: 0.0 - 1.3 10e9/L 0.2 0.1   Absolute Eosinophils Latest Ref Range: 0.0 - 0.7 10e9/L 0.0 0.0   Absolute Basophils Latest Ref Range: 0.0 - 0.2 10e9/L 0.1 0.0   Abs Immature Granulocytes Latest Ref Range: 0 - 0.4 10e9/L 0.0    Absolute Nucleated RBC Unknown 0.0        EKG: Premature ventricular beats.     IMPRESSION/PLAN:  Helen Younger is a 75 year old female with history of ER/DC-positive, HER-2 negative breast cancer treated with lumpectomy, adjuvant chemotherapy with Taxotere, whole breast radiotherapy, and 5 years of adjuvant Arimidex, completed in 2012. She represented in December 2015 with shortness of breath and pleural effusion, found to have developed metastatic disease involving the bones and pleural cavity.    Metastatic breast cancer: Currently on Letrozole and palbociclib with overall stable disease on imaging 12/23/2016. She tolerates this regimen well. She will continue with scans every 4-5 months as long as her tumor markers remain stable, and she doesn't had any signs of progressive disease. She was recently admitted with worsening weakness and bradycardia. She was started on abx for possible infection-although nothing clear was found and was on a ziopatch and was also found to have a possible new cerebral infarction on head CT. She was improving and discharged and her ibrance was held. Today she        Bone mets: Previously on monthly Xgeva, switched to every 3 months due to large co-pay, last received 2/27/17. She is taking calcium and vitamin D supplementation.     Pleural effusions:  This has been stable on subsequent imaging, asymptomatic. Exam is stable today.    CKD/FEN: Encouraged adequate hydration with about 64 oz fluids daily. Creat, lytes today XXXX     Hypotension: now off all anti-hypertensives.No dizziness or lightheadedness. BP continues to be softer, but stable. She was advised to continue to strive for 64 oz fluids daily.    Weakness:s/p  recent admission for worsening weakness-no clear infection but discharged on levaquin. No clear cardiac cause, but on ziopatch to r/o arrhythmia with occasional bradycardia. She has had difficulty with mobility since her previous stroke and has not been enthusiastic about home PT. She XXXX    ID: questionable infectious cause leading to recent weakness/fall-no clear source, but on levaquin 750mg daily to complete 4/1.     Hx of CVA: Head CT during her recent admission showed a possible new focal loss of gray white matter differentiaaion in the R middle fronatl gyrus concerning for reinfarction. Stable chronic L MCA territory infarct and stable 5mm posterior parafalcine calcified meningioma. Her symptoms were improving so she did not undergo a brain MRI. She has been on coumadin with hx of CVA. This is managed by XXX INR today at       It is my privilege to be involved in the care of the above patient.     Padmini Brunson PA-C  [facility] [address] [address] [phone]      Again, thank you for allowing me to participate in the care of your patient.      Sincerely,    Padmini Brunson PA-C

## 2017-03-30 NOTE — TELEPHONE ENCOUNTER
So, patient should hold calcium while on levoquin :  Calcium can block absorption of levoquin    Was INR addressed?  If not can Soumya assist?

## 2017-03-30 NOTE — MR AVS SNAPSHOT
After Visit Summary   3/30/2017    Helen Younger    MRN: [id number]           Patient Information     Date Of Birth          1940        Visit Information        Provider Department      3/30/2017 1:50 PM Padmini Brunson PA-C [facility]        Today's Diagnoses     Malignant neoplasm of female breast, unspecified laterality, unspecified site of breast (H)    -  1    Irregular cardiac rhythm        Pleural effusion        Ventricular premature beats        History of CVA (cerebrovascular accident)        Generalized muscle weakness           Follow-ups after your visit        Your next 10 appointments already scheduled     Apr 04, 2017 12:00 PM CDT   [facility]    [address]  [address]nd Floor  [facility] [address] [phone]           Note: this is not an onsite visit; there is no need to come to the facility.              Who to contact     If you have questions or need follow up information about today's clinic visit or your schedule please contact [facility] directly at [phone].  Normal or non-critical lab and imaging results will be communicated to you by [email or web address], letter or phone within 4 business days after the clinic has received the results. If you do not hear from us within 7 days, please contact the clinic through Smart Destinationst or phone. If you have a critical or abnormal lab result, we will notify you by phone as soon as possible.  Submit refill requests through TrueVault or call your pharmacy and they will forward the refill request to us. Please allow 3 business days for your refill to be completed.          Additional Information About Your Visit        [email or web address] Information     TrueVault gives you secure access to your electronic health record. If you see a primary care provider, you can also send messages to your care team and  make appointments. If you have questions, please call your primary care clinic.  If you do not have a primary care provider, please call [phone] and they will assist you.        Care EveryWhere ID     This is your Care EveryWhere ID. This could be used by other organizations to access your [place] [place] medical records  [id number]        Your Vitals Were     Pulse Temperature Respirations Pulse Oximetry BMI (Body Mass Index)       71 97.9  F (36.6  C) (Oral) 16 95% 34.68 kg/m2        Blood Pressure from Last 3 Encounters:   03/30/17 99/66   03/27/17 145/75   02/27/17 107/60    Weight from Last 3 Encounters:   03/30/17 86 kg (189 lb 9.5 oz)   03/27/17 85.4 kg (188 lb 4.8 oz)   02/27/17 86.5 kg (190 lb 9.6 oz)              We Performed the Following     *CBC with platelets differential     Comprehensive metabolic panel     EKG 12-lead complete w/read - Clinics     INR          Today's Medication Changes          These changes are accurate as of: 3/30/17 11:59 PM.  If you have any questions, ask your nurse or doctor.               These medicines have changed or have updated prescriptions.        Dose/Directions    palbociclib 100 MG capsule CHEMO   Commonly known as:  IBRANCE   This may have changed:  additional instructions   Used for:  Pleural effusion, Malignant neoplasm of female breast, unspecified laterality, unspecified site of breast (H)   Changed by:  Padmini Brunson PA-C        Dose:  100 mg   Take 1 capsule (100 mg) by mouth daily with food Take for 21 days, then 7 days off. Avoid grapefruit. Do not open/crush/chew capsule. ON HOLD.   Quantity:  21 capsule   Refills:  0                Primary Care Provider Office Phone # Fax #    Arin Shahid [phone]       [facility] [address] [facility] [address]        Thank you!     Thank you for choosing [facility]  for your care. Our goal is always to provide you with excellent  care. Hearing back from our patients is one way we can continue to improve our services. Please take a few minutes to complete the written survey that you may receive in the mail after your visit with us. Thank you!             Your Updated Medication List - Protect others around you: Learn how to safely use, store and throw away your medicines at www.disposemymeds.org.          This list is accurate as of: 3/30/17 11:59 PM.  Always use your most recent med list.                   Brand Name Dispense Instructions for use    DOCUSATE SODIUM      1 tablet 2 times daily       letrozole 2.5 MG tablet    FEMARA    90 tablet    Take 1 tablet (2.5 mg) by mouth daily       levofloxacin 750 MG tablet    LEVAQUIN    5 tablet    Take 1 tablet (750 mg) by mouth daily       magnesium hydroxide 400 MG/5ML suspension    MILK OF MAGNESIA    360 mL    Take 30-60 mLs by mouth daily as needed for constipation or heartburn       NYSTOP 818987 UNIT/GM Powd powder      daily as needed       OCUVITE ADULT FORMULA PO      Take 1 tablet by mouth daily.       * order for DME     1 each    Equipment being ordered: wheeled walker       * order for DME     100 each    Equipment being ordered:  Incontinence pads   Patient uses these tid       oxybutynin 5 MG tablet    DITROPAN     TAKE 1 TABLET (5 MG) BY MOUTH 2 TIMES DAILY       palbociclib 100 MG capsule CHEMO    IBRANCE    21 capsule    Take 1 capsule (100 mg) by mouth daily with food Take for 21 days, then 7 days off. Avoid grapefruit. Do not open/crush/chew capsule. ON HOLD.       sennosides 8.6 MG tablet    SENOKOT    120 each    Take 1 tablet by mouth 2 times daily May increase to 2 tabs twice daily if needed       simvastatin 20 MG tablet    ZOCOR    90 tablet    Take 1 tablet (20 mg) by mouth At Bedtime       VIACTIV PO      Take 1 chew tab by mouth 2 times daily. One in the morning and one at bedtime.       warfarin 1 MG tablet    JANTOVEN    30 tablet    Take 1 tablet (1mg) by mouth  on Monday, Wednesday, Friday. Take 2 tablets (2 mg) by mouth on Tuesday and Thursday.       * Notice:  This list has 2 medication(s) that are the same as other medications prescribed for you. Read the directions carefully, and ask your doctor or other care provider to review them with you.

## 2017-03-30 NOTE — TELEPHONE ENCOUNTER
I found home care nurse Luh phone number: [phone].  Called and left detailed message on her confidential voicemail advising of message below.  Routed to [facility] to address/follow up.  Ani Burnett RN  [facility]

## 2017-03-30 NOTE — TELEPHONE ENCOUNTER
INR was addressed. INR being rechecked today. Soumya is not is and will need to be addressed by provider.    Rest of message below was not addressed regarding calcium.    Ellen Benitez RN [facility] Triage.

## 2017-03-30 NOTE — NURSING NOTE
Chief Complaint   Patient presents with     Blood Draw     Labs drawn via Left Arm VPT by Vascular Access RN.       Amada Wade RN

## 2017-03-30 NOTE — TELEPHONE ENCOUNTER
Rony called, advised him [facility] will address the INR, see other encounter.  I did advise him to hold her calcium while on the levaquin.  Ani Burnett RN  [facility]

## 2017-03-30 NOTE — PROGRESS NOTES
HEMATOLOGY/ONCOLOGY PROGRESS NOTE  Mar 30, 2017    REASON FOR VISIT: follow-up of metastatic breast cancer    DIAGNOSIS:   The patient is a 76-year-old woman who, in March of 2006, was diagnosed with a left-sided breast cancer. She presented with pain in the left breast, and a diagnostic mammogram demonstrated abnormalities in the left breast. The region of abnormality was biopsied, and she was found to have an infiltrating ductal carcinoma. She subsequently went on to undergo a left-sided lumpectomy and sentinel lymph node biopsy at the [facility] by Dr. Salas Cook in April of 2006. The patient had a 2.5 x 2.1 cm tumor excised. It was a grade 2 infiltrating ductal carcinoma. Her sentinel lymph node biopsy was negative on frozen section, and final pathology did demonstrate micrometastasis measuring 0.2 cm. The tumor itself was ER-positive, LA-positive, and HER2-negative. The patient did have a positive margin for which she underwent a reexcision in June of 2006. The subsequent pathology was negative.     The patient was then seen by Dr. Jong Dacosta, and she was encouraged to receive adjuvant chemotherapy. She received three cycles of FEC every three weeks followed by Taxotere single-agent administered every three weeks for three cycles. She completed chemotherapy in mid to late October of 2006. Her only complication was neutropenic fever following her last dose, for which she was briefly hospitalized. She otherwise had no issues with residual neuropathies or any other known complications from her chemo.     The patient was started on adjuvant Arimidex on November 6, 2006. She also received whole breast radiotherapy with boost to the left breast and tumor bed from November 15, 2006, through January 5, 2007. This was performed by Dr. Nicholas at [facility]. It is unknown if she received kevon radiation. The patient' portacath was removed in July of 2007. She completed five years of arimidex and  remained off therapy.     She represented in December 2015 with shortness of breath and pleural effusion, requiring hospitalization. She had three thoracenteses while inpatient, with cytology revealing for a metastatic breast cancer, ER/RI-positive, HER-2 negative. On her workup, she also has disease in her bones after CT scan of the chest, abdomen and pelvis. She met with Dr. Landis on 1/11/2016 and was started on endocrine therapy with AI. She started on Ibrance on 2/3/2016. She was dose reduced after cycle 1 to 100 mg for cytopenias.    Ms. Younger was admitted from 3/25-3/27 with weakness and intermittent bradycardia She was on a ziopatch to evaluate and started on levaquin for possible infectious etiology. Head CT also deonstrated a suspected new focal loss o ffray white matter concerning for infarction. Patient was improving so no brain MRI pursued. She is here in f/u today.     INTERVAL HISTORY:   Helen is here with her  today who helps in supplementing her hx at times due to her expressive aphasia. She tells me she is just really tired. She had a dental appt two days ago and yesterday PT came out and then today's appt here, so she does not really feel like she has been given sufficient time to recover after her admission. She tells me she slept very poorly there due to all the frequent disruptions. Her  says she still seems weaker than she was a month ago, but better than she was last week at the time of her admission. She cannot walk quite as far as in the past. She ambulates with a walker and requires some assist from him at times. He still feels comfortable that he can provide for her mobility needs at home. There have been no falls since hospital discharge.    She is eating fine, but always has trouble getting enough fluids in. Denies any dizziness and tells me she has not had much today with this appointment, but plans on doing better tomorrow. She is taking her antibiotic as prescribed. She  is on coumadin without any bleeding, but has a resolving L subconjunctival hemorrhage. Denies any new n/t, focal weakness, HA, vision changes, dizziness, confusion, trouble swallowing. She denies fevers/chills/sweats. No cough, congestion, sore throat. Denies any chest pain, SOB, palpitations, n/v, abdominal pain, constipation, loose stools. She has some urinary incontinence-sounds like overflow incontinence, which is not new. No swelling in the legs. No aches/pains.       Current Outpatient Prescriptions   Medication Sig Dispense Refill     palbociclib (IBRANCE) 100 MG capsule CHEMO Take 1 capsule (100 mg) by mouth daily with food Take for 21 days, then 7 days off. Avoid grapefruit. Do not open/crush/chew capsule. ON HOLD. 21 capsule 0     levofloxacin (LEVAQUIN) 750 MG tablet Take 1 tablet (750 mg) by mouth daily 5 tablet 0     warfarin (JANTOVEN) 1 MG tablet Take 1 tablet (1mg) by mouth on Monday, Wednesday, Friday. Take 2 tablets (2 mg) by mouth on Tuesday and Thursday. 30 tablet 0     letrozole (FEMARA) 2.5 MG tablet Take 1 tablet (2.5 mg) by mouth daily 90 tablet 3     magnesium hydroxide (MILK OF MAGNESIA) 400 MG/5ML suspension Take 30-60 mLs by mouth daily as needed for constipation or heartburn 360 mL 0     sennosides (SENOKOT) 8.6 MG tablet Take 1 tablet by mouth 2 times daily May increase to 2 tabs twice daily if needed 120 each 0     simvastatin (ZOCOR) 20 MG tablet Take 1 tablet (20 mg) by mouth At Bedtime 90 tablet 1     Nystatin (NYSTOP) 327214 UNIT/GM POWD daily as needed       oxybutynin (DITROPAN) 5 MG tablet TAKE 1 TABLET (5 MG) BY MOUTH 2 TIMES DAILY  11     order for DME Equipment being ordered:  Incontinence pads   Patient uses these tid 100 each 3     order for DME Equipment being ordered: wheeled walker 1 each 0     DOCUSATE SODIUM 1 tablet 2 times daily        Multiple Vitamins-Minerals (OCUVITE ADULT FORMULA PO) Take 1 tablet by mouth daily.       VIACTIV OR Take 1 chew tab by mouth 2 times  daily. One in the morning and one at bedtime.        palbociclib (IBRANCE) 100 MG capsule CHEMO Take 1 capsule (100 mg) by mouth daily with food Take for 21 days, then 7 days off. Avoid grapefruit. Do not open/crush/chew capsule. 21 capsule 2          Allergies   Allergen Reactions     Diuretic      Don't remember side effect     Zyrtec [Cetirizine Hcl]      Elevated blood pressure       PHYSICAL EXAMINATION  BP 99/66  Pulse 71  Temp 97.9  F (36.6  C) (Oral)  Resp 16  Wt 86 kg (189 lb 9.5 oz)  SpO2 95%  BMI 34.68 kg/m2   Wt Readings from Last 4 Encounters:   03/30/17 86 kg (189 lb 9.5 oz)   03/27/17 85.4 kg (188 lb 4.8 oz)   02/27/17 86.5 kg (190 lb 9.6 oz)   01/24/17 84.5 kg (186 lb 4.8 oz)     Constitutional: Alert, oriented female in no visible distress. +Expressive aphasia. Examined in wheelchair today.  Eyes: PERRL. Anicteric sclerae. L eye with resolving subconjunctival hemorrhage  ENT/Mouth: OM moist and pink without thrush.   CV: irregular rhythm, no murmurs appreciated.  Resp: Decreased breath sounds on the left base, stable. Breathing comfortably without accessory muscle usage.  Abdomen: Soft, non-tender, non-distended.  Extremities: No lower extremity edema appreciated.   Skin: Warm, dry.   Lymph: No cervical or supraclavicular lymphadenopathy appreciated.   Neuro: CN II-XII grossly intact. Motor and strength intact in lower extremities     LABS:  Results for RYLEE PURCELL (MRN [id number]) as of 3/31/2017 06:59   Ref. Range 3/26/2017 06:33 3/26/2017 16:10 3/27/2017 05:52 3/27/2017 10:41 3/30/2017 13:58   Sodium Latest Ref Range: 133 - 144 mmol/L 142  142  142   Potassium Latest Ref Range: 3.4 - 5.3 mmol/L 3.6  3.9  3.5   Chloride Latest Ref Range: 94 - 109 mmol/L 107  111 (H)  109   Carbon Dioxide Latest Ref Range: 20 - 32 mmol/L 28  25  26   Urea Nitrogen Latest Ref Range: 7 - 30 mg/dL 22  20  26   Creatinine Latest Ref Range: 0.52 - 1.04 mg/dL 1.21 (H)  0.98  1.15 (H)   GFR Estimate Latest Ref  Range: >60 mL/min/1.7m2 43 (L)  55 (L)  46 (L)   GFR Estimate If Black Latest Ref Range: >60 mL/min/1.7m2 53 (L)  67  56 (L)   Calcium Latest Ref Range: 8.5 - 10.1 mg/dL 8.7  8.3 (L)  8.7   Anion Gap Latest Ref Range: 3 - 14 mmol/L 8  6  7   Magnesium Latest Ref Range: 1.6 - 2.3 mg/dL 2.0  2.3     Phosphorus Latest Ref Range: 2.5 - 4.5 mg/dL 2.8  2.0 (L)     Albumin Latest Ref Range: 3.4 - 5.0 g/dL     3.5   Protein Total Latest Ref Range: 6.8 - 8.8 g/dL     6.9   Bilirubin Total Latest Ref Range: 0.2 - 1.3 mg/dL     0.6   Alkaline Phosphatase Latest Ref Range: 40 - 150 U/L     35 (L)   ALT Latest Ref Range: 0 - 50 U/L     12   AST Latest Ref Range: 0 - 45 U/L     12   Results for RYLEE PURCELL (MRN [id number]) as of 3/31/2017 06:59   Ref. Range 3/26/2017 06:33 3/27/2017 05:52 3/30/2017 13:58   Glucose Latest Ref Range: 70 - 99 mg/dL 108 (H) 149 (H) 164 (H)   Results for RYLEE PURCELL (MRN [id number]) as of 3/31/2017 06:59   Ref. Range 3/27/2017 10:41 3/30/2017 13:58   WBC Latest Ref Range: 4.0 - 11.0 10e9/L 2.3 (L) 2.3 (L)   Hemoglobin Latest Ref Range: 11.7 - 15.7 g/dL 9.0 (L) 10.1 (L)   Hematocrit Latest Ref Range: 35.0 - 47.0 % 27.6 (L) 30.6 (L)   Platelet Count Latest Ref Range: 150 - 450 10e9/L 143 (L) 159   RBC Count Latest Ref Range: 3.8 - 5.2 10e12/L 2.50 (L) 2.79 (L)   MCV Latest Ref Range: 78 - 100 fl 110 (H) 110 (H)   MCH Latest Ref Range: 26.5 - 33.0 pg 36.0 (H) 36.2 (H)   MCHC Latest Ref Range: 31.5 - 36.5 g/dL 32.6 33.0   RDW Latest Ref Range: 10.0 - 15.0 % 14.7 14.6   Diff Method Unknown Automated Method Manual Differential   % Neutrophils Latest Units: % 49.2 68.7   % Lymphocytes Latest Units: % 39.7 25.9   % Monocytes Latest Units: % 8.1 4.5   % Eosinophils Latest Units: % 0.9 0.0   % Basophils Latest Units: % 2.1 0.9   % Immature Granulocytes Latest Units: % 0.0    Nucleated RBCs Latest Ref Range: 0 /100 0    Absolute Neutrophil Latest Ref Range: 1.6 - 8.3 10e9/L 1.2 (L) 1.6   Absolute  Lymphocytes Latest Ref Range: 0.8 - 5.3 10e9/L 0.9 0.6 (L)   Absolute Monocytes Latest Ref Range: 0.0 - 1.3 10e9/L 0.2 0.1   Absolute Eosinophils Latest Ref Range: 0.0 - 0.7 10e9/L 0.0 0.0   Absolute Basophils Latest Ref Range: 0.0 - 0.2 10e9/L 0.1 0.0   Abs Immature Granulocytes Latest Ref Range: 0 - 0.4 10e9/L 0.0    Absolute Nucleated RBC Unknown 0.0        EKG: Premature ventricular beats.     IMPRESSION/PLAN:  Helen Younger is a 75 year old female with history of ER/AZ-positive, HER-2 negative breast cancer treated with lumpectomy, adjuvant chemotherapy with Taxotere, whole breast radiotherapy, and 5 years of adjuvant Arimidex, completed in 2012. She represented in December 2015 with shortness of breath and pleural effusion, found to have developed metastatic disease involving the bones and pleural cavity.    Metastatic breast cancer: Currently on Letrozole and palbociclib with overall stable disease on imaging 12/23/2016. She tolerates this regimen well. She will continue with scans every 4-5 months as long as her tumor markers remain stable, and she doesn't had any signs of progressive disease. She was recently admitted with worsening weakness and bradycardia. She was started on abx for possible infection-although nothing clear was found and was on a ziopatch and was also found to have a possible new cerebral infarction on head CT. She was improving and discharged and her ibrance was held. Today she is doing okay, no changes since hospital discharge. She feels stronger than at admission, but weaker than her recent baseline. Recommended we continue to hold her ibrance for now. I will schedule her to return again in about 3 weeks. I will discuss with Dr. Landis.      Bone mets: Previously on monthly Xgeva, switched to every 3 months due to large co-pay, last received 2/27/17. She is taking calcium and vitamin D supplementation.     Pleural effusions:  This has been stable on subsequent imaging, asymptomatic.  Exam is stable today.    CKD/FEN: Encouraged adequate hydration with about 64 oz fluids daily. Creat, lytes today okay, except Cr slightly elevated. Reminded her to push fluids.     Hypotension: now off all anti-hypertensives.No dizziness or lightheadedness. BP continues to be softer, but stable. She was advised to continue to strive for 64 oz fluids daily.    Weakness:s/p recent admission for worsening weakness-no clear infection but discharged on levaquin. No clear cardiac cause, but on ziopatch to r/o arrhythmia with occasional bradycardia. She has had difficulty with mobility since her previous stroke and has not been enthusiastic about home PT. She is using a walker at home and home PT came out to start working with her yesterday. Her and her  feel comfortable providing care for her at home at this time.     ID: questionable infectious cause leading to recent weakness/fall-no clear source, but on levaquin 750mg daily to complete 4/1.     Hx of CVA: Head CT during her recent admission showed a possible new focal loss of gray white matter differentiaaion in the R middle fronatl gyrus concerning for reinfarction. Stable chronic L MCA territory infarct and stable 5mm posterior parafalcine calcified meningioma. Her symptoms were improving so she did not undergo a brain MRI. She has been on coumadin with hx of CVA. This is managed by the [facility]. INR today at 1.55. Called a notified them of today's draw. They will call ruthy for instructions for her dose later this afternoon.     PVC: heart rhythm today with PVCs as confirmed by EKG. Asymptomatic. She just finished a ziopatch monitor and sent it in. That will quantify how much of the time she is in this rhythm. No intervention at this time.     It is my privilege to be involved in the care of the above patient.     Padmini Brunson PA-C  [facility] [address] [address]  [phone]

## 2017-03-30 NOTE — NURSING NOTE
"Helen Younger is a 76 year old female who presents for:  Chief Complaint   Patient presents with     Blood Draw     Labs drawn via Left Arm VPT by Vascular Access RN.       Oncology Clinic Visit     Malignant neoplasm of female breast, unspecified laterality, unspecified site of breast        Initial Vitals:  BP 99/66  Pulse 71  Temp 97.9  F (36.6  C) (Oral)  Resp 16  Wt 86 kg (189 lb 9.5 oz)  SpO2 95%  BMI 34.68 kg/m2 Estimated body mass index is 34.68 kg/(m^2) as calculated from the following:    Height as of 3/25/17: 1.575 m (5' 2\").    Weight as of this encounter: 86 kg (189 lb 9.5 oz).. Body surface area is 1.94 meters squared. BP completed using cuff size: NA (Not Taken)  No Pain (0) No LMP recorded. Patient is postmenopausal. Allergies and medications reviewed.     Medications: MEDICATION REFILLS NEEDED TODAY.  Pharmacy name entered into [facility]:    [facility]  [facility] [id number] [place] [facility] [place], [place] - [address] [place] [address] [address] [facility] - [place], [place] - [address] [facility] - [place], [place] - [address] [place] [address]    Comments: Pt would like to request a refill for IBRANCE 100 mg    7 minutes for nursing intake (face to face time)   Annamaria Arora MA        "

## 2017-03-30 NOTE — TELEPHONE ENCOUNTER
Having INR done today during Oncology appointment at [place].  Will need to be dosed after draw.  Ellen Benitez RN [facility] Triage.

## 2017-03-30 NOTE — TELEPHONE ENCOUNTER
Padmini is calling to let you know patients INR today was low and you will have to call patient to adjust INR dosing.    Caller:   [facility] [phone] x pager       Sondra BURGER  [facility]

## 2017-03-30 NOTE — LETTER
3/30/2017      RE: Helen Machadoirina  [address]  [facility] [address]          HEMATOLOGY/ONCOLOGY PROGRESS NOTE  Mar 30, 2017    REASON FOR VISIT: follow-up of metastatic breast cancer    DIAGNOSIS:   The patient is a 76-year-old woman who, in March of 2006, was diagnosed with a left-sided breast cancer. She presented with pain in the left breast, and a diagnostic mammogram demonstrated abnormalities in the left breast. The region of abnormality was biopsied, and she was found to have an infiltrating ductal carcinoma. She subsequently went on to undergo a left-sided lumpectomy and sentinel lymph node biopsy at the [facility] by Dr. Salas Cook in April of 2006. The patient had a 2.5 x 2.1 cm tumor excised. It was a grade 2 infiltrating ductal carcinoma. Her sentinel lymph node biopsy was negative on frozen section, and final pathology did demonstrate micrometastasis measuring 0.2 cm. The tumor itself was ER-positive, MT-positive, and HER2-negative. The patient did have a positive margin for which she underwent a reexcision in June of 2006. The subsequent pathology was negative.     The patient was then seen by Dr. Jong Dacosta, and she was encouraged to receive adjuvant chemotherapy. She received three cycles of FEC every three weeks followed by Taxotere single-agent administered every three weeks for three cycles. She completed chemotherapy in mid to late October of 2006. Her only complication was neutropenic fever following her last dose, for which she was briefly hospitalized. She otherwise had no issues with residual neuropathies or any other known complications from her chemo.     The patient was started on adjuvant Arimidex on November 6, 2006. She also received whole breast radiotherapy with boost to the left breast and tumor bed from November 15, 2006, through January 5, 2007. This was performed by Dr. Nicholas at [facility]. It is unknown if she received kevon radiation. The  patient' portacath was removed in July of 2007. She completed five years of arimidex and remained off therapy.     She represented in December 2015 with shortness of breath and pleural effusion, requiring hospitalization. She had three thoracenteses while inpatient, with cytology revealing for a metastatic breast cancer, ER/MA-positive, HER-2 negative. On her workup, she also has disease in her bones after CT scan of the chest, abdomen and pelvis. She met with Dr. Landis on 1/11/2016 and was started on endocrine therapy with AI. She started on Ibrance on 2/3/2016. She was dose reduced after cycle 1 to 100 mg for cytopenias.    Ms. Younger was admitted from 3/25-3/27 with weakness and intermittent bradycardia She was on a ziopatch to evaluate and started on levaquin for possible infectious etiology. Head CT also deonstrated a suspected new focal loss o ffray white matter concerning for infarction. Patient was improving so no brain MRI pursued. She is here in f/u today.     INTERVAL HISTORY:   Helen is here with her  today who helps in supplementing her hx at times due to her expressive aphasia. She tells me she is just really tired. She had a dental appt two days ago and yesterday PT came out and then today's appt here, so she does not really feel like she has been given sufficient time to recover after her admission. She tells me she slept very poorly there due to all the frequent disruptions. Her  says she still seems weaker than she was a month ago, but better than she was last week at the time of her admission. She cannot walk quite as far as in the past. She ambulates with a walker and requires some assist from him at times. He still feels comfortable that he can provide for her mobility needs at home. There have been no falls since hospital discharge.    She is eating fine, but always has trouble getting enough fluids in. Denies any dizziness and tells me she has not had much today with this  appointment, but plans on doing better tomorrow. She is taking her antibiotic as prescribed. She is on coumadin without any bleeding, but has a resolving L subconjunctival hemorrhage. Denies any new n/t, focal weakness, HA, vision changes, dizziness, confusion, trouble swallowing. She denies fevers/chills/sweats. No cough, congestion, sore throat. Denies any chest pain, SOB, palpitations, n/v, abdominal pain, constipation, loose stools. She has some urinary incontinence-sounds like overflow incontinence, which is not new. No swelling in the legs. No aches/pains.       Current Outpatient Prescriptions   Medication Sig Dispense Refill     palbociclib (IBRANCE) 100 MG capsule CHEMO Take 1 capsule (100 mg) by mouth daily with food Take for 21 days, then 7 days off. Avoid grapefruit. Do not open/crush/chew capsule. ON HOLD. 21 capsule 0     levofloxacin (LEVAQUIN) 750 MG tablet Take 1 tablet (750 mg) by mouth daily 5 tablet 0     warfarin (JANTOVEN) 1 MG tablet Take 1 tablet (1mg) by mouth on Monday, Wednesday, Friday. Take 2 tablets (2 mg) by mouth on Tuesday and Thursday. 30 tablet 0     letrozole (FEMARA) 2.5 MG tablet Take 1 tablet (2.5 mg) by mouth daily 90 tablet 3     magnesium hydroxide (MILK OF MAGNESIA) 400 MG/5ML suspension Take 30-60 mLs by mouth daily as needed for constipation or heartburn 360 mL 0     sennosides (SENOKOT) 8.6 MG tablet Take 1 tablet by mouth 2 times daily May increase to 2 tabs twice daily if needed 120 each 0     simvastatin (ZOCOR) 20 MG tablet Take 1 tablet (20 mg) by mouth At Bedtime 90 tablet 1     Nystatin (NYSTOP) 763086 UNIT/GM POWD daily as needed       oxybutynin (DITROPAN) 5 MG tablet TAKE 1 TABLET (5 MG) BY MOUTH 2 TIMES DAILY  11     order for DME Equipment being ordered:  Incontinence pads   Patient uses these tid 100 each 3     order for DME Equipment being ordered: wheeled walker 1 each 0     DOCUSATE SODIUM 1 tablet 2 times daily        Multiple Vitamins-Minerals (OCUVITE  ADULT FORMULA PO) Take 1 tablet by mouth daily.       VIACTIV OR Take 1 chew tab by mouth 2 times daily. One in the morning and one at bedtime.        palbociclib (IBRANCE) 100 MG capsule CHEMO Take 1 capsule (100 mg) by mouth daily with food Take for 21 days, then 7 days off. Avoid grapefruit. Do not open/crush/chew capsule. 21 capsule 2          Allergies   Allergen Reactions     Diuretic      Don't remember side effect     Zyrtec [Cetirizine Hcl]      Elevated blood pressure       PHYSICAL EXAMINATION  BP 99/66  Pulse 71  Temp 97.9  F (36.6  C) (Oral)  Resp 16  Wt 86 kg (189 lb 9.5 oz)  SpO2 95%  BMI 34.68 kg/m2   Wt Readings from Last 4 Encounters:   03/30/17 86 kg (189 lb 9.5 oz)   03/27/17 85.4 kg (188 lb 4.8 oz)   02/27/17 86.5 kg (190 lb 9.6 oz)   01/24/17 84.5 kg (186 lb 4.8 oz)     Constitutional: Alert, oriented female in no visible distress. +Expressive aphasia. Examined in wheelchair today.  Eyes: PERRL. Anicteric sclerae. L eye with resolving subconjunctival hemorrhage  ENT/Mouth: OM moist and pink without thrush.   CV: irregular rhythm, no murmurs appreciated.  Resp: Decreased breath sounds on the left base, stable. Breathing comfortably without accessory muscle usage.  Abdomen: Soft, non-tender, non-distended.  Extremities: No lower extremity edema appreciated.   Skin: Warm, dry.   Lymph: No cervical or supraclavicular lymphadenopathy appreciated.   Neuro: CN II-XII grossly intact. Motor and strength intact in lower extremities     LABS:  Results for RYLEE PURCELL (MRN [id number]) as of 3/31/2017 06:59   Ref. Range 3/26/2017 06:33 3/26/2017 16:10 3/27/2017 05:52 3/27/2017 10:41 3/30/2017 13:58   Sodium Latest Ref Range: 133 - 144 mmol/L 142  142  142   Potassium Latest Ref Range: 3.4 - 5.3 mmol/L 3.6  3.9  3.5   Chloride Latest Ref Range: 94 - 109 mmol/L 107  111 (H)  109   Carbon Dioxide Latest Ref Range: 20 - 32 mmol/L 28  25  26   Urea Nitrogen Latest Ref Range: 7 - 30 mg/dL 22  20  26    Creatinine Latest Ref Range: 0.52 - 1.04 mg/dL 1.21 (H)  0.98  1.15 (H)   GFR Estimate Latest Ref Range: >60 mL/min/1.7m2 43 (L)  55 (L)  46 (L)   GFR Estimate If Black Latest Ref Range: >60 mL/min/1.7m2 53 (L)  67  56 (L)   Calcium Latest Ref Range: 8.5 - 10.1 mg/dL 8.7  8.3 (L)  8.7   Anion Gap Latest Ref Range: 3 - 14 mmol/L 8  6  7   Magnesium Latest Ref Range: 1.6 - 2.3 mg/dL 2.0  2.3     Phosphorus Latest Ref Range: 2.5 - 4.5 mg/dL 2.8  2.0 (L)     Albumin Latest Ref Range: 3.4 - 5.0 g/dL     3.5   Protein Total Latest Ref Range: 6.8 - 8.8 g/dL     6.9   Bilirubin Total Latest Ref Range: 0.2 - 1.3 mg/dL     0.6   Alkaline Phosphatase Latest Ref Range: 40 - 150 U/L     35 (L)   ALT Latest Ref Range: 0 - 50 U/L     12   AST Latest Ref Range: 0 - 45 U/L     12   Results for RYLEE PURCELL (MRN [id number]) as of 3/31/2017 06:59   Ref. Range 3/26/2017 06:33 3/27/2017 05:52 3/30/2017 13:58   Glucose Latest Ref Range: 70 - 99 mg/dL 108 (H) 149 (H) 164 (H)   Results for RYLEE PURCELL (MRN [id number]) as of 3/31/2017 06:59   Ref. Range 3/27/2017 10:41 3/30/2017 13:58   WBC Latest Ref Range: 4.0 - 11.0 10e9/L 2.3 (L) 2.3 (L)   Hemoglobin Latest Ref Range: 11.7 - 15.7 g/dL 9.0 (L) 10.1 (L)   Hematocrit Latest Ref Range: 35.0 - 47.0 % 27.6 (L) 30.6 (L)   Platelet Count Latest Ref Range: 150 - 450 10e9/L 143 (L) 159   RBC Count Latest Ref Range: 3.8 - 5.2 10e12/L 2.50 (L) 2.79 (L)   MCV Latest Ref Range: 78 - 100 fl 110 (H) 110 (H)   MCH Latest Ref Range: 26.5 - 33.0 pg 36.0 (H) 36.2 (H)   MCHC Latest Ref Range: 31.5 - 36.5 g/dL 32.6 33.0   RDW Latest Ref Range: 10.0 - 15.0 % 14.7 14.6   Diff Method Unknown Automated Method Manual Differential   % Neutrophils Latest Units: % 49.2 68.7   % Lymphocytes Latest Units: % 39.7 25.9   % Monocytes Latest Units: % 8.1 4.5   % Eosinophils Latest Units: % 0.9 0.0   % Basophils Latest Units: % 2.1 0.9   % Immature Granulocytes Latest Units: % 0.0    Nucleated RBCs Latest Ref Range:  0 /100 0    Absolute Neutrophil Latest Ref Range: 1.6 - 8.3 10e9/L 1.2 (L) 1.6   Absolute Lymphocytes Latest Ref Range: 0.8 - 5.3 10e9/L 0.9 0.6 (L)   Absolute Monocytes Latest Ref Range: 0.0 - 1.3 10e9/L 0.2 0.1   Absolute Eosinophils Latest Ref Range: 0.0 - 0.7 10e9/L 0.0 0.0   Absolute Basophils Latest Ref Range: 0.0 - 0.2 10e9/L 0.1 0.0   Abs Immature Granulocytes Latest Ref Range: 0 - 0.4 10e9/L 0.0    Absolute Nucleated RBC Unknown 0.0        EKG: Premature ventricular beats.     IMPRESSION/PLAN:  Helen Younger is a 75 year old female with history of ER/PA-positive, HER-2 negative breast cancer treated with lumpectomy, adjuvant chemotherapy with Taxotere, whole breast radiotherapy, and 5 years of adjuvant Arimidex, completed in 2012. She represented in December 2015 with shortness of breath and pleural effusion, found to have developed metastatic disease involving the bones and pleural cavity.    Metastatic breast cancer: Currently on Letrozole and palbociclib with overall stable disease on imaging 12/23/2016. She tolerates this regimen well. She will continue with scans every 4-5 months as long as her tumor markers remain stable, and she doesn't had any signs of progressive disease. She was recently admitted with worsening weakness and bradycardia. She was started on abx for possible infection-although nothing clear was found and was on a ziopatch and was also found to have a possible new cerebral infarction on head CT. She was improving and discharged and her ibrance was held. Today she is doing okay, no changes since hospital discharge. She feels stronger than at admission, but weaker than her recent baseline. Recommended we continue to hold her ibrance for now. I will schedule her to return again in about 3 weeks. I will discuss with Dr. Landis.      Bone mets: Previously on monthly Xgeva, switched to every 3 months due to large co-pay, last received 2/27/17. She is taking calcium and vitamin D  supplementation.     Pleural effusions:  This has been stable on subsequent imaging, asymptomatic. Exam is stable today.    CKD/FEN: Encouraged adequate hydration with about 64 oz fluids daily. Creat, lytes today okay, except Cr slightly elevated. Reminded her to push fluids.     Hypotension: now off all anti-hypertensives.No dizziness or lightheadedness. BP continues to be softer, but stable. She was advised to continue to strive for 64 oz fluids daily.    Weakness:s/p recent admission for worsening weakness-no clear infection but discharged on levaquin. No clear cardiac cause, but on ziopatch to r/o arrhythmia with occasional bradycardia. She has had difficulty with mobility since her previous stroke and has not been enthusiastic about home PT. She is using a walker at home and home PT came out to start working with her yesterday. Her and her  feel comfortable providing care for her at home at this time.     ID: questionable infectious cause leading to recent weakness/fall-no clear source, but on levaquin 750mg daily to complete 4/1.     Hx of CVA: Head CT during her recent admission showed a possible new focal loss of gray white matter differentiaaion in the R middle fronatl gyrus concerning for reinfarction. Stable chronic L MCA territory infarct and stable 5mm posterior parafalcine calcified meningioma. Her symptoms were improving so she did not undergo a brain MRI. She has been on coumadin with hx of CVA. This is managed by the [facility]. INR today at 1.55. Called a notified them of today's draw. They will call ruthy for instructions for her dose later this afternoon.     PVC: heart rhythm today with PVCs as confirmed by EKG. Asymptomatic. She just finished a ziopatch monitor and sent it in. That will quantify how much of the time she is in this rhythm. No intervention at this time.     It is my privilege to be involved in the care of the above patient.     Padmini Brunson PA-C  [facility]  [address] [address]  [phone]

## 2017-03-30 NOTE — TELEPHONE ENCOUNTER
MD called.  She takes 1 mg mwf  2mg on other days    Done with Levoquin in 3 days.     Increase to 2 mg daily    Recheck one week.

## 2017-03-31 ENCOUNTER — ANTICOAGULATION THERAPY VISIT (OUTPATIENT)
Dept: INTERNAL MEDICINE | Facility: CLINIC | Age: 77
End: 2017-03-31
Payer: COMMERCIAL

## 2017-03-31 ENCOUNTER — TELEPHONE (OUTPATIENT)
Dept: ONCOLOGY | Facility: CLINIC | Age: 77
End: 2017-03-31

## 2017-03-31 ENCOUNTER — TELEPHONE (OUTPATIENT)
Dept: INTERNAL MEDICINE | Facility: CLINIC | Age: 77
End: 2017-03-31

## 2017-03-31 DIAGNOSIS — C50.919 MALIGNANT NEOPLASM OF FEMALE BREAST, UNSPECIFIED LATERALITY, UNSPECIFIED SITE OF BREAST: ICD-10-CM

## 2017-03-31 DIAGNOSIS — J90 PLEURAL EFFUSION: Primary | ICD-10-CM

## 2017-03-31 DIAGNOSIS — Z79.01 LONG-TERM (CURRENT) USE OF ANTICOAGULANTS: ICD-10-CM

## 2017-03-31 DIAGNOSIS — I63.9 CEREBRAL INFARCTION (H): ICD-10-CM

## 2017-03-31 LAB
BACTERIA SPEC CULT: NO GROWTH
BACTERIA SPEC CULT: NO GROWTH
MICRO REPORT STATUS: NORMAL
MICRO REPORT STATUS: NORMAL
SPECIMEN SOURCE: NORMAL
SPECIMEN SOURCE: NORMAL

## 2017-03-31 PROCEDURE — 99207 ZZC NO CHARGE NURSE ONLY: CPT | Performed by: INTERNAL MEDICINE

## 2017-03-31 NOTE — TELEPHONE ENCOUNTER
Forms received from: [facility]   Phone number listed: [phone]   Fax listed: [phone]  Date received: 03/31/2017  Form description: Orders-PT 1 Day 1, SN 2 Week 2, 1 Week 4, 3 as needed  Once forms are completed, please return to [facility] via fax.  Is patient requesting to be contacted when forms are completed: NA    Form placed: In provider's basket  Romina Pierce

## 2017-03-31 NOTE — TELEPHONE ENCOUNTER
Oral Chemotherapy Monitoring Program   Placed call to patient in follow up of Ibrance (palbociclib) therapy.     [facility] received a refill for Ibrance. Spoke to German () who stated that Ibrance has been on hold since Helen's hospitalization and continued hold following her clinic appointment yesterday with Padmini Brunson PA-C. He was agreeable to profiling rx until she is instructed to restart medication.     Thank you for the opportunity to participate in this patient's care.     Rio Freeman, PharmD  Therapy Management Oncology Pharmacist  [facility]   Phone: [phone]

## 2017-03-31 NOTE — PROGRESS NOTES
ANTICOAGULATION FOLLOW-UP CLINIC VISIT    Patient Name:  Helen Younger  Date:  3/31/2017  Contact Type:  Telephone/ MD called patient    SUBJECTIVE:     Patient Findings     Positives No Problem Findings           OBJECTIVE    INR   Date Value Ref Range Status   03/30/2017 1.55 (H) 0.86 - 1.14 Final       ASSESSMENT / PLAN  INR assessment SUB    Recheck INR In: 1 WEEK    INR Location Clinic      Anticoagulation Summary as of 3/31/2017     INR goal 2.0-3.0   Today's INR 1.55! (3/30/2017)   Maintenance plan 1 mg (1 mg x 1) on Mon, Wed, Fri; 2 mg (1 mg x 2) all other days   Full instructions 3/31: 2 mg; 4/3: 2 mg; 4/5: 2 mg; Otherwise 1 mg on Mon, Wed, Fri; 2 mg all other days   Weekly total 11 mg   Plan last modified Soumya Garza, RN (3/13/2017)   Next INR check 4/6/2017   Target end date     Indications   Long-term (current) use of anticoagulants [Z79.01] [Z79.01]  Cerebral infarction (H) [I63.9]         Anticoagulation Episode Summary     INR check location     Preferred lab     Send INR reminders to  [facility]    Comments       Anticoagulation Care Providers     Provider Role Specialty Phone number    Arin Shahid MD  Internal Medicine [phone]            See the Encounter Report to view Anticoagulation Flowsheet and Dosing Calendar (Go to Encounters tab in chart review, and find the Anticoagulation Therapy Visit)    Dosage adjustment made based on physician directed care plan.    Kamla Fofana, RN

## 2017-03-31 NOTE — MR AVS SNAPSHOT
Helen Younger   3/31/2017   Anticoagulation Therapy Visit    Description:  76 year old female   Provider:  Arin Shahid MD   Department:  [facility]           INR as of 3/31/2017     Today's INR 1.55! (3/30/2017)      Anticoagulation Summary as of 3/31/2017     INR goal 2.0-3.0   Today's INR 1.55! (3/30/2017)   Full instructions 3/31: 2 mg; 4/3: 2 mg; 4/5: 2 mg; Otherwise 1 mg on Mon, Wed, Fri; 2 mg all other days   Next INR check 4/6/2017    Indications   Long-term (current) use of anticoagulants [Z79.01] [Z79.01]  Cerebral infarction (H) [I63.9]         March 2017 Details    Sun Mon Tue Wed Thu Fri Sat        1               2               3               4                 5               6               7               8               9               10               11                 12               13               14               15               16               17               18                 19               20               21               22               23               24               25                 26               27               28               29               30               31      2 mg   See details        Date Details   03/31 This INR check               How to take your warfarin dose     To take:  2 mg Take 2 of the 1 mg tablets.           April 2017 Details    Sun Mon Tue Wed Thu Fri Sat           1      2 mg           2      2 mg         3      2 mg         4      2 mg         5      2 mg         6            7               8                 9               10               11               12               13               14               15                 16               17               18               19               20               21               22                 23               24               25               26               27               28               29                 30                      Date Details   No additional  details    Date of next INR:  4/6/2017         How to take your warfarin dose     To take:  2 mg Take 2 of the 1 mg tablets.

## 2017-04-03 ENCOUNTER — TELEPHONE (OUTPATIENT)
Dept: INTERNAL MEDICINE | Facility: CLINIC | Age: 77
End: 2017-04-03

## 2017-04-03 NOTE — TELEPHONE ENCOUNTER
Forms received from: [facility]   Phone number listed: [phone]   Fax listed: [phone]  Date received: 04/03/2017  Form description: Orders-INR/Warfarin  Once forms are completed, please return to [facility] via fax.  Is patient requesting to be contacted when forms are completed: NA    Form placed: In provider's basket  Romina Pierce

## 2017-04-04 ENCOUNTER — TELEPHONE (OUTPATIENT)
Dept: INTERNAL MEDICINE | Facility: CLINIC | Age: 77
End: 2017-04-04

## 2017-04-04 ENCOUNTER — ANTICOAGULATION THERAPY VISIT (OUTPATIENT)
Dept: NURSING | Facility: CLINIC | Age: 77
End: 2017-04-04

## 2017-04-04 ENCOUNTER — ALLIED HEALTH/NURSE VISIT (OUTPATIENT)
Dept: PHARMACY | Facility: CLINIC | Age: 77
End: 2017-04-04
Payer: COMMERCIAL

## 2017-04-04 DIAGNOSIS — E78.5 HYPERLIPIDEMIA LDL GOAL <100: ICD-10-CM

## 2017-04-04 DIAGNOSIS — R55 PRE-SYNCOPE: ICD-10-CM

## 2017-04-04 DIAGNOSIS — I63.9 CEREBRAL INFARCTION (H): ICD-10-CM

## 2017-04-04 DIAGNOSIS — K59.00 CONSTIPATION, UNSPECIFIED CONSTIPATION TYPE: ICD-10-CM

## 2017-04-04 DIAGNOSIS — C50.919 MALIGNANT NEOPLASM OF FEMALE BREAST, UNSPECIFIED LATERALITY, UNSPECIFIED SITE OF BREAST: Primary | ICD-10-CM

## 2017-04-04 DIAGNOSIS — N32.81 OVERACTIVE BLADDER: ICD-10-CM

## 2017-04-04 DIAGNOSIS — Z79.01 LONG-TERM (CURRENT) USE OF ANTICOAGULANTS: ICD-10-CM

## 2017-04-04 DIAGNOSIS — I63.9 CEREBRAL INFARCTION, UNSPECIFIED MECHANISM (H): ICD-10-CM

## 2017-04-04 DIAGNOSIS — E63.9 NUTRITIONAL DEFICIENCY: ICD-10-CM

## 2017-04-04 DIAGNOSIS — R00.1 BRADYCARDIA: ICD-10-CM

## 2017-04-04 LAB — INR PPP: 2.2

## 2017-04-04 PROCEDURE — 0296T ZIO PATCH HOLTER: CPT | Performed by: INTERNAL MEDICINE

## 2017-04-04 PROCEDURE — 99605 MTMS BY PHARM NP 15 MIN: CPT | Performed by: PHARMACIST

## 2017-04-04 PROCEDURE — 99607 MTMS BY PHARM ADDL 15 MIN: CPT | Performed by: PHARMACIST

## 2017-04-04 NOTE — TELEPHONE ENCOUNTER
Forms received from: [facility]   Phone number listed: [phone]   Fax listed: [phone]  Date received: 04/04/2017  Form description: Orders-PT 1 Week 1, 3 Week 1, 2 Week 2, SN INR/Coumadin  Once forms are completed, please return to [facility] via fax.  Is patient requesting to be contacted when forms are completed: NA    Form placed: In provider's basket  Romina Pierce

## 2017-04-04 NOTE — MR AVS SNAPSHOT
After Visit Summary   4/4/2017    Helen Younger    MRN: [id number]           Patient Information     Date Of Birth          1940        Visit Information        Provider Department      4/4/2017 12:00 [facility]        Today's Diagnoses     Malignant neoplasm of female breast, unspecified laterality, unspecified site of breast (H)    -  1    Cerebral infarction, unspecified mechanism (H)        Constipation, unspecified constipation type        Hyperlipidemia LDL goal <100        Nutritional deficiency        Overactive bladder          Care Instructions    Recommendations from today's MTM visit:                                                    MTM (medication therapy management) is a service provided by a clinical pharmacist designed to help you get the most of out of your medicines.   Today we reviewed what your medicines are for, how to know if they are working, that your medicines are safe and how to make your medicine regimen as easy as possible.     1. No medication changes today.    Next MTM visit: 1-2 months    To schedule another MTM appointment, please call the clinic directly or you may call the MTM scheduling line at [phone] or toll-free at [phone].     My Clinical Pharmacist's contact information:                                                      It was a pleasure seeing you today!  Please feel free to contact me with any questions or concerns you have.      Amaris Luke, PharmD, BCOP, BCPS  Clinical Pharmacy Specialist/  Oncology Medication Therapy Management Pharmacist  [facility]  Pager [phone]  Phone [phone]          You may receive a survey about the MTM services you received.  I would appreciate your feedback to help me serve you better in the future. Please fill it out and return it when you can. Your comments will be anonymous.            Follow-ups after your visit        Your next 10  appointments already scheduled     Apr 17, 2017 12:30 PM CDT   [facility] Draw with [facility]   [facility] Draw ([facility])    [address] [place] [address]   [phone]            Apr 17, 2017  1:00 PM CDT   (Arrive by 12:45 PM)   Return Visit with Padmini Brunson PA-C   [facility] ([facility])    [address] [place] [address]   [phone]            May 02, 2017 12:30 PM CDT   [facility] Lab Draw with [facility]   [facility] Draw ([facility])    [address] [place] [address]   [phone]            May 02, 2017  1:00 PM CDT   (Arrive by 12:45 PM)   Return Visit with Padmini Brunson PA-C   [facility] ([facility])    [address] [place] [address]   [phone]            May 22, 2017  3:30 PM CDT   [facility] Lab Draw with [facility]   [facility] Draw ([facility])    [address] [place] [address]   [phone]            May 22, 2017  4:00 PM CDT   (Arrive by 3:45 PM)   Return Visit with Keisha Landis MD   [facility] ([facility])    [address] [place] [address]   [phone]              Who to contact     If you have questions or need follow up information about today's clinic visit or your schedule please contact [facility] MEDICATION THERAPY MANAGEMENT directly at [phone].  Normal or non-critical lab and imaging results will be communicated to you by MyChart, letter or phone within 4 business days after the clinic has received the results. If you do not hear from us within 7 days, please contact the clinic through MyChart or phone. If you have a  critical or abnormal lab result, we will notify you by phone as soon as possible.  Submit refill requests through [employer] or call your pharmacy and they will forward the refill request to us. Please allow 3 business days for your refill to be completed.          Additional Information About Your Visit        [employer] Information     [employer] gives you secure access to your electronic health record. If you see a primary care provider, you can also send messages to your care team and make appointments. If you have questions, please call your primary care clinic.  If you do not have a primary care provider, please call [phone] and they will assist you.        Care EveryWhere ID     This is your Care EveryWhere ID. This could be used by other organizations to access your [facility] medical records  [id number]         Blood Pressure from Last 3 Encounters:   03/30/17 99/66   03/27/17 145/75   02/27/17 107/60    Weight from Last 3 Encounters:   03/30/17 189 lb 9.5 oz (86 kg)   03/27/17 188 lb 4.8 oz (85.4 kg)   02/27/17 190 lb 9.6 oz (86.5 kg)              Today, you had the following     No orders found for display         Today's Medication Changes          These changes are accurate as of: 4/4/17 11:59 PM.  If you have any questions, ask your nurse or doctor.               These medicines have changed or have updated prescriptions.        Dose/Directions    warfarin 1 MG tablet   Commonly known as:  JANTOVEN   This may have changed:    - how much to take  - how to take this  - when to take this  - additional instructions   Used for:  History of stroke        Take 1 tablet (1mg) by mouth on Monday, Wednesday, Friday. Take 2 tablets (2 mg) by mouth on Tuesday and Thursday.   Quantity:  30 tablet   Refills:  0         Stop taking these medicines if you haven't already. Please contact your care team if you have questions.     levofloxacin 750 MG tablet   Commonly known as:  LEVAQUIN   Stopped by:  Amaris Luke [facility]            NYSTOP 995117 UNIT/GM Powd powder   Stopped by:  Amaris Luke [facility]           palbociclib 100 MG capsule CHEMO   Commonly known as:  IBRANCE   Stopped by:  Amaris Luke RPH                    Primary Care Provider Office Phone # Fax #    Arin Shahid [phone]       [facility] [address] [facility] [address]        Thank you!     Thank you for choosing [facility] MEDICATION THERAPY MANAGEMENT  for your care. Our goal is always to provide you with excellent care. Hearing back from our patients is one way we can continue to improve our services. Please take a few minutes to complete the written survey that you may receive in the mail after your visit with us. Thank you!             Your Updated Medication List - Protect others around you: Learn how to safely use, store and throw away your medicines at www.disposemymeds.org.          This list is accurate as of: 4/4/17 11:59 PM.  Always use your most recent med list.                   Brand Name Dispense Instructions for use    DOCUSATE SODIUM      1 tablet 2 times daily       letrozole 2.5 MG tablet    FEMARA    90 tablet    Take 1 tablet (2.5 mg) by mouth daily       magnesium hydroxide 400 MG/5ML suspension    MILK OF MAGNESIA    360 mL    Take 30-60 mLs by mouth daily as needed for constipation or heartburn       OCUVITE ADULT FORMULA PO      Take 1 tablet by mouth daily.       * order for DME     1 each    Equipment being ordered: wheeled walker       * order for DME     100 each    Equipment being ordered:  Incontinence pads   Patient uses these tid       oxybutynin 5 MG tablet    DITROPAN     TAKE 1 TABLET (5 MG) BY MOUTH 2 TIMES DAILY       sennosides 8.6 MG tablet    SENOKOT    120 each    Take 1 tablet by mouth 2 times daily May increase to 2 tabs twice daily if needed       simvastatin 20 MG tablet    ZOCOR    90 tablet    Take 1 tablet (20 mg) by mouth At Bedtime       VIACTIV PO      Take 1  chew tab by mouth 2 times daily. One in the morning and one at bedtime.       warfarin 1 MG tablet    JANTOVEN    30 tablet    Take 1 tablet (1mg) by mouth on Monday, Wednesday, Friday. Take 2 tablets (2 mg) by mouth on Tuesday and Thursday.       * Notice:  This list has 2 medication(s) that are the same as other medications prescribed for you. Read the directions carefully, and ask your doctor or other care provider to review them with you.

## 2017-04-04 NOTE — MR AVS SNAPSHOT
Helen Younger   4/4/2017   Anticoagulation Therapy Visit    Description:  76 year old female   Provider:  Arin Shahid MD   Department:  [facility]           INR as of 4/4/2017     Today's INR 2.2      Anticoagulation Summary as of 4/4/2017     INR goal 2.0-3.0   Today's INR 2.2   Full instructions 4/5: 2 mg; Otherwise 2 mg every day   Next INR check 4/11/2017    Indications   Long-term (current) use of anticoagulants [Z79.01] [Z79.01]  Cerebral infarction (H) [I63.9]         Contact Numbers     [facility]  Please call [phone] or [phone]  to cancel and/or reschedule your appointment.   Please call [phone] with any problems or questions regarding your therapy          April 2017 Details    Sun Mon Tue Wed Thu Fri Sat           1                 2               3               4      2 mg   See details      5      2 mg         6      2 mg         7      2 mg         8      2 mg           9      2 mg         10      2 mg         11            12               13               14               15                 16               17               18               19               20               21               22                 23               24               25               26               27               28               29                 30                      Date Details   04/04 This INR check       Date of next INR:  4/11/2017         How to take your warfarin dose     To take:  2 mg Take 2 of the 1 mg tablets.

## 2017-04-04 NOTE — PROGRESS NOTES
ANTICOAGULATION FOLLOW-UP CLINIC VISIT    Patient Name:  Helen Younger  Date:  4/4/2017  Contact Type:  Telephone/ [facility] calling with INR results    SUBJECTIVE:        OBJECTIVE    INR   Date Value Ref Range Status   04/04/2017 2.2  Final       ASSESSMENT / PLAN  INR assessment THER    Recheck INR In: 1 WEEK    [facility] Location [facility]      Anticoagulation Summary as of 4/4/2017     INR goal 2.0-3.0   Today's INR 2.2   Maintenance plan 2 mg (1 mg x 2) every day   Full instructions 4/5: 2 mg; Otherwise 2 mg every day   Weekly total 14 mg   Plan last modified Soumya Garza RN (4/4/2017)   Next INR check 4/11/2017   Target end date     Indications   Long-term (current) use of anticoagulants [Z79.01] [Z79.01]  Cerebral infarction (H) [I63.9]         Anticoagulation Episode Summary     INR check location     Preferred lab     Send INR reminders to  ANTICOAG CLINIC    Comments       Anticoagulation Care Providers     Provider Role Specialty Phone number    Arin Shahid MD  Internal Medicine [phone]            See the Encounter Report to view Anticoagulation Flowsheet and Dosing Calendar (Go to Encounters tab in chart review, and find the Anticoagulation Therapy Visit)    Dosage adjustment made based on physician directed care plan.    Soumya Garza RN

## 2017-04-05 NOTE — PATIENT INSTRUCTIONS
Recommendations from today's MTM visit:                                                    MTM (medication therapy management) is a service provided by a clinical pharmacist designed to help you get the most of out of your medicines.   Today we reviewed what your medicines are for, how to know if they are working, that your medicines are safe and how to make your medicine regimen as easy as possible.     1. No medication changes today.    Next MTM visit: 1-2 months    To schedule another MTM appointment, please call the clinic directly or you may call the MTM scheduling line at [phone] or toll-free at [phone].     My Clinical Pharmacist's contact information:                                                      It was a pleasure seeing you today!  Please feel free to contact me with any questions or concerns you have.      Amaris Luke, PharmD, BCOP, BCPS  Clinical Pharmacy Specialist/  Oncology Medication Therapy Management Pharmacist  [facility]  Pager [phone]  Phone [phone]          You may receive a survey about the MTM services you received.  I would appreciate your feedback to help me serve you better in the future. Please fill it out and return it when you can. Your comments will be anonymous.

## 2017-04-05 NOTE — PROGRESS NOTES
SUBJECTIVE/OBJECTIVE:                                                    Helen Yuonger is a 76 year old female called for a transitions of care visit.  She was discharged from [facility] on 3/27/17 for weakness and bradycardia.     Per request of patient, I spoke to  Rony during this call.    The primary oncologist for this patient is Dr Keisha Landis.  The PCP for this patient is Dr Arin Shahid.    Cancer diagnosis: Breast cancer      Chief Complaint: medication review.    Allergies/ADRs: Reviewed in Epic  Tobacco: No tobacco use   Alcohol: not currently using    PMH: Reviewed in Epic    Medication Adherence: has assistance setting up med boxes    Breast cancer:  Current medications include: Ibrance (currently on hold) and Femara 2.5mg daily.  The Ibrance is still on hold, due to patient's persistent weakness and history of neutropenia.  No hot flashes or other side effects reported with Femara.    Anticoagulation (Hx CVA):  Current medications include: warfarin, currently at 2mg daily.  Warfarin therapy is managed by Anticoagulation Service.  No bleeding/bruising complications reported.    Constipation:  Current medications include: docusate 1 tab BID, and PRN MOM.  This is controlling her bowels well.    Hyperlipidemia: Current therapy includes simvastatin 20mg once daily.  Pt reports no significant myalgias or other side effects.     Supplements:  Current medications include: Ocuvits, and Viactiv.  No problems reported.    Overactive bladder/incontinence:  Current medications include: oxybutynin 5mg daily.  This doesn't seem to have been helpful, even when she was taking it BID.    Current labs include:  BP Readings from Last 3 Encounters:   03/30/17 99/66   03/27/17 145/75   02/27/17 107/60     Today's Vitals: There were no vitals taken for this visit. (phone visit)      Lab Results   Component Value Date    CHOL 126 07/14/2016     Lab Results   Component Value Date    TRIG 125 07/14/2016     Lab Results  "  Component Value Date    HDL 50 07/14/2016     Lab Results   Component Value Date    LDL 51 07/14/2016     Lab Results   Component Value Date    ALT 12 03/30/2017       Latest Ht and Wt:  Wt Readings from Last 2 Encounters:   03/30/17 189 lb 9.5 oz (86 kg)   03/27/17 188 lb 4.8 oz (85.4 kg)     Ht Readings from Last 2 Encounters:   03/25/17 5' 2\" (1.575 m)   01/24/17 5' 2.01\" (1.575 m)      Current labs include:  Lab Results   Component Value Date    BUN 26 03/30/2017     Lab Results   Component Value Date    CR 1.15 03/30/2017     Lab Results   Component Value Date    POTASSIUM 3.5 03/30/2017     Lab Results   Component Value Date    ALT 12 03/30/2017     Lab Results   Component Value Date    AST 12 03/30/2017     Lab Results   Component Value Date    BILITOTAL 0.6 03/30/2017     Lab Results   Component Value Date    ALBUMIN 3.5 03/30/2017     Lab Results   Component Value Date    WBC 2.3 03/30/2017     Lab Results   Component Value Date    HGB 10.1 03/30/2017     Lab Results   Component Value Date     03/30/2017     Absolute Neutrophil   Date Value Ref Range Status   03/30/2017 1.6 1.6 - 8.3 10e9/L Final         Most Recent Immunizations   Administered Date(s) Administered     Influenza (High Dose) 3 valent vaccine 11/10/2016     Influenza (IIV3) 09/30/2010     Pneumococcal (PCV 13) 10/09/2015     Pneumococcal 23 valent 10/24/2011     TD (ADULT, 7+) 07/14/2016     Tdap (Adacel,Boostrix) 04/24/2006     Zoster vaccine, live 12/12/2007     ASSESSMENT:                                                       Current medications were reviewed today.      Medication Adherence: no issues identified    Breast cancer: Stable. Patient will be reassessed at clinic visit 4/17 for restarting Ibrance.    Anticoagulation (Hx CVA): Stable. No change needed.    Constipation: Stable. Current treatment effective.    Hyperlipidemia: Stable. No change in current medication necessary at this time. Last lipid panel about nine " months ago.    Supplements: Stable. No change needed.    Overactive bladder: Unimproved. Not clear that changing to another medication would actually help.  Recommended scheduled voiding, as patient's mobility is limited.      PLAN:                                                      Post Discharge Medication Reconciliation Status: discharge medications reconciled, continue medications without change.    No medication changes today.    I spent 30 minutes with this patient today.  A copy of the visit note was provided to the patient's primary care provider.    Will follow up in 1-2 months.    The patient was sent via [employer] a summary of these recommendations as an after visit summary.    Amaris Luke, PharmD, BCOP, BCPS  Clinical Pharmacy Specialist/  Oncology Medication Therapy Management Pharmacist  [facility]  Pager [phone]  Phone [phone]

## 2017-04-06 ENCOUNTER — TELEPHONE (OUTPATIENT)
Dept: INTERNAL MEDICINE | Facility: CLINIC | Age: 77
End: 2017-04-06

## 2017-04-06 NOTE — TELEPHONE ENCOUNTER
Forms received from: [facility]   Phone number listed: [phone]   Fax listed: [phone]  Date received: 04/06/2017  Form description: SN-INR/Coumadin  Once forms are completed, please return to [facility] via fax.  Is patient requesting to be contacted when forms are completed: NA    Form placed: In provider's basket  Romina Pierce

## 2017-04-07 LAB — INTERPRETATION ECG - MUSE: NORMAL

## 2017-04-07 NOTE — PROGRESS NOTES
Helen Younger    Here is the Ziopatch result.  There are some benign rhthm changes from time to time, but none are pathological.  When you had triggered your symptoms, they did not correlate with any rhythm disturbance.  I am afraid that we are still unclear about the nature of these episodes as we had been since they started a couple years ago.     Sincerely,     LALY DEJESUS M.D.

## 2017-04-07 NOTE — PROGRESS NOTES
Helen Younger    Here is the Ziopatch result.  There are some benign rhthm changes from time to time, but none are pathological.  When you had triggered your symptoms, they did not correlate with any rhythm disturbance.  I am afraid that we are still unclear about the nature of these episodes as we had been since they started a couple years ago.     Sincerely,     LALY DEJESUS M.D.      TC:  Send letter with copy of the ziopatch results.  Thanks.

## 2017-04-07 NOTE — TELEPHONE ENCOUNTER
MD left message re: Ziopatch results.  Does not appear to be any pathological cardiac arrhthmias correlating with reported symptoms.  Will send report.  No further recommendations.

## 2017-04-11 NOTE — MR AVS SNAPSHOT
Helen LONDON Younger   4/11/2017   Anticoagulation Therapy Visit    Description:  76 year old female   Provider:  Arin Shahid MD   Department:  [facility]           INR as of 4/11/2017     Today's INR 2.3      Anticoagulation Summary as of 4/11/2017     INR goal 2.0-3.0   Today's INR 2.3   Full instructions 2 mg every day   Next INR check 4/25/2017    Indications   Long-term (current) use of anticoagulants [Z79.01] [Z79.01]  Cerebral infarction (H) [I63.9]         Contact Numbers     [facility] [[email or web address]]  Please call [phone] or [phone]  to cancel and/or reschedule your appointment.   Please call [phone] with any problems or questions regarding your therapy          April 2017 Details    Sun Mon Tue Wed Thu Fri Sat           1                 2               3               4               5               6               7               8                 9               10               11      2 mg   See details      12      2 mg         13      2 mg         14      2 mg         15      2 mg           16      2 mg         17      2 mg         18      2 mg         19      2 mg         20      2 mg         21      2 mg         22      2 mg           23      2 mg         24      2 mg         25            26               27               28               29                 30                      Date Details   04/11 This INR check       Date of next INR:  4/25/2017         How to take your warfarin dose     To take:  2 mg Take 2 of the 1 mg tablets.

## 2017-04-11 NOTE — PROGRESS NOTES
ANTICOAGULATION FOLLOW-UP CLINIC VISIT    Patient Name:  Helen Younger  Date:  4/11/2017  Contact Type:  Telephone/ [facility] RN calling with fingerstick INR    SUBJECTIVE:        OBJECTIVE    INR   Date Value Ref Range Status   04/11/2017 2.3  Final       ASSESSMENT / PLAN  INR assessment THER    Recheck INR In: 2 WEEKS    [facility]      Anticoagulation Summary as of 4/11/2017     INR goal 2.0-3.0   Today's INR 2.3   Maintenance plan 2 mg (1 mg x 2) every day   Full instructions 2 mg every day   Weekly total 14 mg   No change documented Soumya Garza, RN   Plan last modified Soumya Garza RN (4/4/2017)   Next INR check 4/25/2017   Target end date     Indications   Long-term (current) use of anticoagulants [Z79.01] [Z79.01]  Cerebral infarction (H) [I63.9]         Anticoagulation Episode Summary     INR check location     Preferred lab     Send INR reminders to  ANTICO CL[facility]IC    Comments       Anticoagulation Care Providers     Provider Role Specialty Phone number    Arin Shahid MD  Internal Medicine [phone]            See the Encounter Report to view Anticoagulation Flowsheet and Dosing Calendar (Go to Encounters tab in chart review, and find the Anticoagulation Therapy Visit)    Dosage adjustment made based on physician directed care plan.    Soumya Garza RN

## 2017-04-14 NOTE — TELEPHONE ENCOUNTER
Forms received from: [facility]   Phone number listed: [phone]   Fax listed: [phone]  Date received: 04/14/2017  Form description: Orders-SN INR/Coumadin  Once forms are completed, please return to [facility] via fax.  Is patient requesting to be contacted when forms are completed: NA    Form placed: In provider's basket  Romina Pierce

## 2017-04-17 NOTE — PROGRESS NOTES
HEMATOLOGY/ONCOLOGY PROGRESS NOTE  Apr 17, 2017    REASON FOR VISIT: follow-up of metastatic breast cancer    DIAGNOSIS:   The patient is a 76-year-old woman who, in March of 2006, was diagnosed with a left-sided breast cancer. She presented with pain in the left breast, and a diagnostic mammogram demonstrated abnormalities in the left breast. The region of abnormality was biopsied, and she was found to have an infiltrating ductal carcinoma. She subsequently went on to undergo a left-sided lumpectomy and sentinel lymph node biopsy at the [facility] by Dr. Salas Cook in April of 2006. The patient had a 2.5 x 2.1 cm tumor excised. It was a grade 2 infiltrating ductal carcinoma. Her sentinel lymph node biopsy was negative on frozen section, and final pathology did demonstrate micrometastasis measuring 0.2 cm. The tumor itself was ER-positive, NJ-positive, and HER2-negative. The patient did have a positive margin for which she underwent a reexcision in June of 2006. The subsequent pathology was negative.     The patient was then seen by Dr. Jong Dacosta, and she was encouraged to receive adjuvant chemotherapy. She received three cycles of FEC every three weeks followed by Taxotere single-agent administered every three weeks for three cycles. She completed chemotherapy in mid to late October of 2006. Her only complication was neutropenic fever following her last dose, for which she was briefly hospitalized. She otherwise had no issues with residual neuropathies or any other known complications from her chemo.     The patient was started on adjuvant Arimidex on November 6, 2006. She also received whole breast radiotherapy with boost to the left breast and tumor bed from November 15, 2006, through January 5, 2007. This was performed by Dr. Nicholas at [facility]. It is unknown if she received kevon radiation. The patient' portacath was removed in July of 2007. She completed five years of arimidex and  remained off therapy.     She represented in December 2015 with shortness of breath and pleural effusion, requiring hospitalization. She had three thoracenteses while inpatient, with cytology revealing for a metastatic breast cancer, ER/SC-positive, HER-2 negative. On her workup, she also has disease in her bones after CT scan of the chest, abdomen and pelvis. She met with Dr. Landis on 1/11/2016 and was started on endocrine therapy with AI. She started on Ibrance on 2/3/2016. She was dose reduced after cycle 1 to 100 mg for cytopenias.    Ms. Younger was admitted from 3/25-3/27 with weakness and intermittent bradycardia She was on a ziopatch to evaluate and started on levaquin for possible infectious etiology. Head CT also deonstrated a suspected new focal loss o ffray white matter concerning for infarction. Patient was improving so no brain MRI pursued. She was seen shortly after discharge and recommended to hold her ibrance to allow for improvement in her overall weakness. She is here in f/u today.     INTERVAL HISTORY:   Helen is here with her  today. She complains about b/l arm pain today, worse on the R vs. The L. The pain is elicited with movement or with lifting things. She denies any swelling or n/t. She cannot lift her R arm as high as her L. She has a hx of prior stroke affecting her R side in the past. Her  notes that last week she fell and he had to pull on her arms to help her up. He had not heard about the pain until after that time. They tell me she has had two falls in th epast month, both of which she landed on her bottom. Both occurred during the night when she was up to go to the bathroom. She has had intermittent falls in the past due to weakness. She thought this time maybe she got dizzy before one of the most recent episodes. She recently had a ziopatch and was noted to have no arrhythmias contributing to these episodes. Her generalized weakness has improved and she has been up  moving around with her walker much better than after her admission.  Both her and her  feel she is back to her baseline today. She is eating well and is drinking about 30oz/day. She tells me she has not been feeling dizzy.     No fevers/chills/sweats, HA, cough, congestion, sore throat, chest pain/pressure, swelling, bleeding, rash, SOB, HUERTA, n/v, constipation, loose stools, new urinary changes (baseline incontinence issues).     Current Outpatient Prescriptions   Medication Sig Dispense Refill     order for DME Equipment being ordered: portable commode 1 Units 0     warfarin (JANTOVEN) 1 MG tablet Take 1 tablet (1mg) by mouth on Monday, Wednesday, Friday. Take 2 tablets (2 mg) by mouth on Tuesday and Thursday. (Patient taking differently: Take 2 mg by mouth daily Take 1 tablet (1mg) by mouth on Monday, Wednesday, Friday. Take 2 tablets (2 mg) by mouth on Tuesday and Thursday.) 30 tablet 0     letrozole (FEMARA) 2.5 MG tablet Take 1 tablet (2.5 mg) by mouth daily 90 tablet 3     magnesium hydroxide (MILK OF MAGNESIA) 400 MG/5ML suspension Take 30-60 mLs by mouth daily as needed for constipation or heartburn 360 mL 0     sennosides (SENOKOT) 8.6 MG tablet Take 1 tablet by mouth 2 times daily May increase to 2 tabs twice daily if needed 120 each 0     simvastatin (ZOCOR) 20 MG tablet Take 1 tablet (20 mg) by mouth At Bedtime 90 tablet 1     oxybutynin (DITROPAN) 5 MG tablet TAKE 1 TABLET (5 MG) BY MOUTH 2 TIMES DAILY  11     order for DME Equipment being ordered:  Incontinence pads   Patient uses these tid 100 each 3     order for DME Equipment being ordered: wheeled walker 1 each 0     DOCUSATE SODIUM 1 tablet 2 times daily        Multiple Vitamins-Minerals (OCUVITE ADULT FORMULA PO) Take 1 tablet by mouth daily.       VIACTIV OR Take 1 chew tab by mouth 2 times daily. One in the morning and one at bedtime.             Allergies   Allergen Reactions     Diuretic      Don't remember side effect     Zyrtec  [Cetirizine Hcl]      Elevated blood pressure       PHYSICAL EXAMINATION  /89  Pulse 73  Temp 98.7  F (37.1  C) (Oral)  Wt 83.4 kg (183 lb 14.4 oz)  SpO2 95%  BMI 33.64 kg/m2   Wt Readings from Last 4 Encounters:   04/17/17 83.4 kg (183 lb 14.4 oz)   03/30/17 86 kg (189 lb 9.5 oz)   03/27/17 85.4 kg (188 lb 4.8 oz)   02/27/17 86.5 kg (190 lb 9.6 oz)     Constitutional: Alert, oriented female in no visible distress. +Expressive aphasia. Examined in wheelchair today.  Eyes: PERRL. Anicteric sclerae.  ENT/Mouth: OM moist and pink without thrush.   CV: RRR, no murmurs appreciated.  Resp: Decreased breath sounds on the left base, stable. Breathing comfortably without accessory muscle usage.  Abdomen: Soft, non-tender, non-distended.  Extremities: No lower extremity edema appreciated. Tender in R upper arm to palpation and with movement of her elbow and upper arm. Denies any pain at the shoulder. Tender to palpation over R anterior shoulder. No swelling. Cannot lift her R arm as high as the L above her head.   Skin: Warm, dry.   Lymph: No cervical or supraclavicular lymphadenopathy appreciated.   Neuro: CN II-XII grossly intact. Motor and strength intact in lower extremities     LABS:  Results for RYLEE PURCELL (MRN [id number]) as of 4/17/2017 17:52   Ref. Range 3/27/2017 05:52 3/27/2017 10:41 3/30/2017 13:58 4/17/2017 13:03   Sodium Latest Ref Range: 133 - 144 mmol/L 142  142 140   Potassium Latest Ref Range: 3.4 - 5.3 mmol/L 3.9  3.5 3.4   Chloride Latest Ref Range: 94 - 109 mmol/L 111 (H)  109 103   Carbon Dioxide Latest Ref Range: 20 - 32 mmol/L 25 26 30   Urea Nitrogen Latest Ref Range: 7 - 30 mg/dL 20 26 17   Creatinine Latest Ref Range: 0.52 - 1.04 mg/dL 0.98  1.15 (H) 1.13 (H)   GFR Estimate Latest Ref Range: >60 mL/min/1.7m2 55 (L)  46 (L) 47 (L)   GFR Estimate If Black Latest Ref Range: >60 mL/min/1.7m2 67  56 (L) 57 (L)   Calcium Latest Ref Range: 8.5 - 10.1 mg/dL 8.3 (L)  8.7 10.0   Anion Gap  Latest Ref Range: 3 - 14 mmol/L 6  7 7   Magnesium Latest Ref Range: 1.6 - 2.3 mg/dL 2.3      Phosphorus Latest Ref Range: 2.5 - 4.5 mg/dL 2.0 (L)      Albumin Latest Ref Range: 3.4 - 5.0 g/dL   3.5 3.5   Protein Total Latest Ref Range: 6.8 - 8.8 g/dL   6.9 7.2   Bilirubin Total Latest Ref Range: 0.2 - 1.3 mg/dL   0.6 0.6   Alkaline Phosphatase Latest Ref Range: 40 - 150 U/L   35 (L) 40   ALT Latest Ref Range: 0 - 50 U/L   12 16   AST Latest Ref Range: 0 - 45 U/L   12 17   Results for RYLEE PURCELL (MRN [id number]) as of 4/17/2017 17:52   Ref. Range 3/30/2017 13:58 4/17/2017 13:03   Glucose Latest Ref Range: 70 - 99 mg/dL 164 (H) 162 (H)   Results for RYLEE PURCELL (MRN [id number]) as of 4/17/2017 17:52   Ref. Range 3/30/2017 13:58 4/17/2017 13:03   WBC Latest Ref Range: 4.0 - 11.0 10e9/L 2.3 (L) 7.4   Hemoglobin Latest Ref Range: 11.7 - 15.7 g/dL 10.1 (L) 11.3 (L)   Hematocrit Latest Ref Range: 35.0 - 47.0 % 30.6 (L) 35.5   Platelet Count Latest Ref Range: 150 - 450 10e9/L 159 382   RBC Count Latest Ref Range: 3.8 - 5.2 10e12/L 2.79 (L) 3.30 (L)   MCV Latest Ref Range: 78 - 100 fl 110 (H) 108 (H)   MCH Latest Ref Range: 26.5 - 33.0 pg 36.2 (H) 34.2 (H)   MCHC Latest Ref Range: 31.5 - 36.5 g/dL 33.0 31.8   RDW Latest Ref Range: 10.0 - 15.0 % 14.6 13.4   Diff Method Unknown Manual Differential Automated Method   % Neutrophils Latest Units: % 68.7 69.4   % Lymphocytes Latest Units: % 25.9 17.2   % Monocytes Latest Units: % 4.5 10.6   % Eosinophils Latest Units: % 0.0 1.5   % Basophils Latest Units: % 0.9 0.5   % Immature Granulocytes Latest Units: %  0.8   Nucleated RBCs Latest Ref Range: 0 /100  0   Absolute Neutrophil Latest Ref Range: 1.6 - 8.3 10e9/L 1.6 5.2   Absolute Lymphocytes Latest Ref Range: 0.8 - 5.3 10e9/L 0.6 (L) 1.3   Absolute Monocytes Latest Ref Range: 0.0 - 1.3 10e9/L 0.1 0.8   Absolute Eosinophils Latest Ref Range: 0.0 - 0.7 10e9/L 0.0 0.1   Absolute Basophils Latest Ref Range: 0.0 - 0.2 10e9/L  0.0 0.0   Abs Immature Granulocytes Latest Ref Range: 0 - 0.4 10e9/L  0.1   Absolute Nucleated RBC Unknown  0.0   Poikilocytosis Unknown Slight          IMPRESSION/PLAN:  Helen Younger is a 75 year old female with history of ER/ND-positive, HER-2 negative breast cancer treated with lumpectomy, adjuvant chemotherapy with Taxotere, whole breast radiotherapy, and 5 years of adjuvant Arimidex, completed in 2012. She represented in December 2015 with shortness of breath and pleural effusion, found to have developed metastatic disease involving the bones and pleural cavity.     Metastatic breast cancer: Most recently on Letrozole and palbociclib with overall stable disease on imaging 12/23/2016. She tolerates this regimen well. She will continue with scans every 4-5 months as long as her tumor markers remain stable, and she doesn't had any signs of progressive disease. She was admitted in March with worsening weakness and bradycardia. She was started on abx for possible infection-although nothing clear was found and was on a ziopatch and was also found to have a possible new cerebral infarction on head CT. She was improving and discharged. I chose to further hold her ibrance to allow improvement in her overall weakness. She is here for f/u today now 3 weeks later. Her overall condition has returned to baseline. She has never really had any side effects from the ibrance. We discussed options today and she will resume the Ibrance and she is due for scans again next month. I will alert oral onc pharmacy and they will contact her. I will see her back in two weeks to see how she is doing after resuming it.     Bone mets: Previously on monthly Xgeva, switched to every 3 months due to large co-pay, last received 2/27/17. She is taking calcium and vitamin D supplementation.      Pleural effusions:  This has been stable on subsequent imaging, asymptomatic. Exam is stable today.     CKD/FEN: Encouraged adequate hydration with about  64 oz fluids daily. Creat, lytes today okay, except Cr slightly elevated. Reminded her to push fluids.        Weakness:ongoing hx for several months with intermittent falls-usually seem mechanical and some have been described after some dizziness. Ziopatch was negative for contributable arrhythmia. Likely due to chronic deconditioning. Ambulates with walker at home. In the past month her two falls were during the night with being up to go to the bathroom. We discussed a bed side commode to avoid nighttime travels. DME order provided. She overall has been working with PT and is back to her baseline physically with improved strength since her admission last month.  also has a waist belt now to help with any transfers if needed.      Hx of CVA: Head CT during her recent admission showed a possible new focal loss of gray white matter differentiaaion in the R middle fronatl gyrus concerning for reinfarction. Stable chronic L MCA territory infarct and stable 5mm posterior parafalcine calcified meningioma. Her symptoms were improving so she did not undergo a brain MRI. She has been on coumadin with hx of CVA. This is managed by the [facility].       Arm pain: R upper arm primarily-with lifting or movement of R upper am. She has not fallen on her arms during her recent falls, unlikely fracture. On coumadin and therapeutic at last check-unlikely clot. Her  notes having to really pull on her R arm lately with trying to get her to her feet and pain started after that. Likely MSK. Advised heat and tylenol. Has not had any imaging of her cancer for awhile, but has been on ibrance with ANGELIQUE on her scan back in December, but has a hx of bone mets. Would recommend XR and US if pain does not improve.     It is my privilege to be involved in the care of the above patient.     Padmini Brunson PA-C  [facility]  [address] [place] [id number] [phone]

## 2017-04-17 NOTE — NURSING NOTE
"Helen Younger is a 76 year old female who presents for:  Chief Complaint   Patient presents with     Blood Draw     Pt with hx of breast ca labs collected via venipuncture.         Initial Vitals:  /89  Pulse 73  Temp 98.7  F (37.1  C) (Oral)  Wt 83.4 kg (183 lb 14.4 oz)  SpO2 95%  BMI 33.64 kg/m2 Estimated body mass index is 33.64 kg/(m^2) as calculated from the following:    Height as of 3/25/17: 1.575 m (5' 2\").    Weight as of this encounter: 83.4 kg (183 lb 14.4 oz).. Body surface area is 1.91 meters squared. BP completed using cuff size: regular  Data Unavailable No LMP recorded. Patient is postmenopausal. Allergies and medications reviewed.     Medications: Medication refills not needed today.  Pharmacy name entered into Press-sense:    [facility]  [id number] - [place], [place] - [address] [facility] - [place], [place] - [address] [facility] - [place], [place] - [address] [place] [address]    Comments:     6 minutes for nursing intake (face to face time)   Yissel Muniz CMA        "

## 2017-04-17 NOTE — LETTER
4/17/2017      RE: Helen Machadoirina  [address]  [facility] [id number]          HEMATOLOGY/ONCOLOGY PROGRESS NOTE  Apr 17, 2017    REASON FOR VISIT: follow-up of metastatic breast cancer    DIAGNOSIS:   The patient is a 76-year-old woman who, in March of 2006, was diagnosed with a left-sided breast cancer. She presented with pain in the left breast, and a diagnostic mammogram demonstrated abnormalities in the left breast. The region of abnormality was biopsied, and she was found to have an infiltrating ductal carcinoma. She subsequently went on to undergo a left-sided lumpectomy and sentinel lymph node biopsy at the [facility] by Dr. Salas Cook in April of 2006. The patient had a 2.5 x 2.1 cm tumor excised. It was a grade 2 infiltrating ductal carcinoma. Her sentinel lymph node biopsy was negative on frozen section, and final pathology did demonstrate micrometastasis measuring 0.2 cm. The tumor itself was ER-positive, W[place]-positive, and HER2-negative. The patient did have a positive margin for which she underwent a reexcision in June of 2006. The subsequent pathology was negative.     The patient was then seen by Dr. Jong Dacosta, and she was encouraged to receive adjuvant chemotherapy. She received three cycles of FEC every three weeks followed by Taxotere single-agent administered every three weeks for three cycles. She completed chemotherapy in mid to late October of 2006. Her only complication was neutropenic fever following her last dose, for which she was briefly hospitalized. She otherwise had no issues with residual neuropathies or any other known complications from her chemo.     The patient was started on adjuvant Arimidex on November 6, 2006. She also received whole breast radiotherapy with boost to the left breast and tumor bed from November 15, 2006, through January 5, 2007. This was performed by Dr. Nicholas at [facility]. It is unknown if she received kevon radiation. The  patient' portacath was removed in July of 2007. She completed five years of arimidex and remained off therapy.     She represented in December 2015 with shortness of breath and pleural effusion, requiring hospitalization. She had three thoracenteses while inpatient, with cytology revealing for a metastatic breast cancer, ER/DC-positive, HER-2 negative. On her workup, she also has disease in her bones after CT scan of the chest, abdomen and pelvis. She met with Dr. Landis on 1/11/2016 and was started on endocrine therapy with AI. She started on Ibrance on 2/3/2016. She was dose reduced after cycle 1 to 100 mg for cytopenias.    Ms. Younger was admitted from 3/25-3/27 with weakness and intermittent bradycardia She was on a ziopatch to evaluate and started on levaquin for possible infectious etiology. Head CT also deonstrated a suspected new focal loss o ffray white matter concerning for infarction. Patient was improving so no brain MRI pursued. She was seen shortly after discharge and recommended to hold her ibrance to allow for improvement in her overall weakness. She is here in f/u today.     INTERVAL HISTORY:   Helen is here with her  today. She complains about b/l arm pain today, worse on the R vs. The L. The pain is elicited with movement or with lifting things. She denies any swelling or n/t. She cannot lift her R arm as high as her L. She has a hx of prior stroke affecting her R side in the past. Her  notes that last week she fell and he had to pull on her arms to help her up. He had not heard about the pain until after that time. They tell me she has had two falls in th epast month, both of which she landed on her bottom. Both occurred during the night when she was up to go to the bathroom. She has had intermittent falls in the past due to weakness. She thought this time maybe she got dizzy before one of the most recent episodes. She recently had a ziopatch and was noted to have no arrhythmias  contributing to these episodes. Her generalized weakness has improved and she has been up moving around with her walker much better than after her admission.  Both her and her  feel she is back to her baseline today. She is eating well and is drinking about 30oz/day. She tells me she has not been feeling dizzy.     No fevers/chills/sweats, HA, cough, congestion, sore throat, chest pain/pressure, swelling, bleeding, rash, SOB, HUERTA, n/v, constipation, loose stools, new urinary changes (baseline incontinence issues).     Current Outpatient Prescriptions   Medication Sig Dispense Refill     order for DME Equipment being ordered: portable commode 1 Units 0     warfarin (JANTOVEN) 1 MG tablet Take 1 tablet (1mg) by mouth on Monday, Wednesday, Friday. Take 2 tablets (2 mg) by mouth on Tuesday and Thursday. (Patient taking differently: Take 2 mg by mouth daily Take 1 tablet (1mg) by mouth on Monday, Wednesday, Friday. Take 2 tablets (2 mg) by mouth on Tuesday and Thursday.) 30 tablet 0     letrozole (FEMARA) 2.5 MG tablet Take 1 tablet (2.5 mg) by mouth daily 90 tablet 3     magnesium hydroxide (MILK OF MAGNESIA) 400 MG/5ML suspension Take 30-60 mLs by mouth daily as needed for constipation or heartburn 360 mL 0     sennosides (SENOKOT) 8.6 MG tablet Take 1 tablet by mouth 2 times daily May increase to 2 tabs twice daily if needed 120 each 0     simvastatin (ZOCOR) 20 MG tablet Take 1 tablet (20 mg) by mouth At Bedtime 90 tablet 1     oxybutynin (DITROPAN) 5 MG tablet TAKE 1 TABLET (5 MG) BY MOUTH 2 TIMES DAILY  11     order for DME Equipment being ordered:  Incontinence pads   Patient uses these tid 100 each 3     order for DME Equipment being ordered: wheeled walker 1 each 0     DOCUSATE SODIUM 1 tablet 2 times daily        Multiple Vitamins-Minerals (OCUVITE ADULT FORMULA PO) Take 1 tablet by mouth daily.       VIACTIV OR Take 1 chew tab by mouth 2 times daily. One in the morning and one at bedtime.              Allergies   Allergen Reactions     Diuretic      Don't remember side effect     Zyrtec [Cetirizine Hcl]      Elevated blood pressure       PHYSICAL EXAMINATION  /89  Pulse 73  Temp 98.7  F (37.1  C) (Oral)  Wt 83.4 kg (183 lb 14.4 oz)  SpO2 95%  BMI 33.64 kg/m2   Wt Readings from Last 4 Encounters:   04/17/17 83.4 kg (183 lb 14.4 oz)   03/30/17 86 kg (189 lb 9.5 oz)   03/27/17 85.4 kg (188 lb 4.8 oz)   02/27/17 86.5 kg (190 lb 9.6 oz)     Constitutional: Alert, oriented female in no visible distress. +Expressive aphasia. Examined in wheelchair today.  Eyes: PERRL. Anicteric sclerae.  ENT/Mouth: OM moist and pink without thrush.   CV: RRR, no murmurs appreciated.  Resp: Decreased breath sounds on the left base, stable. Breathing comfortably without accessory muscle usage.  Abdomen: Soft, non-tender, non-distended.  Extremities: No lower extremity edema appreciated. Tender in R upper arm to palpation and with movement of her elbow and upper arm. Denies any pain at the shoulder. Tender to palpation over R anterior shoulder. No swelling. Cannot lift her R arm as high as the L above her head.   Skin: Warm, dry.   Lymph: No cervical or supraclavicular lymphadenopathy appreciated.   Neuro: CN II-XII grossly intact. Motor and strength intact in lower extremities     LABS:  Results for RYLEE PURCELL (MRN [id number]) as of 4/17/2017 17:52   Ref. Range 3/27/2017 05:52 3/27/2017 10:41 3/30/2017 13:58 4/17/2017 13:03   Sodium Latest Ref Range: 133 - 144 mmol/L 142  142 140   Potassium Latest Ref Range: 3.4 - 5.3 mmol/L 3.9  3.5 3.4   Chloride Latest Ref Range: 94 - 109 mmol/L 111 (H)  109 103   Carbon Dioxide Latest Ref Range: 20 - 32 mmol/L 25  26 30   Urea Nitrogen Latest Ref Range: 7 - 30 mg/dL 20  26 17   Creatinine Latest Ref Range: 0.52 - 1.04 mg/dL 0.98  1.15 (H) 1.13 (H)   GFR Estimate Latest Ref Range: >60 mL/min/1.7m2 55 (L)  46 (L) 47 (L)   GFR Estimate If Black Latest Ref Range: >60 mL/min/1.7m2 67  56  (L) 57 (L)   Calcium Latest Ref Range: 8.5 - 10.1 mg/dL 8.3 (L)  8.7 10.0   Anion Gap Latest Ref Range: 3 - 14 mmol/L 6  7 7   Magnesium Latest Ref Range: 1.6 - 2.3 mg/dL 2.3      Phosphorus Latest Ref Range: 2.5 - 4.5 mg/dL 2.0 (L)      Albumin Latest Ref Range: 3.4 - 5.0 g/dL   3.5 3.5   Protein Total Latest Ref Range: 6.8 - 8.8 g/dL   6.9 7.2   Bilirubin Total Latest Ref Range: 0.2 - 1.3 mg/dL   0.6 0.6   Alkaline Phosphatase Latest Ref Range: 40 - 150 U/L   35 (L) 40   ALT Latest Ref Range: 0 - 50 U/L   12 16   AST Latest Ref Range: 0 - 45 U/L   12 17   Results for RYLEE PURCELL (MRN [id number]) as of 4/17/2017 17:52   Ref. Range 3/30/2017 13:58 4/17/2017 13:03   Glucose Latest Ref Range: 70 - 99 mg/dL 164 (H) 162 (H)   Results for RYLEE PURCELL (MRN [id number]) as of 4/17/2017 17:52   Ref. Range 3/30/2017 13:58 4/17/2017 13:03   WBC Latest Ref Range: 4.0 - 11.0 10e9/L 2.3 (L) 7.4   Hemoglobin Latest Ref Range: 11.7 - 15.7 g/dL 10.1 (L) 11.3 (L)   Hematocrit Latest Ref Range: 35.0 - 47.0 % 30.6 (L) 35.5   Platelet Count Latest Ref Range: 150 - 450 10e9/L 159 382   RBC Count Latest Ref Range: 3.8 - 5.2 10e12/L 2.79 (L) 3.30 (L)   MCV Latest Ref Range: 78 - 100 fl 110 (H) 108 (H)   MCH Latest Ref Range: 26.5 - 33.0 pg 36.2 (H) 34.2 (H)   MCHC Latest Ref Range: 31.5 - 36.5 g/dL 33.0 31.8   RDW Latest Ref Range: 10.0 - 15.0 % 14.6 13.4   Diff Method Unknown Manual Differential Automated Method   % Neutrophils Latest Units: % 68.7 69.4   % Lymphocytes Latest Units: % 25.9 17.2   % Monocytes Latest Units: % 4.5 10.6   % Eosinophils Latest Units: % 0.0 1.5   % Basophils Latest Units: % 0.9 0.5   % Immature Granulocytes Latest Units: %  0.8   Nucleated RBCs Latest Ref Range: 0 /100  0   Absolute Neutrophil Latest Ref Range: 1.6 - 8.3 10e9/L 1.6 5.2   Absolute Lymphocytes Latest Ref Range: 0.8 - 5.3 10e9/L 0.6 (L) 1.3   Absolute Monocytes Latest Ref Range: 0.0 - 1.3 10e9/L 0.1 0.8   Absolute Eosinophils Latest Ref  Range: 0.0 - 0.7 10e9/L 0.0 0.1   Absolute Basophils Latest Ref Range: 0.0 - 0.2 10e9/L 0.0 0.0   Abs Immature Granulocytes Latest Ref Range: 0 - 0.4 10e9/L  0.1   Absolute Nucleated RBC Unknown  0.0   Poikilocytosis Unknown Slight          IMPRESSION/PLAN:  Helen Younger is a 75 year old female with history of ER/CO-positive, HER-2 negative breast cancer treated with lumpectomy, adjuvant chemotherapy with Taxotere, whole breast radiotherapy, and 5 years of adjuvant Arimidex, completed in 2012. She represented in December 2015 with shortness of breath and pleural effusion, found to have developed metastatic disease involving the bones and pleural cavity.     Metastatic breast cancer: Most recently on Letrozole and palbociclib with overall stable disease on imaging 12/23/2016. She tolerates this regimen well. She will continue with scans every 4-5 months as long as her tumor markers remain stable, and she doesn't had any signs of progressive disease. She was admitted in March with worsening weakness and bradycardia. She was started on abx for possible infection-although nothing clear was found and was on a ziopatch and was also found to have a possible new cerebral infarction on head CT. She was improving and discharged. I chose to further hold her ibrance to allow improvement in her overall weakness. She is here for f/u today now 3 weeks later. Her overall condition has returned to baseline. She has never really had any side effects from the ibrance. We discussed options today and she will resume the Ibrance and she is due for scans again next month. I will alert oral onc pharmacy and they will contact her. I will see her back in two weeks to see how she is doing after resuming it.     Bone mets: Previously on monthly Xgeva, switched to every 3 months due to large co-pay, last received 2/27/17. She is taking calcium and vitamin D supplementation.      Pleural effusions:  This has been stable on subsequent imaging,  asymptomatic. Exam is stable today.     CKD/FEN: Encouraged adequate hydration with about 64 oz fluids daily. Creat, lytes today okay, except Cr slightly elevated. Reminded her to push fluids.        Weakness:ongoing hx for several months with intermittent falls-usually seem mechanical and some have been described after some dizziness. Ziopatch was negative for contributable arrhythmia. Likely due to chronic deconditioning. Ambulates with walker at home. In the past month her two falls were during the night with being up to go to the bathroom. We discussed a bed side commode to avoid nighttime travels. DME order provided. She overall has been working with PT and is back to her baseline physically with improved strength since her admission last month.  also has a waist belt now to help with any transfers if needed.      Hx of CVA: Head CT during her recent admission showed a possible new focal loss of gray white matter differentiaaion in the R middle fronatl gyrus concerning for reinfarction. Stable chronic L MCA territory infarct and stable 5mm posterior parafalcine calcified meningioma. Her symptoms were improving so she did not undergo a brain MRI. She has been on coumadin with hx of CVA. This is [facility] by the [facility].       Arm pain: R upper arm primarily-with lifting or movement of R upper am. She has not fallen on her arms during her recent falls, unlikely fracture. On coumadin and therapeutic at last check-unlikely clot. Her  notes having to really pull on her R arm lately with trying to get her to her feet and pain started after that. Likely MSK. Advised heat and tylenol. Has not had any imaging of her cancer for awhile, but has been on ibrance with ANGELIQUE on her scan back in December, but has a hx of bone mets. Would recommend XR and US if pain does not improve.     It is my privilege to be involved in the care of the above patient.     Padmini Brunson PA-C  [facility]  [address]  [place] [address]       [phone]

## 2017-04-17 NOTE — MR AVS SNAPSHOT
After Visit Summary   4/17/2017    Helen Younger    MRN: [id number]           Patient Information     Date Of Birth          1940        Visit Information        Provider Department      4/17/2017 1:00 PM Padmini Brunson PA-C [facility]        Today's Diagnoses     Malignant neoplasm of female breast, unspecified laterality, unspecified site of breast (H)    -  1    Physical deconditioning        Pain of right upper arm        Metastatic breast cancer (H)           Follow-ups after your visit        Your next 10 appointments already scheduled     May 02, 2017 12:30 PM CDT   [facility] Lab Draw with RETAIL PRO LAB DRAW   [facility]    [address] [place] [address] [id number] [phone]            May 02, 2017  1:00 PM CDT   (Arrive by 12:45 PM)   Return Visit with Padmini Brunson PA-C   [facility] ([facility])    [address] [place] [id number] [phone]            May 22, 2017  3:30 PM CDT   [facility] Lab Draw with  MASONIC LAB DRAW   [facility]    [address] [place] [id number] [phone]            May 22, 2017  4:00 PM CDT   (Arrive by 3:45 PM)   Return Visit with Keisha Landis MD   [facility] ([facility])    [address] [place] [id number] [phone]              Future tests that were ordered for you today     Open Future Orders        Priority Expected Expires Ordered    [facility] Oncology (Eyes to Thighs) Routine  7/16/2017 4/17/2017            Who to contact     If you have questions or need follow up information about today's clinic visit or your schedule please contact [facility] directly at [phone].  Normal or non-critical  lab and imaging results will be communicated to you by uShiphart, letter or phone within 4 business days after the clinic has received the results. If you do not hear from us within 7 days, please contact the clinic through [employer] or phone. If you have a critical or abnormal lab result, we will notify you by phone as soon as possible.  Submit refill requests through [employer] or call your pharmacy and they will forward the refill request to us. Please allow 3 business days for your refill to be completed.          Additional Information About Your Visit        [employer] Information     M[employer]ango gives you secure access to your electronic health record. If you see a primary care provider, you can also send messages to your care team and make appointments. If you have questions, please call your primary care clinic.  If you do not have a primary care provider, please call [phone] and they will assist you.        Care EveryWhere ID     This is your Care EveryWhere ID. This could be used by other organizations to access your [facility] medical records  [id number]        Your Vitals Were     Pulse Temperature Pulse Oximetry BMI (Body Mass Index)          73 98.7  F (37.1  C) (Oral) 95% 33.64 kg/m2         Blood Pressure from Last 3 Encounters:   04/17/17 132/89   03/30/17 99/66   03/27/17 145/75    Weight from Last 3 Encounters:   04/17/17 83.4 kg (183 lb 14.4 oz)   03/30/17 86 kg (189 lb 9.5 oz)   03/27/17 85.4 kg (188 lb 4.8 oz)              We Performed the Following     *CBC with platelets differential     Ca27.29  breast tumor marker     CEA     Comprehensive metabolic panel          Today's Medication Changes          These changes are accurate as of: 4/17/17  7:53 PM.  If you have any questions, ask your nurse or doctor.               These medicines have changed or have updated prescriptions.        Dose/Directions    * order for DME   This may have changed:  Another medication with the same name was added. Make  sure you understand how and when to take each.   Used for:  Malignant neoplasm of female breast, unspecified laterality, unspecified site of breast (H), Pleural effusion   Changed by:  Arin Shahid MD        Equipment being ordered: wheeled walker   Quantity:  1 each   Refills:  0       * order for DME   This may have changed:  Another medication with the same name was added. Make sure you understand how and when to take each.   Used for:  Mixed incontinence   Changed by:  Arin Shahid MD        Equipment being ordered:  Incontinence pads   Patient uses these tid   Quantity:  100 each   Refills:  3       * order for DME   This may have changed:  You were already taking a medication with the same name, and this prescription was added. Make sure you understand how and when to take each.   Used for:  Physical deconditioning   Changed by:  Padmini Brunson PA-C        Equipment being ordered: portable commode   Quantity:  1 Units   Refills:  0       warfarin 1 MG tablet   Commonly known as:  JANTOVEN   This may have changed:    - how much to take  - how to take this  - when to take this  - additional instructions   Used for:  History of stroke        Take 1 tablet (1mg) by mouth on Monday, Wednesday, Friday. Take 2 tablets (2 mg) by mouth on Tuesday and Thursday.   Quantity:  30 tablet   Refills:  0       * Notice:  This list has 3 medication(s) that are the same as other medications prescribed for you. Read the directions carefully, and ask your doctor or other care provider to review them with you.         Where to get your medicines      Some of these will need a paper prescription and others can be bought over the counter.  Ask your nurse if you have questions.     Bring a paper prescription for each of these medications     order for DME                Primary Care Provider Office Phone # Fax #    Arin Shahid [phone]       [address]  [place]  [facility] [address]        Thank you!     Thank you for choosing [facility]  for your care. Our goal is always to provide you with excellent care. Hearing back from our patients is one way we can continue to improve our services. Please take a few minutes to complete the written survey that you may receive in the mail after your visit with us. Thank you!             Your Updated Medication List - Protect others around you: Learn how to safely use, store and throw away your medicines at www.disposemymeds.org.          This list is accurate as of: 4/17/17  7:53 PM.  Always use your most recent med list.                   Brand Name Dispense Instructions for use    DOCUSATE SODIUM      1 tablet 2 times daily       letrozole 2.5 MG tablet    FEMARA    90 tablet    Take 1 tablet (2.5 mg) by mouth daily       magnesium hydroxide 400 MG/5ML suspension    MILK OF MAGNESIA    360 mL    Take 30-60 mLs by mouth daily as needed for constipation or heartburn       OCUVITE ADULT FORMULA PO      Take 1 tablet by mouth daily.       * order for DME     1 each    Equipment being ordered: wheeled walker       * order for DME     100 each    Equipment being ordered:  Incontinence pads   Patient uses these tid       * order for DME     1 Units    Equipment being ordered: portable commode       oxybutynin 5 MG tablet    DITROPAN     TAKE 1 TABLET (5 MG) BY MOUTH 2 TIMES DAILY       sennosides 8.6 MG tablet    SENOKOT    120 each    Take 1 tablet by mouth 2 times daily May increase to 2 tabs twice daily if needed       simvastatin 20 MG tablet    ZOCOR    90 tablet    Take 1 tablet (20 mg) by mouth At Bedtime       VIACTIV PO      Take 1 chew tab by mouth 2 times daily. One in the morning and one at bedtime.       warfarin 1 MG tablet    JANTOVEN    30 tablet    Take 1 tablet (1mg) by mouth on Monday, Wednesday, Friday. Take 2 tablets (2 mg) by mouth on Tuesday and Thursday.       * Notice:  This list has  3 medication(s) that are the same as other medications prescribed for you. Read the directions carefully, and ask your doctor or other care provider to review them with you.

## 2017-04-18 NOTE — TELEPHONE ENCOUNTER
Please fill and sign this physician form. Then fax to [facility] @ [phone]. Helen has a ludmila that helps her with her Ibrance copays and it has been put on hold until they receive this back.  4/19/17  Form completed and signed and faxed at 5:45pm to [facility]

## 2017-04-18 NOTE — TELEPHONE ENCOUNTER
Oral Chemotherapy Monitoring Program    Primary Oncologist: Dr. Landis  Primary Oncology Clinic: [facility]  Cancer Diagnosis: Breast      Therapy History: Confirmed patient to restart on 4/19/17- Ibrance 100mg once daily for 21 days then 7 days off, cycle q28 days  2/3/16- Start date   3/30/17 to 4/18/17-  Ibrance on hold due to persistent weakness and history of neutropenia       Drug Interaction Assessment: No new drug interactions.       Lab Monitoring Plan                  Monitoring Plan:      CBC, CMP   C2D1+   Call, CBC, CMP   C3D1+   Call, CBC, CMP   C4D1+   Call, CBC, CMP   C5D1+   Call, CBC, CMP   C6D1+   Call, CBC, CMP        C2D8+      C3D8+      C4D8+      C5D8+      C6D8+        CBC   C2D15+   CBC   C3D15+      C4D15+      C5D15+      C6D15+           C2D22+      C3D22+      C4D22+      C5D22+      C6D22+            Subjective/Objective:  Helen Younger is a 76 year old female contacted by phone for a follow-up visit for oral chemotherapy.  Spoke to German (), he states Helen has been doing better. Ibrance was on hold 3/30/17 to 4/18/17  due to patient's persistent weakness and history of neutropenia. She has returned to baseline and will restart Ibrance. No side effects or questions at this time. Ibrance will be delivered 4/19.     ORAL CHEMOTHERAPY 4/27/2016 5/26/2016 7/7/2016 10/6/2016 2/20/2017 3/24/2017 4/18/2017   Drug Name Ibrance (Palbociclib) Ibrance (Palbociclib) Ibrance (Palbociclib) Ibrance (Palbociclib) Ibrance (Palbociclib) Ibrance (Palbociclib) Ibrance (Palbociclib)   Current Dosage 100mg 100mg 100mg 100mg 100mg 100mg 100mg   Current Schedule Daily Daily Daily Daily Daily Daily Daily   Cycle Details 3 weeks on 1 week off 3 weeks on 1 week off 3 weeks on 1 week off 3 weeks on 1 week off 3 weeks on 1 week off 3 weeks on 1 week off 3 weeks on 1 week off   Start Date of Last Cycle 4/12/2016 5/11/2016 7/7/2016 9/29/2016 2/2/2017 3/2/2017 3/2/2017   Planned next cycle start date - - - -  "3/2/2017 3/30/2017 4/19/2017   Doses missed in last 2 weeks 0 0 0 0 0 0 0   Adherence Assessment - - - - Adherent Adherent Non-adherent   Reason for Non-adherence - - - - - - Drug on hold   Adherence Intervention Recommended - - - - - - None   Adverse Effects Mucositis/mouth sores/ mouth pain Mucositis/mouth sores/ mouth pain Mucositis/mouth sores/ mouth pain Oral mucositis;Fatigue Fatigue Fatigue;Other (see note for details) No AE identified during assessment   Oral Mucositis mild mild mild - - - -   Fatigue - - - - Grade 1 Grade 1 -   Pharmacist Intervention(fatigue) - - - - Yes Yes -   Intervention(s) - - - - Patient education Patient education -   Other (see note for details) - - - - - Low pulse -   Pharmacist intervention? - - - - - No -   Home BPs not needed not done - not needed not needed all BPs<140/90 all BPs<140/90   Any new drug interactions? - - - - No No No   Is the dose as ordered appropriate for the patient? - - - - Yes Yes Yes   Is the patient currently in pain? - - - - No No No   Has the patient been assessed within the past 6 months for depression? - - - - Yes - Yes   Has the patient missed any days of school, work, or other routine activity? - - - - No No No       Last PHQ-2 Score on record:   PHQ-2 ( 1999 [employer]) 2/27/2017 12/26/2016   Q1: Little interest or pleasure in doing things 0 0   Q2: Feeling down, depressed or hopeless 0 0   PHQ-2 Score 0 0       Patient does not report depression symptoms.      Vitals:  BP:   BP Readings from Last 1 Encounters:   04/17/17 132/89     Wt Readings from Last 1 Encounters:   04/17/17 83.4 kg (183 lb 14.4 oz)     Estimated body surface area is 1.91 meters squared as calculated from the following:    Height as of 3/25/17: 1.575 m (5' 2\").    Weight as of 4/17/17: 83.4 kg (183 lb 14.4 oz).    Labs:  Lab Results   Component Value Date     04/17/2017      Lab Results   Component Value Date    POTASSIUM 3.4 04/17/2017     Lab Results   Component Value Date "    SHERYL 10.0 04/17/2017     Lab Results   Component Value Date    ALBUMIN 3.5 04/17/2017     Lab Results   Component Value Date    MAG 2.3 03/27/2017     Lab Results   Component Value Date    PHOS 2.0 03/27/2017     Lab Results   Component Value Date    BUN 17 04/17/2017     Lab Results   Component Value Date    CR 1.13 04/17/2017       Lab Results   Component Value Date    AST 17 04/17/2017     Lab Results   Component Value Date    ALT 16 04/17/2017     Lab Results   Component Value Date    BILITOTAL 0.6 04/17/2017       Lab Results   Component Value Date    WBC 7.4 04/17/2017     Lab Results   Component Value Date    HGB 11.3 04/17/2017     Lab Results   Component Value Date     04/17/2017     Lab Results   Component Value Date    ANEU 5.2 04/17/2017           Assessment:  The Ibrance was on hold, due to patient's persistent weakness and history of neutropenia. Helen has returned to baseline and will restart Ibrance.    Plan:  Restart Ibrance 100mg once daily for 21 days then 7 days off, 28-day cycle starts 4/19/17    Follow-Up:  Oncology appointment + labs 5/2/17  Dr. Landis appointment + labs 5/22/17  Oral oncology management in 4 weeks    Refill Due:  Next cycle starts 5/17/17    Thank you for the opportunity to participate in this patient's care,    Rio Freeman, PharmD  Therapy Management Oncology Pharmacist  [facility]   Phone: [phone]

## 2017-04-18 NOTE — TELEPHONE ENCOUNTER
Oral Chemotherapy Monitoring Program    Placed call to patient regarding restarting Ibrance therapy. She is in need of an Ibrance refill. I contacted [facility] to get the refill processing. They will contact her for delivery.     Tamara Shelby, Pharmacy Intern  Oral Chemotherapy Monitoring Program  [facility]  [phone]

## 2017-04-20 NOTE — PROGRESS NOTES
Clinic Care Coordination Contact  Care Team Conversations    This writer left a voicemail for [facility]  Desmond Henderson, RN- asking for an update and any discharge plans for this patient.     Will check about in about 2 weeks if I haven't heard back. I did review [facility] and patient still appears to be open to [facility].    Eve nUger

## 2017-04-26 NOTE — TELEPHONE ENCOUNTER
Reason for Call: Request for an order or referral:    Order or referral being requested: skilled nursing home visits 1 per week for 9 weeks     Date needed: as soon as possible    Has the patient been seen by the PCP for this problem? NO    Phone number Patient can be reached at:  Other phone number:  [phone] [facility]/ be [facility]    Best Time:  Any time     Can we leave a detailed message on this number?  YES    Call taken on 4/26/2017 at 1:36 PM by Evie Parada

## 2017-04-26 NOTE — TELEPHONE ENCOUNTER
Called Desmond again and notified him of message below from provider. He stated understanding and will notify patient of the instructions.     Clarita Rose RN  [facility]

## 2017-04-26 NOTE — TELEPHONE ENCOUNTER
INR is slightly high. Please decrease the warfarin to 1 mg on Fridays, but 2 mg daily on the other days (so back to 13 mg per week). Recheck an INR in 12-14 days.

## 2017-04-26 NOTE — TELEPHONE ENCOUNTER
Called Desmond at number listed below. Unable to reach, left a VM to call back to RN triage line.    Clarita Rose RN  [facility]

## 2017-04-26 NOTE — TELEPHONE ENCOUNTER
Last INR was 2.3 on 4/11/17.   Patient on coumadin 2 mg daily.    Today's INR is 3.1.    Routed to provider to advise on coumadin dosing and follow up INR.    Ani Burnett RN  [facility]

## 2017-04-26 NOTE — TELEPHONE ENCOUNTER
Panel Management Review      Patient has the following on her problem list:     Diabetes    ASA: Failed    Last A1C  Lab Results   Component Value Date    A1C 5.9 07/14/2016    A1C 6.4 12/28/2015    A1C 6.6 04/14/2015    A1C 6.1 08/29/2014    A1C 6.3 12/17/2013     A1C tested: Passed    Last LDL:    Lab Results   Component Value Date    CHOL 126 07/14/2016     Lab Results   Component Value Date    HDL 50 07/14/2016     Lab Results   Component Value Date    LDL 51 07/14/2016     Lab Results   Component Value Date    TRIG 125 07/14/2016     Lab Results   Component Value Date    CHOLHDLRATIO 2.3 01/13/2015     Lab Results   Component Value Date    NHDL 76 07/14/2016       Is the patient on a Statin? NO             Is the patient on Aspirin? YES    Medications     HMG CoA Reductase Inhibitors    simvastatin (ZOCOR) 20 MG tablet          Last three blood pressure readings:  BP Readings from Last 3 Encounters:   04/17/17 132/89   03/30/17 99/66   03/27/17 145/75       Date of last diabetes office visit: 7/14/16     Tobacco History:     History   Smoking Status     Never Smoker   Smokeless Tobacco     Never Used         Hypertension   Last three blood pressure readings:  BP Readings from Last 3 Encounters:   04/17/17 132/89   03/30/17 99/66   03/27/17 145/75     Blood pressure: Passed    HTN Guidelines:  Age 18-59 BP range:  Less than 140/90  Age 60-85 with Diabetes:  Less than 140/90  Age 60-85 without Diabetes:  less than 150/90      Composite cancer screening  Chart review shows that this patient is due/due soon for the following Fecal Colorectal (FIT)  Summary:    Patient is due/failing the following:   FIT    Action needed:   Due for a FIT test    Type of outreach:Patient declined a FIT test Patient declined a FIT test     Questions for provider review:    None                                                                                                                                    Sania Rodriguez ma        Chart routed to closing chart. .

## 2017-04-26 NOTE — TELEPHONE ENCOUNTER
Reason for Call:  Other - INR Report    Detailed comments: Radha Logan [facility], called to report patient's INR. INR was 3.1 today. Please call Desmond back to discuss.    Phone Number Patient can be reached at: Other phone number:  Desmond: [phone]    Best Time: Anytime    Can we leave a detailed message on this number? YES    Call taken on 4/26/2017 at 1:48 PM by Tamara Hoskins

## 2017-04-26 NOTE — TELEPHONE ENCOUNTER
Not yet addressed by [facility]; re-routed to Dr. Red to advise on coumadin dosing and follow up INR.  Ani Burnett RN  [facility]

## 2017-04-26 NOTE — TELEPHONE ENCOUNTER
Order signed per Brackney Dyad 5 standing orders for home care, assisted living, or nursing home evaluations and treatments, mammogram (reflex diagnostics), FIT test.  Notified home care that the below standing orders are ok per above standing orders.  Ellen Benitez RN [facility] Triage.

## 2017-04-28 NOTE — TELEPHONE ENCOUNTER
Rony reportedly called back, I missed the [facility] message doing some paperwork.  Did not appear the call was picked up.    Attempted to call patient at home number, left message on answering service requesting call back to clinic to discuss.  Ani Burnett RN  [facility]

## 2017-04-28 NOTE — TELEPHONE ENCOUNTER
Same POLST is still in Epic, I don't see that this was addressed at last oncology visit (4/17/17) or at the one on 3/30/17 the day after our conversation below, Rony states they don't plan to see [facility] as they see oncology so often.    Attempted to call patient at home number, left message on answering service requesting call back to clinic to discuss.  Ani Burnett RN  [facility]

## 2017-04-28 NOTE — PROGRESS NOTES
ANTICOAGULATION FOLLOW-UP CLINIC VISIT    Patient Name:  Helen Younger  Date:  4/28/2017  Contact Type:  Documentation Only.  this is in follow up to INR called to PCP due to INR Clinic closed on Wed's.  Auyb [phone] was given order by Dr Red.    SUBJECTIVE:     Patient Findings     Positives No Problem Findings (nothing reported)           OBJECTIVE    INR   Date Value Ref Range Status   04/26/2017 3.1  Final       ASSESSMENT / PLAN  INR assessment THER    Recheck INR In: 10 DAYS    INR Location Homecare INR      Anticoagulation Summary as of 4/28/2017     INR goal 2.0-3.0   Today's INR 3.1! (4/26/2017)   Maintenance plan 1 mg (1 mg x 1) on Fri; 2 mg (1 mg x 2) all other days   Full instructions 1 mg on Fri; 2 mg all other days   Weekly total 13 mg   Plan last modified Soumya Garza, RN (4/28/2017)   Next INR check 5/8/2017   Target end date     Indications   Long-term (current) use of anticoagulants [Z79.01] [Z79.01]  Cerebral infarction (H) [I63.9]         Anticoagulation Episode Summary     INR check location     Preferred lab     Send INR reminders to  ANTICOAG CLINIC    Comments       Anticoagulation Care Providers     Provider Role Specialty Phone number    Arin Shahid MD  Internal Medicine [phone]            See the Encounter Report to view Anticoagulation Flowsheet and Dosing Calendar (Go to Encounters tab in chart review, and find the Anticoagulation Therapy Visit)    Dosage adjustment made based on physician directed care plan.    Soumya Garza, RN

## 2017-05-01 NOTE — IP AVS SNAPSHOT
"    UNIT 6D OBSERVATION [facility]: [phone]                                              INTERAGENCY TRANSFER FORM - PHYSICIAN ORDERS   2017                    Hospital Admission Date: 2017  RYLEE PURCELL   : 1940  Sex: Female        Attending Provider: Zeenat Stein MD     Allergies:  Diuretic, Zyrtec [Cetirizine Hcl]    Infection:  None   Service:  INTERNAL MED    Ht:  1.575 m (5' 2.01\")   Wt:  81.7 kg (180 lb 2 oz)   Admission Wt:  85.1 kg (187 lb 11.2 oz)    BMI:  32.94 kg/m 2   BSA:  1.89 m 2            Patient PCP Information     Provider PCP Type    Arin Shahid MD General      ED Clinical Impression     Diagnosis Description Comment Added By Time Added    Weakness [R53.1] Weakness [R53.1]  Mia Aparicio MD 2017 10:45 PM    Abnormal urinalysis [R82.90] Abnormal urinalysis [R82.90]  Mia Aparicio MD 2017 10:45 PM    Fall, initial encounter [W19.XXXA] Fall, initial encounter [W19.XXXA]  Mia Aparicio MD 2017 10:45 PM      Hospital Problems as of 2017              Priority Class Noted POA    Weakness Medium  2017 Yes      Non-Hospital Problems as of 2017              Priority Class Noted    Hyperlipidemia LDL goal <100   2009    Breast cancer,left   2009    Cerebral infarction (H)   2009    Hypertension goal BP (blood pressure) < 140/90   2011    Advance Care Planning   10/24/2011    CKD (chronic kidney disease) stage 3, GFR 30-59 ml/min   10/24/2011    Health Care Home   3/9/2012    Cataract, mild, ou   2012    Melanocytic nevus   2012    Type 2 diabetes, HbA1C goal < 8% (H)   2013    Macular degeneration (senile) of retina   2014    Posterior vitreous detachment   2014    Peripheral vascular disease (H) Medium  2014    Obesity Medium  10/25/2015    Type 2 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) Medium  10/25/2015    Type 2 diabetes mellitus with " autonomic neuropathy (H) Medium  10/25/2015    SOB (shortness of breath) Medium  12/28/2015    Pleural effusion on left Medium  12/28/2015    Pleural effusion Medium  12/29/2015    Malignant neoplasm of female breast, unspecified laterality, unspecified site of breast (H)   1/11/2016    Malignant pleural effusion Medium  2/25/2016    Long-term (current) use of anticoagulants [Z79.01] Medium  2/26/2016    Pneumonia Medium  3/26/2017      Code Status History     Date Active Date Inactive Code Status Order ID Comments User Context    5/2/2017  1:14 AM 5/2/2017  1:14 AM Full Code [id number]  Rell Melara MD Inpatient    3/27/2017  1:54 PM 5/2/2017  1:14 AM DNR/DNI [id number]  Caterina Bush PA-C Outpatient    3/26/2017  1:05 AM 3/27/2017  1:54 PM DNR/DNI [id number]  Lorrie Wagner MD Inpatient    11/15/2016  3:12 PM 3/26/2017  1:05 AM DNR/DNI [id number]  Justine Hay CMA Outpatient    1/6/2016  2:00 PM 11/15/2016  3:12 PM Full Code [id number]  Rio Malone MD Outpatient    12/28/2015  9:01 PM 1/6/2016  2:00 PM Full Code [id number]  Philomena Bob MD Inpatient         Medication Review      START taking        Dose / Directions Comments    fludrocortisone 0.1 MG tablet   Commonly known as:  FLORINEF   Used for:  Orthostatic hypotension        Dose:  0.05 mg   Take 0.5 tablets (0.05 mg) by mouth daily   Refills:  0        palbociclib 100 MG capsule CHEMO   Commonly known as:  IBRANCE   Indication:  Hormone Receptor-Positive, 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 If we are uncertain of the size of tablets/capsules you have at home, strength may be listed as something that might have changed.        Dose / Directions Comments     warfarin 1 MG tablet   Commonly known as:  JANTOVEN   This may have changed:    - how much to take  - how to take this  - when to take this  - additional instructions   Used for:  History of stroke        Take 1 tablet (1mg) by mouth on Monday, Wednesday, Friday. Take 2 tablets (2 mg) by mouth on Tuesday and Thursday.   Quantity:  30 tablet   Refills:  0          CONTINUE these medications which have NOT CHANGED        Dose / Directions Comments    DOCUSATE SODIUM        Dose:  1 tablet   1 tablet 2 times daily   Refills:  0        letrozole 2.5 MG tablet   Commonly known as:  FEMARA   Used for:  Malignant neoplasm of female breast, unspecified laterality, unspecified site of breast (H), Pleural effusion        Dose:  2.5 mg   Take 1 tablet (2.5 mg) by mouth daily   Quantity:  90 tablet   Refills:  3        magnesium hydroxide 400 MG/5ML suspension   Commonly known as:  MILK OF MAGNESIA   Used for:  Constipation        Dose:  30-60 mL   Take 30-60 mLs by mouth daily as needed for constipation or heartburn   Quantity:  360 mL   Refills:  0        OCUVITE ADULT FORMULA PO        Dose:  1 tablet   Take 1 tablet by mouth daily.   Refills:  0        * order for DME   Used for:  Malignant neoplasm of female breast, unspecified laterality, unspecified site of breast (H), Pleural effusion        Equipment being ordered: wheeled walker   Quantity:  1 each   Refills:  0        * order for DME   Used for:  Mixed incontinence        Equipment being ordered:  Incontinence pads   Patient uses these tid   Quantity:  100 each   Refills:  3        * order for DME   Used for:  Physical deconditioning        Equipment being ordered: portable commode   Quantity:  1 Units   Refills:  0        oxybutynin 5 MG tablet   Commonly known as:  DITROPAN        TAKE 1 TABLET (5 MG) BY MOUTH 2 TIMES DAILY   Refills:  11        sennosides 8.6 MG tablet   Commonly known as:  SENOKOT   Used for:  Constipation        Dose:  1 tablet   Take 1 tablet  by mouth 2 times daily May increase to 2 tabs twice daily if needed   Quantity:  120 each   Refills:  0        simvastatin 20 MG tablet   Commonly known as:  ZOCOR   Used for:  Hyperlipidemia LDL goal <100        Dose:  20 mg   Take 1 tablet (20 mg) by mouth At Bedtime   Quantity:  90 tablet   Refills:  1        VIACTIV PO   Used for:  Chest pain        Dose:  1 chew tab   Take 1 chew tab by mouth 2 times daily. One in the morning and one at bedtime.   Refills:  0        * Notice:  This list has 3 medication(s) that are the same as other medications prescribed for you. Read the directions carefully, and ask your doctor or other care provider to review them with you.              Further instructions from your care team       _______________________  [facility]  Phone  [phone]  Fax  [phone]  ______________________        Summary of Visit     Reason for your hospital stay       You were admitted for frequent falls, that are most likely attributed to orthostatic hypotension (blood pressure dropping when you stand up) and autonomic dysfunction, meaning very labile blood pressures. You were started on fludrocortisone to help with this. We encourage oral fluid intake. PT recommended a TCU stay due to generalized weakness and frequent falls.             After Care     Activity - Up with nursing assistance           Additional Discharge Instructions       Keep head of bed elevated       Advance Diet as Tolerated       Follow this diet upon discharge: Orders Placed This Encounter      Regular Diet Adult       Fall precautions           General info for SNF       Length of Stay Estimate: Short Term Care  Condition at Discharge: Stable  Level of care:skilled   Rehabilitation Potential: Fair  Admission H&P remains valid and up-to-date: Yes  Recent Chemotherapy: Current chemotherapy with Ibrance                       Use Nursing Home Standing Orders: Yes       Mantoux instructions       Give two-step Mantoux  (PPD) Per Facility Policy Yes             Referrals     Occupational Therapy Adult Consult       Evaluate and treat as clinically indicated.    Reason:  Weakness, deconditioning       Physical Therapy Adult Consult       Evaluate and treat as clinically indicated.    Reason:  Weakness, deconditioning             Your next 10 appointments already scheduled     May 22, 2017  1:30 PM CDT   PE NPET ONCOLOGY (EYES TO THIGHS) with UUPET1   [facility] [place] [facility] ([facility] [place])    [address] [facility] [id number] [phone]           Tell your doctor:   If there is any chance you may be pregnant or if you are breastfeeding.   If you have problems lying in small spaces (claustrophobia). If you do, your doctor may give you medicine to help you relax. If you have diabetes:   Have your exam early in the morning. Your blood glucose will go up as the day goes by.   Your glucose level must be 180 or less at the start of the exam. Please take any medicines you need to ensure this blood glucose level. 24 hours before your scan: Don t do any heavy exercise. (No jogging, aerobics or other workouts.) Exercise will make your pictures less accurate. 6 hours before your scan:   Stop all food and liquids (except water).   Do not chew gum or suck on mints.   If you need to take medicine with food, you may take it with a few crackers.  Please call your Imaging Department at your exam site with any questions.            May 22, 2017  3:30 PM CDT   [facility] Lab Draw with  [facility] DRAW   [facility] ([facility])    [address] [place] [address]  [address] [facility] [id number] [phone]            May 22, 2017  4:00 PM CDT   (Arrive by 3:45 PM)   Return Visit with Keisha Landis MD   [facility] ([facility])    [address] [facility]  [address] [facility] [id number] [phone]               Follow-Up Appointment Instructions     Future Labs/Procedures    Follow Up and recommended labs and tests     Comments:    Follow up with [facility] upon discharge from TCU.      Follow-Up Appointment Instructions     Follow Up and recommended labs and tests       Follow up with [facility] upon discharge from TCU.             Statement of Approval     Ordered          05/04/17 1549  I have reviewed and agree with all the recommendations and orders detailed in this document.  EFFECTIVE NOW     Approved and electronically signed by:  Zeenat Stein MD

## 2017-05-01 NOTE — IP AVS SNAPSHOT
"          UNIT 6D OBSERVATION [facility]: [phone]                                              INTERAGENCY TRANSFER FORM - LAB / IMAGING / EKG / EMG RESULTS   2017                    Hospital Admission Date: 2017  RYLEE PURCELL   : 1940  Sex: Female        Attending Provider: Zeenat Stein MD     Allergies:  Diuretic, Zyrtec [Cetirizine Hcl]    Infection:  None   Service:  INTERNAL MED    Ht:  1.575 m (5' 2.01\")   Wt:  81.7 kg (180 lb 2 oz)   Admission Wt:  85.1 kg (187 lb 11.2 oz)    BMI:  32.94 kg/m 2   BSA:  1.89 m 2            Patient PCP Information     Provider PCP Type    Arin Shahid MD General         Lab Results - 3 Days      Basic metabolic panel [[id number]] (Abnormal)  Resulted: 17, Result status: Final result    Ordering provider: Awilda Heart MD  17 Resulting lab: [facility]    Specimen Information    Type Source Collected On   Blood  17 075          Components       Value Reference Range Flag Lab   Sodium 144 133 - 144 mmol/L  13   Potassium 3.9 3.4 - 5.3 mmol/L  13   Chloride 110 94 - 109 mmol/L H 13   Carbon Dioxide 22 20 - 32 mmol/L  13   Anion Gap 12 3 - 14 mmol/L  13   Glucose 133 70 - 99 mg/dL H 13   Urea Nitrogen 18 7 - 30 mg/dL  13   Creatinine 1.02 0.52 - 1.04 mg/dL  13   GFR Estimate 53 >60 mL/min/1.7m2 L 13   Comment:  Non  GFR Calc   GFR Estimate If Black 64 >60 mL/min/1.7m2  13   Comment:  African American GFR Calc   Calcium 8.6 8.5 - 10.1 mg/dL  13            INR [[id number]] (Abnormal)  Resulted: 17 0824, Result status: Final result    Ordering provider: Rell Melara MD  17 010 Resulting lab: [facility]    Specimen Information    Type Source Collected On   Blood  17 0759          Components       Value Reference Range Flag Lab   INR 2.47 0.86 - 1.14 H 51            CBC with platelets [[id number]] " (Abnormal)  Resulted: 05/04/17 0818, Result status: Final result    Ordering provider: Awilda Heart MD  05/04/17 0100 Resulting lab: [facility]    Specimen Information    Type Source Collected On   Blood  05/04/17 0759          Components       Value Reference Range Flag Lab   WBC 2.7 4.0 - 11.0 10e9/L L 51   RBC Count 2.80 3.8 - 5.2 10e12/L L 51   Hemoglobin 9.3 11.7 - 15.7 g/dL L 51   Hematocrit 29.4 35.0 - 47.0 % L 51    78 - 100 fl H 51   MCH 33.2 26.5 - 33.0 pg H 51   MCHC 31.6 31.5 - 36.5 g/dL  51   RDW 15.1 10.0 - 15.0 % H 51   Platelet Count 189 150 - 450 10e9/L  51            Blood culture [[id number]]  Resulted: 05/04/17 0700, Result status: Preliminary result    Ordering provider: Rell Melara MD  05/02/17 0127 Resulting lab: [facility]    Specimen Information    Type Source Collected On   Blood  05/02/17 0158          Components       Value Reference Range Flag Lab   Specimen Description Blood Right Hand   75   Culture Micro No growth after 2 days   225   Micro Report Status Pending   225            CBC with platelets [[id number]] (Abnormal)  Resulted: 05/03/17 0828, Result status: Final result    Ordering provider: Awilda Heart MD  05/03/17 0730 Resulting lab: [facility]    Specimen Information    Type Source Collected On   Blood  05/03/17 0732          Components       Value Reference Range Flag Lab   WBC 3.4 4.0 - 11.0 10e9/L L 51   RBC Count 3.03 3.8 - 5.2 10e12/L L 51   Hemoglobin 10.4 11.7 - 15.7 g/dL L 51   Hematocrit 31.8 35.0 - 47.0 % L 51    78 - 100 fl H 51   MCH 34.3 26.5 - 33.0 pg H 51   MCHC 32.7 31.5 - 36.5 g/dL  51   RDW 15.2 10.0 - 15.0 % H 51   Platelet Count 224 150 - 450 10e9/L  51            INR [[id number]] (Abnormal)  Resulted: 05/03/17 0758, Result status: Final result    Ordering provider: Rell Melara MD  05/03/17 0100 Resulting lab: [place]  [facility]    Specimen Information    Type Source Collected On   Blood  05/03/17 0732          Components       Value Reference Range Flag Lab   INR 2.30 0.86 - 1.14 H 51            Urine Culture Aerobic Bacterial [[id number]]  Resulted: 05/03/17 0751, Result status: Final result    Ordering provider: Callum Teran MD  05/01/17 2050 Resulting lab: [facility]    Specimen Information    Type Source Collected On     05/01/17 2050          Components       Value Reference Range Flag Lab   Specimen Description Midstream Urine   51   Special Requests Specimen received in preservative   75   Culture Micro --   225   Result:         10,000 to 50,000 colonies/mL mixed urogenital geoff Susceptibility testing not   routinely done     Micro Report Status FINAL 05/03/2017   225   Result:              Basic metabolic panel [[id number]] (Abnormal)  Resulted: 05/02/17 0831, Result status: Final result    Ordering provider: Rell Melara MD  05/02/17 0347 Resulting lab: [facility]    Specimen Information    Type Source Collected On   Blood  05/02/17 0755          Components       Value Reference Range Flag Lab   Sodium 141 133 - 144 mmol/L  51   Potassium 3.6 3.4 - 5.3 mmol/L  51   Chloride 108 94 - 109 mmol/L  51   Carbon Dioxide 26 20 - 32 mmol/L  51   Anion Gap 7 3 - 14 mmol/L  51   Glucose 146 70 - 99 mg/dL H 51   Urea Nitrogen 19 7 - 30 mg/dL  51   Creatinine 0.99 0.52 - 1.04 mg/dL  51   GFR Estimate 54 >60 mL/min/1.7m2 L 51   Comment:  Non  GFR Calc   GFR Estimate If Black 66 >60 mL/min/1.7m2  51   Comment:  African American GFR Calc   Calcium 8.6 8.5 - 10.1 mg/dL  51            Lactic acid whole blood (1200) [[id number]]  Resulted: 05/02/17 0221, Result status: Final result    Ordering provider: Rubén Correa MD  05/02/17 0133 Resulting lab: [facility]    Specimen Information    Type  Source Collected On   Blood  05/02/17 0158          Components       Value Reference Range Flag Lab   Lactic Acid 1.7 0.7 - 2.1 mmol/L  51            Hepatic panel [[id number]]  Resulted: 05/02/17 0055, Result status: Final result    Ordering provider: Mia Aparicio MD  05/01/17 1903 Resulting lab: [facility]    Specimen Information    Type Source Collected On     05/01/17 1903          Components       Value Reference Range Flag Lab   Bilirubin Direct 0.1 0.0 - 0.2 mg/dL  51   Bilirubin Total 1.0 0.2 - 1.3 mg/dL  51   Albumin 3.5 3.4 - 5.0 g/dL  51   Protein Total 6.9 6.8 - 8.8 g/dL  51   Alkaline Phosphatase 40 40 - 150 U/L  51   ALT 12 0 - 50 U/L  51   AST 16 0 - 45 U/L  51            UA with Microscopic reflex to Culture [[id number]] (Abnormal)  Resulted: 05/01/17 2132, Result status: Final result    Ordering provider: Mia Aparicio MD  05/01/17 1941 Resulting lab: [facility]    Specimen Information    Type Source Collected On   Urine Urine Midstream 05/01/17 2050          Components       Value Reference Range Flag Lab   Color Urine Yellow   51   Appearance Urine Clear   51   Glucose Urine Negative NEG mg/dL  51   Bilirubin Urine Negative NEG  51   Ketones Urine Negative NEG mg/dL  51   Specific Gravity Urine 1.013 1.003 - 1.035  51   Blood Urine Negative NEG  51   pH Urine 6.5 5.0 - 7.0 pH  51   Protein Albumin Urine 10 NEG mg/dL A 51   Urobilinogen mg/dL Normal 0.0 - 2.0 mg/dL  51   Nitrite Urine Negative NEG  51   Leukocyte Esterase Urine Large NEG A 51   Source Midstream Urine   51   WBC Urine 25 0 - 2 /HPF H 51   RBC Urine 4 0 - 2 /HPF H 51   Bacteria Urine Few NEG /HPF A 51   Transitional Epi 1 0 - 1 /HPF  51   Mucous Urine Present NEG /LPF A 51   Hyaline Casts 6 0 - 2 /LPF H 51   Granular Casts 1 NEG /LPF A 51            Basic metabolic panel [[id number]] (Abnormal)  Resulted: 05/01/17 2005, Result status: Final result     Ordering provider: Mia Aparicio MD  05/01/17 1801 Resulting lab: [facility]    Specimen Information    Type Source Collected On   Blood  05/01/17 1825          Components       Value Reference Range Flag Lab   Sodium 143 133 - 144 mmol/L  51   Potassium 4.4 3.4 - 5.3 mmol/L  51   Comment:  Specimen slightly hemolyzed, potassium may be falsely elevated   Chloride 108 94 - 109 mmol/L  51   Carbon Dioxide 22 20 - 32 mmol/L  51   Anion Gap 13 3 - 14 mmol/L  51   Glucose 179 70 - 99 mg/dL H 51   Urea Nitrogen 22 7 - 30 mg/dL  51   Creatinine 1.27 0.52 - 1.04 mg/dL H 51   GFR Estimate 41 >60 mL/min/1.7m2 L 51   Comment:  Non  GFR Calc   GFR Estimate If Black 50 >60 mL/min/1.7m2 L 51   Comment:  African American GFR Calc   Calcium 9.2 8.5 - 10.1 mg/dL  51            INR [[id number]] (Abnormal)  Resulted: 05/01/17 1952, Result status: Final result    Ordering provider: Mia Aparicio MD  05/01/17 1900 Resulting lab: [facility]    Specimen Information    Type Source Collected On   Blood  05/01/17 1903          Components       Value Reference Range Flag Lab   INR 2.54 0.86 - 1.14 H 51            CBC with platelets differential [[id number]] (Abnormal)  Resulted: 05/01/17 1941, Result status: Final result    Ordering provider: Mia Aparicio MD  05/01/17 1851 Resulting lab: [facility]    Specimen Information    Type Source Collected On   Blood  05/01/17 1903          Components       Value Reference Range Flag Lab   WBC 5.0 4.0 - 11.0 10e9/L  51   RBC Count 3.24 3.8 - 5.2 10e12/L L 51   Hemoglobin 10.9 11.7 - 15.7 g/dL L 51   Hematocrit 34.1 35.0 - 47.0 % L 51    78 - 100 fl H 51   MCH 33.6 26.5 - 33.0 pg H 51   MCHC 32.0 31.5 - 36.5 g/dL  51   RDW 14.5 10.0 - 15.0 %  51   Platelet Count 293 150 - 450 10e9/L  51   Diff Method Automated Method   51   % Neutrophils 70.5 %  51   %  Lymphocytes 23.1 %  51   % Monocytes 5.0 %  51   % Eosinophils 0.8 %  51   % Basophils 0.2 %  51   % Immature Granulocytes 0.4 %  51   Nucleated RBCs 0 0 /100  51   Absolute Neutrophil 3.5 1.6 - 8.3 10e9/L  51   Absolute Lymphocytes 1.2 0.8 - 5.3 10e9/L  51   Absolute Monocytes 0.3 0.0 - 1.3 10e9/L  51   Absolute Eosinophils 0.0 0.0 - 0.7 10e9/L  51   Absolute Basophils 0.0 0.0 - 0.2 10e9/L  51   Abs Immature Granulocytes 0.0 0 - 0.4 10e9/L  51   Absolute Nucleated RBC 0.0   51            INR [[id number]]  Resulted: 05/01/17 1900, Result status: Final result    Ordering provider: Mia Aparicio MD  05/01/17 1801 Resulting lab: [facility]    Specimen Information    Type Source Collected On   Blood  05/01/17 1825          Components       Value Reference Range Flag Lab   INR -- 0.86 - 1.14  51   Result:         Canceled, Test credited   Unsatisfactory specimen - clotted  NOTIFIED FLORENCIO Paz AT ER,5/1/17 @ 1900 BY ST              CBC with platelets differential [[id number]]  Resulted: 05/01/17 1852, Result status: Final result    Ordering provider: Mia Aparicio MD  05/01/17 1801 Resulting lab: [facility]    Specimen Information    Type Source Collected On   Blood  05/01/17 1825          Components       Value Reference Range Flag Lab   WBC -- 4.0 - 11.0 10e9/L  51   Result:         Unsatisfactory specimen - clotted   NOTIFIED CESIA SMITH RN ON ER 5/1/17 AT 1851 BY MO     RBC Count -- 3.8 - 5.2 10e12/L  51   Result:         Unsatisfactory specimen - clotted   NOTIFIED CESIA SMITH RN ON ER 5/1/17 AT 1851 BY MO     Hemoglobin -- 11.7 - 15.7 g/dL  51   Result:         Unsatisfactory specimen - clotted   NOTIFIED CESIA SMITH RN ON ER 5/1/17 AT 1851 BY MO     Hematocrit -- 35.0 - 47.0 %  51   Result:         Unsatisfactory specimen - clotted   NOTIFIED CESIA SMITH RN ON ER 5/1/17 AT 1851 BY MO     MCV -- 78 - 100 fl  51    Result:         Unsatisfactory specimen - clotted   NOTIFIED CESIA SMITH RN ON ER 5/1/17 AT 1851 BY MO     MCH -- 26.5 - 33.0 pg  51   Result:         Unsatisfactory specimen - clotted   NOTIFIED CESIA SMITH RN ON ER 5/1/17 AT 1851 BY MO     MCHC -- 31.5 - 36.5 g/dL  51   Result:         Unsatisfactory specimen - clotted   NOTIFIED CESIA SMITH RN ON ER 5/1/17 AT 1851 BY MO     RDW -- 10.0 - 15.0 %  51   Result:         Unsatisfactory specimen - clotted   NOTIFIED CESAI SMITH RN ON ER 5/1/17 AT 1851 BY MO     Platelet Count -- 150 - 450 10e9/L  51   Result:         Unsatisfactory specimen - clotted   NOTIFIED CESIA SMITH RN ON ER 5/1/17 AT 1851 BY MO     Diff Method --   51   Result:         Unsatisfactory specimen - clotted   NOTIFIED CESIA SMITH RN ON ER 5/1/17 AT 1851 BY MO              Glucose by meter [[id number]] (Abnormal)  Resulted: 05/01/17 1825, Result status: Final result    Ordering provider: Mia Aparicio MD  05/01/17 1822 Resulting lab: POINT OF CARE TEST, GLUCOSE    Specimen Information    Type Source Collected On     05/01/17 1822          Components       Value Reference Range Flag Lab   Glucose 134 70 - 99 mg/dL H 170            Testing Performed By     Lab - Abbreviation Name Director Address Valid Date Range    13 - Unknown [facility] Unknown [address] [facility] [address] 01/15/15 0916 - Present    51 - Unknown [facility] Unknown [address] [facility] [address] 12/31/14 1010 - Present    75 - Unknown [facility] Unknown [address] [facility] [address] 01/15/15 1019 - Present    170 - Unknown POINT OF CARE TEST, GLUCOSE Unknown Unknown 10/31/11 1114 - Present    225 - Unknown [facility] Unknown [address] [facility] [id number] 12/19/14 0954 - Present            Unresulted Labs (24h ago through future)    Start       Ordered     05/05/17 0700  Basic metabolic panel  AM DRAW,   Routine      05/04/17 1154    05/03/17 0700  INR  (warfarin (COUMADIN) Pharmacy Consult-INITIAL ORDER)  DAILY,   Routine      05/02/17 0124      Encounter-Level Documents:     There are no encounter-level documents.      Order-Level Documents:     There are no order-level documents.

## 2017-05-01 NOTE — IP AVS SNAPSHOT
MRN:[id number]                      After Visit Summary   5/1/2017    Helen Younger    MRN: [id number]           Thank you!     Thank you for choosing [facility] for your care. Our goal is always to provide you with excellent care. Hearing back from our patients is one way we can continue to improve our services. Please take a few minutes to complete the written survey that you may receive in the mail after you visit with us. Thank you!        Patient Information     Date Of Birth          1940        Designated Caregiver       Most Recent Value    Caregiver    Will someone help with your care after discharge? yes    Name of designated caregiver  [also has home health nurse]    Phone number of caregiver [phone]    Caregiver address same as Pt      About your hospital stay     You were admitted on:  May 2, 2017 You last received care in the:  Unit 6D Observation [facility]    You were discharged on:  May 4, 2017        Reason for your hospital stay       You were admitted for frequent falls, that are most likely attributed to orthostatic hypotension (blood pressure dropping when you stand up) and autonomic dysfunction, meaning very labile blood pressures. You were started on fludrocortisone to help with this. We encourage oral fluid intake. PT recommended a TCU stay due to generalized weakness and frequent falls.                  Who to Call     For medical emergencies, please call 911.  For non-urgent questions about your medical care, please call your primary care provider or clinic, [phone]          Attending Provider     Provider Specialty    Mia Aparicio MD Emergency Medicine    RuidosoCallum MD Emergency Medicine    [facility] Rubén FARFAN [facility] Internal Medicine    [facility]       Primary Care Provider Office Phone # Fax #    Arin Shahid [phone]       [facility] [address]  [place], [place]. \Hospital Has a New Name and Outlook.\"" [id number]        After Care Instructions     Activity - Up with nursing assistance           Additional Discharge Instructions       Keep head of bed elevated            Advance Diet as Tolerated       Follow this diet upon discharge: Orders Placed This Encounter      Regular Diet Adult            Fall precautions           General info for SNF       Length of Stay Estimate: Short Term Care  Condition at Discharge: Stable  Level of care:skilled   Rehabilitation Potential: Fair  Admission H&P remains valid and up-to-date: Yes  Recent Chemotherapy: Current chemotherapy with Ibrance                       Use Nursing Home Standing Orders: Yes            Mantoux instructions       Give two-step Mantoux (PPD) Per Facility Policy Yes                  Follow-up Appointments     Follow Up and recommended labs and tests       Follow up with [facility] upon discharge from TCU.                  Your next 10 appointments already scheduled     May 22, 2017  1:30 PM CDT   PE NPET ONCOLOGY (EYES TO THIGHS) with UUPET1   [facility] [place] PET CT ([facility] [place])    [address] [address] [phone]           Tell your doctor:   If there is any chance you may be pregnant or if you are breastfeeding.   If you have problems lying in small spaces (claustrophobia). If you do, your doctor may give you medicine to help you relax. If you have diabetes:   Have your exam early in the morning. Your blood glucose will go up as the day goes by.   Your glucose level must be 180 or less at the start of the exam. Please take any medicines you need to ensure this blood glucose level. 24 hours before your scan: Don t do any heavy exercise. (No jogging, aerobics or other workouts.) Exercise will make your pictures less accurate. 6 hours before your scan:   Stop all food and liquids (except water).   Do not chew gum or  suck on mints.   If you need to take medicine with food, you may take it with a few crackers.  Please call your Imaging Department at your exam site with any questions.            May 22, 2017  3:30 PM CDT   [facility] Draw with  [facility] DRAW   [facility]    [address] [place] [id number] [phone]            May 22, 2017  4:00 PM CDT   (Arrive by 3:45 PM)   Return Visit with Keisha Landis MD   [facility] ([facility])    [address] [place] [address] [phone]              Additional Services     Occupational Therapy Adult Consult       Evaluate and treat as clinically indicated.    Reason:  Weakness, deconditioning            Physical Therapy Adult Consult       Evaluate and treat as clinically indicated.    Reason:  Weakness, deconditioning                  Further instructions from your care team       _______________________  [facility]  Phone  [phone]  Fax  [phone]  ______________________        Pending Results     Date and Time Order Name Status Description    5/2/2017 0127 Blood culture Preliminary             Statement of Approval     Ordered          05/04/17 1549  I have reviewed and agree with all the recommendations and orders detailed in this document.  EFFECTIVE NOW     Approved and electronically signed by:  Zeenat Stein MD             Admission Information     Date & Time Provider Department Dept. Phone    5/1/2017 Zeenat Stein MD Unit 6D Observation [facility] [place] [phone]      Your Vitals Were     Blood Pressure Pulse Temperature Respirations Weight Pulse Oximetry    166/86 (BP Location: Right arm) 67 98.1  F (36.7  C) (Oral) 16 81.7 kg (180 lb 2 oz) 96%    BMI (Body Mass Index)                   32.95 kg/m2           MyChart Information     [facility] gives you secure access to  your electronic health record. If you see a primary care provider, you can also send messages to your care team and make appointments. If you have questions, please call your primary care clinic.  If you do not have a primary care provider, please call [phone] and they will assist you.        Care EveryWhere ID     This is your Care EveryWhere ID. 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 If we are uncertain of the size of tablets/capsules you have at home, strength may be listed as something that might have changed.        Dose / Directions    warfarin 1 MG tablet   Commonly known as:  JANTOVEN   This may have changed:    - how much to take  - how to take this  - when to take this  - additional instructions   Used for:  History of stroke        Take 1 tablet (1mg) by mouth on Monday, Wednesday, Friday. Take 2 tablets (2 mg) by mouth on Tuesday and Thursday.   Quantity:  30 tablet   Refills:  0         CONTINUE these medicines which have NOT CHANGED        Dose / Directions    DOCUSATE SODIUM        Dose:  1 tablet   1 tablet 2 times daily   Refills:  0       letrozole 2.5 MG tablet   Commonly known as:  FEMARA   Used for:  Malignant neoplasm of female breast, unspecified laterality,  unspecified site of breast (H), Pleural effusion        Dose:  2.5 mg   Take 1 tablet (2.5 mg) by mouth daily   Quantity:  90 tablet   Refills:  3       magnesium hydroxide 400 MG/5ML suspension   Commonly known as:  MILK OF MAGNESIA   Used for:  Constipation        Dose:  30-60 mL   Take 30-60 mLs by mouth daily as needed for constipation or heartburn   Quantity:  360 mL   Refills:  0       OCUVITE ADULT FORMULA PO        Dose:  1 tablet   Take 1 tablet by mouth daily.   Refills:  0       * order for DME   Used for:  Malignant neoplasm of female breast, unspecified laterality, unspecified site of breast (H), Pleural effusion        Equipment being ordered: wheeled walker   Quantity:  1 each   Refills:  0       * order for DME   Used for:  Mixed incontinence        Equipment being ordered:  Incontinence pads   Patient uses these tid   Quantity:  100 each   Refills:  3       * order for DME   Used for:  Physical deconditioning        Equipment being ordered: portable commode   Quantity:  1 Units   Refills:  0       oxybutynin 5 MG tablet   Commonly known as:  DITROPAN        TAKE 1 TABLET (5 MG) BY MOUTH 2 TIMES DAILY   Refills:  11       sennosides 8.6 MG tablet   Commonly known as:  SENOKOT   Used for:  Constipation        Dose:  1 tablet   Take 1 tablet by mouth 2 times daily May increase to 2 tabs twice daily if needed   Quantity:  120 each   Refills:  0       simvastatin 20 MG tablet   Commonly known as:  ZOCOR   Used for:  Hyperlipidemia LDL goal <100        Dose:  20 mg   Take 1 tablet (20 mg) by mouth At Bedtime   Quantity:  90 tablet   Refills:  1       VIACTIV PO   Used for:  Chest pain        Dose:  1 chew tab   Take 1 chew tab by mouth 2 times daily. One in the morning and one at bedtime.   Refills:  0       * Notice:  This list has 3 medication(s) that are the same as other medications prescribed for you. Read the directions carefully, and ask your doctor or other care provider to review them with you.          Where to get your medicines      Some of these will need a paper prescription and others can be bought over the counter. Ask your nurse if you have questions.     You don't need a prescription for these medications     fludrocortisone 0.1 MG tablet    palbociclib 100 MG capsule CHEMO                Protect others around you: Learn how to safely use, store and throw away your medicines at www.disposemymeds.org.             Medication List: This is a list of all your medications and when to take them. Check marks below indicate your daily home schedule. Keep this list as a reference.      Medications           Morning Afternoon Evening Bedtime As Needed    DOCUSATE SODIUM   1 tablet 2 times daily                                fludrocortisone 0.1 MG tablet   Commonly known as:  FLORINEF   Take 0.5 tablets (0.05 mg) by mouth daily   Last time this was given:  50 mcg on 5/4/2017  8:57 AM                                letrozole 2.5 MG tablet   Commonly known as:  FEMARA   Take 1 tablet (2.5 mg) by mouth daily   Last time this was given:  2.5 mg on 5/4/2017  8:58 AM                                magnesium hydroxide 400 MG/5ML suspension   Commonly known as:  MILK OF MAGNESIA   Take 30-60 mLs by mouth daily as needed for constipation or heartburn                                OCUVITE ADULT FORMULA PO   Take 1 tablet by mouth daily.                                * order for DME   Equipment being ordered: wheeled walker                                * order for DME   Equipment being ordered:  Incontinence pads   Patient uses these tid                                * order for DME   Equipment being ordered: portable commode                                oxybutynin 5 MG tablet   Commonly known as:  DITROPAN   TAKE 1 TABLET (5 MG) BY MOUTH 2 TIMES DAILY   Last time this was given:  5 mg on 5/4/2017  8:58 AM                                palbociclib 100 MG capsule CHEMO   Commonly known as:  IBRANCE   Take 1  capsule (100 mg) by mouth daily (with breakfast)   Last time this was given:  100 mg on 5/4/2017  8:58 AM                                sennosides 8.6 MG tablet   Commonly known as:  SENOKOT   Take 1 tablet by mouth 2 times daily May increase to 2 tabs twice daily if needed   Last time this was given:  1 tablet on 5/4/2017  8:57 AM                                simvastatin 20 MG tablet   Commonly known as:  ZOCOR   Take 1 tablet (20 mg) by mouth At Bedtime                                VIACTIV PO   Take 1 chew tab by mouth 2 times daily. One in the morning and one at bedtime.                                warfarin 1 MG tablet   Commonly known as:  JANTOVEN   Take 1 tablet (1mg) by mouth on Monday, Wednesday, Friday. Take 2 tablets (2 mg) by mouth on Tuesday and Thursday.   Last time this was given:  2 mg on 5/3/2017  6:00 PM                                * Notice:  This list has 3 medication(s) that are the same as other medications prescribed for you. Read the directions carefully, and ask your doctor or other care provider to review them with you.

## 2017-05-01 NOTE — IP AVS SNAPSHOT
Unit 6D Observation [address] [address]    Phone:  [phone]    Fax:  [phone]                                       After Visit Summary   5/1/2017    Helen Younger    MRN: [id number]           After Visit Summary Signature Page     I have received my discharge instructions, and my questions have been answered. I have discussed any challenges I see with this plan with the nurse or doctor.    ..........................................................................................................................................  Patient/Patient Representative Signature      ..........................................................................................................................................  Patient Representative Print Name and Relationship to Patient    ..................................................               ................................................  Date                                            Time    ..........................................................................................................................................  Reviewed by Signature/Title    ...................................................              ..............................................  Date                                                            Time

## 2017-05-01 NOTE — TELEPHONE ENCOUNTER
"I called home number and spoke to Rony.   He says he forgot to ask oncology about doing the POLST last time they were there.   I asked him if he did find his original copy of the one we have on file; again he states \"I'm sure it's around here somewhere\"; I advised the one we have needs clarification regarding intubation and a required checkbox was not addressed so is not legal/valid; medics coming to home would not have guidance in that respect.  Advised him to ask oncology again if they can assist or refer them appropriately as Rony feels they don't really see [facility] anymore.   Advised him of what POLST stands for and asked him to write it down to address tomorrow when he states (and I see appt scheduled) they will see oncology.  If oncology wants primary to address this, will need to see [facility] or at least see RN for ACP visit and then [facility] will need to review and sign.    Advised Rony I'd check back on this in about a month, if oncology does this, they can have the document scanned (no need to bring it to our clinic as well).  Rony verbalized understanding of and agreement with plan.    Ani Burnett RN  [facility]        "

## 2017-05-01 NOTE — IP AVS SNAPSHOT
` `     UNIT 6D OBSERVATION [facility]: [phone]                 INTERAGENCY TRANSFER FORM - NOTES (H&P, Discharge Summary, Consults, Procedures, Therapies)   2017                    Hospital Admission Date: 2017  HELEN YOUNGER   : 1940  Sex: Female        Patient PCP Information     Provider PCP Type    Arin Shahid MD General         History & Physicals      H&P by Rell Melara MD at 2017  1:13 AM     Author:  Rell Melara MD Service:  Internal Medicine Author Type:  Resident    Filed:  2017  3:48 AM Date of Service:  2017  1:13 AM Note Created:  2017  1:13 AM    Status:  Attested :  Rell Melara MD (Resident)    Cosigner:  Zeenat Stein MD at 2017  3:12 PM        Attestation signed by Zeenat Stein MD at 2017  3:12 PM        Attestation:  Physician Attestation   I, Zeenat Stein, personally examined and evaluated this patient.  I discussed the patient with the resident and care team, and agree with the assessment and plan of care as documented in the resident s note of 17].      I personally reviewed vital signs, medications and labs.    Zeenat Stein  Date of Service (when I saw the patient): 17                               [facility]  Internal Medicine History and Physical    Name: Helen Younger MRN#: [id number]   Age: 77 year old YOB: 1940       Assessment and Plan   Helen Younger is a 77 year old[VA1.1]  female[VA1.2] with a history of[VA1.1] ER/NM-positive, HER-2 negative[VA1.3] metastatic breast[VA1.2] cancer[VA1.3],  CKD, CVA, HTN[VA1.2] who presents with[VA1.1] fall.[VA1.2]     # Fall: mechanical from history. She has history of recurrent mechanical falls, well documented in her chart. 7-8 falls in the last year, recently completed home PT for strength  - EKG was reviewed and did not show any arrhythmias or acute ST-T changes. Holter monitor in 3/2017  did not show any arrhythmias. Echo in 9/2015 showed normal EF, and evidence of diastolic dysfunction. No structural heart disease/ valvular disease noted.   - History is not consistent with syncope or orthostasis. Neuro exam was unremarkable for any acute changes  - neuro was consulted by ED, who recommended orthostatic vitals- pending  - PT/ OT evaluation for disposition    # Uncomplicated UTI: worsening mental status and UA w/ positive leuk esterase and WBC  - ceftriaxone while inpatient, will switch to oral antibiotic on discharge[VA1.4]  - blood and urine Cx pending[VA1.5]    # Deconditioning and frailty: likely due to metastatic breast cancer  - PT/ OT evaluation   - protein supplements     # Metastatic breast cancer: w/ pleural effusions and bony mets.   H/o Rx[VA1.4] with lumpectomy, adjuvant chemotherapy with Taxotere, whole breast radiotherapy, and 5 years of adjuvant Arimidex, completed in 2012[VA1.3]. Found to have bony mets and metastatic pleural effusion in 12/2015  - continue PTA letrozole, since patient is on obs, we will consider if patient can bring this medication from home and can be verified by pharmacy  - follows up with Hem/ onc. Most recently stable disease noted on imaging 12/2016.   - Holding PTA calcium/ vit D while on obs stay. Receives[VA1.4] Xgeva[VA1.3] every 3 months, last dose on 2/27/2017[VA1.4]    [VA1.3]  # CKD:[VA1.4]  [VA1.3]creatinine is at baseline  - avoid nephrotoxins    #[VA1.4] Hx of CVA: Head CT during her recent admission showed a possible new R middle fronatl gyrus[VA1.3] infarct[VA1.4]. Stable chronic L MCA territory infarct and stable 5mm posterior parafalcine calcified meningioma.[VA1.3]   -[VA1.4]  [VA1.3]continue warfarin dosing per pharmacist    # HTN[VA1.4]: -180s.   - hydralazine prn for SBP > 170  - consider starting amlodipine 5mg daily[VA1.5]    # Chronic microcytic anemia: Likely 2/2 Chemotherapy vs MDS. Vit B12 and folate were normal recently  - Hgb  stable, continue to monitor[VA1.6]    ##Other  - PPx:[VA1.1] already anticoagulated[VA1.4] /[VA1.1]none[VA1.4] for GI/[VA1.1]senna and docusate[VA1.4] for bowel  - FEN:[VA1.1] regular[VA1.4] diet  - IVF:[VA1.1] none[VA1.4]  - Code status:[VA1.1] DNR/ DNI[VA1.4]    Disposition:  Patient[VA1.1] admitted to observation status to ensure hemodynamic stability and safe discharge plan[VA1.4]    Rell Kelton  PGY-3  Internal Medicine  [phone]    This patient[VA1.1] will be staffed in am with attending physician[VA1.4]        Chief Complaint[VA1.1]   Fall[VA1.4]  This history was obtained from the patient[VA1.1] and her [VA1.4], who is a[VA1.1] good[VA1.4] historian, and a review of the medical record.       History of Present Illness    77 year old[VA1.1]  female[VA1.2] with a history of[VA1.1] ER/DE-positive, HER-2 negative[VA1.3] metastatic breast[VA1.2] cancer[VA1.3],  CKD, CVA, HTN[VA1.2] who presents with[VA1.1] fall.[VA1.2] She had two falls today, first at 11 am and second at 3:30 pm. Both seemed mechanical from history with legs giving out. The first one happened in the bathroom and  rushed to see her from the other room. The second one happened while moving from kitchen to bedroom. She was assisted by  when the second fall occurred, with legs giving out. She did not lose consciousness with any of the falls and did not hit her head either. She denies any palpitations, shortness of breath, chest pain or dizziness prior to the falls. Otherwise, she has normal appetite, denies any nausea, vomiting, abdominal pain. She has stable constipation. She denies any dysuria or fever, chills, sweats.[VA1.4] Repots weight loss of 40lbs over the last year. She denies any new focal weakness in arms or legs or facial deviation, has some stable aphasia and ataxia from previous MCA stroke.[VA1.5]       Review of Systems   All 12 systems reviewed.  Relevant positives/negatives noted in HPI above         Past Medical History   (Reviewed and updated)  Past Medical History:   Diagnosis Date     CVA (cerebral infarction) 03/01/00    slurred speech, right facial droop partial hemiplagia     Diabetes mellitus      Fibromuscular dysplasia of renal artery (H)      Malignant neoplasm of breast (female), unspecified site 01/03    Breast cancer     MVR (mitral valve repair)      Unspecified essential hypertension           Past Surgical History   (Reviewed and updated)  Past Surgical History:   Procedure Laterality Date     BREAST LUMPECTOMY, RT/LT  04/06    Breast Lumpectomy RT/LT     BREAST LUMPECTOMY, RT/LT  06/06    redo for margins with radiation and chemo     C NONSPECIFIC PROCEDURE  1998    left knee surg torn ligament         Allergies   (Reviewed and updated)   Allergies   Allergen Reactions     Diuretic      Don't remember side effect     Zyrtec [Cetirizine Hcl]      Elevated blood pressure         Medications   (Reviewed and updated)    No current facility-administered medications on file prior to encounter.   Current Outpatient Prescriptions on File Prior to Encounter:  order for DME Equipment being ordered: portable commode   warfarin (JANTOVEN) 1 MG tablet Take 1 tablet (1mg) by mouth on Monday, Wednesday, Friday. Take 2 tablets (2 mg) by mouth on Tuesday and Thursday. (Patient taking differently: Take 2 mg by mouth daily Take 1 tablet (1mg) by mouth on Monday, Wednesday, Friday. Take 2 tablets (2 mg) by mouth on Tuesday and Thursday.)   letrozole (FEMARA) 2.5 MG tablet Take 1 tablet (2.5 mg) by mouth daily   magnesium hydroxide (MILK OF MAGNESIA) 400 MG/5ML suspension Take 30-60 mLs by mouth daily as needed for constipation or heartburn   sennosides (SENOKOT) 8.6 MG tablet Take 1 tablet by mouth 2 times daily May increase to 2 tabs twice daily if needed   simvastatin (ZOCOR) 20 MG tablet Take 1 tablet (20 mg) by mouth At Bedtime   oxybutynin (DITROPAN) 5 MG tablet TAKE 1 TABLET (5 MG) BY MOUTH 2 TIMES DAILY    order for DME Equipment being ordered:  Incontinence pads   Patient uses these tid   order for DME Equipment being ordered: wheeled walker   DOCUSATE SODIUM 1 tablet 2 times daily    Multiple Vitamins-Minerals (OCUVITE ADULT FORMULA PO) Take 1 tablet by mouth daily.   VIACTIV OR Take 1 chew tab by mouth 2 times daily. One in the morning and one at bedtime.          Family History   (Reviewed and updated)  Family History   Problem Relation Age of Onset     Hypertension Mother      Cardiovascular Mother      Prostate Cancer Father      Hypertension Father      Breast Cancer Maternal Grandmother      Cardiovascular Paternal Grandfather      Hypertension Brother      Depression Daughter      Hypertension Daughter      Psychotic Disorder Daughter      bipolar         Social History   (Reviewed and updated)  Social History   Substance Use Topics     Smoking status: Never Smoker     Smokeless tobacco: Never Used     Alcohol use No           Physical Exam   Vitals:[VA1.1] Blood pressure (!) 200/102, pulse 108, temperature 98.4  F (36.9  C), temperature source Oral, resp. rate 18, weight 85.1 kg (187 lb 11.2 oz), SpO2 98 %, not currently breastfeeding.[VA1.4]    Gen:[VA1.1] laying in bed comfortably, NAD[VA1.4]  HEENT:[VA1.1] no scleral icterus, no conjunctival pallor, oral mucosa moist[VA1.4]  CV:[VA1.1] RRR, nl S1, S2, no MRG[VA1.4]  Resp:[VA1.1]breathing comfortably on RA, good b/l air entry, CTAB[VA1.4]  Abd:[VA1.1] NABS, soft, NT, ND[VA1.4]  Ext:[VA1.1] warm, peripheral pulses palpable, 1+ pedal edema upto mid leg  Neuro: awake, alert, oriented to time, place, person. CN II-XII intact, strength 5/5 in b/l. UE and LE sensations to soft touch intact b/l UE and LE. Gait was not assessed[VA1.4]      Data   Labs:  All labs reviewed.       EKG:[VA1.1]  Reviewed. NSR w/ PACs. No acute ST-T wave changes.[VA1.4]     Microbiology:[VA1.1]  Blood Cx, urine Cx pending[VA1.4]    Imaging[VA1.1]  No imaging obtained current  admission    Ziopatch results and echo results were reviewed.[VA1.4]                        Revision History        User Key Date/Time User Provider Type Action    > VA1.6 5/2/2017  3:48 AM Rell Melara MD Resident Sign     VA1.5 5/2/2017  3:45 AM Rell Melara MD Resident Sign     VA1.4 5/2/2017  1:29 AM Rell Melara MD Resident      VA1.2 5/2/2017  1:27 AM Rell Melara MD Resident      VA1.3 5/2/2017  1:26 AM Rell Melara MD Resident      VA1.1 5/2/2017  1:13 AM Rell Melara MD Resident                      Discharge Summaries      Discharge Summaries by Awilda Heart MD at 5/4/2017  3:58 PM     Author:  Awilda Heart MD Service:  General Medicine Author Type:  Resident    Filed:  5/4/2017  3:58 PM Date of Service:  5/4/2017  3:58 PM Note Created:  5/4/2017  3:11 PM    Status:  Cosign Needed :  Awilda Heart MD (Resident)    Cosign Required:  Yes                                                                              Medicine Discharge Summary  Helen Younger MRN: [id number]  1940  Primary care provider: Arin Shahid  ___________________________________          Date of Admission:  5/1/2017  Date of Discharge:  5/4/2017   Admitting Physician:  Rubén Correa MD  Discharge Physician:  Zeenat Stein  Discharging Service:  Internal Medicine, [place] [address]     Primary Provider: Arin Shahid         Reason for Admission:   Fall    From H&P 5/2/17:  77 year old  female with a history of ER/NM-positive, HER-2 negative metastatic breast cancer, CKD, CVA, HTN who presents with fall. She had two falls today, first at 11 am and second at 3:30 pm. Both seemed mechanical from history with legs giving out. The first one happened in the bathroom and  rushed to see her from the other room. The second one happened while moving from kitchen to bedroom. She was assisted by  when the second fall occurred, with legs giving out. She did  not lose consciousness with any of the falls and did not hit her head either. She denies any palpitations, shortness of breath, chest pain or dizziness prior to the falls. Otherwise, she has normal appetite, denies any nausea, vomiting, abdominal pain. She has stable constipation. She denies any dysuria or fever, chills, sweats. Repots weight loss of 40lbs over the last year. She denies any new focal weakness in arms or legs or facial deviation, has some stable aphasia and ataxia from previous MCA stroke.           Discharge Diagnosis:   Orthostatic hypotension  Autonomic dysfunction  Sterile Pyuria  Deconditioning  Metastatic breast cancer with pleural effusions and bony mets  History of CVA             Procedures & Significant Findings:   UA: large leukocyte esterase, 25 WBC  Urine culture with < 10,000 urogenital geoff         Consultations:   Neurology         Hospital Course by Problem:    1. Fall 2/2 orthostatic hypotension  She has a history of recurrent falls (7-8 in the last year). Recently completed home PT. No signs of arrythmia or structural heart disease to suggest syncope. Orthostatics positive on admission with labile blood pressures noted. She received IV fluids for presumed volume depletion contributing to her orthostasis. Additionally, parkinsonism (shuffling gait, camptocormia, dysarthria) and autonomic dysfunction raise concern for neurodegenerative disease such as multiple systems atrophy.  -- fludrocortisone 0.05mg daily for orthostatics  --compression stockings  -- TCU for PT/OT  -- follow up with Neurology movement disorders clinic upon discharge    2. Deconditioning and frailty  The patient has 16 stairs to get into her home, and was able to only to 3 with physical therapy. Discussed with patient's  that her current living situation may not be sustainable long term if she continues to decline and that she may require a higher level of care in the future.    3. Orthostatic hypotension,  autonomic dysfunction  Blood pressures very labile, with systolics 160-180. She was given IV hydralazine x 1 and BP precipitously dropped, requiring IV fluids. Would only treat elevated blood pressure if systolic blood pressure remains elevated > 180 while sitting and standing.  -- fludrocorticone as above  -- compression stockings    4. Sterile pyuria  Initially treated with ceftriaxone given UA with positive leuk esterase and WBC, though urine culture with no growth so ceftriaxone discontinued after day 3.     5. Metastatic breast cancer with pleural effusions and bony mets  History of lumpectomy, adjuvant chemotherapy with Taxotere, whole breast radiotherapy,a dn 5 years of adjuvant Arimidex completed in 2012. Found to have bony mets and metastatic pleural effusion in 12/2015. Stable disease noted on imaging 12/2016. Receives Xgeva every 3 months, last dose 2/27/2017.  -- continue PTA letrozole, Ibrance    6. History of CVA  Dysarthria and dysphasia is at baseline, per her .  -- continue warfarin      Physical Exam on day of Discharge:  Blood pressure (!) 165/97, pulse 67, temperature 98.3  F (36.8  C), temperature source Oral, resp. rate 16, weight 81.7 kg (180 lb 2 oz), SpO2 95 %, not currently breastfeeding.  General: in NAD. Some speech latency and  HEENT: MMM, PERRLA, EOM intact  CV: RRR, normal S1S2, no murmur, clicks, rubs appreciated  Resp: Clear to auscultation bilaterally, no wheezes, rhonchi  Abd: Soft, non-tender, BS+, no masses appreciated  Extremities: Radial and pedal pulses intact and symmetric, no pedal edema  Neuro: Some speech latency and dysphasia, no cogwheel rididity             Pending Results:   none         Discharge Medications:     Current Discharge Medication List      CONTINUE these medications which have NOT CHANGED    Details   !! order for DME Equipment being ordered: portable commode  Qty: 1 Units, Refills: 0    Associated Diagnoses: Physical deconditioning      warfarin  (JANTOVEN) 1 MG tablet Take 1 tablet (1mg) by mouth on Monday, Wednesday, Friday. Take 2 tablets (2 mg) by mouth on Tuesday and Thursday.  Qty: 30 tablet, Refills: 0    Associated Diagnoses: History of stroke      letrozole (FEMARA) 2.5 MG tablet Take 1 tablet (2.5 mg) by mouth daily  Qty: 90 tablet, Refills: 3    Associated Diagnoses: Malignant neoplasm of female breast, unspecified laterality, unspecified site of breast (H); Pleural effusion      magnesium hydroxide (MILK OF MAGNESIA) 400 MG/5ML suspension Take 30-60 mLs by mouth daily as needed for constipation or heartburn  Qty: 360 mL, Refills: 0    Associated Diagnoses: Constipation      sennosides (SENOKOT) 8.6 MG tablet Take 1 tablet by mouth 2 times daily May increase to 2 tabs twice daily if needed  Qty: 120 each, Refills: 0    Associated Diagnoses: Constipation      simvastatin (ZOCOR) 20 MG tablet Take 1 tablet (20 mg) by mouth At Bedtime  Qty: 90 tablet, Refills: 1    Associated Diagnoses: Hyperlipidemia LDL goal <100      oxybutynin (DITROPAN) 5 MG tablet TAKE 1 TABLET (5 MG) BY MOUTH 2 TIMES DAILY  Refills: 11      !! order for DME Equipment being ordered:  Incontinence pads   Patient uses these tid  Qty: 100 each, Refills: 3    Associated Diagnoses: Mixed incontinence      !! order for DME Equipment being ordered: wheeled walker  Qty: 1 each, Refills: 0    Associated Diagnoses: Malignant neoplasm of female breast, unspecified laterality, unspecified site of breast (H); Pleural effusion      DOCUSATE SODIUM 1 tablet 2 times daily       Multiple Vitamins-Minerals (OCUVITE ADULT FORMULA PO) Take 1 tablet by mouth daily.      VIACTIV OR Take 1 chew tab by mouth 2 times daily. One in the morning and one at bedtime.     Associated Diagnoses: Chest pain       !! - Potential duplicate medications found. Please discuss with provider.               Discharge Instructions and Follow-Up:     Discharge Procedure Orders  Home care nursing referral   Referral Type:  Home Health Therapies & Aides     Home Care PT Referral for Hospital Discharge   Referral Type: Home Health Therapies & Aides     Home Care OT Referral for Hospital Discharge     Home Care Social Service Referral for Hospital Discharge                   Discharge Disposition:   TCU          Condition on Discharge:   Discharge condition: Fair   Code status on discharge: DNR / DNI        Date of service: 5/4/2017    The patient was discussed with Dr. Stein.    Awilda Heart MD PGY-2  Internal Medicine  [phone][JS1.1]     Revision History        User Key Date/Time User Provider Type Action    > JS1.1 5/4/2017  3:58 PM Awilda Heart MD Resident Sign                     Consult Notes      Consults by Elias Del Valle MD at 5/1/2017  7:29 PM     Author:  Elias Del Valle MD Service:  Neurology Author Type:  Physician    Filed:  5/2/2017 12:47 PM Date of Service:  5/1/2017  7:29 PM Note Created:  5/1/2017  7:29 PM    Status:  Signed :  Elias Del Valle MD (Physician)         Neurology Consult    CC/Reason for consult: falls    HPI:   76 yo woman with hx L MCA stroke 2000 & breast cancer who presents with 2 falls today. First fall occurred around 11AM when patient was in the bathroom; she stood from the toilet & fell soon afterwards. Fell onto her bottom; did not hit her head. Witnessed by , who helped her up & helped walk patient to bed. She laid in bed for a couple hours, then was able to get up & get around with her walker. Later in the afternoon, around 3PM, patient stood from seat in kitchen & collapsed moments later. Again, fell on her bottom & did not hit head.  helped patient get up. Brought patient to ED b/c had 2 falls in one day, which is unusual. Patient has history of falls, occur every couple months. Previous work-up has included ziopatch & MRI brain (10/2016) which were negative. Orthostatic vitals have not been documented  clearly.     Limited ability to obtain history from patient due to baseline expressive aphasia. Most of history obtained from . Difficult to ascertain whether patient had prodrome of dizziness, vertigo, numbness. Per , patient has never passed out, lost consciousness or had shaking spells. Has baseline R-sided weakness & aphasia from stroke in 2000. She is on coumadin, apparently for carotid artery dz per ; does not have hx of atrial fibrillation. Ambulates with walker at baseline.      ROS: Negative except as stated above.    PMHx/PSHx:  Past Medical History:   Diagnosis Date     CVA (cerebral infarction) 03/01/00    slurred speech, right facial droop partial hemiplagia     Diabetes mellitus      Fibromuscular dysplasia of renal artery (H)      Malignant neoplasm of breast (female), unspecified site 01/03    Breast cancer     MVR (mitral valve repair)      Unspecified essential hypertension      Past Surgical History:   Procedure Laterality Date     BREAST LUMPECTOMY, RT/LT  04/06    Breast Lumpectomy RT/LT     BREAST LUMPECTOMY, RT/LT  06/06    redo for margins with radiation and chemo     C NONSPECIFIC PROCEDURE  1998    left knee surg torn ligament       Medications:    sodium chloride 0.9%  500 mL Intravenous Once       Allergies:  Diuretic and Zyrtec [cetirizine hcl]    Social Hx:   No etoh or tobacco use.  Lives with .  Ambulates w/ walker at baseline.    Family Hx:   Non-contributory    Exam:  BP (!) 173/101  Pulse 108  Temp 97.8  F (36.6  C) (Oral)  SpO2 98%    Gen: NAD  Neck: no meningismus  CV: RRR  Resp: no labored breathing  Abd: soft  Ext: mild edema in legs, non-pitting  Neuro: awake, gives 1-2 word answers, difficulty with naming (especially low-frequency words eg feather), able to repeat simple sentence, follows 2-step commands, EOMI, PERRL, blinks to threat from all quadrants, mild facial asymmetry with R droop, dysarthric speech. No drift in arms of legs against  "gravity; slightly increased tone in R arm compared to L. 4/5 strength R deltoid & ; 4+/5 in L deltoid & . 4/5 hip flexion & ankle dorsiflexion bilaterally. FNF without dysmetria b/l. Difficult to assess sensation due to aphasia/cooperation. No obvious neglect or inattention. Did not assess gait due to safety concern.      Impression & Recommendations:    # Falls. Had 2 falls in one day; both occurred soon after patient stood from sitting position. Suspect due to orthostatic hypotension or physical deconditioning. Can also consider toxic/metabolic derangements or infection. Lower suspicion for stroke given no new neurologic deficits & patient already on anticoagulation for secondary stroke prevention. Atypical seizure (eg \"drop attack\") is possible, though seems less likely based on history.    -recommend checking orthostatic vitals (story is very suspicious for orthostatic hypotension) & basic labs (CBC, CMP, U/A) to rule out infection or toxic/metabolic process.[RP1.1] Treat with IVF if appropriate, per primary team.[RP1.2]  -recommend admission to medicine/obs for above work-up & evaluation by PT/OT.  -if the above work-up negative, then can consider further w/u with MRI, EEG or orthostatic testing. We will continue to follow.    Patient seen & discussed with Staff Dr. Del Valle.    Lupe Lyn  G2 Neurology[RP1.1]    ATTENDING ADDENDUM: Patient seen and examined today with resident Dr Herrera. Discussed on the phone with resident Dr Lny last night, and agree with her assessment and recs as above, except as amended herein.  TT spent for patient care 35 minutes. More than half was counseling.  Mrs Younger has had recurrent falls for at least 2 years now. This is definitely not a new problem. Falls typically occur upon standing. Our nursing staff did orthostatic vitals which showed a very significant drop from the laying to sitting position from 180 systolic to 130. We would like vitals to also be done " with standing to understand this better. Tilt test may be an option if the patient is unable to stand for 1-2 minutes. This is an important treatable cause of falls that should not be missed- in that case we would consider compression stockings, increase of fluid and salt intake, etc.  In addition, her exam showed: findings expected from previous left MCA stroke (right mild facial droop, mild right spastic hemiparesis and hyperreflexia) but also a few other abnormalities, including :1) a LEFT sided Babinski sign and brisk LEFT knee and upper extremity reflexes with spread 2) Dysarthric speech with a scanning quality (ataxia?) and 3) A very shuffling gait with camptocormia, and very small steps. She has no tremor or upper extremity cogwheel rigidity.   The latter has previously raised concerns about parkinsonism, but it appears that a trial of Sinemet done through [facility] in 2015 was unsuccessful and made her WORSE (for 1-2 months).    What I wonder is whether the constellation of orthostasis, camptocormia with shuffling gait, unresponsive to L-dopa, and scanning dysarthria, suggest multiple system atrophy. There are no clear clues to this diagnosis from previous MRIs of the brain, and she has had a few of those (in 2016 and 2015, reviewed). She also had MRI of C spine in 2015 which was unrevealing (similar symptoms). Would not recommend repeat imaging.     In addition, recent UTI and mild dehydration may be playing a role in her symptoms.     Recommend 1) Repeat orthostatic vitals 2) Medical treatment of UTI and hydration 3) PT/OT assessment for safety 4) If she does not improve by #1-3 we may need to transfer to Neurology for further workup.     Elias Del Valle MD[GM1.1]         Revision History        User Key Date/Time User Provider Type Action    > GM1.1 5/2/2017 12:47 PM Elias Del Valle MD Physician Sign     RP1.2 5/1/2017  8:18 PM Lupe Lyn MD Resident Sign     RP1.1  5/1/2017  8:16 PM Lupe Lyn MD Resident Sign                     Progress Notes - Physician (Notes from 05/01/17 through 05/04/17)      Progress Notes by Marianne Aparicio LICSW at 5/4/2017  4:01 PM     Author:  Marianne Aparicio LICSW Service:  Social Work Author Type:      Filed:  5/4/2017  4:05 PM Date of Service:  5/4/2017  4:01 PM Note Created:  5/4/2017  4:01 PM    Status:  Signed :  Marianne Aparicio LICSW ()         Social Work Services Discharge Note      Patient Name:  Helen Younger     Anticipated Discharge Date:  5/4/2017    Discharge Disposition:   TCU:  [facility]  ([phone] f: [phone])       Pre-Admission Screening (PAS) online form has been completed.  The Level of Care (LOC) is:  Determined  Confirmation Code is:  [id number]  Patient/caregiver informed of referral to [facility] Line for Pre-Admission Screening for skilled nursing facility (SNF) placement and to expect a phone call post discharge from SNF.     Additional Services/Equipment Arranged:   requests transportation be arranged. HE transport arranged for 6pm.      Patient / Family response to discharge plan:  Pt and  are in agreement w/ plan to d/c to [facility].      Persons notified of above discharge plan:  Pt and , care team, TCU    Staff Discharge Instructions:  Please fax discharge orders and signed hard scripts for any controlled substances.  Please print a packet and send with patient.     CTS Handoff completed:  NO    Medicare Notice of Rights provided to the patient/family:  NO[NC1.1]         Revision History        User Key Date/Time User Provider Type Action    > NC1.1 5/4/2017  4:05 PM Marianne Aparicio LICSW  Sign            Progress Notes by Marianne Aparicio LICSW at 5/4/2017  8:05 AM     Author:  Marianne Aparicio LICSW Service:  Social Work Author Type:      Filed:  5/4/2017 12:44 PM Date of Service:   5/4/2017  8:05 AM Note Created:  5/4/2017  8:05 AM    Status:  Addendum :  Marianne Aparicio LICSW ()         Social Work Services Progress Note    Hospital Day: 3 (Observation)  Collaborated with:  Pt and her , Rony    Data:  SW consult to assist w/ TCU placement. Pt had previously been refusing placement as was . Late yesterday, pt and  finally agreed to look at TCU placement, per PT recommendation. Pt and  expressed concerns regarding insurance coverage for TCU. Writer explained that referrals would need to be sent to facilities, who would then verify insurance coverage.  expressed preference for the [facility] network and did not have preference for location.     Intervention:  D/C Planning    Assessment:  Pt and  appear to understand need for TCU placement in that pt is not safe to be at home, at this time.     Plan:    Anticipated Disposition:  Facility:  The following referrals have been initiated:   [facility] ([phone])  [facility] Place: No Beds  [facility] ([phone])  [facility] ([phone] f: [phone])    Barriers to d/c plan:  Possible insurance/fianncial, locating placement    Follow Up:  SW waiting to hear back from above facilities.[NC1.1]     Addendum:    [facility]: No Beds  [facility]: Assessed and declined to accept  [facility]: Continuing to assess  [facility]: Assessing    Discussed w/  d/c planning progress.  now debating taking pt home. Writer again explained rationale for placement and that pt is not safe to be returning home at this time. Pt's  discussed getting in home help, but this still does not resolve the concern of pt not being able to do the 16 stairs that are required.  will to await final decision from above facilities.[NC1.2]      Revision History        User Key Date/Time User Provider Type Action    > [N/A]  5/4/2017 12:44 PM Marianne Aparicio LICSW  Addend     NC1.2 5/4/2017 12:43 PM Marianne Aparicio LICSW  Incomplete Revision     NC1.1 5/4/2017  8:11 AM Marianne Aparicio LICSW  Sign            Progress Notes by Elias Del Valle MD at 5/4/2017  8:35 AM     Author:  Elias Del Valle MD Service:  Neurology Author Type:  Physician    Filed:  5/4/2017 10:49 AM Date of Service:  5/4/2017  8:35 AM Note Created:  5/4/2017  8:35 AM    Status:  Signed :  Elias Del Valle MD (Physician)         Patient seen and examined today.   Exam stable from previously noted.   Orthostatic recommendations from yesterday instituted by primary team.  Recommend re-checking orthostatic vitals today to see if improved or resolved[JR1.1] although suspect it may take a few day[JR1.2]s on Fludrocortisone[JR1.3].[JR1.1]   - Follow up in Movement disorders clinic in 2-4 weeks for consideration of MSA work up.   - PT/OT/Primary team to arrange safe disposition  - If going to TCU would recommend repeat orthostatic vital check in 3-4 days.  - Inpatient Neurology will sign off at this time     Patient discussed with attending Dr. Del Valle[JR1.3]    Anna Herrera PGY-3  Neurology resident[JR1.1]     ATTENDING ADDENDUM: Pt discussed today with resident Dr Herrera but not seen. Agree with his assessment and recs as above. Elias Del Valle MD[GM1.1]     Revision History        User Key Date/Time User Provider Type Action    > GM1.1 5/4/2017 10:49 AM Elias Del Valle MD Physician Sign     JR1.3 5/4/2017 10:46 AM Anna Herrera MD Resident Sign     JR1.2 5/4/2017 10:41 AM Anna Herrera MD Resident      JR1.1 5/4/2017  8:35 AM Anna Herrera MD Resident             Progress Notes by Awilda Heart MD at 5/3/2017  4:51 PM     Author:  Awilda Heart MD Service:  General Medicine Author Type:   Resident    Filed:  5/3/2017  5:01 PM Date of Service:  5/3/2017  4:51 PM Note Created:  5/3/2017  4:51 PM    Status:  Attested :  Awilda Heart MD (Resident)    Cosigner:  Zeenat Stein MD at 5/4/2017  6:53 AM        Attestation signed by Zeenat Stein MD at 5/4/2017  6:53 AM        Attestation:  Physician Attestation   I, Zeenat Stein, saw this patient with the resident and agree with the resident s findings and plan of care as documented in the resident s note.      I personally reviewed vital signs, medications and labs.    Key findings: PT/OT rec TCU, pt/family expressed financial concerns which is currently being addressed for safe dispo.    Zeenat Stein  Date of Service (when I saw the patient): 5/3/17                               [facility] Internal Medicine Progress Note          Assessment and Plan:   Assessment: Ms. Younger is a 76yo female with h/o ER/WI-positive, HER-2 negative metastatic breast cancer, CKD, CVA, HTN who presents with fall.    # Fall:  History of recurrent falls (7-8 in the last year). Recently completed home PT. No signs of arrythmia on admission EKG. Holter in 3/2017 without any arrythmia. Echo 9/2015 with normal EF, no diastolic dysfunction. No structural heart disease. Possible that UTI is contributing factor to weakness. Fall most likely related to orthostatic hypotension and autonomic dysfunction. Per neuro,dysautonomia and parkinsonian signs raise question of neurodegenerative disease such as Multiple systems atrophy.  - appreciate neuro recs  - PT/OT recommend TCU placement  - compression stockings  - encourage po fluid intake  - start fludrocortisone 0.05mg daily  - follow up with Neurology Movement disorders clinic upon d/c    # Uncomplicated UTI: worsening mental status and UA w/ positive leuk esterase and WBC, though urine culture with no growth  - ceftriaxone while inpatient, will plan to switch to oral antibiotic on  discharge for 5 day course     # Deconditioning and frailty: likely due to metastatic breast cancer, possible neurodegenerative disease  - PT/ OT recommend TCU  - protein supplements      # Metastatic breast cancer: w/ pleural effusions and bony mets.   H/o Rx with lumpectomy, adjuvant chemotherapy with Taxotere, whole breast radiotherapy, and 5 years of adjuvant Arimidex, completed in 2012. Found to have bony mets and metastatic pleural effusion in 12/2015  - continue PTA letrozole, since patient is on obs, plan for patient's  to bring from home  - continue PTA Ibrance  - follows up with Hem/ onc. Most recently stable disease noted on imaging 12/2016.   - Holding PTA calcium/ vit D while on obs stay. Receives Xgeva every 3 months, last dose on 2/27/2017      # CKD:  creatinine is at baseline  - avoid nephrotoxins     # Hx of CVA: Head CT during her recent admission showed a possible new R middle fronatl gyrus infarct. Stable chronic L MCA territory infarct and stable 5mm posterior parafalcine calcified meningioma.   -  continue warfarin dosing per pharmacy recs     # HTN: -180s.   - hydralazine prn for SBP > 170     # Chronic microcytic anemia: Likely 2/2 Chemotherapy vs MDS. Vit B12 and folate were normal recently  - Hgb stable, continue to monitor        PPx: already anticoagulated /none for GI/senna and docusate for bowel   FEN: regular diet  -IVF: none   Code status: DNR/ DNI  Dispo: PT/OT recommending TCU. Patient has 16 steps to get into her house and was only able to climb 3 stairs with PT today. Family agreeable to TCU only if cost is covered. SW is looking into [facility].    Patient seen and discussed with Dr. Sarita Heart MD  Internal Medicine PGY-2  [phone]           Interval History:   No events overnight. Didn't sleep well last night. Denies dizziness, lightheadedness, headache, chest pain.              Medications:     Current Facility-Administered Medications   Medication      warfarin (COUMADIN) tablet 2 mg     fludrocortisone (FLORINEF) half-tab 50 mcg     docusate sodium (COLACE) capsule 100 mg     oxybutynin (DITROPAN) tablet 5 mg     sennosides (SENOKOT) tablet 1 tablet     naloxone (NARCAN) injection 0.1-0.4 mg     acetaminophen (TYLENOL) tablet 650 mg     ondansetron (ZOFRAN-ODT) ODT tab 4 mg    Or     ondansetron (ZOFRAN) injection 4 mg     letrozole (FEMARA) tablet 2.5 mg     Warfarin Therapy Reminder (Check START DATE - warfarin may be starting in the FUTURE)     cefTRIAXone (ROCEPHIN) 1 g vial to attach to  mL bag for ADULTS or NS 50 mL bag for PEDS     palbociclib (IBRANCE) capsule CHEMO 100 mg             Physical Exam:   Vitals were reviewed  Blood pressure 145/84, pulse 67, temperature 98.2  F (36.8  C), temperature source Oral, resp. rate 16, weight 82.1 kg (181 lb), SpO2 98 %, not currently breastfeeding.    Physical Exam:   General: in NAD. Some speech latency and  HEENT: MMM, PERRLA, EOM intact  CV: RRR, normal S1S2, no murmur, clicks, rubs appreciated  Resp: Clear to auscultation bilaterally, no wheezes, rhonchi  Abd: Soft, non-tender, BS+, no masses appreciated  Extremities: Radial and pedal pulses intact and symmetric, no pedal edema  Neuro: Some speech latency and dysphasia, no cogwheel rididity         Data:   ROUTINE LABS (Last four results)  CMP    Recent Labs  Lab 05/02/17  0755 05/01/17  1903 05/01/17  1825     --  143   POTASSIUM 3.6  --  4.4   CHLORIDE 108  --  108   CO2 26  --  22   ANIONGAP 7  --  13   *  --  179*   BUN 19  --  22   CR 0.99  --  1.27*   GFRESTIMATED 54*  --  41*   GFRESTBLACK 66  --  50*   SHERYL 8.6  --  9.2   PROTTOTAL  --  6.9  --    ALBUMIN  --  3.5  --    BILITOTAL  --  1.0  --    ALKPHOS  --  40  --    AST  --  16  --    ALT  --  12  --      CBC    Recent Labs  Lab 05/03/17  0732 05/01/17  1903 05/01/17  1825   WBC 3.4* 5.0 Unsatisfactory specimen - clotted NOTIFIED CESIA SMITH RN ON ER 5/1/17 AT 1851 BY RADHA TRIPLETT  3.03* 3.24* Unsatisfactory specimen - clotted NOTIFIED CHLOEWARDROOSEVELT FINCHER RN ON ER 5/1/17 AT 1851 BY MO   HGB 10.4* 10.9* Unsatisfactory specimen - clotted NOTIFIED JINJER LUIS RN ON ER 5/1/17 AT 1851 BY MO   HCT 31.8* 34.1* Unsatisfactory specimen - clotted NOTIFIED JINWARDR LUIS RN ON ER 5/1/17 AT 1851 BY MO   * 105* Unsatisfactory specimen - clotted NOTIFIED JINJER LUIS RN ON ER 5/1/17 AT 1851 BY MO   MCH 34.3* 33.6* Unsatisfactory specimen - clotted NOTIFIED JINWARDR LUIS RN ON ER 5/1/17 AT 1851 BY MO   MCHC 32.7 32.0 Unsatisfactory specimen - clotted NOTIFIED JINJER LUIS RN ON ER 5/1/17 AT 1851 BY MO   RDW 15.2* 14.5 Unsatisfactory specimen - clotted NOTIFIED JINWARDR LUIS RN ON ER 5/1/17 AT 1851 BY MO    293 Unsatisfactory specimen - clotted NOTIFIED CHLOEWARDR LUIS RN ON ER 5/1/17 AT 1851 BY MO     INR    Recent Labs  Lab 05/03/17  0732 05/01/17  1903 05/01/17  1825   INR 2.30* 2.54* Canceled, Test credited Unsatisfactory specimen - clottedNOTIFIED RN Enmanuel Paz AT ER,5/1/17 @ 1900 BY      Arterial Blood GasNo lab results found in last 7 days.[JS1.1]             Revision History        User Key Date/Time User Provider Type Action    > JS1.1 5/3/2017  5:01 PM Awilda Heart MD Resident Sign            Progress Notes by Elias Del Valle MD at 5/3/2017 12:25 PM     Author:  Elias Del Valle MD Service:  Neurology Author Type:  Physician    Filed:  5/3/2017  2:24 PM Date of Service:  5/3/2017 12:25 PM Note Created:  5/3/2017 12:25 PM    Status:  Signed :  Elias Del Valle MD (Physician)         Neurology Progress Note  Helen LONDON Machadopreetmaxine  [id number]  May 3, 2017      Assessment/Recommendations:  Ms. Rekow is a 77 year old female with history of bilateral MCA strokes (previously thought to be L MCA only, but CT 3/2017 shows R MCA as well), breast cancer, and frequent falls presents with two falls in one day.   Her exam is stable from  previous.   It appears she has 2 chief problems, 1. orthostasis- which is documented on multiple orthostatic vitals and should be treated appropriately.    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  Given her poor response the levodopa in the past and prominent dysautonomia a parkinson's plus syndrome such as MSA should be considered.   We considered VIJI scan, but will defer to our movement disorder colleagues as an outpatient.      - Consider decreasing dose of oxybutynin  - HOB up at night  - Compression stockings  - Increase fluid intake  - 0.05 mg Fludrocortisone daily in the AM.    - Will need follow up in Neurology Movement disorders clinic upon discharge.        Subjective: No acute events overnight.      Physical Exam:[JR1.1]  BP (!) 120/96 (BP Location: Right arm)  Pulse 78  Temp 98.8  F (37.1  C) (Oral)  Resp 16  Wt 82.1 kg (181 lb)  SpO2 96%  BMI 33.11 kg/m2[JR1.2]  GEN: awake and alert, NAD   RESP: Non-labored breathing  GI: Nondistended   NEURO:   MS: AO x3. Expressive aphasia, able to name properly with single words. Able to follow 3 step commands.   CN:   Visual fields full, EN, Conjugate gaze, EOMI except for mildly limited upgaze.  Flat R NLF.  Hearing intact bilaterally. Normal neck turning, shoulder shrug. Tongue protrudes midline. Dysarthria with ataxic features more than expected for stroke.  .   Motor:Paratonia present, no tremor, mild right hemiparesis in arms and legs 4+/5.   Reflexes: Brisk and slightly more prominent on R. L toe upgoing, 2 beats clonus on L, postive Hoffmans on R.   Sensory: decreased on R mildly but present.    Coordination / Gait: Walks with assist of 1, camptocormic, seems to be falling forward, wide based gait with short steps, difficulty with turns.   Gets fatigued with about 10 steps.         Pertinent Imaging and Labs:  Reviewed in chart    Discussed and seen with attending staff physician, Dr. Del Valle.       Anna Herrera  DO  PGY-3[JR1.1]    ATTENDING ADDENDUM: Patient discussed with resident Dr Herrera on 5/3/2017, but not seen. I agree with his  assessment and recs as above. Patient has definite orthostatic hypotension and this should be treated. We recommend starting Florinef 0.05 mg in am, compression stockings, elevation of HOB at night to 30 degrees (to minimize supine hypertension), and increasing her fluid intake. Would rather avoid midodrine due to risk of vasospasm in a patient with previous large left MCA stroke. The question is whether she has a syndrome of parkinsonism with dysautonomia, like MSA. The prominent camptocormia, AND very poor response to levodopa 2 years ago, point to this possibility. This is a very difficult diagnosis to make, and we may not be able to establish it during the current hospitalization. Recommend f/u in our Movement Disorder Clinic in 1-2 months to discuss above. Junito ([id number]) scan would be helpful to demonstrate a presynaptic dopaminergic defect, but we consulted Radiology and this test can NOT be done or reimbursed as inpatient. Because L-dopa can aggravate orthostatic hypotension, and she did not tolerate it at all 2 years ago (nausea, worsening gait), we do NOT recommend another empirical trial. We also do NOT recommend tilt test because OH was well documented by vital sign checks, and doing a tilt would not offer additional information.    Elias Del Valle MD[GM1.1]                 Revision History        User Key Date/Time User Provider Type Action    > GM1.1 5/3/2017  2:24 PM Elias Del Valle MD Physician Sign     JR1.2 5/3/2017 12:37 PM Anna Herrera MD Resident Sign     JR1.1 5/3/2017 12:25 PM Anna Herrera MD Resident             Progress Notes by Dinorah Arteaga RN at 5/3/2017 10:41 AM     Author:  Dinorah Arteaga, RN Service:  (none) Author Type:  Care Coordinator    Filed:  5/3/2017 12:10 PM Date of Service:  5/3/2017 10:41 AM  "Note Created:  5/3/2017 10:41 AM    Status:  Addendum :  Dinorah Arteaga RN (Care Coordinator)           Care Coordinator Progress Note     Admission Date/Time:  5/1/2017  Attending MD:  Zeenat Stein,*     Data  Chart reviewed, discussed with interdisciplinary team.   Patient was admitted for:    Weakness  Abnormal urinalysis  Fall, initial encounter.    Concerns with insurance coverage for discharge needs: None.  Current Living Situation: Patient lives with spouse.  Support System: Supportive  Services Involved: Home Care  Transportation: Family or Friend will provide  Barriers to Discharge: chronically ill and physical impairment    Coordination of Care and Referrals: Provided patient/family with options for Home Care and Skilled Nursing Facility, TCU.        Assessment  Spoke with patient at bedside.  Patient was admitted for weakness and has continued to have falls at home.  Family is able to provide transportation upon discharge.  Patient given MOON form for observation status.  Discussed PT recs for TCU for continued rehab and increased safety.  Also discussed the need to be able to get up stairs within the home.  Patient again declines TCU, stating \"no overnight\" stays.  Patient did agree to have home PT/OT.  Orders placed.[ME1.1]    Spoke with  and expressed concerns with patient going home rather than TCU.  Patient has 16 stairs to get into the house and she could only do 3 with therapy today.   would be home 24/7 to assist.   is concerned about the observation status and not being able to afford TCU.  Updated SW and will meet with  later this afternoon.[ME1.2]    _______________________  [facility]  Phone  [phone]  Fax  [phone]  ______________________         Plan  Anticipated Discharge Date: today[ME1.1]/tomorrow[ME1.2]  Anticipated Discharge Plan:  Home with home care    Dinorah Arteaga RN, BSN  Care Coordinator [facility]  Pager: " [phone]  Phone: [phone][ME1.1]           Revision History        User Key Date/Time User Provider Type Action    > ME1.2 5/3/2017 12:10 PM Dinorah Arteaga, RN Care Coordinator Addend     ME1.1 5/3/2017 10:45 AM Dinorah Arteaga, RN Care Coordinator Sign            Progress Notes by Shara Woodall RN at 5/3/2017 10:05 AM     Author:  Shara Woodall RN Service:  (none) Author Type:  Registered Nurse    Filed:  5/3/2017 10:06 AM Date of Service:  5/3/2017 10:05 AM Note Created:  5/3/2017 10:05 AM    Status:  Signed :  Shara Woodall RN (Registered Nurse)         Text paged OT/PT re - Pt will be d/c home today and team would like you to see pt before she d/c.  Shara Zamarripa RN [id number][MO1.1]     Revision History        User Key Date/Time User Provider Type Action    > MO1.1 5/3/2017 10:06 AM Shara Woodall RN Registered Nurse Sign            Progress Notes by Amada Horvath RN at 5/3/2017  2:37 AM     Author:  Amada Horvath RN Service:  (none) Author Type:  Registered Nurse    Filed:  5/3/2017  2:39 AM Date of Service:  5/3/2017  2:37 AM Note Created:  5/3/2017  2:37 AM    Status:  Signed :  Amada Horvath RN (Registered Nurse)         PRN Hydralazine given at 0030, BP dropped to 80's/40's, team notified. IV antibiotics given in 100 mg NS, BP now at 110/49. Will continue to monitor.[JS1.1]      Revision History        User Key Date/Time User Provider Type Action    > JS1.1 5/3/2017  2:39 AM Amada Horvath RN Registered Nurse Sign            Progress Notes by Ruben Alvarez, PT at 5/2/2017  5:58 PM     Author:  Ruben Alvarez, PT Service:  (none) Author Type:  Physical Therapist    Filed:  5/2/2017  5:58 PM Date of Service:  5/2/2017  5:58 PM Note Created:  5/2/2017  5:58 PM    Status:  Signed :  Ruben Alvarez, PT (Physical Therapist)          05/02/17 1300   Quick Adds   Type of Visit Initial PT Evaluation   Living Environment   Lives With spouse   Living  Arrangements house   Home Accessibility bed and bath are not on the first floor;bed and bath on same level;stairs (1 railing present);stairs within home;stairs to enter home   Number of Stairs to Enter Home 16  (8 + 8 with chair on landing for seated rest 1/2 way)   Stair Railings at Home inside, present at both sides   Transportation Available family or friend will provide   Living Environment Comment Pt lives with  who is available 24/7 to assist, also have a daughter that lives nearby that is availble to as able.    Self-Care   Dominant Hand right   Usual Activity Tolerance fair   Current Activity Tolerance poor   Regular Exercise no   Equipment Currently Used at Home bath bench;walker, rolling;grab bar   Functional Level Prior   Ambulation 1-->assistive equipment   Transferring 1-->assistive equipment   Toileting 1-->assistive equipment   Bathing 3-->assistive equipment and person   Dressing 0-->independent   Eating 0-->independent   Communication 2-->difficulty speaking (not related to language barrier)   Swallowing 0-->swallows foods/liquids without difficulty   Cognition 1 - attention or memory deficits   Fall history within last six months yes   Which of the above functional risks had a recent onset or change? ambulation;fall history;transferring   Prior Functional Level Comment Per pt and , pt is able to walk short distances in home (<50') with FWW, more recently pt's ambulation has worsened. Pt's  states he holds onto patient for stairs and guards appropriately. Per , until recently pt was fairly indepdendent with most mobility and ADLs, did receive assistance getting in and out of shower.   General Information   Onset of Illness/Injury or Date of Surgery - Date 05/01/17   Referring Physician Rell Melara MD   Patient/Family Goals Statement to return home   Pertinent History of Current Problem (include personal factors and/or comorbidities that impact the POC) Ms. Younger is a  78yo female with h/o ER/MA-positive, HER-2 negative metastatic breast cancer, CKD, CVA, HTN who presents with fall. History of recurrent falls (7-8 in the last year). Recently completed home PT. No signs of arrythmia on admission EKG. Holter in 3/2017 without any arrythmia. Echo 9/2015 with normal EF, no diastolic dysfunction. No structural heart disease. Possible that UTI is contributing factor to weakness. Per neuro,dysautonomia and parkinsonian signs raise question of neurodegenerative disease such as Multiple systems atrophy.   Precautions/Limitations fall precautions;immunosuppressed   Heart Disease Risk Factors High blood pressure;Lack of physical activity;Medical history;Gender;Age   General Observations Pt received supine in bed, agreeable to PT with encouragement, RN ok'd treatment session.   General Info Comments Activity: Ambulate with Assist (4x/day)   Cognitive Status Examination   Orientation orientation to person, place and time   Level of Consciousness alert   Follows Commands and Answers Questions 75% of the time   Personal Safety and Judgment at risk behaviors demonstrated   Pain Assessment   Patient Currently in Pain No   Integumentary/Edema   Integumentary/Edema Comments Bilateral LE edema   Range of Motion (ROM)   ROM Comment BLE WFL   Strength   Strength Comments Mild R hemiparesis, grossly 4/5 RLE, 4+/5- 5/5 LLE   Bed Mobility   Bed Mobility Comments Supine>sit with min A   Transfer Skills   Transfer Comments Sit<>stand CGA>min A   Gait   Gait Comments Ambulates with signifant forward trunk lean with increased distance from FWW, shuffled gait pattern, WBOS   Balance   Balance Comments Decreased standing static and dynamic balance requiring FWW and Assist to maintain balance   Sensory Examination   Sensory Perception Comments Pt denies numbness/tingling in extrimities, unreliable sensation test due to pt aphasia/mentation   Coordination   Coordination Comments Dysmetric finger-to-nose  "bilaterally   Muscle Tone   Muscle Tone Comments Brisk patellar reflex (R>L), 2 beat clonus on L   General Therapy Interventions   Planned Therapy Interventions gait training;balance training;ROM;risk factor education;home program guidelines;neuromuscular re-education;bed mobility training;transfer training;strengthening   Clinical Impression   Criteria for Skilled Therapeutic Intervention yes, treatment indicated   PT Diagnosis impaired functional mobility   Influenced by the following impairments decreased strength, neuromuscular coordination, balance, and activity tolerance   Functional limitations due to impairments gait, transfers, stairs, functional endurance   Clinical Presentation Evolving/Changing   Clinical Presentation Rationale Pt presents following 2 falls at home in the setting of significant PMH and worsened functional mobility impacting ability to return home safely and independently   Clinical Decision Making (Complexity) High complexity   Therapy Frequency` 5 times/week   Predicted Duration of Therapy Intervention (days/wks) 1 week   Anticipated Discharge Disposition Transitional Care Facility;Home with Assist;Home with Home Therapy   Risk & Benefits of therapy have been explained Yes   Patient, Family & other staff in agreement with plan of care Yes   [facility] [employer] \"6 Clicks\"   2016, Trustees of [facility], under license to [employer].  All rights reserved.   6 Clicks Short Forms Basic Mobility Inpatient Short Form   [facility] AM-PAC  \"6 Clicks\" V.2 Basic Mobility Inpatient Short Form   1. Turning from your back to your side while in a flat bed without using bedrails? 4 - None   2. Moving from lying on your back to sitting on the side of a flat bed without using bedrails? 3 - A Little   3. Moving to and from a bed to a chair (including a wheelchair)? 3 - A Little   4. Standing up from a chair using your arms (e.g., wheelchair, or bedside chair)? 3 - A Little   5. To " walk in hospital room? 3 - A Little   6. Climbing 3-5 steps with a railing? 2 - A Lot   Basic Mobility Raw Score (Score out of 24.Lower scores equate to lower levels of function) 18   Total Evaluation Time   Total Evaluation Time (Minutes) 15[AS1.1]        Revision History        User Key Date/Time User Provider Type Action    > AS1.1 5/2/2017  5:58 PM Ruben Alvarez, PT Physical Therapist Sign            Progress Notes by Awilda Heart MD at 5/2/2017  1:17 PM     Author:  Awilda Heart MD Service:  General Medicine Author Type:  Resident    Filed:  5/2/2017  1:29 PM Date of Service:  5/2/2017  1:17 PM Note Created:  5/2/2017  1:17 PM    Status:  Attested :  Awilda Heart MD (Resident)    Cosigner:  Zeenat Stein MD at 5/2/2017  2:13 PM        Attestation signed by Zeenat Stein MD at 5/2/2017  2:13 PM        Attestation:  Physician Attestation   I, Zeenat Stein, saw this patient with the resident and agree with the resident s findings and plan of care as documented in the resident s note.      I personally reviewed vital signs, medications and labs.    Key findings: Admitted after a fall. No orthostatic BP change and it is likely mechanical fall. Await PT/OT eval for safe dispo. Treating for UTI. Pt can use home meds given obs status.    Zeenat Stein  Date of Service (when I saw the patient): 05/02/17                               [facility] Internal Medicine Progress Note          Assessment and Plan:   Assessment: Ms. Younger is a 76yo female with h/o ER/NM-positive, HER-2 negative metastatic breast cancer, CKD, CVA, HTN who presents with fall.    # Fall: Mechanical vs possibly orthostatic. History of recurrent falls (7-8 in the last year). Recently completed home PT. No signs of arrythmia on admission EKG. Holter in 3/2017 without any arrythmia. Echo 9/2015 with normal EF, no diastolic dysfunction. No structural heart disease. Possible  that UTI is contributing factor to weakness. Per neuro,dysautonomia and parkinsonian signs raise question of neurodegenerative disease such as Multiple systems atrophy.  - will recheck orthostatics  - appreciate neuro recs  - PT/OT evaluation    # Uncomplicated UTI: worsening mental status and UA w/ positive leuk esterase and WBC  - ceftriaxone while inpatient, will switch to oral antibiotic on discharge  - blood and urine Cx pending     # Deconditioning and frailty: likely due to metastatic breast cancer  - PT/ OT evaluation   - protein supplements      # Metastatic breast cancer: w/ pleural effusions and bony mets.   H/o Rx with lumpectomy, adjuvant chemotherapy with Taxotere, whole breast radiotherapy, and 5 years of adjuvant Arimidex, completed in 2012. Found to have bony mets and metastatic pleural effusion in 12/2015  - continue PTA letrozole, since patient is on obs, plan for patient's  to bring from home  - continue PTA Ibrance  - follows up with Hem/ onc. Most recently stable disease noted on imaging 12/2016.   - Holding PTA calcium/ vit D while on obs stay. Receives Xgeva every 3 months, last dose on 2/27/2017      # CKD:  creatinine is at baseline  - avoid nephrotoxins     # Hx of CVA: Head CT during her recent admission showed a possible new R middle fronatl gyrus infarct. Stable chronic L MCA territory infarct and stable 5mm posterior parafalcine calcified meningioma.   -  continue warfarin dosing per pharmacist     # HTN: -180s.   - hydralazine prn for SBP > 170     # Chronic microcytic anemia: Likely 2/2 Chemotherapy vs MDS. Vit B12 and folate were normal recently  - Hgb stable, continue to monitor        PPx: already anticoagulated /none for GI/senna and docusate for bowel   FEN: regular diet  -IVF: none   Code status: DNR/ DNI    Patient seen and discussed with Dr. Sarita Heart MD  Internal Medicine PGY-2  [phone]           Interval History:   No events overnight.  Denies dizziness, lightheadedness, headache, chest pain.     Called  who feels that patient's mental status and speech are near baseline, he has not noticed any changes in speech as of late.              Medications:     Current Facility-Administered Medications   Medication     docusate sodium (COLACE) capsule 100 mg     oxybutynin (DITROPAN) tablet 5 mg     sennosides (SENOKOT) tablet 1 tablet     naloxone (NARCAN) injection 0.1-0.4 mg     acetaminophen (TYLENOL) tablet 650 mg     ondansetron (ZOFRAN-ODT) ODT tab 4 mg    Or     ondansetron (ZOFRAN) injection 4 mg     letrozole (FEMARA) tablet 2.5 mg     Warfarin Therapy Reminder (Check START DATE - warfarin may be starting in the FUTURE)     cefTRIAXone (ROCEPHIN) 1 g vial to attach to  mL bag for ADULTS or NS 50 mL bag for PEDS     hydrALAZINE (APRESOLINE) tablet 10 mg     warfarin (COUMADIN) tablet 2 mg             Physical Exam:   Vitals were reviewed  Blood pressure 108/72, pulse 108, temperature 98.2  F (36.8  C), temperature source Oral, resp. rate 18, weight 85.1 kg (187 lb 11.2 oz), SpO2 96 %, not currently breastfeeding.    Physical Exam:   General: in NAD. Some speech latency  HEENT: MMM, PERRLA, EOM intact  CV: RRR, normal S1S2, no murmur, clicks, rubs appreciated  Resp: Clear to auscultation bilaterally, no wheezes, rhonchi  Abd: Soft, non-tender, BS+, no masses appreciated  Extremities: Radial and pedal pulses intact and symmetric, no pedal edema  Neuro: Some speech latency and dysphasia         Data:   ROUTINE LABS (Last four results)  CMP  Recent Labs  Lab 05/02/17  0755 05/01/17  1903 05/01/17  1825     --  143   POTASSIUM 3.6  --  4.4   CHLORIDE 108  --  108   CO2 26  --  22   ANIONGAP 7  --  13   *  --  179*   BUN 19  --  22   CR 0.99  --  1.27*   GFRESTIMATED 54*  --  41*   GFRESTBLACK 66  --  50*   SHERYL 8.6  --  9.2   PROTTOTAL  --  6.9  --    ALBUMIN  --  3.5  --    BILITOTAL  --  1.0  --    ALKPHOS  --  40  --    AST   --  16  --    ALT  --  12  --      CBC  Recent Labs  Lab 05/01/17 1903 05/01/17  1825   WBC 5.0 Unsatisfactory specimen - clotted NOTIFIED CESIA SMITH RN ON ER 5/1/17 AT 1851 BY MO   RBC 3.24* Unsatisfactory specimen - clotted NOTIFIED CESIA FINCHER RN ON ER 5/1/17 AT 1851 BY MO   HGB 10.9* Unsatisfactory specimen - clotted NOTIFIED CESIA SMITH RN ON ER 5/1/17 AT 1851 BY MO   HCT 34.1* Unsatisfactory specimen - clotted NOTIFIED CESIA SMITH RN ON ER 5/1/17 AT 1851 BY MO   * Unsatisfactory specimen - clotted NOTIFIED CESIA SMITH RN ON ER 5/1/17 AT 1851 BY MO   MCH 33.6* Unsatisfactory specimen - clotted NOTIFIED CESIA SMITH RN ON ER 5/1/17 AT 1851 BY MO   MCHC 32.0 Unsatisfactory specimen - clotted NOTIFIED CESIA SMITH RN ON ER 5/1/17 AT 1851 BY MO   RDW 14.5 Unsatisfactory specimen - clotted NOTIFIED CESIA SMITH RN ON ER 5/1/17 AT 1851 BY MO    Unsatisfactory specimen - clotted NOTIFIED CESIA SMITH RN ON ER 5/1/17 AT 1851 BY MO     INR  Recent Labs  Lab 05/01/17 1903 05/01/17  1825 04/26/17   INR 2.54* Canceled, Test credited Unsatisfactory specimen - clottedNOTIFIED RN Enmanuel Jackson AT ER,5/1/17 @ 1900 BY ST 3.1     Arterial Blood GasNo lab results found in last 7 days.[JS1.1]             Revision History        User Key Date/Time User Provider Type Action    > JS1.1 5/2/2017  1:29 PM Awilda Heart MD Resident Sign            Progress Notes by La Goldsmith RN at 5/2/2017  2:12 PM     Author:  La Goldsmith RN Service:  (none) Author Type:  Registered Nurse    Filed:  5/2/2017  2:13 PM Date of Service:  5/2/2017  2:12 PM Note Created:  5/2/2017  2:12 PM    Status:  Signed :  Rupal, La, RN (Registered Nurse)         [facility]  Patient is currently open to home care services with [facility].  The patient is currently receiving RN services.  [facility]  and team have been notified that patient is under OBSERVATION STATUS. [facility]  liaison will continue to follow patient during stay.  If patient is admitted to inpatient status please provide orders to resume home care at time of discharge if appropriate.    La Goldsmith RN, BSN  [facility] Liaison  [phone][SH1.1]     Revision History        User Key Date/Time User Provider Type Action    > SH1.1 5/2/2017  2:13 PM La Goldsmith RN Registered Nurse Sign            Progress Notes by Dinorah Arteaga RN at 5/2/2017 12:34 PM     Author:  Dinorah Arteaga RN Service:  (none) Author Type:  Care Coordinator    Filed:  5/2/2017 12:57 PM Date of Service:  5/2/2017 12:34 PM Note Created:  5/2/2017 12:34 PM    Status:  Addendum :  Dinorah Arteaga RN (Care Coordinator)           Care Coordinator Progress Note     Admission Date/Time:  5/1/2017  Attending MD:  Zeenat Stein,*     Data  Chart reviewed, discussed with interdisciplinary team.   Patient was admitted for:    Weakness  Abnormal urinalysis  Fall, initial encounter.    Concerns with insurance coverage for discharge needs: None.  Current Living Situation: Patient lives with spouse.  Support System: Supportive  Services Involved: Home Care  Transportation: Family or Friend will provide  Barriers to Discharge: chronically ill and physical impairment    Coordination of Care and Referrals: Provided patient/family with options for Home Care and Skilled Nursing Facility, TCU.        Assessment  Spoke with patient at bedside.  Patient was admitted for weakness and has continued to have falls at home.  Pateint's speech is slow and she seems forgetful.  Patient reports she still has [facility], but is unsure what services they provide.  Patient gave permission to speak with her  German and granddaughter Jami to answer questions.  Patient states she uses a walker at home and has a wheelchair in the garage.  Family is able to provide transportation upon discharge.  Patient states she would not want TCU stay and she  wishes to go home.[ME1.1]  Resumption orders placed.[ME1.2]  Awaiting PT/OT evaluations.[ME1.1]     _______________________  [facility]  Phone  [phone]  Fax  [phone]  ______________________[ME1.3]         Plan  Anticipated Discharge Date:  TBD  Anticipated Discharge Plan:  TBD    Dinorah Arteaga RN, BSN  Care Coordinator [address]  Pager: [phone]  Phone: [phone][ME1.1]           Revision History        User Key Date/Time User Provider Type Action    > ME1.2 5/2/2017 12:57 PM Dinorah Arteaga, RN Care Coordinator Addend     [N/A] 5/2/2017 12:40 PM Dinorah Arteaga, RN Care Coordinator Addend     ME1.3 5/2/2017 12:40 PM Dinorah Arteaga, RN Care Coordinator Sign     ME1.1 5/2/2017 12:34 PM Dinorah Arteaga, RN Care Coordinator             Progress Notes by Elias Del Valle MD at 5/2/2017 11:13 AM     Author:  Elias Del Valle MD Service:  Neurology Author Type:  Physician    Filed:  5/2/2017 12:47 PM Date of Service:  5/2/2017 11:13 AM Note Created:  5/2/2017 11:13 AM    Status:  Signed :  Elias Del Valle MD (Physician)         Neurology Progress Note  Helen Younger  [id number]  May 2, 2017      Assessment/Recommendations:  Ms. Younger is a 77 year old female with history of L MCA stroke, breast cancer and long history of falls who presents with two falls on 5/1/17.   Her history of falls are somewhat consistent with orthostasis, and in fact on her second orthostatic test today she had a 70 point drop in her systolic pressures.    Her exam shows expressive aphasia, mild r hemiparesis, and facial droop that is likely from her old stroke.    However, she also displays some ataxic speech, wide based shuffling gait with significant camptocormia, as well as brisk reflexes and a upgoing toe on the left.  This could all be related to baseline poor gait and orthostasis in the setting of UTI, however early dysautonomia and  "parkinsonian/cerebellar signs raise the question of a neurodegenerative disease such as Multiple systems atrophy.      - Recommend rechecking orthostatic vitals given discrepency between two values thus far.   - Continue treatment of UTI  - PT/OT evaluations  - Neurology will continue to follow for improvement with treatment of UTI.        Physical Exam:[JR1.1]  /72  Pulse 108  Temp 98.2  F (36.8  C) (Oral)  Resp 18  Wt 85.1 kg (187 lb 11.2 oz)  SpO2 96%  BMI 34.33 kg/m2[JR1.2]  GEN: awake and alert, NAD   HEENT: NCAT  NECK: Suppl  RESP: Non-labored breathing  GI: Nondistended   SKIN/MSK: moderate peripheral edema  NEURO:   MS: AO x3.  Expressive aphasia, able to name properly with single words but trouble repeating longer sentences or difficult syllables such as \"Gnosticism Religion\".    Able to follow 3 step commands.    CN:   Visual fields full, EN, Conjugate gaze, EOMI except for mildly limited upgaze. . No nystagmus. Flat R NLF.   Hearing intact bilaterally. Normal neck turning, shoulder shrug. Tongue protrudes midline.   Ataxic dysarthria.    Motor:Paratonia present, no tremor, mild right hemiparesis in arms and legs 4+/5.    Reflexes: Brisk and slightly more prominent on R. L toe upgoing, 2 beats clonus on L, postive Hoffmans on R.    Sensory: Length dependent vibration deficit difficult to truly assess due to aphasia.    Coordination / Gait: FNF slow but not dysmetric.   Walks with assist of 1, camptocormic, seems to be falling forward, wide based gait with short steps, difficulty with turns.       Pertinent Imaging and Labs:  Labs from this admission and MRI imaging reviewed.        Discussed and seen with attending staff physician, Dr. Del Valle.       Anna Herrera DO  [phone][JR1.1]    ATTENDING ADDENDUM: Patient seen and examined today with resident Dr Herrera. Agree with his assessment and recs as above. Please see my addendum to Dr Lyn's note for details. Elias" MD Ivon[GM1.1]       Revision History        User Key Date/Time User Provider Type Action    > GM1.1 5/2/2017 12:47 PM Elias Del Valle MD Physician Sign     JR1.2 5/2/2017 11:27 AM Anna Herrera MD Resident Sign     JR1.1 5/2/2017 11:13 AM Anna Herrera MD Resident             Progress Notes by Marlin Gerber RN at 5/2/2017  1:51 AM     Author:  Marlin Gerber RN Service:  (none) Author Type:  Registered Nurse    Filed:  5/2/2017  1:56 AM Date of Service:  5/2/2017  1:51 AM Note Created:  5/2/2017  1:51 AM    Status:  Signed :  Marlin Gerber RN (Registered Nurse)         Pt admitted to  from ER. Arrived via cart and transferred to bed with assist of 2 (stand and pivot). Vitals obtained. Pt ambulated to restroom with heavy assist of 1/difficulty due to no walker. Pt oriented to unit and plan of care.[ML1.1]     Revision History        User Key Date/Time User Provider Type Action    > ML1.1 5/2/2017  1:56 AM Marlin Gerber RN Registered Nurse Sign            ED Provider Notes by Mia Aparicio MD at 5/1/2017  5:21 PM     Author:  Mia Aparicio MD Service:  Emergency Medicine Author Type:  Physician    Filed:  5/1/2017 11:24 PM Date of Service:  5/1/2017  5:21 PM Note Created:  5/1/2017  6:34 PM    Status:  Signed :  Mia Aparicio MD (Physician)           History[CB1.1]     Chief Complaint   Patient presents with     Generalized Weakness[NC1.1]     HPI  Helen Younger is a 77 year old female who presents to the emergency department today with weakness and falls.  Patient has a prior history of a stroke in 2000.  This has left her with some expressive aphasia and some ataxia, per her .  Normally she gets around with a walker.  She also has a history of type II diabetes, prior history of breast cancer.  Today, she was at home with her  and she has fallen twice.   reports that the first time was around 11:30 this morning.  She  was in the bathroom and stood up to get off the toilet, and that s when her  heard her fall.  He came into the room to find her sitting on her bottom.  He was ultimately able to get her up onto the bed.  Then, later on today at about 2:30 PM, she had another episode where her  was assisting her in ambulating from the kitchen table to her recliner chair where she watches television.  On the way there, her legs gave out from underneath her and she fell, landing on her bottom.    The patient currently denies any unilateral weakness.  No headache or vision changes.  She did not hit her head.  No fevers, cough, chest pain, nausea or vomiting.  Patient reports that she is feeling fine though she admits that she feels a little bit weaker than normal.  She specifically denies any fainting or loss of consciousness.  Denies any p[CB1.1]rodromal[NC1.2] symptoms such as dizziness, palpitations, chest pain or shortness of breath.  Currently, she has no complaints.      This part of the medical record was transcribed by Pedro Luis Goddard Medical Scribe, from a dictation done by Mia Aparicio MD.[CB1.1]      Past Medical History:   Diagnosis Date     CVA (cerebral infarction) 03/01/00    slurred speech, right facial droop partial hemiplagia     Diabetes mellitus      Fibromuscular dysplasia of renal artery (H)      Malignant neoplasm of breast (female), unspecified site 01/03    Breast cancer     MVR (mitral valve repair)      Unspecified essential hypertension        Past Surgical History:   Procedure Laterality Date     BREAST LUMPECTOMY, RT/LT  04/06    Breast Lumpectomy RT/LT     BREAST LUMPECTOMY, RT/LT  06/06    redo for margins with radiation and chemo     C NONSPECIFIC PROCEDURE  1998    left knee surg torn ligament       Family History   Problem Relation Age of Onset     Hypertension Mother      Cardiovascular Mother      Prostate Cancer Father      Hypertension Father      Breast Cancer Maternal  Grandmother      Cardiovascular Paternal Grandfather      Hypertension Brother      Depression Daughter      Hypertension Daughter      Psychotic Disorder Daughter      bipolar       Social History   Substance Use Topics     Smoking status: Never Smoker     Smokeless tobacco: Never Used     Alcohol use No     Current Facility-Administered Medications   Medication     cefTRIAXone (ROCEPHIN) 1 g vial to attach to  mL bag for ADULTS or NS 50 mL bag for PEDS     Current Outpatient Prescriptions   Medication     order for DME     warfarin (JANTOVEN) 1 MG tablet     letrozole (FEMARA) 2.5 MG tablet     magnesium hydroxide (MILK OF MAGNESIA) 400 MG/5ML suspension     sennosides (SENOKOT) 8.6 MG tablet     simvastatin (ZOCOR) 20 MG tablet     oxybutynin (DITROPAN) 5 MG tablet     order for DME     order for DME     DOCUSATE SODIUM     Multiple Vitamins-Minerals (OCUVITE ADULT FORMULA PO)     VIACTIV OR[NC1.1]        Allergies   Allergen Reactions     Diuretic      Don't remember side effect     Zyrtec [Cetirizine Hcl]      Elevated blood pressure[NC1.2]      I have reviewed the Medications, Allergies, Past Medical and Surgical History, and Social History in the Epic system.    Review of Systems   Constitutional: Negative for fever.   Eyes: Negative for visual disturbance.   Respiratory: Negative for cough and shortness of breath.    Cardiovascular: Negative for chest pain and palpitations.   Gastrointestinal: Negative for nausea and vomiting.   Neurological: Positive for weakness (generalized). Negative for dizziness and headaches.   All other systems reviewed and are negative.      Physical Exam   BP: 133/82  Pulse: 108  Heart Rate: 67  Temp: 97.8  F (36.6  C)  Weight:  (will wiegh with  bed in room )  SpO2: 98 %  Physical Exam   Constitutional: She appears[CB1.1] well-developed[NC1.2] and[CB1.1] well-nourished[NC1.2].[CB1.1]   Adult female, halting speech pattern 2/2 expressive aphasia.[NC1.2]   HENT:   Head:[CB1.1]  Normocephalic[NC1.2].   Mouth/Throat:[CB1.1] Oropharynx is clear and moist[NC1.2].   Eyes:[CB1.1] Pupils are equal, round, and reactive to light[NC1.2].   Cardiovascular:[CB1.1] Normal rate[NC1.2],[CB1.1] regular rhythm[NC1.2],[CB1.1] normal heart sounds[NC1.2] and[CB1.1] intact distal pulses[NC1.2].[CB1.1]    No murmur[NC1.2] heard.  Pulmonary/Chest:[CB1.1] Effort normal[NC1.2] and[CB1.1] breath sounds normal[NC1.2]. No[CB1.1] respiratory distress[NC1.2]. She has[CB1.1] no wheezes[NC1.2].   Abdominal:[CB1.1] Soft[NC1.2]. There is[CB1.1] no tenderness[NC1.2]. There is[CB1.1] no rebound[NC1.2].[CB1.1]   Obese, soft, nontender[NC1.2]   Neurological: She is[CB1.1] alert[NC1.2]. No[CB1.1] cranial nerve deficit[NC1.2] or[CB1.1] sensory deficit[NC1.2]. She exhibits[CB1.1] normal muscle tone[NC1.2].[CB1.1] Coordination[NC1.2] abnormal. GCS eye subscore is[CB1.1] 4[NC1.2]. GCS verbal subscore is[CB1.1] 5[NC1.2]. GCS motor subscore is[CB1.1] 6[NC1.2].[CB1.1]   Some difficulty with coordination on finger-nose-finger testing B, slow, some past pointing   Nursing note[NC1.2] and[CB1.1] vitals[NC1.2] reviewed.      ED Course[CB1.1]     ED Course[NC1.2]     Procedures[CB1.1]             EKG Interpretation:      Interpreted by Mia Aparicio  Time reviewed: 1845  Symptoms at time of EKG: None   Rhythm: normal sinus   Rate: Normal  Axis: Normal  Ectopy: premature atrial contraction, PVC  Conduction: normal  ST Segments/ T Waves: No acute ischemic changes  Q Waves: none  Comparison to prior: Unchanged    Clinical Impression: no acute changes[NC1.2]      Critical Care time:[CB1.1]  none[NC1.2]             Results for orders placed or performed during the hospital encounter of 05/01/17 (from the past 24 hour(s))   Glucose by meter   Result Value Ref Range    Glucose 134 (H) 70 - 99 mg/dL   EKG 12 lead   Result Value Ref Range    Interpretation ECG Click View Image link to view waveform and result    INR   Result Value Ref Range     INR  0.86 - 1.14     Canceled, Test credited   Unsatisfactory specimen - clotted  NOTIFIED RN Enmanuel Paz AT ER,5/1/17 @ 1900 BY ST     CBC with platelets differential   Result Value Ref Range    WBC  4.0 - 11.0 10e9/L     Unsatisfactory specimen - clotted   NOTIFIED CESIA SMITH RN ON ER 5/1/17 AT 1851 BY MO      RBC Count  3.8 - 5.2 10e12/L     Unsatisfactory specimen - clotted   NOTIFIED CESIA SMITH RN ON ER 5/1/17 AT 1851 BY MO      Hemoglobin  11.7 - 15.7 g/dL     Unsatisfactory specimen - clotted   NOTIFIED CESIA SMITH RN ON ER 5/1/17 AT 1851 BY MO      Hematocrit  35.0 - 47.0 %     Unsatisfactory specimen - clotted   NOTIFIED CESIA SMITH RN ON ER 5/1/17 AT 1851 BY MO      MCV  78 - 100 fl     Unsatisfactory specimen - clotted   NOTIFIED CESIA SMITH RN ON ER 5/1/17 AT 1851 BY MO      MCH  26.5 - 33.0 pg     Unsatisfactory specimen - clotted   NOTIFIED CESIA SMITH RN ON ER 5/1/17 AT 1851 BY MO      MCHC  31.5 - 36.5 g/dL     Unsatisfactory specimen - clotted   NOTIFIED CESIA SMITH RN ON ER 5/1/17 AT 1851 BY MO      RDW  10.0 - 15.0 %     Unsatisfactory specimen - clotted   NOTIFIED CESIA SMITH RN ON ER 5/1/17 AT 1851 BY MO      Platelet Count  150 - 450 10e9/L     Unsatisfactory specimen - clotted   NOTIFIED CESIA SMITH RN ON ER 5/1/17 AT 1851 BY MO      Diff Method       Unsatisfactory specimen - clotted   NOTIFIED CESIA SMITH RN ON ER 5/1/17 AT 1851 BY MO     Basic metabolic panel   Result Value Ref Range    Sodium 143 133 - 144 mmol/L    Potassium 4.4 3.4 - 5.3 mmol/L    Chloride 108 94 - 109 mmol/L    Carbon Dioxide 22 20 - 32 mmol/L    Anion Gap 13 3 - 14 mmol/L    Glucose 179 (H) 70 - 99 mg/dL    Urea Nitrogen 22 7 - 30 mg/dL    Creatinine 1.27 (H) 0.52 - 1.04 mg/dL    GFR Estimate 41 (L) >60 mL/min/1.7m2    GFR Estimate If Black 50 (L) >60 mL/min/1.7m2    Calcium 9.2 8.5 - 10.1 mg/dL   CBC with platelets differential   Result Value Ref Range    WBC 5.0 4.0 - 11.0 10e9/L    RBC  Count 3.24 (L) 3.8 - 5.2 10e12/L    Hemoglobin 10.9 (L) 11.7 - 15.7 g/dL    Hematocrit 34.1 (L) 35.0 - 47.0 %     (H) 78 - 100 fl    MCH 33.6 (H) 26.5 - 33.0 pg    MCHC 32.0 31.5 - 36.5 g/dL    RDW 14.5 10.0 - 15.0 %    Platelet Count 293 150 - 450 10e9/L    Diff Method Automated Method     % Neutrophils 70.5 %    % Lymphocytes 23.1 %    % Monocytes 5.0 %    % Eosinophils 0.8 %    % Basophils 0.2 %    % Immature Granulocytes 0.4 %    Nucleated RBCs 0 0 /100    Absolute Neutrophil 3.5 1.6 - 8.3 10e9/L    Absolute Lymphocytes 1.2 0.8 - 5.3 10e9/L    Absolute Monocytes 0.3 0.0 - 1.3 10e9/L    Absolute Eosinophils 0.0 0.0 - 0.7 10e9/L    Absolute Basophils 0.0 0.0 - 0.2 10e9/L    Abs Immature Granulocytes 0.0 0 - 0.4 10e9/L    Absolute Nucleated RBC 0.0    INR   Result Value Ref Range    INR 2.54 (H) 0.86 - 1.14   UA with Microscopic reflex to Culture   Result Value Ref Range    Color Urine Yellow     Appearance Urine Clear     Glucose Urine Negative NEG mg/dL    Bilirubin Urine Negative NEG    Ketones Urine Negative NEG mg/dL    Specific Gravity Urine 1.013 1.003 - 1.035    Blood Urine Negative NEG    pH Urine 6.5 5.0 - 7.0 pH    Protein Albumin Urine 10 (A) NEG mg/dL    Urobilinogen mg/dL Normal 0.0 - 2.0 mg/dL    Nitrite Urine Negative NEG    Leukocyte Esterase Urine Large (A) NEG    Source Midstream Urine     WBC Urine 25 (H) 0 - 2 /HPF    RBC Urine 4 (H) 0 - 2 /HPF    Bacteria Urine Few (A) NEG /HPF    Transitional Epi 1 0 - 1 /HPF    Mucous Urine Present (A) NEG /LPF    Hyaline Casts 6 (H) 0 - 2 /LPF    Granular Casts 1 (A) NEG /LPF[NC1.3]              Assessments & Plan (with Medical Decision Making)[CB1.1]   Patient presents for the above complaints.  On my evaluation, she is alert, cooperative, and in no distress.  She does have an expressive aphasia, which is baseline for her.  She also has a little bit of difficulty with finger-nose-finger testing bilaterally, which apparently is also baseline for  her.     Need to consider any number of possible etiologies.  She is on Coumadin, will check an INR today.  INR today is[CB1.2] 2.54[NC1.2]. CBC[CB1.2] shows hemoglobin of 10.9[NC1.2] and BMP[CB1.2] shows creatinine of 1.27[NC1.2].  Although I do not find anything new or unusual on her neurologic exam, we did elect to consult Neurology as well.  EKG was done here in the emergency department, and this showed[CB1.2] sinus rhythm with PVCs and PACs, no change from prior[NC1.2]. Point-of-care glucose was[CB1.2] 134. UA shows 25 WBC, 4 RBC, large LE.  Could be UTI causing weakness and falls in a very frail elderly woman.  Due to her taking coumadin, would not advise levaquin.  Will order ceftriaxone to treat UTI, culture in process.  Due to her falls, we will need to admit her.[NC1.2]     This part of the medical record was transcribed by [facility] [employer], from a dictation done by Mia Aparicio MD.[CB1.2]      I have reviewed the nursing notes.    I have reviewed the findings, diagnosis, plan and need for follow up with the patient.[CB1.1]    New Prescriptions    No medications on file       Final diagnoses:   Weakness   Abnormal urinalysis   Fall, initial encounter[NC1.2]       5/1/2017   [facility], EMERGENCY DEPARTMENT[CB1.1]     Mia Aparicio MD  05/01/17 8454  [NC1.4]     Revision History        User Key Date/Time User Provider Type Action    > NC1.4 5/1/2017 11:24 PM Mia Aparicio MD Physician Sign     NC1.3 5/1/2017 11:19 PM Mia Aparicio MD Physician      NC1.1 5/1/2017 11:12 PM Mia Aparicio MD Physician      NC1.2 5/1/2017 11:11 PM Mia Aparicio MD Physician      [N/A] 5/1/2017  6:38 PM [facility] Scribe Share     CB1.2 5/1/2017  6:36 PM [facility] Scribe Share     CB1.1 5/1/2017  6:34 PM [facility]             ED Notes by Renay Ho RN at 5/1/2017  7:23 PM     Author:  Renay Ho, RN Service:  Nursing  "Author Type:  Registered Nurse    Filed:  5/1/2017  7:24 PM Date of Service:  5/1/2017  7:23 PM Note Created:  5/1/2017  7:24 PM    Status:  Signed :  Renay Ho RN (Registered Nurse)         Nursing unable to get orthostatic standing blood pressure after multiple attempts and 2 nurses[KK1.1]     Revision History        User Key Date/Time User Provider Type Action    > KK1.1 5/1/2017  7:24 PM Renay Ho RN Registered Nurse Sign            ED Notes by Brigitte Knutson RN at 5/1/2017  6:27 PM     Author:  Glauser, Brigitte, RN Service:  Emergency Medicine Author Type:  Registered Nurse    Filed:  5/1/2017  6:30 PM Date of Service:  5/1/2017  6:27 PM Note Created:  5/1/2017  6:30 PM    Status:  Signed :  Brigitte Knutson RN (Registered Nurse)         Labs drawn with IV insertion and sent[GG1.1]     Revision History        User Key Date/Time User Provider Type Action    > GG1.1 5/1/2017  6:30 PM Brigitte Knutson RN Registered Nurse Sign            ED Notes by Renay Ho RN at 5/1/2017  6:14 PM     Author:  Georgia, Renay, RN Service:  Nursing Author Type:  Registered Nurse    Filed:  5/1/2017  6:14 PM Date of Service:  5/1/2017  6:14 PM Note Created:  5/1/2017  6:14 PM    Status:  Signed :  Renay Ho RN (Registered Nurse)         Nurse looked with an ultrasound and could not find a vein[KK1.1]     Revision History        User Key Date/Time User Provider Type Action    > KK1.1 5/1/2017  6:14 PM Renay Ho RN Registered Nurse Sign            ED Notes by Doe oMore RN at 5/1/2017  5:34 PM     Author:  Doe Moore RN Service:  (none) Author Type:  Registered Nurse    Filed:  5/1/2017  5:34 PM Date of Service:  5/1/2017  5:34 PM Note Created:  5/1/2017  5:34 PM    Status:  Signed :  Buley, Doe, RN (Registered Nurse)         feeling very weak, has had two falls today, no pain. reports falling on \"bottom\" .  Has history of stroke, taking warfarin .  Felt well before " first fall at 1130 am[SB1.1]      Revision History        User Key Date/Time User Provider Type Action    > SB1.1 5/1/2017  5:34 PM Doe Moore, RN Registered Nurse Sign                  Procedure Notes     No notes of this type exist for this encounter.         Progress Notes - Therapies (Notes from 05/01/17 through 05/04/17)      Progress Notes by Ruben Alvarez, PT at 5/2/2017  5:58 PM     Author:  Ruben Alvarez PT Service:  (none) Author Type:  Physical Therapist    Filed:  5/2/2017  5:58 PM Date of Service:  5/2/2017  5:58 PM Note Created:  5/2/2017  5:58 PM    Status:  Signed :  Ruben Alvarez PT (Physical Therapist)          05/02/17 1300   Quick Adds   Type of Visit Initial PT Evaluation   Living Environment   Lives With spouse   Living Arrangements house   Home Accessibility bed and bath are not on the first floor;bed and bath on same level;stairs (1 railing present);stairs within home;stairs to enter home   Number of Stairs to Enter Home 16  (8 + 8 with chair on landing for seated rest 1/2 way)   Stair Railings at Home inside, present at both sides   Transportation Available family or friend will provide   Living Environment Comment Pt lives with  who is available 24/7 to assist, also have a daughter that lives nearby that is availble to as able.    Self-Care   Dominant Hand right   Usual Activity Tolerance fair   Current Activity Tolerance poor   Regular Exercise no   Equipment Currently Used at Home bath bench;walker, rolling;grab bar   Functional Level Prior   Ambulation 1-->assistive equipment   Transferring 1-->assistive equipment   Toileting 1-->assistive equipment   Bathing 3-->assistive equipment and person   Dressing 0-->independent   Eating 0-->independent   Communication 2-->difficulty speaking (not related to language barrier)   Swallowing 0-->swallows foods/liquids without difficulty   Cognition 1 - attention or memory deficits   Fall history within last six months yes   Which  of the above functional risks had a recent onset or change? ambulation;fall history;transferring   Prior Functional Level Comment Per pt and , pt is able to walk short distances in home (<50') with FWW, more recently pt's ambulation has worsened. Pt's  states he holds onto patient for stairs and guards appropriately. Per , until recently pt was fairly indepdendent with most mobility and ADLs, did receive assistance getting in and out of shower.   General Information   Onset of Illness/Injury or Date of Surgery - Date 05/01/17   Referring Physician Rell Melara MD   Patient/Family Goals Statement to return home   Pertinent History of Current Problem (include personal factors and/or comorbidities that impact the POC) Ms. 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 History of recurrent falls (7-8 in the last year). Recently completed home PT. No signs of arrythmia on admission EKG. Holter in 3/2017 without any arrythmia. Echo 9/2015 with normal EF, no diastolic dysfunction. No structural heart disease. Possible that UTI is contributing factor to weakness. Per neuro,dysautonomia and parkinsonian signs raise question of neurodegenerative disease such as Multiple systems atrophy.   Precautions/Limitations fall precautions;immunosuppressed   Heart Disease Risk Factors High blood pressure;Lack of physical activity;Medical history;Gender;Age   General Observations Pt received supine in bed, agreeable to PT with encouragement, RN ok'd treatment session.   General Info Comments Activity: Ambulate with Assist (4x/day)   Cognitive Status Examination   Orientation orientation to person, place and time   Level of Consciousness alert   Follows Commands and Answers Questions 75% of the time   Personal Safety and Judgment at risk behaviors demonstrated   Pain Assessment   Patient Currently in Pain No   Integumentary/Edema    Integumentary/Edema Comments Bilateral LE edema   Range of Motion (ROM)   ROM Comment BLE WFL   Strength   Strength Comments Mild R hemiparesis, grossly 4/5 RLE, 4+/5- 5/5 LLE   Bed Mobility   Bed Mobility Comments Supine>sit with min A   Transfer Skills   Transfer Comments Sit<>stand CGA>min A   Gait   Gait Comments Ambulates with signifant forward trunk lean with increased distance from FWW, shuffled gait pattern, WBOS   Balance   Balance Comments Decreased standing static and dynamic balance requiring FWW and Assist to maintain balance   Sensory Examination   Sensory Perception Comments Pt denies numbness/tingling in extrimities, unreliable sensation test due to pt aphasia/mentation   Coordination   Coordination Comments Dysmetric finger-to-nose bilaterally   Muscle Tone   Muscle Tone Comments Brisk patellar reflex (R>L), 2 beat clonus on L   General Therapy Interventions   Planned Therapy Interventions gait training;balance training;ROM;risk factor education;home program guidelines;neuromuscular re-education;bed mobility training;transfer training;strengthening   Clinical Impression   Criteria for Skilled Therapeutic Intervention yes, treatment indicated   PT Diagnosis impaired functional mobility   Influenced by the following impairments decreased strength, neuromuscular coordination, balance, and activity tolerance   Functional limitations due to impairments gait, transfers, stairs, functional endurance   Clinical Presentation Evolving/Changing   Clinical Presentation Rationale Pt presents following 2 falls at home in the setting of significant PMH and worsened functional mobility impacting ability to return home safely and independently   Clinical Decision Making (Complexity) High complexity   Therapy Frequency` 5 times/week   Predicted Duration of Therapy Intervention (days/wks) 1 week   Anticipated Discharge Disposition Transitional Care Facility;Home with Assist;Home with Home Therapy   Risk & Benefits  "of therapy have been explained Yes   Patient, Family & other staff in agreement with plan of care Yes   [facility] AM-PAC TM \"6 Clicks\"   2016, Trustees of [facility], under license to [employer].  All rights reserved.   6 Clicks Short Forms Basic Mobility Inpatient Short Form   [facility] AM-PAC  \"6 Clicks\" V.2 Basic Mobility Inpatient Short Form   1. Turning from your back to your side while in a flat bed without using bedrails? 4 - None   2. Moving from lying on your back to sitting on the side of a flat bed without using bedrails? 3 - A Little   3. Moving to and from a bed to a chair (including a wheelchair)? 3 - A Little   4. Standing up from a chair using your arms (e.g., wheelchair, or bedside chair)? 3 - A Little   5. To walk in hospital room? 3 - A Little   6. Climbing 3-5 steps with a railing? 2 - A Lot   Basic Mobility Raw Score (Score out of 24.Lower scores equate to lower levels of function) 18   Total Evaluation Time   Total Evaluation Time (Minutes) 15[AS1.1]        Revision History        User Key Date/Time User Provider Type Action    > AS1.1 5/2/2017  5:58 PM Ruben Alvarez PT Physical Therapist Sign            "

## 2017-05-01 NOTE — IP AVS SNAPSHOT
` ` Patient Information     Patient Name Sex     Helen Purcell ([id number]) Female 1940       Room Bed    6517 6517-02      Patient Demographics     Address Phone E-mail Address    [address] [place] [address] [phone] (Home) *Preferred* [email or web address]      Patient Ethnicity & Race     Ethnic Group Patient Race    American White      Emergency Contact(s)     Name Relation Home Work Mobile    ARLEEN PURCELL Spouse [phone]    INEZ PURCELL Daughter [phone]      Documents on File        Status Date Received Description       Documents for the Patient    Privacy Notice - [place] Received 11     Face Sheet Received () 09     External Medication Information Consent Accepted () 09     Insurance Card Received () 09     Insurance Card Received () 03/18/10 [employer]. Grp:[id number]    Face Sheet Received () 04/13/10     Consent for Services - Hospital/Clinic Received () 10/27/10     Insurance Card Received () 11     External Medication Information Consent Accepted () 11     Consent for Services - Hospital/Clinic Received () 11     Insurance Card Received () 12     HIM TETO Authorization - File Only   [facility] MyChart [employer] Auth. 2    External Medication Information Consent Patient Refused () 02/15/12     Consent for Services - Hospital/Clinic Received () 12     Privacy Notice - [place]  12 ACKNOWLEDGMENT OF RECEIPT OF NOTICE OF PRIVACY PRACTICE    Consent for Services - [facility] Received 12 CONSENT    Consent for Services - [facility]  12 GENERAL CONSENT FORM: SHARED EHR - ENGLISH    Consent for EHR Access  13 Copied from existing Consent for services - C/HOD collected on 2012    [facility] Specified Other Received () 14 bor    External Medication Information Consent Accepted 13      Consent for Services - Hospital/Clinic Received () 13     Insurance Card Received () 14 [facility] Medicare Advantage    Insurance Card Received () 14 MEDICARE    Patient ID Received 14 did not bring    Consent for Services - Hospital/Clinic Received () 14     Consent to Communicate Received 14     Insurance Card Received () 01/13/15 [facility]    Consent for Services - Hospital/Clinic Received () 05/19/15     Consent for Services/Privacy Notice - Hospital/Clinic Received 17     Consent for Services/Privacy Notice - Hospital/Clinic Received () 16     Consent for Services/Privacy Notice - Hospital/Clinic  () 16 CONSENT FOR SERVICES/PRIVACY NOTICE    Consent for Services/Privacy Notice - Hospital/Clinic-Esign       Insurance Card Received 10/18/16 [employer]are    Advance Directives and Living Will Received 16 Health Care Directive 11/15/16    Advance Directives and Living Will Not Received  Validation of AD 11/15/16    Advance Directives and Living Will Received 16 POLST 16    Business/Insurance/Care Coordination/Health Form - Patient   are [employer] Approval 2.27.17-2.27.18    HIM TETO Authorization  () 17 Authorization for batch ARE 17-2    Patient ID Received (Deleted) 14     Advance Directives and Living Will Received (Deleted) 12        Documents for the Encounter    CMS IM for Patient Signature Received 17     CMS MCDANIEL for Patient Signature Received 17 patient signed      Admission Information     Attending Provider Admitting Provider Admission Type Admission Date/Time    Zeenat Stein MD Zewde, Anteneh G, MD Emergency 17  1734    Discharge Date Hospital Service Auth/Cert Status Service Area     Internal Medicine Incomplete [facility]    Unit Room/Bed Admission Status       [facility] U6D OBSERVATION 6517/6517-02 Admission (Confirmed)        Admission     Complaint    Weakness      Hospital Account     Name Acct ID Class Status Primary Coverage    Helen Younger [id number] Observation Open UCARE - [employer] FOR S[employer]ORS            Guarantor Account (for Hospital Account #[id number])     Name Relation to Pt Service Area Active? Acct Type    Helen Younger  FCS Yes Personal/Family    Address Phone          [address] [address] [phone](H)              Coverage Information (for Hospital Account #[id number])     F/O Payor/Plan Precert #    UCARE/UCARE FOR SENIORS     Subscriber Subscriber #    Helen Younger [id number]    Address Phone    [address]  [place] [address] [phone]

## 2017-05-01 NOTE — IP AVS SNAPSHOT
"` `           UNIT 6D OBSERVATION [facility]: [phone]                                              INTERAGENCY TRANSFER FORM - NURSING   2017                    Hospital Admission Date: 2017  RYLEE PURCELL   : 1940  Sex: Female        Attending Provider: Zeenat Stein MD     Allergies:  Diuretic, Zyrtec [Cetirizine Hcl]    Infection:  None   Service:  INTERNAL MED    Ht:  1.575 m (5' 2.01\")   Wt:  81.7 kg (180 lb 2 oz)   Admission Wt:  85.1 kg (187 lb 11.2 oz)    BMI:  32.94 kg/m 2   BSA:  1.89 m 2            Patient PCP Information     Provider PCP Type    Arin Shahid MD General      Current Code Status     Date Active Code Status Order ID Comments User Context       2017  1:15 AM DNR/DNI [id number]  Rell Melara MD Inpatient       Code Status History     Date Active Date Inactive Code Status Order ID Comments User Context    2017  1:14 AM 2017  1:14 AM Full Code [id number]  Rell Melara MD Inpatient    3/27/2017  1:54 PM 2017  1:14 AM DNR/DNI [id number]  Caterina Bush PA-C Outpatient    3/26/2017  1:05 AM 3/27/2017  1:54 PM DNR/DNI [id number]  Lorrie Wagner MD Inpatient    11/15/2016  3:12 PM 3/26/2017  1:05 AM DNR/DNI [id number]  Justine Hay CMA Outpatient    2016  2:00 PM 11/15/2016  3:12 PM Full Code [id number]  Rio Malone MD Outpatient    2015  9:01 PM 2016  2:00 PM Full Code [id number]  Philomena Bob MD Inpatient      Advance Directives        Does patient have a scanned Advance Directive/ACP document in EPIC?           Yes        Hospital Problems as of 2017              Priority Class Noted POA    Weakness Medium  2017 Yes      Non-Hospital Problems as of 2017              Priority Class Noted    Hyperlipidemia LDL goal <100   2009    Breast cancer,left   2009    Cerebral infarction (H)   2009    Hypertension goal BP (blood pressure) < 140/90   2011    Advance Care Planning   " 10/24/2011    CKD (chronic kidney disease) stage 3, GFR 30-59 ml/min   10/24/2011    Health Care Home   3/9/2012    Cataract, mild, ou   4/18/2012    Melanocytic nevus   5/2/2012    Type 2 diabetes, HbA1C goal < 8% (H)   12/18/2013    Macular degeneration (senile) of retina   1/30/2014    Posterior vitreous detachment   1/30/2014    Peripheral vascular disease (H) Medium  9/17/2014    Obesity Medium  10/25/2015    Type 2 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) Medium  10/25/2015    Type 2 diabetes mellitus with autonomic neuropathy (H) Medium  10/25/2015    SOB (shortness of breath) Medium  12/28/2015    Pleural effusion on left Medium  12/28/2015    Pleural effusion Medium  12/29/2015    Malignant neoplasm of female breast, unspecified laterality, unspecified site of breast (H)   1/11/2016    Malignant pleural effusion Medium  2/25/2016    Long-term (current) use of anticoagulants [Z79.01] Medium  2/26/2016    Pneumonia Medium  3/26/2017      Immunizations     Name Date      Influenza (High Dose) 3 valent vaccine 11/10/16     Influenza (High Dose) 3 valent vaccine 10/09/15     Influenza (High Dose) 3 valent vaccine 10/23/14     Influenza (High Dose) 3 valent vaccine 10/08/13     Influenza (High Dose) 3 valent vaccine 10/09/12     Influenza (High Dose) 3 valent vaccine 10/27/11     Influenza (IIV3) 09/30/10     Influenza (IIV3) 09/28/09     Influenza (IIV3) 12/18/08     Pneumococcal (PCV 13) 10/09/15     Pneumococcal 23 valent 10/24/11     TD (ADULT, 7+) 07/14/16     Tdap (Adacel,Boostrix) 04/24/06     Zoster vaccine, live 12/12/07          END      ASSESSMENT     Discharge Profile Flowsheet     DISCHARGE NEEDS ASSESSMENT     Patient's communication style  spoken language (English or Bilingual) 05/01/17 1723    Equipment Currently Used at Home  bath bench;walker, rolling;grab bar 05/02/17 1728   FINAL RESOURCES      Transportation Available  family or friend will provide 05/02/17 1728   Resources  "List  Home Care 03/27/17 1416    Equipment Used at Home  none 12/29/15 1546   Referrals Placed  Community Resources;Housekeeping or Chore Agency;Meals on Wheels;Senior Linkage Line 10/20/16 1545    FUNCTIONAL LEVEL CURRENT     SKIN      Change in Functional Status Since Onset of Current Illness/Injury  -- (increased weakness last few weeks) 03/26/17 0130   Inspection  Full 05/04/17 1028    GASTROINTESTINAL (ADULT,PEDIATRIC,OB)     Skin areas NOT inspected  Spine;Hip, left;Hip, right;Buttock, left;Buttock, right;Sacrum;Coccyx 05/04/17 0200    GI WDL  WDL 05/04/17 1028   Skin WDL  WDL 05/04/17 1028    Last Bowel Movement  05/02/17 05/03/17 0939   SAFETY      COMMUNICATION ASSESSMENT     Safety WDL  WDL 05/04/17 1028                 Assessment WDL (Within Defined Limits) Definitions           Safety WDL     Effective: 09/28/15    Row Information: <b>WDL Definition:</b> Bed in low position, wheels locked; call light in reach; upper side rails up x 2; ID band on<br> <font color=\"gray\"><i>Item=AS safety wdl>>List=AS safety wdl>>Version=F14</i></font>      Skin WDL     Effective: 09/28/15    Row Information: <b>WDL Definition:</b> Warm; dry; intact; elastic; without discoloration; pressure points without redness<br> <font color=\"gray\"><i>Item=AS skin wdl>>List=AS skin wdl>>Version=F14</i></font>      Vitals     Vital Signs Flowsheet     VITAL SIGNS     Patient Currently in Pain  denies 05/04/17 0040    Temp  98.1  F (36.7  C) 05/04/17 1525   Preferred Pain Scale  CAPA (Clinically Aligned Pain Assessment) ([facility], [facility] and [facility] Adults Only) 05/03/17 1949    Temp src  Oral 05/04/17 1525   HEIGHT AND WEIGHT      Resp  16 05/04/17 1525   Weight  81.7 kg (180 lb 2 oz) 05/04/17 0628    Pulse  67 05/03/17 1623   SITTING ORTHOSTATIC BP      Heart Rate  60 05/04/17 1525   Sitting Orthostatic BP  135/91 05/04/17 1005    Pulse/Heart Rate Source  Monitor 05/04/17 1525   Sitting Orthostatic Pulse  70 bpm 05/04/17 1005    BP  166/86 " 05/04/17 1525   LEDY COMA SCALE      BP Location  Right arm 05/04/17 1525   Best Eye Response  4-->(E4) spontaneous 05/04/17 1028    LYING ORTHOSTATIC BP     Best Motor Response  6-->(M6) obeys commands 05/04/17 1028    Lying Orthostatic BP  167/99 05/04/17 1005   Best Verbal Response  5-->(V5) oriented 05/04/17 1028    Lying Orthostatic Pulse  60 bpm 05/04/17 1005   Ledy Coma Scale Score  15 05/04/17 1028    STANDING ORTHOSTATIC BP     POSITIONING      Standing Orthostatic BP  88/64 05/04/17 1005   Body Position  independently positioning 05/04/17 1028    Standing Orthostatic Pulse  91 bpm 05/04/17 1005   Head of Bed (HOB)  HOB at 30-45 degrees 05/04/17 1028    OXYGEN THERAPY     Chair  Upright in chair 05/02/17 0937    SpO2  96 % 05/04/17 1525   DAILY CARE      O2 Device  None (Room air) 05/04/17 1525   Activity Type  activity adjusted per tolerance 05/04/17 1028    PAIN/COMFORT     Activity Level of Assistance  assistance, 1 person 05/04/17 0200            Patient Lines/Drains/Airways Status    Active LINES/DRAINS/AIRWAYS     Name: Placement date: Placement time: Site: Days: Last dressing change:    Chest Tube 1 Left Pleural 10 Maltese 12/30/15   1152   Pleural   491     Peripheral IV 05/01/17 Left Hand 05/01/17   1827   Hand   2     Peripheral IV 05/03/17 Left Lower forearm 05/03/17   0037   Lower forearm   1     Rash 12/28/15 2100 lower abdomen 12/28/15   2100    492             Patient Lines/Drains/Airways Status    Active PICC/CVC     None            Intake/Output Detail Report     Date Intake   Output Net    Shift P.O. IV Piggyback Total Urine Total       Krissy 05/03/17 0700 - 05/03/17 1459 200 -- 200 750 750 -550    Noc 05/03/17 1500 - 05/03/17 2359 -- -- -- 300 300 -300    Day 05/04/17 0000 - 05/04/17 0659 100 -- 100 200 200 -100    Krissy 05/04/17 0700 - 05/04/17 1459 -- -- -- -- -- 0    Noc 05/04/17 1500 - 05/04/17 2359 -- -- -- -- -- 0      Last Void/BM       Most Recent Value    Urine Occurrence 1 at  05/04/2017 1500    Stool Occurrence 1 at 05/02/2017 1324      Case Management/Discharge Planning     Case Management/Discharge Planning Flowsheet     REFERRAL INFORMATION     Equipment Currently Used at Home  bath bench;walker, rolling;grab bar 05/02/17 1728    Arrived From  other (see comments) (admit to obs) 05/02/17 0130   Resources List  Home Care 03/27/17 1416    LIVING ENVIRONMENT     Referrals Placed  Community Resources;Housekeeping or Chore Agency;Meals on Wheels;Senior Linkage Line 10/20/16 1545    Lives With  spouse 05/02/17 1728   / CAREGIVER      Living Arrangements  house 05/02/17 1728   Filed Complexity Screen Score  12 05/02/17 0820    COPING/STRESS     ABUSE RISK SCREEN      Major Change/Loss/Stressor  hospitalization;illness 05/02/17 0130   QUESTION TO PATIENT:  Has a member of your family or a partner(now or in the past) intimidated, hurt, manipulated, or controlled you in any way?  no 05/02/17 0130    DISCHARGE PLANNING     QUESTION TO PATIENT: Do you feel safe going back to the place where you are living?  yes 05/02/17 0130    Transportation Available  family or friend will provide 05/02/17 1728   OBSERVATION: Is there reason to believe there has been maltreatment of a vulnerable adult (ie. Physical/Sexual/Emotional abuse, self neglect, lack of adequate food, shelter, medical care, or financial exploitation)?  no 05/02/17 0130    Equipment Used at Home  none 12/29/15 1546   (R) MENTAL HEALTH SUICIDE RISK      FINAL RESOURCES     Are you depressed or being treated for depression?  No 05/02/17 0130

## 2017-05-01 NOTE — IP AVS SNAPSHOT
` `     UNIT 6D OBSERVATION [facility]: [phone]            Medication Administration Report for Helen Younger as of 05/04/17 1625   Legend:    Given Hold Not Given Due Canceled Entry Other Actions    Time Time (Time) Time  Time-Action       Inactive    Active    Linked        Medications 04/28/17 04/29/17 04/30/17 05/01/17 05/02/17 05/03/17 05/04/17    acetaminophen (TYLENOL) tablet 650 mg  Dose: 650 mg Freq: EVERY 4 HOURS PRN Route: PO  PRN Reason: mild pain  Start: 05/02/17 0114   Admin Instructions: Alternate ibuprofen (if ordered) with acetaminophen.  Maximum acetaminophen dose from all sources = 75 mg/kg/day not to exceed 4 grams/day.               docusate sodium (COLACE) capsule 100 mg  Dose: 100 mg Freq: 2 TIMES DAILY Route: PO  Start: 05/02/17 0800        0913 (100 mg)-Given       2124 (100 mg)-Given        0806 (100 mg)-Given       1943 (100 mg)-Given        0857 (100 mg)-Given       [ ] 2000           fludrocortisone (FLORINEF) half-tab 50 mcg  Dose: 50 mcg Freq: DAILY Route: PO  Start: 05/03/17 1000         1311 (50 mcg)-Given        0857 (50 mcg)-Given           letrozole (FEMARA) tablet 2.5 mg  Dose: 2.5 mg Freq: DAILY Route: PO  Start: 05/02/17 0800   Admin Instructions: Patient's own supply -- verified by pharmacy.         0914 (2.5 mg)-Given        0806 (2.5 mg)-Given        0858 (2.5 mg)-Given           naloxone (NARCAN) injection 0.1-0.4 mg  Dose: 0.1-0.4 mg Freq: EVERY 2 MIN PRN Route: IV  PRN Reason: opioid reversal  Start: 05/02/17 0114   Admin Instructions: For respiratory rate LESS than or EQUAL to 8.  Partial reversal dose:  0.1 mg titrated q 2 minutes for Analgesia Side Effects Monitoring Sedation Level of 3 (frequently drowsy, arousable, drifts to sleep during conversation).Full reversal dose:  0.4 mg bolus for Analgesia Side Effects Monitoring Sedation Level of 4 (somnolent, minimal or no response to stimulation).               ondansetron (ZOFRAN-ODT) ODT tab 4 mg  Dose: 4 mg  Freq: EVERY 6 HOURS PRN Route: PO  PRN Reason: nausea  Start: 05/02/17 0114   Admin Instructions: This is Step 1 of nausea and vomiting management.  If nausea not resolved in 15 minutes, go to Step 2 prochlorperazine (COMPAZINE). Do not push through foil backing. Peel back foil and gently remove. Place on tongue immediately. Administration with liquid unnecessary              Or  ondansetron (ZOFRAN) injection 4 mg  Dose: 4 mg Freq: EVERY 6 HOURS PRN Route: IV  PRN Reasons: nausea,vomiting  Start: 05/02/17 0114   Admin Instructions: This is Step 1 of nausea and vomiting management.  If nausea not resolved in 15 minutes, go to Step 2 prochlorperazine (COMPAZINE).  Irritant.               oxybutynin (DITROPAN) tablet 5 mg  Dose: 5 mg Freq: 2 TIMES DAILY Route: PO  Start: 05/02/17 0800        0913 (5 mg)-Given       2124 (5 mg)-Given        0806 (5 mg)-Given       1943 (5 mg)-Given        0858 (5 mg)-Given       [ ] 2000           palbociclib (IBRANCE) capsule CHEMO 100 mg  Dose: 100 mg Freq: DAILY WITH BREAKFAST Route: PO  Indications of Use: HORMONE RECEPTOR-POSITIVE, HER2 NEGATIVE BREAST CANCER  Start: 05/03/17 0800   Admin Instructions: Take with food, avoid grapefruit, take whole capsule - do not open, crush or chew.<br>OK to use her home meds -- verified by pharmacy.          0806 (100 mg)-Given        0858 (100 mg)-Given           sennosides (SENOKOT) tablet 1 tablet  Dose: 1 tablet Freq: 2 TIMES DAILY Route: PO  Start: 05/02/17 0800        0913 (1 tablet)-Given       2124 (1 tablet)-Given        0806 (1 tablet)-Given       1943 (1 tablet)-Given        0857 (1 tablet)-Given       [ ] 2000           warfarin (COUMADIN) tablet 2 mg  Dose: 2 mg Freq: ONCE AT 6PM Route: PO  Start: 05/04/17 1800   Admin Instructions: Dose per Pharmacy Warfarin dosing service.           [ ] 1800           Warfarin Therapy Reminder (Check START DATE - warfarin may be starting in the FUTURE)  Freq: CONTINUOUS PRN Route: XX  Start:  05/02/17 0124   Admin Instructions: *Note to reorder warfarin daily*  Pharmacy Warfarin Dosing Service  Patient is on Warfarin Therapy - check for daily order              Completed Medications  Medications 04/28/17 04/29/17 04/30/17 05/01/17 05/02/17 05/03/17 05/04/17         Dose: 500 mL Freq: ONCE Route: IV  Last Dose: 500 mL (05/04/17 1222)  Start: 05/04/17 1200   End: 05/04/17 1622          1222 (500 mL)-New Bag             Dose: 250 mL Freq: ONCE Route: IV  Last Dose: Stopped (05/03/17 0330)  Start: 05/03/17 0130   End: 05/03/17 0330         0230 (250 mL)-New Bag       0330-Stopped              Dose: 500 mL Freq: ONCE Route: IV  Last Dose: Stopped (05/01/17 2049)  Start: 05/01/17 1802   End: 05/01/17 2049       1943 (500 mL)-New Bag       2049-Stopped [C]                Dose: 1 g Freq: ONCE Route: IV  Indications of Use: URINARY TRACT INFECTION  Last Dose: Stopped (05/01/17 2355)  Start: 05/01/17 2253   End: 05/01/17 2355       2319 (1 g)-New Bag       2355-Stopped                Dose: 2 mg Freq: ONCE AT 6PM Route: PO  Start: 05/03/17 1800   End: 05/03/17 1800   Admin Instructions: Dose per Pharmacy Warfarin dosing service.          1800 (2 mg)-Given              Dose: 2 mg Freq: ONCE AT 6PM Route: PO  Start: 05/02/17 1800   End: 05/02/17 1727   Admin Instructions: Dose per Pharmacy Warfarin dosing service.         1727 (2 mg)-Given            Discontinued Medications  Medications 04/28/17 04/29/17 04/30/17 05/01/17 05/02/17 05/03/17 05/04/17         Dose: 1 g Freq: EVERY 24 HOURS Route: IV  Indications of Use: URINARY TRACT INFECTION  Last Dose: Stopped (05/04/17 0144)  Start: 05/02/17 2315   End: 05/04/17 1044         0150 (1 g)-New Bag       0300-Stopped        0041 (1 g)-New Bag       0144-Stopped       1044-Med Discontinued         Dose: 5 mg Freq: ONCE Route: PO  Start: 05/04/17 0200   End: 05/04/17 0718          (0258)-Not Given [C]       0718-Med Discontinued         Dose: 10 mg Freq: EVERY 4 HOURS PRN  Route: IV  PRN Reason: high blood pressure  PRN Comment: For SBP>170  Start: 05/02/17 2235   End: 05/03/17 0123         0030 (10 mg)-Given       0123-Med Discontinued          Dose: 10 mg Freq: EVERY 4 HOURS PRN Route: PO  PRN Comment: SBP >170, DBP >100  Start: 05/02/17 0346   End: 05/02/17 1914        1914-Med Discontinued           Dose: 10 mg Freq: EVERY 4 HOURS PRN Route: PO  PRN Comment: SBP >180, DBP >100  Start: 05/02/17 0228   End: 05/02/17 0346        0346-Med Discontinued           Dose: 25 mg Freq: ONCE Route: PO  Start: 05/02/17 0300   End: 05/02/17 0257   Admin Instructions: Please check the BP before administering and give this dose if SBP >190         0257-Med Discontinued           Dose: 25 mg Freq: EVERY 4 HOURS PRN Route: PO  PRN Comment: SBP >180, DBP >100  Start: 05/02/17 0129   End: 05/02/17 0228        0228-Med Discontinued           Dose: 1 mg Freq: ONCE AT 6PM Route: PO  Start: 05/04/17 1800   End: 05/04/17 1053   Admin Instructions: Dose per Pharmacy Warfarin dosing service.           1053-Med Discontinued    Medications 04/28/17 04/29/17 04/30/17 05/01/17 05/02/17 05/03/17 05/04/17

## 2017-05-01 NOTE — IP AVS SNAPSHOT
MRN:[id number]                      After Visit Summary   5/1/2017    Helen Younger    MRN: [id number]           Thank you!     Thank you for choosing [facility] for your care. Our goal is always to provide you with excellent care. Hearing back from our patients is one way we can continue to improve our services. Please take a few minutes to complete the written survey that you may receive in the mail after you visit with us. Thank you!        Patient Information     Date Of Birth          1940        Designated Caregiver       Most Recent Value    Caregiver    Will someone help with your care after discharge? yes    Name of designated caregiver  [also has home health nurse]    Phone number of caregiver [phone]    Caregiver address same as Pt      About your hospital stay     You were admitted on:  May 2, 2017 You last received care in the:  Unit 6D Observation [facility]    You were discharged on:  May 4, 2017        Reason for your hospital stay       You were admitted for frequent falls, that are most likely attributed to orthostatic hypotension (blood pressure dropping when you stand up) and autonomic dysfunction, meaning very labile blood pressures. You were started on fludrocortisone to help with this. We encourage oral fluid intake. PT recommended a TCU stay due to generalized weakness and frequent falls.                  Who to Call     For medical emergencies, please call 911.  For non-urgent questions about your medical care, please call your primary care provider or clinic, [phone]          Attending Provider     Provider Specialty    Mia Aparicio MD Emergency Medicine    [facility] Emergency Medicine    [facility] Rubén FARFAN MD Internal Medicine    [facility]       Primary Care Provider Office Phone # Fax #    Arin Shahid [phone]       [address]  [place]  [facility] [address]        After Care Instructions     Activity - Up with nursing assistance           Additional Discharge Instructions       Keep head of bed elevated            Advance Diet as Tolerated       Follow this diet upon discharge: Orders Placed This Encounter      Regular Diet Adult            Fall precautions           General info for SNF       Length of Stay Estimate: Short Term Care  Condition at Discharge: Stable  Level of care:skilled   Rehabilitation Potential: Fair  Admission H&P remains valid and up-to-date: Yes  Recent Chemotherapy: Current chemotherapy with Ibrance                       Use Nursing Home Standing Orders: Yes            Mantoux instructions       Give two-step Mantoux (PPD) Per Facility Policy Yes                  Follow-up Appointments     Follow Up and recommended labs and tests       Follow up with [facility] upon discharge from TCU.                  Your next 10 appointments already scheduled     May 22, 2017  1:30 PM CDT   PE NPET ONCOLOGY (EYES TO THIGHS) with UUPET1   [facility] [place] PET CT ([facility])    [address] [facility] [address] [phone]           Tell your doctor:   If there is any chance you may be pregnant or if you are breastfeeding.   If you have problems lying in small spaces (claustrophobia). If you do, your doctor may give you medicine to help you relax. If you have diabetes:   Have your exam early in the morning. Your blood glucose will go up as the day goes by.   Your glucose level must be 180 or less at the start of the exam. Please take any medicines you need to ensure this blood glucose level. 24 hours before your scan: Don t do any heavy exercise. (No jogging, aerobics or other workouts.) Exercise will make your pictures less accurate. 6 hours before your scan:   Stop all food and liquids (except water).   Do not chew  gum or suck on mints.   If you need to take medicine with food, you may take it with a few crackers.  Please call your Imaging Department at your exam site with any questions.            May 22, 2017  3:30 PM CDT   [facility] with  [facility] DRAW   [facility]    [address] [place] [id number] [phone]            May 22, 2017  4:00 PM CDT   (Arrive by 3:45 PM)   Return Visit with Keisha Landis MD   [facility] ([facility])    [address] [place] [address] [phone]              Additional Services     Occupational Therapy Adult Consult       Evaluate and treat as clinically indicated.    Reason:  Weakness, deconditioning            Physical Therapy Adult Consult       Evaluate and treat as clinically indicated.    Reason:  Weakness, deconditioning                  Further instructions from your care team       _______________________  [facility]  Phone  [phone]  Fax  [phone]  ______________________        Pending Results     Date and Time Order Name Status Description    5/2/2017 0127 Blood culture Preliminary             Statement of Approval     Ordered          05/04/17 1549  I have reviewed and agree with all the recommendations and orders detailed in this document.  EFFECTIVE NOW     Approved and electronically signed by:  Zeenat Stein MD             Admission Information     Date & Time Provider Department Dept. Phone    5/1/2017 Zeenat Stein MD Unit 6D Observation [facility] [phone]      Your Vitals Were     Blood Pressure Pulse Temperature Respirations Weight Pulse Oximetry    166/86 (BP Location: Right arm) 67 98.1  F (36.7  C) (Oral) 16 81.7 kg (180 lb 2 oz) 96%    BMI (Body Mass Index)                   32.95 kg/m2           MyChart Information     [employer] gives you secure access  to your electronic health record. If you see a primary care provider, you can also send messages to your care team and make appointments. If you have questions, please call your primary care clinic.  If you do not have a primary care provider, please call [phone] and they will assist you.        Care EveryWhere ID     This is your Care EveryWhere ID. 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 If we are uncertain of the size of tablets/capsules you have at home, strength may be listed as something that might have changed.        Dose / Directions    warfarin 1 MG tablet   Commonly known as:  JANTOVEN   This may have changed:    - how much to take  - how to take this  - when to take this  - additional instructions   Used for:  History of stroke        Take 1 tablet (1mg) by mouth on Monday, Wednesday, Friday. Take 2 tablets (2 mg) by mouth on Tuesday and Thursday.   Quantity:  30 tablet   Refills:  0         CONTINUE these medicines which have NOT CHANGED        Dose / Directions    DOCUSATE SODIUM        Dose:  1 tablet   1 tablet 2 times daily   Refills:  0       letrozole 2.5 MG tablet   Commonly known as:  FEMARA   Used for:  Malignant neoplasm of female breast, unspecified laterality,  unspecified site of breast (H), Pleural effusion        Dose:  2.5 mg   Take 1 tablet (2.5 mg) by mouth daily   Quantity:  90 tablet   Refills:  3       magnesium hydroxide 400 MG/5ML suspension   Commonly known as:  MILK OF MAGNESIA   Used for:  Constipation        Dose:  30-60 mL   Take 30-60 mLs by mouth daily as needed for constipation or heartburn   Quantity:  360 mL   Refills:  0       OCUVITE ADULT FORMULA PO        Dose:  1 tablet   Take 1 tablet by mouth daily.   Refills:  0       * order for DME   Used for:  Malignant neoplasm of female breast, unspecified laterality, unspecified site of breast (H), Pleural effusion        Equipment being ordered: wheeled walker   Quantity:  1 each   Refills:  0       * order for DME   Used for:  Mixed incontinence        Equipment being ordered:  Incontinence pads   Patient uses these tid   Quantity:  100 each   Refills:  3       * order for DME   Used for:  Physical deconditioning        Equipment being ordered: portable commode   Quantity:  1 Units   Refills:  0       oxybutynin 5 MG tablet   Commonly known as:  DITROPAN        TAKE 1 TABLET (5 MG) BY MOUTH 2 TIMES DAILY   Refills:  11       sennosides 8.6 MG tablet   Commonly known as:  SENOKOT   Used for:  Constipation        Dose:  1 tablet   Take 1 tablet by mouth 2 times daily May increase to 2 tabs twice daily if needed   Quantity:  120 each   Refills:  0       simvastatin 20 MG tablet   Commonly known as:  ZOCOR   Used for:  Hyperlipidemia LDL goal <100        Dose:  20 mg   Take 1 tablet (20 mg) by mouth At Bedtime   Quantity:  90 tablet   Refills:  1       VIACTIV PO   Used for:  Chest pain        Dose:  1 chew tab   Take 1 chew tab by mouth 2 times daily. One in the morning and one at bedtime.   Refills:  0       * Notice:  This list has 3 medication(s) that are the same as other medications prescribed for you. Read the directions carefully, and ask your doctor or other care provider to review them with you.          Where to get your medicines      Some of these will need a paper prescription and others can be bought over the counter. Ask your nurse if you have questions.     You don't need a prescription for these medications     fludrocortisone 0.1 MG tablet    palbociclib 100 MG capsule CHEMO                Protect others around you: Learn how to safely use, store and throw away your medicines at www.disposemymeds.org.             Medication List: This is a list of all your medications and when to take them. Check marks below indicate your daily home schedule. Keep this list as a reference.      Medications           Morning Afternoon Evening Bedtime As Needed    DOCUSATE SODIUM   1 tablet 2 times daily                                fludrocortisone 0.1 MG tablet   Commonly known as:  FLORINEF   Take 0.5 tablets (0.05 mg) by mouth daily   Last time this was given:  50 mcg on 5/4/2017  8:57 AM                                letrozole 2.5 MG tablet   Commonly known as:  FEMARA   Take 1 tablet (2.5 mg) by mouth daily   Last time this was given:  2.5 mg on 5/4/2017  8:58 AM                                magnesium hydroxide 400 MG/5ML suspension   Commonly known as:  MILK OF MAGNESIA   Take 30-60 mLs by mouth daily as needed for constipation or heartburn                                OCUVITE ADULT FORMULA PO   Take 1 tablet by mouth daily.                                * order for DME   Equipment being ordered: wheeled walker                                * order for DME   Equipment being ordered:  Incontinence pads   Patient uses these tid                                * order for DME   Equipment being ordered: portable commode                                oxybutynin 5 MG tablet   Commonly known as:  DITROPAN   TAKE 1 TABLET (5 MG) BY MOUTH 2 TIMES DAILY   Last time this was given:  5 mg on 5/4/2017  8:58 AM                                palbociclib 100 MG capsule CHEMO   Commonly known as:  IBRANCE   Take 1  capsule (100 mg) by mouth daily (with breakfast)   Last time this was given:  100 mg on 5/4/2017  8:58 AM                                sennosides 8.6 MG tablet   Commonly known as:  SENOKOT   Take 1 tablet by mouth 2 times daily May increase to 2 tabs twice daily if needed   Last time this was given:  1 tablet on 5/4/2017  8:57 AM                                simvastatin 20 MG tablet   Commonly known as:  ZOCOR   Take 1 tablet (20 mg) by mouth At Bedtime                                VIACTIV PO   Take 1 chew tab by mouth 2 times daily. One in the morning and one at bedtime.                                warfarin 1 MG tablet   Commonly known as:  JANTOVEN   Take 1 tablet (1mg) by mouth on Monday, Wednesday, Friday. Take 2 tablets (2 mg) by mouth on Tuesday and Thursday.   Last time this was given:  2 mg on 5/3/2017  6:00 PM                                * Notice:  This list has 3 medication(s) that are the same as other medications prescribed for you. Read the directions carefully, and ask your doctor or other care provider to review them with you.

## 2017-05-01 NOTE — ED NOTES
"feeling very weak, has had two falls today, no pain. reports falling on \"bottom\" .  Has history of stroke, taking warfarin .  Felt well before first fall at 1130 am   "

## 2017-05-01 NOTE — ED PROVIDER NOTES
History     Chief Complaint   Patient presents with     Generalized Weakness     HPI  Helen Younger is a 77 year old female who presents to the emergency department today with weakness and falls.  Patient has a prior history of a stroke in 2000.  This has left her with some expressive aphasia and some ataxia, per her .  Normally she gets around with a walker.  She also has a history of type II diabetes, prior history of breast cancer.  Today, she was at home with her  and she has fallen twice.   reports that the first time was around 11:30 this morning.  She was in the bathroom and stood up to get off the toilet, and that s when her  heard her fall.  He came into the room to find her sitting on her bottom.  He was ultimately able to get her up onto the bed.  Then, later on today at about 2:30 PM, she had another episode where her  was assisting her in ambulating from the kitchen table to her recliner chair where she watches television.  On the way there, her legs gave out from underneath her and she fell, landing on her bottom.    The patient currently denies any unilateral weakness.  No headache or vision changes.  She did not hit her head.  No fevers, cough, chest pain, nausea or vomiting.  Patient reports that she is feeling fine though she admits that she feels a little bit weaker than normal.  She specifically denies any fainting or loss of consciousness.  Denies any prodromal symptoms such as dizziness, palpitations, chest pain or shortness of breath.  Currently, she has no complaints.      This part of the medical record was transcribed by Buffy Quiles Scribe, from a dictation done by Mia Aparicio MD.      Past Medical History:   Diagnosis Date     CVA (cerebral infarction) 03/01/00    slurred speech, right facial droop partial hemiplagia     Diabetes mellitus      Fibromuscular dysplasia of renal artery (H)      Malignant neoplasm of breast (female),  unspecified site 01/03    Breast cancer     MVR (mitral valve repair)      Unspecified essential hypertension        Past Surgical History:   Procedure Laterality Date     BREAST LUMPECTOMY, RT/LT  04/06    Breast Lumpectomy RT/LT     BREAST LUMPECTOMY, RT/LT  06/06    redo for margins with radiation and chemo     C NONSPECIFIC PROCEDURE  1998    left knee surg torn ligament       Family History   Problem Relation Age of Onset     Hypertension Mother      Cardiovascular Mother      Prostate Cancer Father      Hypertension Father      Breast Cancer Maternal Grandmother      Cardiovascular Paternal Grandfather      Hypertension Brother      Depression Daughter      Hypertension Daughter      Psychotic Disorder Daughter      bipolar       Social History   Substance Use Topics     Smoking status: Never Smoker     Smokeless tobacco: Never Used     Alcohol use No     Current Facility-Administered Medications   Medication     cefTRIAXone (ROCEPHIN) 1 g vial to attach to  mL bag for ADULTS or NS 50 mL bag for PEDS     Current Outpatient Prescriptions   Medication     order for DME     warfarin (JANTOVEN) 1 MG tablet     letrozole (FEMARA) 2.5 MG tablet     magnesium hydroxide (MILK OF MAGNESIA) 400 MG/5ML suspension     sennosides (SENOKOT) 8.6 MG tablet     simvastatin (ZOCOR) 20 MG tablet     oxybutynin (DITROPAN) 5 MG tablet     order for DME     order for DME     DOCUSATE SODIUM     Multiple Vitamins-Minerals (OCUVITE ADULT FORMULA PO)     VIACTIV OR        Allergies   Allergen Reactions     Diuretic      Don't remember side effect     Zyrtec [Cetirizine Hcl]      Elevated blood pressure      I have reviewed the Medications, Allergies, Past Medical and Surgical History, and Social History in the Epic system.    Review of Systems   Constitutional: Negative for fever.   Eyes: Negative for visual disturbance.   Respiratory: Negative for cough and shortness of breath.    Cardiovascular: Negative for chest pain and  palpitations.   Gastrointestinal: Negative for nausea and vomiting.   Neurological: Positive for weakness (generalized). Negative for dizziness and headaches.   All other systems reviewed and are negative.      Physical Exam   BP: 133/82  Pulse: 108  Heart Rate: 67  Temp: 97.8  F (36.6  C)  Weight:  (will wiegh with  bed in room )  SpO2: 98 %  Physical Exam   Constitutional: She appears well-developed and well-nourished.   Adult female, halting speech pattern 2/2 expressive aphasia.   HENT:   Head: Normocephalic.   Mouth/Throat: Oropharynx is clear and moist.   Eyes: Pupils are equal, round, and reactive to light.   Cardiovascular: Normal rate, regular rhythm, normal heart sounds and intact distal pulses.    No murmur heard.  Pulmonary/Chest: Effort normal and breath sounds normal. No respiratory distress. She has no wheezes.   Abdominal: Soft. There is no tenderness. There is no rebound.   Obese, soft, nontender   Neurological: She is alert. No cranial nerve deficit or sensory deficit. She exhibits normal muscle tone. Coordination abnormal. GCS eye subscore is 4. GCS verbal subscore is 5. GCS motor subscore is 6.   Some difficulty with coordination on finger-nose-finger testing B, slow, some past pointing   Nursing note and vitals reviewed.      ED Course     ED Course     Procedures             EKG Interpretation:      Interpreted by Mia Aparicio  Time reviewed: 1845  Symptoms at time of EKG: None   Rhythm: normal sinus   Rate: Normal  Axis: Normal  Ectopy: premature atrial contraction, PVC  Conduction: normal  ST Segments/ T Waves: No acute ischemic changes  Q Waves: none  Comparison to prior: Unchanged    Clinical Impression: no acute changes      Critical Care time:  none             Results for orders placed or performed during the hospital encounter of 05/01/17 (from the past 24 hour(s))   Glucose by meter   Result Value Ref Range    Glucose 134 (H) 70 - 99 mg/dL   EKG 12 lead   Result Value Ref Range     Interpretation ECG Click View Image link to view waveform and result    INR   Result Value Ref Range    INR  0.86 - 1.14     Canceled, Test credited   Unsatisfactory specimen - clotted  NOTIFIED RN Enmanuel Paz AT ER,5/1/17 @ 1900 BY ST     CBC with platelets differential   Result Value Ref Range    WBC  4.0 - 11.0 10e9/L     Unsatisfactory specimen - clotted   NOTIFIED CESIA SMITH RN ON ER 5/1/17 AT 1851 BY MO      RBC Count  3.8 - 5.2 10e12/L     Unsatisfactory specimen - clotted   NOTIFIED CESIA SMITH RN ON ER 5/1/17 AT 1851 BY MO      Hemoglobin  11.7 - 15.7 g/dL     Unsatisfactory specimen - clotted   NOTIFIED CESIA SMITH RN ON ER 5/1/17 AT 1851 BY MO      Hematocrit  35.0 - 47.0 %     Unsatisfactory specimen - clotted   NOTIFIED CESIA SMITH RN ON ER 5/1/17 AT 1851 BY MO      MCV  78 - 100 fl     Unsatisfactory specimen - clotted   NOTIFIED CESIA SMITH RN ON ER 5/1/17 AT 1851 BY MO      MCH  26.5 - 33.0 pg     Unsatisfactory specimen - clotted   NOTIFIED CESIA SMITH RN ON ER 5/1/17 AT 1851 BY MO      MCHC  31.5 - 36.5 g/dL     Unsatisfactory specimen - clotted   NOTIFIED CESIA SMITH RN ON ER 5/1/17 AT 1851 BY MO      RDW  10.0 - 15.0 %     Unsatisfactory specimen - clotted   NOTIFIED CESIA SMITH RN ON ER 5/1/17 AT 1851 BY MO      Platelet Count  150 - 450 10e9/L     Unsatisfactory specimen - clotted   NOTIFIED CESIA SMITH RN ON ER 5/1/17 AT 1851 BY MO      Diff Method       Unsatisfactory specimen - clotted   NOTIFIED CESIA SMITH RN ON ER 5/1/17 AT 1851 BY MO     Basic metabolic panel   Result Value Ref Range    Sodium 143 133 - 144 mmol/L    Potassium 4.4 3.4 - 5.3 mmol/L    Chloride 108 94 - 109 mmol/L    Carbon Dioxide 22 20 - 32 mmol/L    Anion Gap 13 3 - 14 mmol/L    Glucose 179 (H) 70 - 99 mg/dL    Urea Nitrogen 22 7 - 30 mg/dL    Creatinine 1.27 (H) 0.52 - 1.04 mg/dL    GFR Estimate 41 (L) >60 mL/min/1.7m2    GFR Estimate If Black 50 (L) >60 mL/min/1.7m2    Calcium 9.2 8.5 -  10.1 mg/dL   CBC with platelets differential   Result Value Ref Range    WBC 5.0 4.0 - 11.0 10e9/L    RBC Count 3.24 (L) 3.8 - 5.2 10e12/L    Hemoglobin 10.9 (L) 11.7 - 15.7 g/dL    Hematocrit 34.1 (L) 35.0 - 47.0 %     (H) 78 - 100 fl    MCH 33.6 (H) 26.5 - 33.0 pg    MCHC 32.0 31.5 - 36.5 g/dL    RDW 14.5 10.0 - 15.0 %    Platelet Count 293 150 - 450 10e9/L    Diff Method Automated Method     % Neutrophils 70.5 %    % Lymphocytes 23.1 %    % Monocytes 5.0 %    % Eosinophils 0.8 %    % Basophils 0.2 %    % Immature Granulocytes 0.4 %    Nucleated RBCs 0 0 /100    Absolute Neutrophil 3.5 1.6 - 8.3 10e9/L    Absolute Lymphocytes 1.2 0.8 - 5.3 10e9/L    Absolute Monocytes 0.3 0.0 - 1.3 10e9/L    Absolute Eosinophils 0.0 0.0 - 0.7 10e9/L    Absolute Basophils 0.0 0.0 - 0.2 10e9/L    Abs Immature Granulocytes 0.0 0 - 0.4 10e9/L    Absolute Nucleated RBC 0.0    INR   Result Value Ref Range    INR 2.54 (H) 0.86 - 1.14   UA with Microscopic reflex to Culture   Result Value Ref Range    Color Urine Yellow     Appearance Urine Clear     Glucose Urine Negative NEG mg/dL    Bilirubin Urine Negative NEG    Ketones Urine Negative NEG mg/dL    Specific Gravity Urine 1.013 1.003 - 1.035    Blood Urine Negative NEG    pH Urine 6.5 5.0 - 7.0 pH    Protein Albumin Urine 10 (A) NEG mg/dL    Urobilinogen mg/dL Normal 0.0 - 2.0 mg/dL    Nitrite Urine Negative NEG    Leukocyte Esterase Urine Large (A) NEG    Source Midstream Urine     WBC Urine 25 (H) 0 - 2 /HPF    RBC Urine 4 (H) 0 - 2 /HPF    Bacteria Urine Few (A) NEG /HPF    Transitional Epi 1 0 - 1 /HPF    Mucous Urine Present (A) NEG /LPF    Hyaline Casts 6 (H) 0 - 2 /LPF    Granular Casts 1 (A) NEG /LPF              Assessments & Plan (with Medical Decision Making)   Patient presents for the above complaints.  On my evaluation, she is alert, cooperative, and in no distress.  She does have an expressive aphasia, which is baseline for her.  She also has a little bit of  difficulty with finger-nose-finger testing bilaterally, which apparently is also baseline for her.     Need to consider any number of possible etiologies.  She is on Coumadin, will check an INR today.  INR today is 2.54. CBC shows hemoglobin of 10.9 and BMP shows creatinine of 1.27.  Although I do not find anything new or unusual on her neurologic exam, we did elect to consult Neurology as well.  EKG was done here in the emergency department, and this showed sinus rhythm with PVCs and PACs, no change from prior. Point-of-care glucose was 134. UA shows 25 WBC, 4 RBC, large LE.  Could be UTI causing weakness and falls in a very frail elderly woman.  Due to her taking coumadin, would not advise levaquin.  Will order ceftriaxone to treat UTI, culture in process.  Due to her falls, we will need to admit her.     This part of the medical record was transcribed by Pedro Luis Goddard, Medical Scribe, from a dictation done by Mia Aparicio MD.      I have reviewed the nursing notes.    I have reviewed the findings, diagnosis, plan and need for follow up with the patient.    New Prescriptions    No medications on file       Final diagnoses:   Weakness   Abnormal urinalysis   Fall, initial encounter       5/1/2017   [facility], [place], EMERGENCY DEPARTMENT     Mia Aparicio MD  05/01/17 5231

## 2017-05-01 NOTE — IP AVS SNAPSHOT
Helen Younger V #[id number] (CSN: [id number])  (77 year old F)  (Adm: 17)     [id number]               UNIT 6D OBSERVATION [facility]: [phone]            Patient Demographics     Patient Name Sex          Age SSN Address Phone    Helen Younger Female 1940 (77 year old) [id number]  [facility] [address] [phone] (Home) *Preferred*      Emergency Contact(s)     Name Relation Home Work Mobile    ARLEEN YOUNGER Spouse [phone]    INEZ YOUNGER Daughter [phone]      Admission Information     Attending Provider Admitting Provider Admission Type Admission Date/Time    Zeenat Stein MD Zewdnatividad, Rubén FARFAN MD Emergency 17  1734    Discharge Date Hospital Service Auth/Cert Status Service Area     Internal Medicine [facility]    Unit Room/Bed Admission Status       UU U6D OBSERVATION 6517-02 Admission (Confirmed)       Admission     Complaint    Weakness      Hospital Account     Name Acct ID Class Status Primary Coverage    Helen Younger [id number] Observation Open UCARE - [employer]            Guarantor Account (for Hospital Account #[id number])     Name Relation to Pt Service Area Active? Acct Type    Helen Younger V  FCS Yes Personal/Family    Address Phone          [address] [place] [address] [phone](H)              Coverage Information (for Hospital Account #[id number])     F/O Payor/Plan Precert #    UCARE/[employer]ARE FOR SENIORS     Subscriber Subscriber #    Helen Younger [id number]    Address Phone    [address]  [place] [id number] [phone]                                                INTERAGENCY TRANSFER FORM - PHYSICIAN ORDERS   2017                       UNIT 6D OBSERVATION [facility]: [phone]            Attending Provider: Zeenat Stein MD     Allergies:  Diuretic, Zyrtec [Cetirizine Hcl]     "Infection:  None   Service:  INTERNAL MED    Ht:  1.575 m (5' 2.01\")   Wt:  81.7 kg (180 lb 2 oz)   Admission Wt:  85.1 kg (187 lb 11.2 oz)    BMI:  32.94 kg/m 2   BSA:  1.89 m 2            ED Clinical Impression     Diagnosis Description Comment Added By Time Added    Weakness [R53.1] Weakness [R53.1]  Mia Aparicio MD 5/1/2017 10:45 PM    Abnormal urinalysis [R82.90] Abnormal urinalysis [R82.90]  Mia Aparicio MD 5/1/2017 10:45 PM    Fall, initial encounter [W19.XXXA] Fall, initial encounter [W19.XXXA]  Mia Aparicio MD 5/1/2017 10:45 PM      Hospital Problems as of 5/4/2017              Priority Class Noted POA    Weakness Medium  5/2/2017 Yes      Non-Hospital Problems as of 5/4/2017              Priority Class Noted    Hyperlipidemia LDL goal <100   9/28/2009    Breast cancer,left   9/28/2009    Cerebral infarction (H)   9/28/2009    Hypertension goal BP (blood pressure) < 140/90   9/7/2011    Advance Care Planning   10/24/2011    CKD (chronic kidney disease) stage 3, GFR 30-59 ml/min   10/24/2011    Health Care Home   3/9/2012    Cataract, mild, ou   4/18/2012    Melanocytic nevus   5/2/2012    Type 2 diabetes, HbA1C goal < 8% (H)   12/18/2013    Macular degeneration (senile) of retina   1/30/2014    Posterior vitreous detachment   1/30/2014    Peripheral vascular disease (H) Medium  9/17/2014    Obesity Medium  10/25/2015    Type 2 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) Medium  10/25/2015    Type 2 diabetes mellitus with autonomic neuropathy (H) Medium  10/25/2015    SOB (shortness of breath) Medium  12/28/2015    Pleural effusion on left Medium  12/28/2015    Pleural effusion Medium  12/29/2015    Malignant neoplasm of female breast, unspecified laterality, unspecified site of breast (H)   1/11/2016    Malignant pleural effusion Medium  2/25/2016    Long-term (current) use of anticoagulants [Z79.01] Medium  2/26/2016    Pneumonia Medium  3/26/2017      Code Status " History     Date Active Date Inactive Code Status Order ID Comments User Context    5/2/2017  1:14 AM 5/2/2017  1:14 AM Full Code [id number]  Rell Melara MD Inpatient    3/27/2017  1:54 PM 5/2/2017  1:14 AM DNR/DNI [id number]  Caterina Bush PA-C Outpatient    3/26/2017  1:05 AM 3/27/2017  1:54 PM DNR/DNI [id number]  Lorrie Wagner MD Inpatient    11/15/2016  3:12 PM 3/26/2017  1:05 AM DNR/DNI [id number]  Justine Hay CMA Outpatient    1/6/2016  2:00 PM 11/15/2016  3:12 PM Full Code [id number]  Rio Malone MD Outpatient    12/28/2015  9:01 PM 1/6/2016  2:00 PM Full Code [id number]  Philomena Bob MD Inpatient      Current Code Status     Date Active Code Status Order ID Comments User Context       5/2/2017  1:15 AM DNR/DNI [id number]  Rell Melara MD Inpatient       Summary of Visit     Reason for your hospital stay       You were admitted for frequent falls, that are most likely attributed to orthostatic hypotension (blood pressure dropping when you stand up) and autonomic dysfunction, meaning very labile blood pressures. You were started on fludrocortisone to help with this. We encourage oral fluid intake. PT recommended a TCU stay due to generalized weakness and frequent falls.                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 If we are uncertain of the size of  tablets/capsules you have at home, strength may be listed as something that might have changed.        Dose / Directions Comments    warfarin 1 MG tablet   Commonly known as:  JANTOVEN   This may have changed:    - how much to take  - how to take this  - when to take this  - additional instructions   Used for:  History of stroke        Take 1 tablet (1mg) by mouth on Monday, Wednesday, Friday. Take 2 tablets (2 mg) by mouth on Tuesday and Thursday.   Quantity:  30 tablet   Refills:  0          CONTINUE these medications which have NOT CHANGED        Dose / Directions Comments    DOCUSATE SODIUM        Dose:  1 tablet   1 tablet 2 times daily   Refills:  0        letrozole 2.5 MG tablet   Commonly known as:  FEMARA   Used for:  Malignant neoplasm of female breast, unspecified laterality, unspecified site of breast (H), Pleural effusion        Dose:  2.5 mg   Take 1 tablet (2.5 mg) by mouth daily   Quantity:  90 tablet   Refills:  3        magnesium hydroxide 400 MG/5ML suspension   Commonly known as:  MILK OF MAGNESIA   Used for:  Constipation        Dose:  30-60 mL   Take 30-60 mLs by mouth daily as needed for constipation or heartburn   Quantity:  360 mL   Refills:  0        OCUVITE ADULT FORMULA PO        Dose:  1 tablet   Take 1 tablet by mouth daily.   Refills:  0        * order for DME   Used for:  Malignant neoplasm of female breast, unspecified laterality, unspecified site of breast (H), Pleural effusion        Equipment being ordered: wheeled walker   Quantity:  1 each   Refills:  0        * order for DME   Used for:  Mixed incontinence        Equipment being ordered:  Incontinence pads   Patient uses these tid   Quantity:  100 each   Refills:  3        * order for DME   Used for:  Physical deconditioning        Equipment being ordered: portable commode   Quantity:  1 Units   Refills:  0        oxybutynin 5 MG tablet   Commonly known as:  DITROPAN        TAKE 1 TABLET (5 MG) BY MOUTH 2 TIMES DAILY   Refills:   11        sennosides 8.6 MG tablet   Commonly known as:  SENOKOT   Used for:  Constipation        Dose:  1 tablet   Take 1 tablet by mouth 2 times daily May increase to 2 tabs twice daily if needed   Quantity:  120 each   Refills:  0        simvastatin 20 MG tablet   Commonly known as:  ZOCOR   Used for:  Hyperlipidemia LDL goal <100        Dose:  20 mg   Take 1 tablet (20 mg) by mouth At Bedtime   Quantity:  90 tablet   Refills:  1        VIACTIV PO   Used for:  Chest pain        Dose:  1 chew tab   Take 1 chew tab by mouth 2 times daily. One in the morning and one at bedtime.   Refills:  0        * Notice:  This list has 3 medication(s) that are the same as other medications prescribed for you. Read the directions carefully, and ask your doctor or other care provider to review them with you.            After Care     Activity - Up with nursing assistance           Additional Discharge Instructions       Keep head of bed elevated       Advance Diet as Tolerated       Follow this diet upon discharge: Orders Placed This Encounter      Regular Diet Adult       Fall precautions           General info for SNF       Length of Stay Estimate: Short Term Care  Condition at Discharge: Stable  Level of care:skilled   Rehabilitation Potential: Fair  Admission H&P remains valid and up-to-date: Yes  Recent Chemotherapy: Current chemotherapy with Ibrance                       Use Nursing Home Standing Orders: Yes       Mantoux instructions       Give two-step Mantoux (PPD) Per Facility Policy Yes               Further instructions from your care team       _______________________  [facility]  Phone  [phone]  Fax  [phone]  ______________________        Referrals     Occupational Therapy Adult Consult       Evaluate and treat as clinically indicated.    Reason:  Weakness, deconditioning       Physical Therapy Adult Consult       Evaluate and treat as clinically indicated.    Reason:  Weakness, deconditioning              Follow-Up Appointment Instructions     Follow Up and recommended labs and tests       Follow up with [facility] upon discharge from TCU.             Your next 10 appointments already scheduled     May 22, 2017  1:30 PM CDT   PE NPET ONCOLOGY (EYES TO THIGHS) with UUPET1   [facility] [place] PET CT ([facility])    [address] [facility] [address] [address]   [phone]           Tell your doctor:   If there is any chance you may be pregnant or if you are breastfeeding.   If you have problems lying in small spaces (claustrophobia). If you do, your doctor may give you medicine to help you relax. If you have diabetes:   Have your exam early in the morning. Your blood glucose will go up as the day goes by.   Your glucose level must be 180 or less at the start of the exam. Please take any medicines you need to ensure this blood glucose level. 24 hours before your scan: Don t do any heavy exercise. (No jogging, aerobics or other workouts.) Exercise will make your pictures less accurate. 6 hours before your scan:   Stop all food and liquids (except water).   Do not chew gum or suck on mints.   If you need to take medicine with food, you may take it with a few crackers.  Please call your Imaging Department at your exam site with any questions.            May 22, 2017  3:30 PM CDT   [facility] Draw with  [facility] DRAW   [facility] Lab Draw ([facility])    [address] [place] [address] [phone]            May 22, 2017  4:00 PM CDT   (Arrive by 3:45 PM)   Return Visit with Keisha Landis MD   [facility] ([facility])    [address] [id number] [phone]              Statement of Approval     Ordered          05/04/17 1549  I have reviewed and agree with all the  "recommendations and orders detailed in this document.  EFFECTIVE NOW     Approved and electronically signed by:  Zeenat Stein MD                                                 INTERAGENCY TRANSFER FORM - NURSING   5/1/2017                       UNIT 6D OBSERVATION [facility]: [phone]            Attending Provider: Zeenat Stein MD     Allergies:  Diuretic, Zyrtec [Cetirizine Hcl]    Infection:  None   Service:  INTERNAL MED    Ht:  1.575 m (5' 2.01\")   Wt:  81.7 kg (180 lb 2 oz)   Admission Wt:  85.1 kg (187 lb 11.2 oz)    BMI:  32.94 kg/m 2   BSA:  1.89 m 2            Advance Directives        Does patient have a scanned Advance Directive/ACP document in EPIC?           Yes        Immunizations     Name Date      Influenza (High Dose) 3 valent vaccine 11/10/16     Influenza (High Dose) 3 valent vaccine 10/09/15     Influenza (High Dose) 3 valent vaccine 10/23/14     Influenza (High Dose) 3 valent vaccine 10/08/13     Influenza (High Dose) 3 valent vaccine 10/09/12     Influenza (High Dose) 3 valent vaccine 10/27/11     Influenza (IIV3) 09/30/10     Influenza (IIV3) 09/28/09     Influenza (IIV3) 12/18/08     Pneumococcal (PCV 13) 10/09/15     Pneumococcal 23 valent 10/24/11     TD (ADULT, 7+) 07/14/16     Tdap (Adacel,Boostrix) 04/24/06     Zoster vaccine, live 12/12/07       ASSESSMENT     Discharge Profile Flowsheet     DISCHARGE NEEDS ASSESSMENT     Patient's communication style  spoken language (English or Bilingual) 05/01/17 1723    Equipment Currently Used at Home  bath bench;walker, rolling;grab bar 05/02/17 1728   FINAL RESOURCES      Transportation Available  family or friend will provide 05/02/17 1728   Resources List  Home Care 03/27/17 1416    Equipment Used at Home  none 12/29/15 1546   Referrals Placed  Community Resources;Housekeeping or Chore Agency;Meals on Wheels;Senior Linkage Line 10/20/16 1545    FUNCTIONAL LEVEL CURRENT     SKIN      Change in Functional Status " "Since Onset of Current Illness/Injury  -- (increased weakness last few weeks) 03/26/17 0130   Inspection  Full 05/04/17 1028    GASTROINTESTINAL (ADULT,PEDIATRIC,OB)     Skin areas NOT inspected  Spine;Hip, left;Hip, right;Buttock, left;Buttock, right;Sacrum;Coccyx 05/04/17 0200    GI WDL  WDL 05/04/17 1028   Skin WDL  WDL 05/04/17 1028    Last Bowel Movement  05/02/17 05/03/17 0939   SAFETY      COMMUNICATION ASSESSMENT     Safety WDL  WDL 05/04/17 1028                 Assessment WDL (Within Defined Limits) Definitions           Safety WDL     Effective: 09/28/15    Row Information: <b>WDL Definition:</b> Bed in low position, wheels locked; call light in reach; upper side rails up x 2; ID band on<br> <font color=\"gray\"><i>Item=AS safety wdl>>List=AS safety wdl>>Version=F14</i></font>      Skin WDL     Effective: 09/28/15    Row Information: <b>WDL Definition:</b> Warm; dry; intact; elastic; without discoloration; pressure points without redness<br> <font color=\"gray\"><i>Item=AS skin wdl>>List=AS skin wdl>>Version=F14</i></font>      Vitals     Vital Signs Flowsheet     VITAL SIGNS     Patient Currently in Pain  denies 05/04/17 0040    Temp  98.1  F (36.7  C) 05/04/17 1525   Preferred Pain Scale  CAPA (Clinically Aligned Pain Assessment) ([facility] Adults Only) 05/03/17 1949    Temp src  Oral 05/04/17 1525   HEIGHT AND WEIGHT      Resp  16 05/04/17 1525   Weight  81.7 kg (180 lb 2 oz) 05/04/17 0628    Pulse  67 05/03/17 1623   SITTING ORTHOSTATIC BP      Heart Rate  60 05/04/17 1525   Sitting Orthostatic BP  135/91 05/04/17 1005    Pulse/Heart Rate Source  Monitor 05/04/17 1525   Sitting Orthostatic Pulse  70 bpm 05/04/17 1005    BP  166/86 05/04/17 1525   LEDY COMA SCALE      BP Location  Right arm 05/04/17 1525   Best Eye Response  4-->(E4) spontaneous 05/04/17 1028    LYING ORTHOSTATIC BP     Best Motor Response  6-->(M6) obeys commands 05/04/17 1028    Lying Orthostatic BP  167/99 05/04/17 " 1005   Best Verbal Response  5-->(V5) oriented 05/04/17 1028    Lying Orthostatic Pulse  60 bpm 05/04/17 1005   Lykens Coma Scale Score  15 05/04/17 1028    STANDING ORTHOSTATIC BP     POSITIONING      Standing Orthostatic BP  88/64 05/04/17 1005   Body Position  independently positioning 05/04/17 1028    Standing Orthostatic Pulse  91 bpm 05/04/17 1005   Head of Bed (HOB)  HOB at 30-45 degrees 05/04/17 1028    OXYGEN THERAPY     Chair  Upright in chair 05/02/17 0937    SpO2  96 % 05/04/17 1525   DAILY CARE      O2 Device  None (Room air) 05/04/17 1525   Activity Type  activity adjusted per tolerance 05/04/17 1028    PAIN/COMFORT     Activity Level of Assistance  assistance, 1 person 05/04/17 0200            Patient Lines/Drains/Airways Status    Active LINES/DRAINS/AIRWAYS     Name: Placement date: Placement time: Site: Days: Last dressing change:    Chest Tube 1 Left Pleural 10 Malian 12/30/15   1152   Pleural   491     Peripheral IV 05/01/17 Left Hand 05/01/17   1827   Hand   2     Peripheral IV 05/03/17 Left Lower forearm 05/03/17   0037   Lower forearm   1     Rash 12/28/15 2100 lower abdomen 12/28/15   2100    492             Patient Lines/Drains/Airways Status    Active PICC/CVC     None            Intake/Output Detail Report     Date Intake   Output Net    Shift P.O. IV Piggyback Total Urine Total       Krissy 05/03/17 0700 - 05/03/17 1459 200 -- 200 750 750 -550    Noc 05/03/17 1500 - 05/03/17 2359 -- -- -- 300 300 -300    Day 05/04/17 0000 - 05/04/17 0659 100 -- 100 200 200 -100    Krissy 05/04/17 0700 - 05/04/17 1459 -- -- -- -- -- 0    Noc 05/04/17 1500 - 05/04/17 2359 -- -- -- -- -- 0      Last Void/BM       Most Recent Value    Urine Occurrence 1 at 05/04/2017 1500    Stool Occurrence 1 at 05/02/2017 1324      Case Management/Discharge Planning     Case Management/Discharge Planning Flowsheet     REFERRAL INFORMATION     Equipment Currently Used at Home  bath bench;walker, rolling;grab bar 05/02/17 0194     Arrived From  other (see comments) (admit to obs) 05/02/17 0130   Resources List  Home Care 03/27/17 1416    LIVING ENVIRONMENT     Referrals Placed  Community Resources;Housekeeping or Chore Agency;Meals on Wheels;Senior Linkage Line 10/20/16 1545    Lives With  spouse 05/02/17 1728   MH/BH CAREGIVER      Living Arrangements  house 05/02/17 1728   Filed Complexity Screen Score  12 05/02/17 0820    COPING/STRESS     ABUSE RISK SCREEN      Major Change/Loss/Stressor  hospitalization;illness 05/02/17 0130   QUESTION TO PATIENT:  Has a member of your family or a partner(now or in the past) intimidated, hurt, manipulated, or controlled you in any way?  no 05/02/17 0130    DISCHARGE PLANNING     QUESTION TO PATIENT: Do you feel safe going back to the place where you are living?  yes 05/02/17 0130    Transportation Available  family or friend will provide 05/02/17 1728   OBSERVATION: Is there reason to believe there has been maltreatment of a vulnerable adult (ie. Physical/Sexual/Emotional abuse, self neglect, lack of adequate food, shelter, medical care, or financial exploitation)?  no 05/02/17 0130    Equipment Used at Home  none 12/29/15 1546   (R) MENTAL HEALTH SUICIDE RISK      FINAL RESOURCES     Are you depressed or being treated for depression?  No 05/02/17 0130                  UNIT 6D OBSERVATION [facility]: [phone]            Medication Administration Report for Helen Younger as of 05/04/17 1639   Legend:    Given Hold Not Given Due Canceled Entry Other Actions    Time Time (Time) Time  Time-Action       Inactive    Active    Linked        Medications 04/28/17 04/29/17 04/30/17 05/01/17 05/02/17 05/03/17 05/04/17    acetaminophen (TYLENOL) tablet 650 mg  Dose: 650 mg Freq: EVERY 4 HOURS PRN Route: PO  PRN Reason: mild pain  Start: 05/02/17 0114   Admin Instructions: Alternate ibuprofen (if ordered) with acetaminophen.  Maximum acetaminophen dose from all sources = 75 mg/kg/day not to exceed 4  grams/day.               docusate sodium (COLACE) capsule 100 mg  Dose: 100 mg Freq: 2 TIMES DAILY Route: PO  Start: 05/02/17 0800        0913 (100 mg)-Given       2124 (100 mg)-Given        0806 (100 mg)-Given       1943 (100 mg)-Given        0857 (100 mg)-Given       [ ] 2000           fludrocortisone (FLORINEF) half-tab 50 mcg  Dose: 50 mcg Freq: DAILY Route: PO  Start: 05/03/17 1000         1311 (50 mcg)-Given        0857 (50 mcg)-Given           letrozole (FEMARA) tablet 2.5 mg  Dose: 2.5 mg Freq: DAILY Route: PO  Start: 05/02/17 0800   Admin Instructions: Patient's own supply -- verified by pharmacy.         0914 (2.5 mg)-Given        0806 (2.5 mg)-Given        0858 (2.5 mg)-Given           naloxone (NARCAN) injection 0.1-0.4 mg  Dose: 0.1-0.4 mg Freq: EVERY 2 MIN PRN Route: IV  PRN Reason: opioid reversal  Start: 05/02/17 0114   Admin Instructions: For respiratory rate LESS than or EQUAL to 8.  Partial reversal dose:  0.1 mg titrated q 2 minutes for Analgesia Side Effects Monitoring Sedation Level of 3 (frequently drowsy, arousable, drifts to sleep during conversation).Full reversal dose:  0.4 mg bolus for Analgesia Side Effects Monitoring Sedation Level of 4 (somnolent, minimal or no response to stimulation).               ondansetron (ZOFRAN-ODT) ODT tab 4 mg  Dose: 4 mg Freq: EVERY 6 HOURS PRN Route: PO  PRN Reason: nausea  Start: 05/02/17 0114   Admin Instructions: This is Step 1 of nausea and vomiting management.  If nausea not resolved in 15 minutes, go to Step 2 prochlorperazine (COMPAZINE). Do not push through foil backing. Peel back foil and gently remove. Place on tongue immediately. Administration with liquid unnecessary              Or  ondansetron (ZOFRAN) injection 4 mg  Dose: 4 mg Freq: EVERY 6 HOURS PRN Route: IV  PRN Reasons: nausea,vomiting  Start: 05/02/17 0114   Admin Instructions: This is Step 1 of nausea and vomiting management.  If nausea not resolved in 15 minutes, go to Step 2  prochlorperazine (COMPAZINE).  Irritant.               oxybutynin (DITROPAN) tablet 5 mg  Dose: 5 mg Freq: 2 TIMES DAILY Route: PO  Start: 05/02/17 0800        0913 (5 mg)-Given       2124 (5 mg)-Given        0806 (5 mg)-Given       1943 (5 mg)-Given        0858 (5 mg)-Given       [ ] 2000           palbociclib (IBRANCE) capsule CHEMO 100 mg  Dose: 100 mg Freq: DAILY WITH BREAKFAST Route: PO  Indications of Use: HORMONE RECEPTOR-POSITIVE, HER2 NEGATIVE BREAST CANCER  Start: 05/03/17 0800   Admin Instructions: Take with food, avoid grapefruit, take whole capsule - do not open, crush or chew.<br>OK to use her home meds -- verified by pharmacy.          0806 (100 mg)-Given        0858 (100 mg)-Given           sennosides (SENOKOT) tablet 1 tablet  Dose: 1 tablet Freq: 2 TIMES DAILY Route: PO  Start: 05/02/17 0800        0913 (1 tablet)-Given       2124 (1 tablet)-Given        0806 (1 tablet)-Given       1943 (1 tablet)-Given        0857 (1 tablet)-Given       [ ] 2000           warfarin (COUMADIN) tablet 2 mg  Dose: 2 mg Freq: ONCE AT 6PM Route: PO  Start: 05/04/17 1800   Admin Instructions: Dose per Pharmacy Warfarin dosing service.           [ ] 1800           Warfarin Therapy Reminder (Check START DATE - warfarin may be starting in the FUTURE)  Freq: CONTINUOUS PRN Route: XX  Start: 05/02/17 0124   Admin Instructions: *Note to reorder warfarin daily*  Pharmacy Warfarin Dosing Service  Patient is on Warfarin Therapy - check for daily order              Completed Medications  Medications 04/28/17 04/29/17 04/30/17 05/01/17 05/02/17 05/03/17 05/04/17         Dose: 500 mL Freq: ONCE Route: IV  Last Dose: 500 mL (05/04/17 1222)  Start: 05/04/17 1200   End: 05/04/17 1622          1222 (500 mL)-New Bag             Dose: 250 mL Freq: ONCE Route: IV  Last Dose: Stopped (05/03/17 0330)  Start: 05/03/17 0130   End: 05/03/17 0330         0230 (250 mL)-New Bag       0330-Stopped              Dose: 500 mL Freq: ONCE Route:  IV  Last Dose: Stopped (05/01/17 2049)  Start: 05/01/17 1802   End: 05/01/17 2049       1943 (500 mL)-New Bag       2049-Stopped [C]                Dose: 1 g Freq: ONCE Route: IV  Indications of Use: URINARY TRACT INFECTION  Last Dose: Stopped (05/01/17 2355)  Start: 05/01/17 2253   End: 05/01/17 2355       2319 (1 g)-New Bag       2355-Stopped                Dose: 2 mg Freq: ONCE AT 6PM Route: PO  Start: 05/03/17 1800   End: 05/03/17 1800   Admin Instructions: Dose per Pharmacy Warfarin dosing service.          1800 (2 mg)-Given              Dose: 2 mg Freq: ONCE AT 6PM Route: PO  Start: 05/02/17 1800   End: 05/02/17 1727   Admin Instructions: Dose per Pharmacy Warfarin dosing service.         1727 (2 mg)-Given            Discontinued Medications  Medications 04/28/17 04/29/17 04/30/17 05/01/17 05/02/17 05/03/17 05/04/17         Dose: 1 g Freq: EVERY 24 HOURS Route: IV  Indications of Use: URINARY TRACT INFECTION  Last Dose: Stopped (05/04/17 0144)  Start: 05/02/17 2315   End: 05/04/17 1044         0150 (1 g)-New Bag       0300-Stopped        0041 (1 g)-New Bag       0144-Stopped       1044-Med Discontinued         Dose: 5 mg Freq: ONCE Route: PO  Start: 05/04/17 0200   End: 05/04/17 0718          (0258)-Not Given [C]       0718-Med Discontinued         Dose: 10 mg Freq: EVERY 4 HOURS PRN Route: IV  PRN Reason: high blood pressure  PRN Comment: For SBP>170  Start: 05/02/17 2235   End: 05/03/17 0123         0030 (10 mg)-Given       0123-Med Discontinued          Dose: 10 mg Freq: EVERY 4 HOURS PRN Route: PO  PRN Comment: SBP >170, DBP >100  Start: 05/02/17 0346   End: 05/02/17 1914        1914-Med Discontinued           Dose: 10 mg Freq: EVERY 4 HOURS PRN Route: PO  PRN Comment: SBP >180, DBP >100  Start: 05/02/17 0228   End: 05/02/17 0346        0346-Med Discontinued           Dose: 25 mg Freq: ONCE Route: PO  Start: 05/02/17 0300   End: 05/02/17 0257   Admin Instructions: Please check the BP before administering  and give this dose if SBP >190         0257-Med Discontinued           Dose: 25 mg Freq: EVERY 4 HOURS PRN Route: PO  PRN Comment: SBP >180, DBP >100  Start: 05/02/17 0129   End: 05/02/17 0228        0228-Med Discontinued           Dose: 1 mg Freq: ONCE AT 6PM Route: PO  Start: 05/04/17 1800   End: 05/04/17 1053   Admin Instructions: Dose per Pharmacy Warfarin dosing service.           1053-Med Discontinued    Medications 04/28/17 04/29/17 04/30/17 05/01/17 05/02/17 05/03/17 05/04/17               INTERAGENCY TRANSFER FORM - NOTES (H&P, Discharge Summary, Consults, Procedures, Therapies)   5/1/2017                       UNIT 6D OBSERVATION [facility] BANK: [phone]               History & Physicals      H&P by Rell Melara MD at 5/2/2017  1:13 AM     Author:  Rell Melara MD Service:  Internal Medicine Author Type:  Resident    Filed:  5/2/2017  3:48 AM Date of Service:  5/2/2017  1:13 AM Note Created:  5/2/2017  1:13 AM    Status:  Attested :  Rell Melara MD (Resident)    Cosigner:  Zeenat Stein MD at 5/2/2017  3:12 PM        Attestation signed by Zeenat Stein MD at 5/2/2017  3:12 PM        Attestation:  Physician Attestation   IZeenat, personally examined and evaluated this patient.  I discussed the patient with the resident and care team, and agree with the assessment and plan of care as documented in the resident s note of 5/2/17].      I personally reviewed vital signs, medications and labs.    Zeenat Stein  Date of Service (when I saw the patient): 05/02/17                               [facility]  Internal Medicine History and Physical    Name: Helen Younger MRN#: [id number]   Age: 77 year old YOB: 1940       Assessment and Plan   Helen Younger is a 77 year old[VA1.1]  female[VA1.2] with a history of[VA1.1] ER/[place]-positive, HER-2 negative[VA1.3] metastatic breast[VA1.2] cancer[VA1.3],  CKD, CVA, HTN[VA1.2]  who presents with[VA1.1] fall.[VA1.2]     # Fall: mechanical from history. She has history of recurrent mechanical falls, well documented in her chart. 7-8 falls in the last year, recently completed home PT for strength  - EKG was reviewed and did not show any arrhythmias or acute ST-T changes. Holter monitor in 3/2017 did not show any arrhythmias. Echo in 9/2015 showed normal EF, and evidence of diastolic dysfunction. No structural heart disease/ valvular disease noted.   - History is not consistent with syncope or orthostasis. Neuro exam was unremarkable for any acute changes  - neuro was consulted by ED, who recommended orthostatic vitals- pending  - PT/ OT evaluation for disposition    # Uncomplicated UTI: worsening mental status and UA w/ positive leuk esterase and WBC  - ceftriaxone while inpatient, will switch to oral antibiotic on discharge[VA1.4]  - blood and urine Cx pending[VA1.5]    # Deconditioning and frailty: likely due to metastatic breast cancer  - PT/ OT evaluation   - protein supplements     # Metastatic breast cancer: w/ pleural effusions and bony mets.   H/o Rx[VA1.4] with lumpectomy, adjuvant chemotherapy with Taxotere, whole breast radiotherapy, and 5 years of adjuvant Arimidex, completed in 2012[VA1.3]. Found to have bony mets and metastatic pleural effusion in 12/2015  - continue PTA letrozole, since patient is on obs, we will consider if patient can bring this medication from home and can be verified by pharmacy  - follows up with Hem/ onc. Most recently stable disease noted on imaging 12/2016.   - Holding PTA calcium/ vit D while on obs stay. Receives[VA1.4] Xgeva[VA1.3] every 3 months, last dose on 2/27/2017[VA1.4]    [VA1.3]  # CKD:[VA1.4]  [VA1.3]creatinine is at baseline  - avoid nephrotoxins    #[VA1.4] Hx of CVA: Head CT during her recent admission showed a possible new R middle fronatl gyrus[VA1.3] infarct[VA1.4]. Stable chronic L MCA territory infarct and stable 5mm posterior  parafalcine calcified meningioma.[VA1.3]   -[VA1.4]  [VA1.3]continue warfarin dosing per pharmacist    # HTN[VA1.4]: -180s.   - hydralazine prn for SBP > 170  - consider starting amlodipine 5mg daily[VA1.5]    # Chronic microcytic anemia: Likely 2/2 Chemotherapy vs MDS. Vit B12 and folate were normal recently  - Hgb stable, continue to monitor[VA1.6]    ##Other  - PPx:[VA1.1] already anticoagulated[VA1.4] /[VA1.1]none[VA1.4] for GI/[VA1.1]senna and docusate[VA1.4] for bowel  - FEN:[VA1.1] regular[VA1.4] diet  - IVF:[VA1.1] none[VA1.4]  - Code status:[VA1.1] DNR/ DNI[VA1.4]    Disposition:  Patient[VA1.1] admitted to observation status to ensure hemodynamic stability and safe discharge plan[VA1.4]    Rell Melara  PGY-3  Internal Medicine  [phone]    This patient[VA1.1] will be staffed in am with attending physician[VA1.4]        Chief Complaint[VA1.1]   Fall[VA1.4]  This history was obtained from the patient[VA1.1] and her [VA1.4], who is a[VA1.1] good[VA1.4] historian, and a review of the medical record.       History of Present Illness    77 year old[VA1.1]  female[VA1.2] with a history of[VA1.1] ER/[place]-positive, HER-2 negative[VA1.3] metastatic breast[VA1.2] cancer[VA1.3],  CKD, CVA, HTN[VA1.2] who presents with[VA1.1] fall.[VA1.2] She had two falls today, first at 11 am and second at 3:30 pm. Both seemed mechanical from history with legs giving out. The first one happened in the bathroom and  rushed to see her from the other room. The second one happened while moving from kitchen to bedroom. She was assisted by  when the second fall occurred, with legs giving out. She did not lose consciousness with any of the falls and did not hit her head either. She denies any palpitations, shortness of breath, chest pain or dizziness prior to the falls. Otherwise, she has normal appetite, denies any nausea, vomiting, abdominal pain. She has stable constipation. She denies any dysuria  or fever, chills, sweats.[VA1.4] Repots weight loss of 40lbs over the last year. She denies any new focal weakness in arms or legs or facial deviation, has some stable aphasia and ataxia from previous MCA stroke.[VA1.5]       Review of Systems   All 12 systems reviewed.  Relevant positives/negatives noted in HPI above        Past Medical History   (Reviewed and updated)  Past Medical History:   Diagnosis Date     CVA (cerebral infarction) 03/01/00    slurred speech, right facial droop partial hemiplagia     Diabetes mellitus      Fibromuscular dysplasia of renal artery (H)      Malignant neoplasm of breast (female), unspecified site 01/03    Breast cancer     MVR (mitral valve repair)      Unspecified essential hypertension           Past Surgical History   (Reviewed and updated)  Past Surgical History:   Procedure Laterality Date     BREAST LUMPECTOMY, RT/LT  04/06    Breast Lumpectomy RT/LT     BREAST LUMPECTOMY, RT/LT  06/06    redo for margins with radiation and chemo     C NONSPECIFIC PROCEDURE  1998    left knee surg torn ligament         Allergies   (Reviewed and updated)   Allergies   Allergen Reactions     Diuretic      Don't remember side effect     Zyrtec [Cetirizine Hcl]      Elevated blood pressure         Medications   (Reviewed and updated)    No current facility-administered medications on file prior to encounter.   Current Outpatient Prescriptions on File Prior to Encounter:  order for DME Equipment being ordered: portable commode   warfarin (JANTOVEN) 1 MG tablet Take 1 tablet (1mg) by mouth on Monday, Wednesday, Friday. Take 2 tablets (2 mg) by mouth on Tuesday and Thursday. (Patient taking differently: Take 2 mg by mouth daily Take 1 tablet (1mg) by mouth on Monday, Wednesday, Friday. Take 2 tablets (2 mg) by mouth on Tuesday and Thursday.)   letrozole (FEMARA) 2.5 MG tablet Take 1 tablet (2.5 mg) by mouth daily   magnesium hydroxide (MILK OF MAGNESIA) 400 MG/5ML suspension Take 30-60 mLs by  mouth daily as needed for constipation or heartburn   sennosides (SENOKOT) 8.6 MG tablet Take 1 tablet by mouth 2 times daily May increase to 2 tabs twice daily if needed   simvastatin (ZOCOR) 20 MG tablet Take 1 tablet (20 mg) by mouth At Bedtime   oxybutynin (DITROPAN) 5 MG tablet TAKE 1 TABLET (5 MG) BY MOUTH 2 TIMES DAILY   order for DME Equipment being ordered:  Incontinence pads   Patient uses these tid   order for DME Equipment being ordered: wheeled walker   DOCUSATE SODIUM 1 tablet 2 times daily    Multiple Vitamins-Minerals (OCUVITE ADULT FORMULA PO) Take 1 tablet by mouth daily.   VIACTIV OR Take 1 chew tab by mouth 2 times daily. One in the morning and one at bedtime.          Family History   (Reviewed and updated)  Family History   Problem Relation Age of Onset     Hypertension Mother      Cardiovascular Mother      Prostate Cancer Father      Hypertension Father      Breast Cancer Maternal Grandmother      Cardiovascular Paternal Grandfather      Hypertension Brother      Depression Daughter      Hypertension Daughter      Psychotic Disorder Daughter      bipolar         Social History   (Reviewed and updated)  Social History   Substance Use Topics     Smoking status: Never Smoker     Smokeless tobacco: Never Used     Alcohol use No           Physical Exam   Vitals:[VA1.1] Blood pressure (!) 200/102, pulse 108, temperature 98.4  F (36.9  C), temperature source Oral, resp. rate 18, weight 85.1 kg (187 lb 11.2 oz), SpO2 98 %, not currently breastfeeding.[VA1.4]    Gen:[VA1.1] laying in bed comfortably, NAD[VA1.4]  HEENT:[VA1.1] no scleral icterus, no conjunctival pallor, oral mucosa moist[VA1.4]  CV:[VA1.1] RRR, nl S1, S2, no MRG[VA1.4]  Resp:[VA1.1]breathing comfortably on RA, good b/l air entry, CTAB[VA1.4]  Abd:[VA1.1] NABS, soft, NT, ND[VA1.4]  Ext:[VA1.1] warm, peripheral pulses palpable, 1+ pedal edema upto mid leg  Neuro: awake, alert, oriented to time, place, person. CN II-XII intact, strength  5/5 in b/l. UE and LE sensations to soft touch intact b/l UE and LE. Gait was not assessed[VA1.4]      Data   Labs:  All labs reviewed.       EKG:[VA1.1]  Reviewed. NSR w/ PACs. No acute ST-T wave changes.[VA1.4]     Microbiology:[VA1.1]  Blood Cx, urine Cx pending[VA1.4]    Imaging[VA1.1]  No imaging obtained current admission    Ziopatch results and echo results were reviewed.[VA1.4]                        Revision History        User Key Date/Time User Provider Type Action    > VA1.6 5/2/2017  3:48 AM Rell Melara MD Resident Sign     VA1.5 5/2/2017  3:45 AM Rell Melara MD Resident Sign     VA1.4 5/2/2017  1:29 AM Rell Melara MD Resident      VA1.2 5/2/2017  1:27 AM Rell Melara MD Resident      [facility].3 5/2/2017  1:26 AM Rell Melara MD Resident      VA1.1 5/2/2017  1:13 AM Rell Melara MD Resident                      Discharge Summaries      Discharge Summaries by Awilda Heart MD at 5/4/2017  3:58 PM     Author:  Awilda Heart MD Service:  General Medicine Author Type:  Resident    Filed:  5/4/2017  3:58 PM Date of Service:  5/4/2017  3:58 PM Note Created:  5/4/2017  3:11 PM    Status:  Cosign Needed :  Awilda Heart MD (Resident)    Cosign Required:  Yes                                                                              Medicine Discharge Summary  Helen Younger MRN: [id number]  1940  Primary care provider: Arin Shahid  ___________________________________          Date of Admission:  5/1/2017  Date of Discharge:  5/4/2017   Admitting Physician:  Rubén Correa MD  Discharge Physician:  Zeenat Stein  Discharging Service:  Internal Medicine, [place] [address]     Primary Provider: Arin Shahid         Reason for Admission:   Fall    From H&P 5/2/17:  77 year old  female with a history of ER/MA-positive, HER-2 negative metastatic breast cancer, CKD, CVA, HTN who presents with fall. She had two falls today, first at 11 am  and second at 3:30 pm. Both seemed mechanical from history with legs giving out. The first one happened in the bathroom and  rushed to see her from the other room. The second one happened while moving from kitchen to bedroom. She was assisted by  when the second fall occurred, with legs giving out. She did not lose consciousness with any of the falls and did not hit her head either. She denies any palpitations, shortness of breath, chest pain or dizziness prior to the falls. Otherwise, she has normal appetite, denies any nausea, vomiting, abdominal pain. She has stable constipation. She denies any dysuria or fever, chills, sweats. Repots weight loss of 40lbs over the last year. She denies any new focal weakness in arms or legs or facial deviation, has some stable aphasia and ataxia from previous MCA stroke.           Discharge Diagnosis:   Orthostatic hypotension  Autonomic dysfunction  Sterile Pyuria  Deconditioning  Metastatic breast cancer with pleural effusions and bony mets  History of CVA             Procedures & Significant Findings:   UA: large leukocyte esterase, 25 WBC  Urine culture with < 10,000 urogenital geoff         Consultations:   Neurology         Hospital Course by Problem:    1. Fall 2/2 orthostatic hypotension  She has a history of recurrent falls (7-8 in the last year). Recently completed home PT. No signs of arrythmia or structural heart disease to suggest syncope. Orthostatics positive on admission with labile blood pressures noted. She received IV fluids for presumed volume depletion contributing to her orthostasis. Additionally, parkinsonism (shuffling gait, camptocormia, dysarthria) and autonomic dysfunction raise concern for neurodegenerative disease such as multiple systems atrophy.  -- fludrocortisone 0.05mg daily for orthostatics  --compression stockings  -- TCU for PT/OT  -- follow up with Neurology movement disorders clinic upon discharge    2. Deconditioning and  frailty  The patient has 16 stairs to get into her home, and was able to only to 3 with physical therapy. Discussed with patient's  that her current living situation may not be sustainable long term if she continues to decline and that she may require a higher level of care in the future.    3. Orthostatic hypotension, autonomic dysfunction  Blood pressures very labile, with systolics 160-180. She was given IV hydralazine x 1 and BP precipitously dropped, requiring IV fluids. Would only treat elevated blood pressure if systolic blood pressure remains elevated > 180 while sitting and standing.  -- fludrocorticone as above  -- compression stockings    4. Sterile pyuria  Initially treated with ceftriaxone given UA with positive leuk esterase and WBC, though urine culture with no growth so ceftriaxone discontinued after day 3.     5. Metastatic breast cancer with pleural effusions and bony mets  History of lumpectomy, adjuvant chemotherapy with Taxotere, whole breast radiotherapy,a dn 5 years of adjuvant Arimidex completed in 2012. Found to have bony mets and metastatic pleural effusion in 12/2015. Stable disease noted on imaging 12/2016. Receives Xgeva every 3 months, last dose 2/27/2017.  -- continue PTA letrozole, Ibrance    6. History of CVA  Dysarthria and dysphasia is at baseline, per her .  -- continue warfarin      Physical Exam on day of Discharge:  Blood pressure (!) 165/97, pulse 67, temperature 98.3  F (36.8  C), temperature source Oral, resp. rate 16, weight 81.7 kg (180 lb 2 oz), SpO2 95 %, not currently breastfeeding.  General: in NAD. Some speech latency and  HEENT: MMM, PERRLA, EOM intact  CV: RRR, normal S1S2, no murmur, clicks, rubs appreciated  Resp: Clear to auscultation bilaterally, no wheezes, rhonchi  Abd: Soft, non-tender, BS+, no masses appreciated  Extremities: Radial and pedal pulses intact and symmetric, no pedal edema  Neuro: Some speech latency and dysphasia, no cogwheel  rididity             Pending Results:   none         Discharge Medications:     Current Discharge Medication List      CONTINUE these medications which have NOT CHANGED    Details   !! order for DME Equipment being ordered: portable commode  Qty: 1 Units, Refills: 0    Associated Diagnoses: Physical deconditioning      warfarin (JANTOVEN) 1 MG tablet Take 1 tablet (1mg) by mouth on Monday, Wednesday, Friday. Take 2 tablets (2 mg) by mouth on Tuesday and Thursday.  Qty: 30 tablet, Refills: 0    Associated Diagnoses: History of stroke      letrozole (FEMARA) 2.5 MG tablet Take 1 tablet (2.5 mg) by mouth daily  Qty: 90 tablet, Refills: 3    Associated Diagnoses: Malignant neoplasm of female breast, unspecified laterality, unspecified site of breast (H); Pleural effusion      magnesium hydroxide (MILK OF MAGNESIA) 400 MG/5ML suspension Take 30-60 mLs by mouth daily as needed for constipation or heartburn  Qty: 360 mL, Refills: 0    Associated Diagnoses: Constipation      sennosides (SENOKOT) 8.6 MG tablet Take 1 tablet by mouth 2 times daily May increase to 2 tabs twice daily if needed  Qty: 120 each, Refills: 0    Associated Diagnoses: Constipation      simvastatin (ZOCOR) 20 MG tablet Take 1 tablet (20 mg) by mouth At Bedtime  Qty: 90 tablet, Refills: 1    Associated Diagnoses: Hyperlipidemia LDL goal <100      oxybutynin (DITROPAN) 5 MG tablet TAKE 1 TABLET (5 MG) BY MOUTH 2 TIMES DAILY  Refills: 11      !! order for DME Equipment being ordered:  Incontinence pads   Patient uses these tid  Qty: 100 each, Refills: 3    Associated Diagnoses: Mixed incontinence      !! order for DME Equipment being ordered: wheeled walker  Qty: 1 each, Refills: 0    Associated Diagnoses: Malignant neoplasm of female breast, unspecified laterality, unspecified site of breast (H); Pleural effusion      DOCUSATE SODIUM 1 tablet 2 times daily       Multiple Vitamins-Minerals (OCUVITE ADULT FORMULA PO) Take 1 tablet by mouth daily.       VIACTIV OR Take 1 chew tab by mouth 2 times daily. One in the morning and one at bedtime.     Associated Diagnoses: Chest pain       !! - Potential duplicate medications found. Please discuss with provider.               Discharge Instructions and Follow-Up:     Discharge Procedure Orders  Home care nursing referral   Referral Type: Home Health Therapies & Aides     Home Care PT Referral for Hospital Discharge   Referral Type: Home Health Therapies & Aides     Home Care OT Referral for Hospital Discharge     Home Care Social Service Referral for Hospital Discharge                   Discharge Disposition:   TCU          Condition on Discharge:   Discharge condition: Fair   Code status on discharge: DNR / DNI        Date of service: 5/4/2017    The patient was discussed with Dr. Stein.    Awilda Heart MD PGY-2  Internal Medicine  [phone][JS1.1]     Revision History        User Key Date/Time User Provider Type Action    > JS1.1 5/4/2017  3:58 PM Awilda Heart MD Resident Sign                     Consult Notes      Consults by Elias Del Valle MD at 5/1/2017  7:29 PM     Author:  Elias Del Valle MD Service:  Neurology Author Type:  Physician    Filed:  5/2/2017 12:47 PM Date of Service:  5/1/2017  7:29 PM Note Created:  5/1/2017  7:29 PM    Status:  Signed :  Elias Del Valle MD (Physician)         Neurology Consult    CC/Reason for consult: falls    HPI:   76 yo woman with hx L MCA stroke 2000 & breast cancer who presents with 2 falls today. First fall occurred around 11AM when patient was in the bathroom; she stood from the toilet & fell soon afterwards. Fell onto her bottom; did not hit her head. Witnessed by , who helped her up & helped walk patient to bed. She laid in bed for a couple hours, then was able to get up & get around with her walker. Later in the afternoon, around 3PM, patient stood from seat in kitchen & collapsed moments  later. Again, fell on her bottom & did not hit head.  helped patient get up. Brought patient to ED b/c had 2 falls in one day, which is unusual. Patient has history of falls, occur every couple months. Previous work-up has included ziopatch & MRI brain (10/2016) which were negative. Orthostatic vitals have not been documented clearly.     Limited ability to obtain history from patient due to baseline expressive aphasia. Most of history obtained from . Difficult to ascertain whether patient had prodrome of dizziness, vertigo, numbness. Per , patient has never passed out, lost consciousness or had shaking spells. Has baseline R-sided weakness & aphasia from stroke in 2000. She is on coumadin, apparently for carotid artery dz per ; does not have hx of atrial fibrillation. Ambulates with walker at baseline.      ROS: Negative except as stated above.    PMHx/PSHx:  Past Medical History:   Diagnosis Date     CVA (cerebral infarction) 03/01/00    slurred speech, right facial droop partial hemiplagia     Diabetes mellitus      Fibromuscular dysplasia of renal artery (H)      Malignant neoplasm of breast (female), unspecified site 01/03    Breast cancer     MVR (mitral valve repair)      Unspecified essential hypertension      Past Surgical History:   Procedure Laterality Date     BREAST LUMPECTOMY, RT/LT  04/06    Breast Lumpectomy RT/LT     BREAST LUMPECTOMY, RT/LT  06/06    redo for margins with radiation and chemo     C NONSPECIFIC PROCEDURE  1998    left knee surg torn ligament       Medications:    sodium chloride 0.9%  500 mL Intravenous Once       Allergies:  Diuretic and Zyrtec [cetirizine hcl]    Social Hx:   No etoh or tobacco use.  Lives with .  Ambulates w/ walker at baseline.    Family Hx:   Non-contributory    Exam:  BP (!) 173/101  Pulse 108  Temp 97.8  F (36.6  C) (Oral)  SpO2 98%    Gen: NAD  Neck: no meningismus  CV: RRR  Resp: no labored breathing  Abd: soft  Ext:  "mild edema in legs, non-pitting  Neuro: awake, gives 1-2 word answers, difficulty with naming (especially low-frequency words eg feather), able to repeat simple sentence, follows 2-step commands, EOMI, PERRL, blinks to threat from all quadrants, mild facial asymmetry with R droop, dysarthric speech. No drift in arms of legs against gravity; slightly increased tone in R arm compared to L. 4/5 strength R deltoid & ; 4+/5 in L deltoid & . 4/5 hip flexion & ankle dorsiflexion bilaterally. FNF without dysmetria b/l. Difficult to assess sensation due to aphasia/cooperation. No obvious neglect or inattention. Did not assess gait due to safety concern.      Impression & Recommendations:    # Falls. Had 2 falls in one day; both occurred soon after patient stood from sitting position. Suspect due to orthostatic hypotension or physical deconditioning. Can also consider toxic/metabolic derangements or infection. Lower suspicion for stroke given no new neurologic deficits & patient already on anticoagulation for secondary stroke prevention. Atypical seizure (eg \"drop attack\") is possible, though seems less likely based on history.    -recommend checking orthostatic vitals (story is very suspicious for orthostatic hypotension) & basic labs (CBC, CMP, U/A) to rule out infection or toxic/metabolic process.[RP1.1] Treat with IVF if appropriate, per primary team.[RP1.2]  -recommend admission to medicine/obs for above work-up & evaluation by PT/OT.  -if the above work-up negative, then can consider further w/u with MRI, EEG or orthostatic testing. We will continue to follow.    Patient seen & discussed with Staff Dr. Del Valle.    Lupe Lyn  G2 Neurology[RP1.1]    ATTENDING ADDENDUM: Patient seen and examined today with resident Dr Herrera. Discussed on the phone with resident Dr Lyn last night, and agree with her assessment and recs as above, except as amended herein.  TT spent for patient care 35 minutes. More than " half was counseling.  Mrs Younger has had recurrent falls for at least 2 years now. This is definitely not a new problem. Falls typically occur upon standing. Our nursing staff did orthostatic vitals which showed a very significant drop from the laying to sitting position from 180 systolic to 130. We would like vitals to also be done with standing to understand this better. Tilt test may be an option if the patient is unable to stand for 1-2 minutes. This is an important treatable cause of falls that should not be missed- in that case we would consider compression stockings, increase of fluid and salt intake, etc.  In addition, her exam showed: findings expected from previous left MCA stroke (right mild facial droop, mild right spastic hemiparesis and hyperreflexia) but also a few other abnormalities, including :1) a LEFT sided Babinski sign and brisk LEFT knee and upper extremity reflexes with spread 2) Dysarthric speech with a scanning quality (ataxia?) and 3) A very shuffling gait with camptocormia, and very small steps. She has no tremor or upper extremity cogwheel rigidity.   The latter has previously raised concerns about parkinsonism, but it appears that a trial of Sinemet done through [facility] in 2015 was unsuccessful and made her WORSE (for 1-2 months).    What I wonder is whether the constellation of orthostasis, camptocormia with shuffling gait, unresponsive to L-dopa, and scanning dysarthria, suggest multiple system atrophy. There are no clear clues to this diagnosis from previous MRIs of the brain, and she has had a few of those (in 2016 and 2015, reviewed). She also had MRI of C spine in 2015 which was unrevealing (similar symptoms). Would not recommend repeat imaging.     In addition, recent UTI and mild dehydration may be playing a role in her symptoms.     Recommend 1) Repeat orthostatic vitals 2) Medical treatment of UTI and hydration 3) PT/OT assessment for safety 4) If she does not  improve by #1-3 we may need to transfer to Neurology for further workup.     Elias Del Valle MD[GM1.1]         Revision History        User Key Date/Time User Provider Type Action    > GM1.1 5/2/2017 12:47 PM Elias Del Valle MD Physician Sign     RP1.2 5/1/2017  8:18 PM Lupe Lyn MD Resident Sign     RP1.1 5/1/2017  8:16 PM Lupe Lyn MD Resident Sign                     Progress Notes - Physician (Notes for yesterday and today)      Progress Notes by Elias Del Valle MD at 5/4/2017  8:35 AM     Author:  Elias Del Valle MD Service:  Neurology Author Type:  Physician    Filed:  5/4/2017 10:49 AM Date of Service:  5/4/2017  8:35 AM Note Created:  5/4/2017  8:35 AM    Status:  Signed :  Elias Del Valle MD (Physician)         Patient seen and examined today.   Exam stable from previously noted.   Orthostatic recommendations from yesterday instituted by primary team.  Recommend re-checking orthostatic vitals today to see if improved or resolved[JR1.1] although suspect it may take a few day[JR1.2]s on Fludrocortisone[JR1.3].[JR1.1]   - Follow up in Movement disorders clinic in 2-4 weeks for consideration of MSA work up.   - PT/OT/Primary team to arrange safe disposition  - If going to TCU would recommend repeat orthostatic vital check in 3-4 days.  - Inpatient Neurology will sign off at this time     Patient discussed with attending Dr. Del Valle[JR1.3]    Anna Herrera PGY-3  Neurology resident[JR1.1]     ATTENDING ADDENDUM: Pt discussed today with resident Dr Herrera but not seen. Agree with his assessment and recs as above. Elias Del Valle MD[GM1.1]     Revision History        User Key Date/Time User Provider Type Action    > GM1.1 5/4/2017 10:49 AM Elias Del Valle MD Physician Sign     JR1.3 5/4/2017 10:46 AM Anna Herrera MD Resident Sign     JR1.2 5/4/2017 10:41 AM Anna Herrera MD  Resident      JR1.1 5/4/2017  8:35 AM Anna Herrera MD Resident             Progress Notes by Awilda Heart MD at 5/3/2017  4:51 PM     Author:  Awilda Heart MD Service:  General Medicine Author Type:  Resident    Filed:  5/3/2017  5:01 PM Date of Service:  5/3/2017  4:51 PM Note Created:  5/3/2017  4:51 PM    Status:  Attested :  Awilda Heart MD (Resident)    Cosigner:  Zeenat Stein MD at 5/4/2017  6:53 AM        Attestation signed by Zeenat Stein MD at 5/4/2017  6:53 AM        Attestation:  Physician Attestation   I, Zeenat Stein, saw this patient with the resident and agree with the resident s findings and plan of care as documented in the resident s note.      I personally reviewed vital signs, medications and labs.    Key findings: PT/OT rec TCU, pt/family expressed financial concerns which is currently being addressed for safe dispo.    Zeenat Stein  Date of Service (when I saw the patient): 5/3/17                               [facility] Internal Medicine Progress Note          Assessment and Plan:   Assessment: Ms. Younger is a 76yo female with h/o ER/MO-positive, HER-2 negative metastatic breast cancer, CKD, CVA, HTN who presents with fall.    # Fall:  History of recurrent falls (7-8 in the last year). Recently completed home PT. No signs of arrythmia on admission EKG. Holter in 3/2017 without any arrythmia. Echo 9/2015 with normal EF, no diastolic dysfunction. No structural heart disease. Possible that UTI is contributing factor to weakness. Fall most likely related to orthostatic hypotension and autonomic dysfunction. Per neuro,dysautonomia and parkinsonian signs raise question of neurodegenerative disease such as Multiple systems atrophy.  - appreciate neuro recs  - PT/OT recommend TCU placement  - compression stockings  - encourage po fluid intake  - start fludrocortisone 0.05mg daily  - follow up with Neurology  Movement disorders clinic upon d/c    # Uncomplicated UTI: worsening mental status and UA w/ positive leuk esterase and WBC, though urine culture with no growth  - ceftriaxone while inpatient, will plan to switch to oral antibiotic on discharge for 5 day course     # Deconditioning and frailty: likely due to metastatic breast cancer, possible neurodegenerative disease  - PT/ OT recommend TCU  - protein supplements      # Metastatic breast cancer: w/ pleural effusions and bony mets.   H/o Rx with lumpectomy, adjuvant chemotherapy with Taxotere, whole breast radiotherapy, and 5 years of adjuvant Arimidex, completed in 2012. Found to have bony mets and metastatic pleural effusion in 12/2015  - continue PTA letrozole, since patient is on obs, plan for patient's  to bring from home  - continue PTA Ibrance  - follows up with Hem/ onc. Most recently stable disease noted on imaging 12/2016.   - Holding PTA calcium/ vit D while on obs stay. Receives Xgeva every 3 months, last dose on 2/27/2017      # CKD:  creatinine is at baseline  - avoid nephrotoxins     # Hx of CVA: Head CT during her recent admission showed a possible new R middle fronatl gyrus infarct. Stable chronic L MCA territory infarct and stable 5mm posterior parafalcine calcified meningioma.   -  continue warfarin dosing per pharmacy recs     # HTN: -180s.   - hydralazine prn for SBP > 170     # Chronic microcytic anemia: Likely 2/2 Chemotherapy vs MDS. Vit B12 and folate were normal recently  - Hgb stable, continue to monitor        PPx: already anticoagulated /none for GI/senna and docusate for bowel   FEN: regular diet  -IVF: none   Code status: DNR/ DNI  Dispo: PT/OT recommending TCU. Patient has 16 steps to get into her house and was only able to climb 3 stairs with PT today. Family agreeable to TCU only if cost is covered. SW is looking into [facility].    Patient seen and discussed with Dr. Sarita Heart MD  Internal Medicine  PGY-2  [phone]           Interval History:   No events overnight. Didn't sleep well last night. Denies dizziness, lightheadedness, headache, chest pain.              Medications:     Current Facility-Administered Medications   Medication     warfarin (COUMADIN) tablet 2 mg     fludrocortisone (FLORINEF) half-tab 50 mcg     docusate sodium (COLACE) capsule 100 mg     oxybutynin (DITROPAN) tablet 5 mg     sennosides (SENOKOT) tablet 1 tablet     naloxone (NARCAN) injection 0.1-0.4 mg     acetaminophen (TYLENOL) tablet 650 mg     ondansetron (ZOFRAN-ODT) ODT tab 4 mg    Or     ondansetron (ZOFRAN) injection 4 mg     letrozole (FEMARA) tablet 2.5 mg     Warfarin Therapy Reminder (Check START DATE - warfarin may be starting in the FUTURE)     cefTRIAXone (ROCEPHIN) 1 g vial to attach to  mL bag for ADULTS or NS 50 mL bag for PEDS     palbociclib (IBRANCE) capsule CHEMO 100 mg             Physical Exam:   Vitals were reviewed  Blood pressure 145/84, pulse 67, temperature 98.2  F (36.8  C), temperature source Oral, resp. rate 16, weight 82.1 kg (181 lb), SpO2 98 %, not currently breastfeeding.    Physical Exam:   General: in NAD. Some speech latency and  HEENT: MMM, PERRLA, EOM intact  CV: RRR, normal S1S2, no murmur, clicks, rubs appreciated  Resp: Clear to auscultation bilaterally, no wheezes, rhonchi  Abd: Soft, non-tender, BS+, no masses appreciated  Extremities: Radial and pedal pulses intact and symmetric, no pedal edema  Neuro: Some speech latency and dysphasia, no cogwheel rididity         Data:   ROUTINE LABS (Last four results)  CMP    Recent Labs  Lab 05/02/17  0755 05/01/17  1903 05/01/17  1825     --  143   POTASSIUM 3.6  --  4.4   CHLORIDE 108  --  108   CO2 26  --  22   ANIONGAP 7  --  13   *  --  179*   BUN 19  --  22   CR 0.99  --  1.27*   GFRESTIMATED 54*  --  41*   GFRESTBLACK 66  --  50*   SHERYL 8.6  --  9.2   PROTTOTAL  --  6.9  --    ALBUMIN  --  3.5  --    BILITOTAL  --  1.0  --     ALKPHOS  --  40  --    AST  --  16  --    ALT  --  12  --      CBC    Recent Labs  Lab 05/03/17  0732 05/01/17  1903 05/01/17  1825   WBC 3.4* 5.0 Unsatisfactory specimen - clotted NOTIFIED CESIA FINCHER RN ON ER 5/1/17 AT 1851 BY MO   RBC 3.03* 3.24* Unsatisfactory specimen - clotted NOTIFIED CHILANGOR LUIS RN ON ER 5/1/17 AT 1851 BY MO   HGB 10.4* 10.9* Unsatisfactory specimen - clotted NOTIFIED CHILANGOR LUIS RN ON ER 5/1/17 AT 1851 BY MO   HCT 31.8* 34.1* Unsatisfactory specimen - clotted NOTIFIED CHILANGOR LUIS RN ON ER 5/1/17 AT 1851 BY MO   * 105* Unsatisfactory specimen - clotted NOTIFIED CHILANGOR LUIS RN ON ER 5/1/17 AT 1851 BY MO   MCH 34.3* 33.6* Unsatisfactory specimen - clotted NOTIFIED CESIA FINCHER RN ON ER 5/1/17 AT 1851 BY MO   MCHC 32.7 32.0 Unsatisfactory specimen - clotted NOTIFIED CESIA FINCHER RN ON ER 5/1/17 AT 1851 BY MO   RDW 15.2* 14.5 Unsatisfactory specimen - clotted NOTIFIED CHILANGOR LUIS RN ON ER 5/1/17 AT 1851 BY MO    293 Unsatisfactory specimen - clotted NOTIFIED CESIA FINCHER RN ON ER 5/1/17 AT 1851 BY MO     INR    Recent Labs  Lab 05/03/17  0732 05/01/17  1903 05/01/17  1825   INR 2.30* 2.54* Canceled, Test credited Unsatisfactory specimen - clottedNOTIFIED RN Enmanuel Paz AT ER,5/1/17 @ 1900 BY      Arterial Blood GasNo lab results found in last 7 days.[JS1.1]             Revision History        User Key Date/Time User Provider Type Action    > JS1.1 5/3/2017  5:01 PM Awilda Heart MD Resident Sign            Progress Notes by Elias Del Valle MD at 5/3/2017 12:25 PM     Author:  Elias Del Valle MD Service:  Neurology Author Type:  Physician    Filed:  5/3/2017  2:24 PM Date of Service:  5/3/2017 12:25 PM Note Created:  5/3/2017 12:25 PM    Status:  Signed :  Elias Del Valle MD (Physician)         Neurology Progress Note  Helen Younger  [id number]  May 3, 2017      Assessment/Recommendations:  Ms.  Aren is a 77 year old female with history of bilateral MCA strokes (previously thought to be L MCA only, but CT 3/2017 shows R MCA as well), breast cancer, and frequent falls presents with two falls in one day.   Her exam is stable from previous.   It appears she has 2 chief problems, 1. orthostasis- which is documented on multiple orthostatic vitals and should be treated appropriately.    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  Given her poor response the levodopa in the past and prominent dysautonomia a parkinson's plus syndrome such as MSA should be considered.   We considered VIJI scan, but will defer to our movement disorder colleagues as an outpatient.      - Consider decreasing dose of oxybutynin  - HOB up at night  - Compression stockings  - Increase fluid intake  - 0.05 mg Fludrocortisone daily in the AM.    - Will need follow up in Neurology Movement disorders clinic upon discharge.        Subjective: No acute events overnight.      Physical Exam:[JR1.1]  BP (!) 120/96 (BP Location: Right arm)  Pulse 78  Temp 98.8  F (37.1  C) (Oral)  Resp 16  Wt 82.1 kg (181 lb)  SpO2 96%  BMI 33.11 kg/m2[JR1.2]  GEN: awake and alert, NAD   RESP: Non-labored breathing  GI: Nondistended   NEURO:   MS: AO x3. Expressive aphasia, able to name properly with single words. Able to follow 3 step commands.   CN:   Visual fields full, EN, Conjugate gaze, EOMI except for mildly limited upgaze.  Flat R NLF.  Hearing intact bilaterally. Normal neck turning, shoulder shrug. Tongue protrudes midline. Dysarthria with ataxic features more than expected for stroke.  .   Motor:Paratonia present, no tremor, mild right hemiparesis in arms and legs 4+/5.   Reflexes: Brisk and slightly more prominent on R. L toe upgoing, 2 beats clonus on L, postive Hoffmans on R.   Sensory: decreased on R mildly but present.    Coordination / Gait: Walks with assist of 1, camptocormic, seems to be falling forward, wide based  gait with short steps, difficulty with turns.   Gets fatigued with about 10 steps.         Pertinent Imaging and Labs:  Reviewed in chart    Discussed and seen with attending staff physician, Dr. Del Valle.       Anna Herrera DO  PGY-3[JR1.1]    ATTENDING ADDENDUM: Patient discussed with resident Dr Herrera on 5/3/2017, but not seen. I agree with his  assessment and recs as above. Patient has definite orthostatic hypotension and this should be treated. We recommend starting Florinef 0.05 mg in am, compression stockings, elevation of HOB at night to 30 degrees (to minimize supine hypertension), and increasing her fluid intake. Would rather avoid midodrine due to risk of vasospasm in a patient with previous large left MCA stroke. The question is whether she has a syndrome of parkinsonism with dysautonomia, like MSA. The prominent camptocormia, AND very poor response to levodopa 2 years ago, point to this possibility. This is a very difficult diagnosis to make, and we may not be able to establish it during the current hospitalization. Recommend f/u in our Movement Disorder Clinic in 1-2 months to discuss above. Junito ([id number]) scan would be helpful to demonstrate a presynaptic dopaminergic defect, but we consulted Radiology and this test can NOT be done or reimbursed as inpatient. Because L-dopa can aggravate orthostatic hypotension, and she did not tolerate it at all 2 years ago (nausea, worsening gait), we do NOT recommend another empirical trial. We also do NOT recommend tilt test because OH was well documented by vital sign checks, and doing a tilt would not offer additional information.    Elias Del Valle MD[GM1.1]                 Revision History        User Key Date/Time User Provider Type Action    > GM1.1 5/3/2017  2:24 PM Elias Del Valle MD Physician Sign     JR1.2 5/3/2017 12:37 PM Anna Herrera MD Resident Sign     JR1.1 5/3/2017 12:25 PM Anna Herrera MD  Resident                   Procedure Notes     No notes of this type exist for this encounter.         Progress Notes - Therapies (Notes from 05/01/17 through 05/04/17)      Progress Notes by Ruben Alvarez PT at 5/2/2017  5:58 PM     Author:  Ruben Alvarez PT Service:  (none) Author Type:  Physical Therapist    Filed:  5/2/2017  5:58 PM Date of Service:  5/2/2017  5:58 PM Note Created:  5/2/2017  5:58 PM    Status:  Signed :  Ruben Alvarez PT (Physical Therapist)          05/02/17 1300   Quick Adds   Type of Visit Initial PT Evaluation   Living Environment   Lives With spouse   Living Arrangements house   Home Accessibility bed and bath are not on the first floor;bed and bath on same level;stairs (1 railing present);stairs within home;stairs to enter home   Number of Stairs to Enter Home 16  (8 + 8 with chair on landing for seated rest 1/2 way)   Stair Railings at Home inside, present at both sides   Transportation Available family or friend will provide   Living Environment Comment Pt lives with  who is available 24/7 to assist, also have a daughter that lives nearby that is availble to as able.    Self-Care   Dominant Hand right   Usual Activity Tolerance fair   Current Activity Tolerance poor   Regular Exercise no   Equipment Currently Used at Home bath bench;walker, rolling;grab bar   Functional Level Prior   Ambulation 1-->assistive equipment   Transferring 1-->assistive equipment   Toileting 1-->assistive equipment   Bathing 3-->assistive equipment and person   Dressing 0-->independent   Eating 0-->independent   Communication 2-->difficulty speaking (not related to language barrier)   Swallowing 0-->swallows foods/liquids without difficulty   Cognition 1 - attention or memory deficits   Fall history within last six months yes   Which of the above functional risks had a recent onset or change? ambulation;fall history;transferring   Prior Functional Level Comment Per pt and , pt is able  to walk short distances in home (<50') with FWW, more recently pt's ambulation has worsened. Pt's  states he holds onto patient for stairs and guards appropriately. Per , until recently pt was fairly indepdendent with most mobility and ADLs, did receive assistance getting in and out of shower.   General Information   Onset of Illness/Injury or Date of Surgery - Date 05/01/17   Referring Physician Rell Melara MD   Patient/Family Goals Statement to return home   Pertinent History of Current Problem (include personal factors and/or comorbidities that impact the POC) Ms. Younger is a 76yo female with h/o ER/[place]-positive, HER-2 negative metastatic breast cancer, CKD, CVA, HTN who presents with fall. History of recurrent falls (7-8 in the last year). Recently completed home PT. No signs of arrythmia on admission EKG. Holter in 3/2017 without any arrythmia. Echo 9/2015 with normal EF, no diastolic dysfunction. No structural heart disease. Possible that UTI is contributing factor to weakness. Per neuro,dysautonomia and parkinsonian signs raise question of neurodegenerative disease such as Multiple systems atrophy.   Precautions/Limitations fall precautions;immunosuppressed   Heart Disease Risk Factors High blood pressure;Lack of physical activity;Medical history;Gender;Age   General Observations Pt received supine in bed, agreeable to PT with encouragement, RN ok'd treatment session.   General Info Comments Activity: Ambulate with Assist (4x/day)   Cognitive Status Examination   Orientation orientation to person, place and time   Level of Consciousness alert   Follows Commands and Answers Questions 75% of the time   Personal Safety and Judgment at risk behaviors demonstrated   Pain Assessment   Patient Currently in Pain No   Integumentary/Edema   Integumentary/Edema Comments Bilateral LE edema   Range of Motion (ROM)   ROM Comment BLE WFL   Strength   Strength Comments Mild R hemiparesis, grossly 4/5 RLE, 4+/5-  "5/5 LLE   Bed Mobility   Bed Mobility Comments Supine>sit with min A   Transfer Skills   Transfer Comments Sit<>stand CGA>min A   Gait   Gait Comments Ambulates with signifant forward trunk lean with increased distance from FWW, shuffled gait pattern, WBOS   Balance   Balance Comments Decreased standing static and dynamic balance requiring FWW and Assist to maintain balance   Sensory Examination   Sensory Perception Comments Pt denies numbness/tingling in extrimities, unreliable sensation test due to pt aphasia/mentation   Coordination   Coordination Comments Dysmetric finger-to-nose bilaterally   Muscle Tone   Muscle Tone Comments Brisk patellar reflex (R>L), 2 beat clonus on L   General Therapy Interventions   Planned Therapy Interventions gait training;balance training;ROM;risk factor education;home program guidelines;neuromuscular re-education;bed mobility training;transfer training;strengthening   Clinical Impression   Criteria for Skilled Therapeutic Intervention yes, treatment indicated   PT Diagnosis impaired functional mobility   Influenced by the following impairments decreased strength, neuromuscular coordination, balance, and activity tolerance   Functional limitations due to impairments gait, transfers, stairs, functional endurance   Clinical Presentation Evolving/Changing   Clinical Presentation Rationale Pt presents following 2 falls at home in the setting of significant PMH and worsened functional mobility impacting ability to return home safely and independently   Clinical Decision Making (Complexity) High complexity   Therapy Frequency` 5 times/week   Predicted Duration of Therapy Intervention (days/wks) 1 week   Anticipated Discharge Disposition Transitional Care Facility;Home with Assist;Home with Home Therapy   Risk & Benefits of therapy have been explained Yes   Patient, Family & other staff in agreement with plan of care Yes   [facility] AM-PAC TM \"6 Clicks\"   2016, Trustees of [place] " "[employer], under license to [employer].  All rights reserved.   6 Clicks Short Forms Basic Mobility Inpatient Short Form   [facility] AM-PAC  \"6 Clicks\" V.2 Basic Mobility Inpatient Short Form   1. Turning from your back to your side while in a flat bed without using bedrails? 4 - None   2. Moving from lying on your back to sitting on the side of a flat bed without using bedrails? 3 - A Little   3. Moving to and from a bed to a chair (including a wheelchair)? 3 - A Little   4. Standing up from a chair using your arms (e.g., wheelchair, or bedside chair)? 3 - A Little   5. To walk in hospital room? 3 - A Little   6. Climbing 3-5 steps with a railing? 2 - A Lot   Basic Mobility Raw Score (Score out of 24.Lower scores equate to lower levels of function) 18   Total Evaluation Time   Total Evaluation Time (Minutes) 15[AS1.1]        Revision History        User Key Date/Time User Provider Type Action    > AS1.1 5/2/2017  5:58 PM Ruben Alvarez PT Physical Therapist Sign                                                      INTERAGENCY TRANSFER FORM - LAB / IMAGING / EKG / EMG RESULTS   5/1/2017                       UNIT 6D OBSERVATION [facility] BANK: [phone]            Unresulted Labs (24h ago through future)    Start       Ordered    05/05/17 0700  Basic metabolic panel  AM DRAW,   Routine      05/04/17 1154    05/03/17 0700  INR  (warfarin (COUMADIN) Pharmacy Consult-INITIAL ORDER)  DAILY,   Routine      05/02/17 0124         Lab Results - 3 Days      Basic metabolic panel [[id number]] (Abnormal)  Resulted: 05/04/17 0957, Result status: Final result    Ordering provider: Awilda Heart MD  05/04/17 0100 Resulting lab: [facility]    Specimen Information    Type Source Collected On   Blood  05/04/17 075          Components       Value Reference Range Flag Lab   Sodium 144 133 - 144 mmol/L  13   Potassium 3.9 3.4 - 5.3 mmol/L  13   Chloride 110 94 - 109 mmol/L H 13 "   Carbon Dioxide 22 20 - 32 mmol/L  13   Anion Gap 12 3 - 14 mmol/L  13   Glucose 133 70 - 99 mg/dL H 13   Urea Nitrogen 18 7 - 30 mg/dL  13   Creatinine 1.02 0.52 - 1.04 mg/dL  13   GFR Estimate 53 >60 mL/min/1.7m2 L 13   Comment:  Non  GFR Calc   GFR Estimate If Black 64 >60 mL/min/1.7m2  13   Comment:  African American GFR Calc   Calcium 8.6 8.5 - 10.1 mg/dL  13            INR [[id number]] (Abnormal)  Resulted: 05/04/17 0824, Result status: Final result    Ordering provider: Rell Melara MD  05/04/17 0100 Resulting lab: [facility]    Specimen Information    Type Source Collected On   Blood  05/04/17 0759          Components       Value Reference Range Flag Lab   INR 2.47 0.86 - 1.14 H 51            CBC with platelets [[id number]] (Abnormal)  Resulted: 05/04/17 0818, Result status: Final result    Ordering provider: Awilda Heart MD  05/04/17 0100 Resulting lab: [facility]    Specimen Information    Type Source Collected On   Blood  05/04/17 0759          Components       Value Reference Range Flag Lab   WBC 2.7 4.0 - 11.0 10e9/L L 51   RBC Count 2.80 3.8 - 5.2 10e12/L L 51   Hemoglobin 9.3 11.7 - 15.7 g/dL L 51   Hematocrit 29.4 35.0 - 47.0 % L 51    78 - 100 fl H 51   MCH 33.2 26.5 - 33.0 pg H 51   MCHC 31.6 31.5 - 36.5 g/dL  51   RDW 15.1 10.0 - 15.0 % H 51   Platelet Count 189 150 - 450 10e9/L  51            Blood culture [[id number]]  Resulted: 05/04/17 0700, Result status: Preliminary result    Ordering provider: Rell Melara MD  05/02/17 0127 Resulting lab: [facility]    Specimen Information    Type Source Collected On   Blood  05/02/17 0158          Components       Value Reference Range Flag Lab   Specimen Description Blood Right Hand   75   Culture Micro No growth after 2 days   225   Micro Report Status Pending   225            CBC with platelets [[id number]] (Abnormal)   Resulted: 05/03/17 0828, Result status: Final result    Ordering provider: Awilda Heart MD  05/03/17 0730 Resulting lab: [facility]    Specimen Information    Type Source Collected On   Blood  05/03/17 0732          Components       Value Reference Range Flag Lab   WBC 3.4 4.0 - 11.0 10e9/L L 51   RBC Count 3.03 3.8 - 5.2 10e12/L L 51   Hemoglobin 10.4 11.7 - 15.7 g/dL L 51   Hematocrit 31.8 35.0 - 47.0 % L 51    78 - 100 fl H 51   MCH 34.3 26.5 - 33.0 pg H 51   MCHC 32.7 31.5 - 36.5 g/dL  51   RDW 15.2 10.0 - 15.0 % H 51   Platelet Count 224 150 - 450 10e9/L  51            INR [[id number]] (Abnormal)  Resulted: 05/03/17 0758, Result status: Final result    Ordering provider: Rell Melara MD  05/03/17 0100 Resulting lab: [facility]    Specimen Information    Type Source Collected On   Blood  05/03/17 0732          Components       Value Reference Range Flag Lab   INR 2.30 0.86 - 1.14 H 51            Urine Culture Aerobic Bacterial [[id number]]  Resulted: 05/03/17 0751, Result status: Final result    Ordering provider: Callum Teran MD  05/01/17 2050 Resulting lab: [facility]    Specimen Information    Type Source Collected On     05/01/17 2050          Components       Value Reference Range Flag Lab   Specimen Description Midstream Urine   51   Special Requests Specimen received in preservative   75   Culture Micro --   225   Result:         10,000 to 50,000 colonies/mL mixed urogenital geoff Susceptibility testing not   routinely done     Micro Report Status FINAL 05/03/2017   225   Result:              Basic metabolic panel [[id number]] (Abnormal)  Resulted: 05/02/17 0831, Result status: Final result    Ordering provider: Rell Melara MD  05/02/17 3627 Resulting lab: [facility]    Specimen Information    Type Source Collected On   Blood  05/02/17 0756           Components       Value Reference Range Flag Lab   Sodium 141 133 - 144 mmol/L  51   Potassium 3.6 3.4 - 5.3 mmol/L  51   Chloride 108 94 - 109 mmol/L  51   Carbon Dioxide 26 20 - 32 mmol/L  51   Anion Gap 7 3 - 14 mmol/L  51   Glucose 146 70 - 99 mg/dL H 51   Urea Nitrogen 19 7 - 30 mg/dL  51   Creatinine 0.99 0.52 - 1.04 mg/dL  51   GFR Estimate 54 >60 mL/min/1.7m2 L 51   Comment:  Non  GFR Calc   GFR Estimate If Black 66 >60 mL/min/1.7m2  51   Comment:  African American GFR Calc   Calcium 8.6 8.5 - 10.1 mg/dL  51            Lactic acid whole blood (1200) [[id number]]  Resulted: 05/02/17 0221, Result status: Final result    Ordering provider: Rubén Correa MD  05/02/17 0133 Resulting lab: [facility]    Specimen Information    Type Source Collected On   Blood  05/02/17 0158          Components       Value Reference Range Flag Lab   Lactic Acid 1.7 0.7 - 2.1 mmol/L  51            Hepatic panel [[id number]]  Resulted: 05/02/17 0055, Result status: Final result    Ordering provider: Mia Aparicio MD  05/01/17 1903 Resulting lab: [facility]    Specimen Information    Type Source Collected On     05/01/17 1903          Components       Value Reference Range Flag Lab   Bilirubin Direct 0.1 0.0 - 0.2 mg/dL  51   Bilirubin Total 1.0 0.2 - 1.3 mg/dL  51   Albumin 3.5 3.4 - 5.0 g/dL  51   Protein Total 6.9 6.8 - 8.8 g/dL  51   Alkaline Phosphatase 40 40 - 150 U/L  51   ALT 12 0 - 50 U/L  51   AST 16 0 - 45 U/L  51            UA with Microscopic reflex to Culture [[id number]] (Abnormal)  Resulted: 05/01/17 2132, Result status: Final result    Ordering provider: Mia Aparicio MD  05/01/17 1941 Resulting lab: [facility]    Specimen Information    Type Source Collected On   Urine Urine Midstream 05/01/17 2050          Components       Value Reference Range Flag Lab   Color Urine Yellow   51    Appearance Urine Clear   51   Glucose Urine Negative NEG mg/dL  51   Bilirubin Urine Negative NEG  51   Ketones Urine Negative NEG mg/dL  51   Specific Gravity Urine 1.013 1.003 - 1.035  51   Blood Urine Negative NEG  51   pH Urine 6.5 5.0 - 7.0 pH  51   Protein Albumin Urine 10 NEG mg/dL A 51   Urobilinogen mg/dL Normal 0.0 - 2.0 mg/dL  51   Nitrite Urine Negative NEG  51   Leukocyte Esterase Urine Large NEG A 51   Source Midstream Urine   51   WBC Urine 25 0 - 2 /HPF H 51   RBC Urine 4 0 - 2 /HPF H 51   Bacteria Urine Few NEG /HPF A 51   Transitional Epi 1 0 - 1 /HPF  51   Mucous Urine Present NEG /LPF A 51   Hyaline Casts 6 0 - 2 /LPF H 51   Granular Casts 1 NEG /LPF A 51            Basic metabolic panel [[id number]] (Abnormal)  Resulted: 05/01/17 2005, Result status: Final result    Ordering provider: Mia Aparicio MD  05/01/17 1801 Resulting lab: [facility]    Specimen Information    Type Source Collected On   Blood  05/01/17 1825          Components       Value Reference Range Flag Lab   Sodium 143 133 - 144 mmol/L  51   Potassium 4.4 3.4 - 5.3 mmol/L  51   Comment:  Specimen slightly hemolyzed, potassium may be falsely elevated   Chloride 108 94 - 109 mmol/L  51   Carbon Dioxide 22 20 - 32 mmol/L  51   Anion Gap 13 3 - 14 mmol/L  51   Glucose 179 70 - 99 mg/dL H 51   Urea Nitrogen 22 7 - 30 mg/dL  51   Creatinine 1.27 0.52 - 1.04 mg/dL H 51   GFR Estimate 41 >60 mL/min/1.7m2 L 51   Comment:  Non  GFR Calc   GFR Estimate If Black 50 >60 mL/min/1.7m2 L 51   Comment:  African American GFR Calc   Calcium 9.2 8.5 - 10.1 mg/dL  51            INR [[id number]] (Abnormal)  Resulted: 05/01/17 1952, Result status: Final result    Ordering provider: Mia Aparicio MD  05/01/17 1900 Resulting lab: [facility]    Specimen Information    Type Source Collected On   Blood  05/01/17 1903          Components       Value Reference  Range Flag Lab   INR 2.54 0.86 - 1.14 H 51            CBC with platelets differential [[id number]] (Abnormal)  Resulted: 05/01/17 1941, Result status: Final result    Ordering provider: Mia Aparicio MD  05/01/17 1851 Resulting lab: [facility]    Specimen Information    Type Source Collected On   Blood  05/01/17 1903          Components       Value Reference Range Flag Lab   WBC 5.0 4.0 - 11.0 10e9/L  51   RBC Count 3.24 3.8 - 5.2 10e12/L L 51   Hemoglobin 10.9 11.7 - 15.7 g/dL L 51   Hematocrit 34.1 35.0 - 47.0 % L 51    78 - 100 fl H 51   MCH 33.6 26.5 - 33.0 pg H 51   MCHC 32.0 31.5 - 36.5 g/dL  51   RDW 14.5 10.0 - 15.0 %  51   Platelet Count 293 150 - 450 10e9/L  51   Diff Method Automated Method   51   % Neutrophils 70.5 %  51   % Lymphocytes 23.1 %  51   % Monocytes 5.0 %  51   % Eosinophils 0.8 %  51   % Basophils 0.2 %  51   % Immature Granulocytes 0.4 %  51   Nucleated RBCs 0 0 /100  51   Absolute Neutrophil 3.5 1.6 - 8.3 10e9/L  51   Absolute Lymphocytes 1.2 0.8 - 5.3 10e9/L  51   Absolute Monocytes 0.3 0.0 - 1.3 10e9/L  51   Absolute Eosinophils 0.0 0.0 - 0.7 10e9/L  51   Absolute Basophils 0.0 0.0 - 0.2 10e9/L  51   Abs Immature Granulocytes 0.0 0 - 0.4 10e9/L  51   Absolute Nucleated RBC 0.0   51            INR [[id number]]  Resulted: 05/01/17 1900, Result status: Final result    Ordering provider: Mia Aparicio MD  05/01/17 1801 Resulting lab: [facility]    Specimen Information    Type Source Collected On   Blood  05/01/17 1825          Components       Value Reference Range Flag Lab   INR -- 0.86 - 1.14  51   Result:         Canceled, Test credited   Unsatisfactory specimen - clotted  NOTIFIED FLORENCIO Paz AT ,5/1/17 @ 1900 BY ST              CBC with platelets differential [[id number]]  Resulted: 05/01/17 1852, Result status: Final result    Ordering provider: Mia Aparicio MD  05/01/17 8923  Resulting lab: [facility]    Specimen Information    Type Source Collected On   Blood  05/01/17 1825          Components       Value Reference Range Flag Lab   WBC -- 4.0 - 11.0 10e9/L  51   Result:         Unsatisfactory specimen - clotted   NOTIFIED CESIA FINCHER RN ON ER 5/1/17 AT 1851 BY MO     RBC Count -- 3.8 - 5.2 10e12/L  51   Result:         Unsatisfactory specimen - clotted   NOTIFIED CHLOEMICHELL LUIS RN ON ER 5/1/17 AT 1851 BY MO     Hemoglobin -- 11.7 - 15.7 g/dL  51   Result:         Unsatisfactory specimen - clotted   NOTIFIED CHLOEWARDROOSEVELT SMITH RN ON ER 5/1/17 AT 1851 BY MO     Hematocrit -- 35.0 - 47.0 %  51   Result:         Unsatisfactory specimen - clotted   NOTIFIED CHLOEWARDROOSEVELT SMITH RN ON ER 5/1/17 AT 1851 BY MO     MCV -- 78 - 100 fl  51   Result:         Unsatisfactory specimen - clotted   NOTIFIED CHLOEWARDROOSEVELT SMITH RN ON ER 5/1/17 AT 1851 BY MO     MCH -- 26.5 - 33.0 pg  51   Result:         Unsatisfactory specimen - clotted   NOTIFIED CHLOEWARDROOSEVELT SMITH RN ON ER 5/1/17 AT 1851 BY MO     MCHC -- 31.5 - 36.5 g/dL  51   Result:         Unsatisfactory specimen - clotted   NOTIFIED CHLOEWARDROOSEVELT FINCHER RN ON ER 5/1/17 AT 1851 BY MO     RDW -- 10.0 - 15.0 %  51   Result:         Unsatisfactory specimen - clotted   NOTIFIED CHLOEWARDROOSEVELT SMITH RN ON ER 5/1/17 AT 1851 BY MO     Platelet Count -- 150 - 450 10e9/L  51   Result:         Unsatisfactory specimen - clotted   NOTIFIED CHLOEWARDROOSEVELT SMITH RN ON ER 5/1/17 AT 1851 BY MO     Diff Method --   51   Result:         Unsatisfactory specimen - clotted   NOTIFIED CESIA LUIS RN ON ER 5/1/17 AT 1851 BY MO              Glucose by meter [[id number]] (Abnormal)  Resulted: 05/01/17 1825, Result status: Final result    Ordering provider: Mia Aparicio MD  05/01/17 1822 Resulting lab: POINT OF CARE TEST, GLUCOSE    Specimen Information    Type Source Collected On     05/01/17 1822          Components       Value Reference Range Flag Lab   Glucose 134 70 -  99 mg/dL H 170            Testing Performed By     Lab - Abbreviation Name Director Address Valid Date Range    13 - Unknown [facility] Unknown [address] [facility] [address] 01/15/15 0916 - Present    51 - Unknown [facility] Unknown [address] [facility] [address] 12/31/14 1010 - Present    75 - Unknown [facility] Unknown [address] [facility] [address] 01/15/15 1019 - Present    170 - Unknown POINT OF CARE TEST, GLUCOSE Unknown Unknown 10/31/11 1114 - Present    225 - Unknown [facility] Unknown [address] [facility] [address] 12/19/14 0954 - Present            Encounter-Level Documents:     There are no encounter-level documents.      Order-Level Documents:     There are no order-level documents.

## 2017-05-02 PROBLEM — R53.1 WEAKNESS: Status: ACTIVE | Noted: 2017-01-01

## 2017-05-02 NOTE — PROGRESS NOTES
Care Coordinator Progress Note     Admission Date/Time:  5/1/2017  Attending MD:  Zeenat Stein,*     Data  Chart reviewed, discussed with interdisciplinary team.   Patient was admitted for:    Weakness  Abnormal urinalysis  Fall, initial encounter.    Concerns with insurance coverage for discharge needs: None.  Current Living Situation: Patient lives with spouse.  Support System: Supportive  Services Involved: Home Care  Transportation: Family or Friend will provide  Barriers to Discharge: chronically ill and physical impairment    Coordination of Care and Referrals: Provided patient/family with options for Home Care and Skilled Nursing Facility, TCU.        Assessment  Spoke with patient at bedside.  Patient was admitted for weakness and has continued to have falls at home.  Pateint's speech is slow and she seems forgetful.  Patient reports she still has [facility], but is unsure what services they provide.  Patient gave permission to speak with her  German and granddaughter Jami to answer questions.  Patient states she uses a walker at home and has a wheelchair in the garage.  Family is able to provide transportation upon discharge.  Patient states she would not want TCU stay and she wishes to go home.  Resumption orders placed.  Awaiting PT/OT evaluations.     _______________________  [facility]  Phone  [phone]  Fax  [phone]  ______________________         Plan  Anticipated Discharge Date:  TBD  Anticipated Discharge Plan:  TBD    Dinorah Arteaga RN, BSN  Care Coordinator [address]  Pager: [phone]  Phone: [phone]

## 2017-05-02 NOTE — PLAN OF CARE
Problem: Discharge Planning  Goal: Discharge Planning (Adult, OB, Behavioral, Peds)  -diagnostic tests and consults completed and resulted  -vital signs normal or at patient baseline  -safe disposition plan has been identified  Nurse to notify provider when observation goals have been met and patient is ready for discharge.   Outpatient/Observation goals to be met before discharge home:     -diagnostic tests and consults completed and resulted: NO  -vital signs normal or at patient baseline: NO  -safe disposition plan has been identified: NO  Nurse to notify provider when observation goals have been met and patient is ready for discharge.

## 2017-05-02 NOTE — PROGRESS NOTES
"Neurology Progress Note  Helen Younger  [id number]  May 2, 2017      Assessment/Recommendations:  Ms. Younger is a 77 year old female with history of L MCA stroke, breast cancer and long history of falls who presents with two falls on 5/1/17.   Her history of falls are somewhat consistent with orthostasis, and in fact on her second orthostatic test today she had a 70 point drop in her systolic pressures.    Her exam shows expressive aphasia, mild r hemiparesis, and facial droop that is likely from her old stroke.    However, she also displays some ataxic speech, wide based shuffling gait with significant camptocormia, as well as brisk reflexes and a upgoing toe on the left.  This could all be related to baseline poor gait and orthostasis in the setting of UTI, however early dysautonomia and parkinsonian/cerebellar signs raise the question of a neurodegenerative disease such as Multiple systems atrophy.      - Recommend rechecking orthostatic vitals given discrepency between two values thus far.   - Continue treatment of UTI  - PT/OT evaluations  - Neurology will continue to follow for improvement with treatment of UTI.        Physical Exam:  /72  Pulse 108  Temp 98.2  F (36.8  C) (Oral)  Resp 18  Wt 85.1 kg (187 lb 11.2 oz)  SpO2 96%  BMI 34.33 kg/m2  GEN: awake and alert, NAD   HEENT: NCAT  NECK: Suppl  RESP: Non-labored breathing  GI: Nondistended   SKIN/MSK: moderate peripheral edema  NEURO:   MS: AO x3.  Expressive aphasia, able to name properly with single words but trouble repeating longer sentences or difficult syllables such as \"Confucianism Gnosticism\".    Able to follow 3 step commands.    CN:   Visual fields full, EN, Conjugate gaze, EOMI except for mildly limited upgaze. . No nystagmus. Flat R NLF.   Hearing intact bilaterally. Normal neck turning, shoulder shrug. Tongue protrudes midline.   Ataxic dysarthria.    Motor:Paratonia present, no tremor, mild right hemiparesis in arms and legs 4+/5.  "   Reflexes: Brisk and slightly more prominent on R. L toe upgoing, 2 beats clonus on L, postive Hoffmans on R.    Sensory: Length dependent vibration deficit difficult to truly assess due to aphasia.    Coordination / Gait: FNF slow but not dysmetric.   Walks with assist of 1, camptocormic, seems to be falling forward, wide based gait with short steps, difficulty with turns.       Pertinent Imaging and Labs:  Labs from this admission and MRI imaging reviewed.        Discussed and seen with attending staff physician, Dr. Del Valle.       Anna Herrera DO      ATTENDING ADDENDUM: Patient seen and examined today with resident Dr Herrera. Agree with his assessment and recs as above. Please see my addendum to Dr Lyn's note for details. Elias Del Valle MD

## 2017-05-02 NOTE — MR AVS SNAPSHOT
After Visit Summary   5/2/2017    Helen Younger    MRN: [id number]           Patient Information     Date Of Birth          1940        Today's Diagnoses     Malignant neoplasm of female breast, unspecified laterality, unspecified site of breast (H)    -  1       Follow-ups after your visit        Your next 10 appointments already scheduled     May 22, 2017  1:30 PM CDT   PE NPET ONCOLOGY (EYES TO THIGHS) with UUPET1   [facility] [place] PET CT ([facility], [facility])    [address] [facility] [address] [facility] [id number] [phone]           Tell your doctor:   If there is any chance you may be pregnant or if you are breastfeeding.   If you have problems lying in small spaces (claustrophobia). If you do, your doctor may give you medicine to help you relax. If you have diabetes:   Have your exam early in the morning. Your blood glucose will go up as the day goes by.   Your glucose level must be 180 or less at the start of the exam. Please take any medicines you need to ensure this blood glucose level. 24 hours before your scan: Don t do any heavy exercise. (No jogging, aerobics or other workouts.) Exercise will make your pictures less accurate. 6 hours before your scan:   Stop all food and liquids (except water).   Do not chew gum or suck on mints.   If you need to take medicine with food, you may take it with a few crackers.  Please call your Imaging Department at your exam site with any questions.            May 22, 2017  3:30 PM CDT   [facility] Lab Draw with  [facility] LAB DRAW   [facility] Lab Draw ([facility])    [address] [place] [id number] [phone]            May 22, 2017  4:00 PM CDT   (Arrive by 3:45 PM)   Return Visit with Keisha Landis MD   [facility] ([facility])    [address] [place] [address] [phone]               Who to contact     If you have questions or need follow up information about today's clinic visit or your schedule please contact [facility] directly at [phone].  Normal or non-critical lab and imaging results will be communicated to you by MyChart, letter or phone within 4 business days after the clinic has received the results. If you do not hear from us within 7 days, please contact the clinic through [employer] or phone. If you have a critical or abnormal lab result, we will notify you by phone as soon as possible.  Submit refill requests through [employer] or call your pharmacy and they will forward the refill request to us. Please allow 3 business days for your refill to be completed.          Additional Information About Your Visit        [employer] Information     [employer] gives you secure access to your electronic health record. If you see a primary care provider, you can also send messages to your care team and make appointments. If you have questions, please call your primary care clinic.  If you do not have a primary care provider, please call [phone] and they will assist you.        Care EveryWhere ID     This is your Care EveryWhere ID. This could be used by other organizations to access your [facility] medical records  [id number]         Blood Pressure from Last 3 Encounters:   05/04/17 166/86   04/17/17 132/89   03/30/17 99/66    Weight from Last 3 Encounters:   05/04/17 81.7 kg (180 lb 2 oz)   04/17/17 83.4 kg (183 lb 14.4 oz)   03/30/17 86 kg (189 lb 9.5 oz)                 Today's Medication Changes      Notice     This visit is during an admission. Changes to the med list made in this visit will be reflected in the After Visit Summary of the admission.             Primary Care Provider Office Phone # Fax #    Arin Shahid [phone]       [address] [address]        Thank you!     Thank you  for choosing [facility]  for your care. Our goal is always to provide you with excellent care. Hearing back from our patients is one way we can continue to improve our services. Please take a few minutes to complete the written survey that you may receive in the mail after your visit with us. Thank you!             Your Updated Medication List - Protect others around you: Learn how to safely use, store and throw away your medicines at www.disposemymeds.org.      Notice     This visit is during an admission. Changes to the med list made in this visit will be reflected in the After Visit Summary of the admission.

## 2017-05-02 NOTE — PROGRESS NOTES
[facility] Progress Note          Assessment and Plan:   Assessment: Ms. Younger is a 78yo female with h/o ER/NE-positive, HER-2 negative metastatic breast cancer, CKD, CVA, HTN who presents with fall.    # Fall: Mechanical vs possibly orthostatic. History of recurrent falls (7-8 in the last year). Recently completed home PT. No signs of arrythmia on admission EKG. Holter in 3/2017 without any arrythmia. Echo 9/2015 with normal EF, no diastolic dysfunction. No structural heart disease. Possible that UTI is contributing factor to weakness. Per neuro,dysautonomia and parkinsonian signs raise question of neurodegenerative disease such as Multiple systems atrophy.  - will recheck orthostatics  - appreciate neuro recs  - PT/OT evaluation    # Uncomplicated UTI: worsening mental status and UA w/ positive leuk esterase and WBC  - ceftriaxone while inpatient, will switch to oral antibiotic on discharge  - blood and urine Cx pending     # Deconditioning and frailty: likely due to metastatic breast cancer  - PT/ OT evaluation   - protein supplements      # Metastatic breast cancer: w/ pleural effusions and bony mets.   H/o Rx with lumpectomy, adjuvant chemotherapy with Taxotere, whole breast radiotherapy, and 5 years of adjuvant Arimidex, completed in 2012. Found to have bony mets and metastatic pleural effusion in 12/2015  - continue PTA letrozole, since patient is on obs, plan for patient's  to bring from home  - continue PTA Ibrance  - follows up with Hem/ onc. Most recently stable disease noted on imaging 12/2016.   - Holding PTA calcium/ vit D while on obs stay. Receives Xgeva every 3 months, last dose on 2/27/2017      # CKD:  creatinine is at baseline  - avoid nephrotoxins     # Hx of CVA: Head CT during her recent admission showed a possible new R middle fronatl gyrus infarct. Stable chronic L MCA territory infarct and stable 5mm posterior parafalcine calcified meningioma.   -  continue  warfarin dosing per pharmacist     # HTN: -180s.   - hydralazine prn for SBP > 170     # Chronic microcytic anemia: Likely 2/2 Chemotherapy vs MDS. Vit B12 and folate were normal recently  - Hgb stable, continue to monitor        PPx: already anticoagulated /none for GI/senna and docusate for bowel   FEN: regular diet  -IVF: none   Code status: DNR/ DNI    Patient seen and discussed with Dr. Sarita Heart MD  Internal Medicine PGY-2  [phone]           Interval History:   No events overnight. Denies dizziness, lightheadedness, headache, chest pain.     Called  who feels that patient's mental status and speech are near baseline, he has not noticed any changes in speech as of late.              Medications:     Current Facility-Administered Medications   Medication     docusate sodium (COLACE) capsule 100 mg     oxybutynin (DITROPAN) tablet 5 mg     sennosides (SENOKOT) tablet 1 tablet     naloxone (NARCAN) injection 0.1-0.4 mg     acetaminophen (TYLENOL) tablet 650 mg     ondansetron (ZOFRAN-ODT) ODT tab 4 mg    Or     ondansetron (ZOFRAN) injection 4 mg     letrozole (FEMARA) tablet 2.5 mg     Warfarin Therapy Reminder (Check START DATE - warfarin may be starting in the FUTURE)     cefTRIAXone (ROCEPHIN) 1 g vial to attach to  mL bag for ADULTS or NS 50 mL bag for PEDS     hydrALAZINE (APRESOLINE) tablet 10 mg     warfarin (COUMADIN) tablet 2 mg             Physical Exam:   Vitals were reviewed  Blood pressure 108/72, pulse 108, temperature 98.2  F (36.8  C), temperature source Oral, resp. rate 18, weight 85.1 kg (187 lb 11.2 oz), SpO2 96 %, not currently breastfeeding.    Physical Exam:   General: in NAD. Some speech latency  HEENT: MMM, PERRLA, EOM intact  CV: RRR, normal S1S2, no murmur, clicks, rubs appreciated  Resp: Clear to auscultation bilaterally, no wheezes, rhonchi  Abd: Soft, non-tender, BS+, no masses appreciated  Extremities: Radial and pedal pulses intact and  symmetric, no pedal edema  Neuro: Some speech latency and dysphasia         Data:   ROUTINE LABS (Last four results)  CMP  Recent Labs  Lab 05/02/17  0755 05/01/17 1903 05/01/17  1825     --  143   POTASSIUM 3.6  --  4.4   CHLORIDE 108  --  108   CO2 26  --  22   ANIONGAP 7  --  13   *  --  179*   BUN 19  --  22   CR 0.99  --  1.27*   GFRESTIMATED 54*  --  41*   GFRESTBLACK 66  --  50*   SHERYL 8.6  --  9.2   PROTTOTAL  --  6.9  --    ALBUMIN  --  3.5  --    BILITOTAL  --  1.0  --    ALKPHOS  --  40  --    AST  --  16  --    ALT  --  12  --      CBC  Recent Labs  Lab 05/01/17 1903 05/01/17  1825   WBC 5.0 Unsatisfactory specimen - clotted NOTIFIED CESIA SMITH RN ON ER 5/1/17 AT 1851 BY MO   RBC 3.24* Unsatisfactory specimen - clotted NOTIFIED CESIA SMITH RN ON ER 5/1/17 AT 1851 BY MO   HGB 10.9* Unsatisfactory specimen - clotted NOTIFIED CESIA SMITH RN ON ER 5/1/17 AT 1851 BY MO   HCT 34.1* Unsatisfactory specimen - clotted NOTIFIED CESIA SMITH RN ON ER 5/1/17 AT 1851 BY MO   * Unsatisfactory specimen - clotted NOTIFIED CESIA SMITH RN ON ER 5/1/17 AT 1851 BY MO   MCH 33.6* Unsatisfactory specimen - clotted NOTIFIED CESIA SMITH RN ON ER 5/1/17 AT 1851 BY MO   MCHC 32.0 Unsatisfactory specimen - clotted NOTIFIED CESIA SMITH RN ON ER 5/1/17 AT 1851 BY MO   RDW 14.5 Unsatisfactory specimen - clotted NOTIFIED CESIA SMITH RN ON ER 5/1/17 AT 1851 BY MO    Unsatisfactory specimen - clotted NOTIFIED CESIA SMITH RN ON ER 5/1/17 AT 1851 BY MO     INR  Recent Labs  Lab 05/01/17 1903 05/01/17  1825 04/26/17   INR 2.54* Canceled, Test credited Unsatisfactory specimen - clottedNOTIFIED RN Enmanuel Paz AT ER,5/1/17 @ 1900 BY ST 3.1     Arterial Blood GasNo lab results found in last 7 days.

## 2017-05-02 NOTE — CONSULTS
Neurology Consult    CC/Reason for consult: falls    HPI:   78 yo woman with hx L MCA stroke 2000 & breast cancer who presents with 2 falls today. First fall occurred around 11AM when patient was in the bathroom; she stood from the toilet & fell soon afterwards. Fell onto her bottom; did not hit her head. Witnessed by , who helped her up & helped walk patient to bed. She laid in bed for a couple hours, then was able to get up & get around with her walker. Later in the afternoon, around 3PM, patient stood from seat in kitchen & collapsed moments later. Again, fell on her bottom & did not hit head.  helped patient get up. Brought patient to ED b/c had 2 falls in one day, which is unusual. Patient has history of falls, occur every couple months. Previous work-up has included ziopatch & MRI brain (10/2016) which were negative. Orthostatic vitals have not been documented clearly.     Limited ability to obtain history from patient due to baseline expressive aphasia. Most of history obtained from . Difficult to ascertain whether patient had prodrome of dizziness, vertigo, numbness. Per , patient has never passed out, lost consciousness or had shaking spells. Has baseline R-sided weakness & aphasia from stroke in 2000. She is on coumadin, apparently for carotid artery dz per ; does not have hx of atrial fibrillation. Ambulates with walker at baseline.      ROS: Negative except as stated above.    PMHx/PSHx:  Past Medical History:   Diagnosis Date     CVA (cerebral infarction) 03/01/00    slurred speech, right facial droop partial hemiplagia     Diabetes mellitus      Fibromuscular dysplasia of renal artery (H)      Malignant neoplasm of breast (female), unspecified site 01/03    Breast cancer     MVR (mitral valve repair)      Unspecified essential hypertension      Past Surgical History:   Procedure Laterality Date     BREAST LUMPECTOMY, RT/LT  04/06    Breast Lumpectomy RT/LT     BREAST  "LUMPECTOMY, RT/LT  06/06    redo for margins with radiation and chemo     C NONSPECIFIC PROCEDURE  1998    left knee surg torn ligament       Medications:    sodium chloride 0.9%  500 mL Intravenous Once       Allergies:  Diuretic and Zyrtec [cetirizine hcl]    Social Hx:   No etoh or tobacco use.  Lives with .  Ambulates w/ walker at baseline.    Family Hx:   Non-contributory    Exam:  BP (!) 173/101  Pulse 108  Temp 97.8  F (36.6  C) (Oral)  SpO2 98%    Gen: NAD  Neck: no meningismus  CV: RRR  Resp: no labored breathing  Abd: soft  Ext: mild edema in legs, non-pitting  Neuro: awake, gives 1-2 word answers, difficulty with naming (especially low-frequency words eg feather), able to repeat simple sentence, follows 2-step commands, EOMI, PERRL, blinks to threat from all quadrants, mild facial asymmetry with R droop, dysarthric speech. No drift in arms of legs against gravity; slightly increased tone in R arm compared to L. 4/5 strength R deltoid & ; 4+/5 in L deltoid & . 4/5 hip flexion & ankle dorsiflexion bilaterally. FNF without dysmetria b/l. Difficult to assess sensation due to aphasia/cooperation. No obvious neglect or inattention. Did not assess gait due to safety concern.      Impression & Recommendations:    # Falls. Had 2 falls in one day; both occurred soon after patient stood from sitting position. Suspect due to orthostatic hypotension or physical deconditioning. Can also consider toxic/metabolic derangements or infection. Lower suspicion for stroke given no new neurologic deficits & patient already on anticoagulation for secondary stroke prevention. Atypical seizure (eg \"drop attack\") is possible, though seems less likely based on history.    -recommend checking orthostatic vitals (story is very suspicious for orthostatic hypotension) & basic labs (CBC, CMP, U/A) to rule out infection or toxic/metabolic process. Treat with IVF if appropriate, per primary team.  -recommend admission to " medicine/obs for above work-up & evaluation by PT/OT.  -if the above work-up negative, then can consider further w/u with MRI, EEG or orthostatic testing. We will continue to follow.    Patient seen & discussed with Staff Dr. Del Valle.    Lupe Lyn  G2 Neurology    ATTENDING ADDENDUM: Patient seen and examined today with resident Dr Herrera. Discussed on the phone with resident Dr Lyn last night, and agree with her assessment and recs as above, except as amended herein.  TT spent for patient care 35 minutes. More than half was counseling.  Mrs Younger has had recurrent falls for at least 2 years now. This is definitely not a new problem. Falls typically occur upon standing. Our nursing staff did orthostatic vitals which showed a very significant drop from the laying to sitting position from 180 systolic to 130. We would like vitals to also be done with standing to understand this better. Tilt test may be an option if the patient is unable to stand for 1-2 minutes. This is an important treatable cause of falls that should not be missed- in that case we would consider compression stockings, increase of fluid and salt intake, etc.  In addition, her exam showed: findings expected from previous left MCA stroke (right mild facial droop, mild right spastic hemiparesis and hyperreflexia) but also a few other abnormalities, including :1) a LEFT sided Babinski sign and brisk LEFT knee and upper extremity reflexes with spread 2) Dysarthric speech with a scanning quality (ataxia?) and 3) A very shuffling gait with camptocormia, and very small steps. She has no tremor or upper extremity cogwheel rigidity.   The latter has previously raised concerns about parkinsonism, but it appears that a trial of Sinemet done through [facility] in 2015 was unsuccessful and made her WORSE (for 1-2 months).    What I wonder is whether the constellation of orthostasis, camptocormia with shuffling gait, unresponsive to  L-dopa, and scanning dysarthria, suggest multiple system atrophy. There are no clear clues to this diagnosis from previous MRIs of the brain, and she has had a few of those (in 2016 and 2015, reviewed). She also had MRI of C spine in 2015 which was unrevealing (similar symptoms). Would not recommend repeat imaging.     In addition, recent UTI and mild dehydration may be playing a role in her symptoms.     Recommend 1) Repeat orthostatic vitals 2) Medical treatment of UTI and hydration 3) PT/OT assessment for safety 4) If she does not improve by #1-3 we may need to transfer to Neurology for further workup.     Elias Del Valle MD

## 2017-05-02 NOTE — PLAN OF CARE
Problem: Discharge Planning  Goal: Discharge Planning (Adult, OB, Behavioral, Peds)  -diagnostic tests and consults completed and resulted  -vital signs normal or at patient baseline  -safe disposition plan has been identified  Nurse to notify provider when observation goals have been met and patient is ready for discharge.   Outpatient/Observation goals to be met before discharge home:      -diagnostic tests and consults completed and resulted : NO, PT ordered  -vital signs normal or at patient baseline : NO  -safe disposition plan has been identified : NO

## 2017-05-02 NOTE — PLAN OF CARE
Problem: Discharge Planning  Goal: Discharge Planning (Adult, OB, Behavioral, Peds)    -diagnostic tests and consults completed and resulted. Lactic acid 1.7, blood culture in process. INR and BMP ordered for 0700. PT and OT consults ordered for AM.  -vital signs normal or at patient baseline. BP is improved. 165/99 at 0500.  -safe disposition plan has been identified. TBD  Nurse to notify provider when observation goals have been met and patient is ready for discharge.   Outcome: Improving      Pt resting comfortably.

## 2017-05-02 NOTE — PHARMACY-ANTICOAGULATION SERVICE
Clinical Pharmacy - Warfarin Dosing Consult     Pharmacy has been consulted to manage this patient s warfarin therapy.  Indication: Other - specify in comments (Previous CVA)  Therapy Goal: INR 2-3  Provider/Team: Internal Medicine  OP Anticoag Clinic: [facility]  Warfarin Prior to Admission: Yes  Warfarin PTA Regimen: 1 mg on Fridays, 2 mg all other days of the week per outpatient anticoagulation clinic records on 4/28/17.  Patient unsure of her dose.  Significant drug interactions: Recently started ceftriaxone (may see increase in INR)  Recent documented change in oral intake/nutrition: Yes (Patient endorses normal appetite, denies nausea/vomiting & abdominal pain. Does report weight loss of 40lbs over the last year.)    INR   Date Value Ref Range Status   05/01/2017 2.54 (H) 0.86 - 1.14 Final   05/01/2017  0.86 - 1.14 Final    Canceled, Test credited   Unsatisfactory specimen - clotted  NOTIFIED FLORENCIO Paz AT ER,5/1/17 @ 1900 BY ST         Recommend warfarin 2 mg today.  Pharmacy will monitor Helen Younger daily and order warfarin doses to achieve specified goal.      Please contact pharmacy as soon as possible if the warfarin needs to be held for a procedure or if the warfarin goals change.      Ruben Morrow, PharmD -- pager [phone]

## 2017-05-02 NOTE — TELEPHONE ENCOUNTER
Forms received from: [facility]   Phone number listed: [phone]   Fax listed: [phone]  Date received: 05/02/2017  Form description: SN-INR/Coumadin  Once forms are completed, please return to [facility] via fax.  Is patient requesting to be contacted when forms are completed: NA    Form placed: In provider's basket  Romina Pierce

## 2017-05-02 NOTE — PROGRESS NOTES
Reviewed patient's chart with admission today and has an appointment arranged previously with lab work and see Padmini Brunson. Message sent to scheduling to cancel appointments today and left a voice mail on patient's home telephone number. Jolie Farley RN, BSN

## 2017-05-02 NOTE — PROGRESS NOTES
[facility]  Patient is currently open to home care services with [facility].  The patient is currently receiving RN services.  [facility]  and team have been notified that patient is under OBSERVATION STATUS. [facility] liaison will continue to follow patient during stay.  If patient is admitted to inpatient status please provide orders to resume home care at time of discharge if appropriate.    La Goldsmith RN, BSN  [facility] Liaison  [phone]

## 2017-05-02 NOTE — H&P
[facility]  Internal Medicine History and Physical    Name: Helen Younger MRN#: [id number]   Age: 77 year old YOB: 1940       Assessment and Plan   Helen Younger is a 77 year old  female with a history of ER/AK-positive, HER-2 negative metastatic breast cancer,  CKD, CVA, HTN who presents with fall.     # Fall: mechanical from history. She has history of recurrent mechanical falls, well documented in her chart. 7-8 falls in the last year, recently completed home PT for strength  - EKG was reviewed and did not show any arrhythmias or acute ST-T changes. Holter monitor in 3/2017 did not show any arrhythmias. Echo in 9/2015 showed normal EF, and evidence of diastolic dysfunction. No structural heart disease/ valvular disease noted.   - History is not consistent with syncope or orthostasis. Neuro exam was unremarkable for any acute changes  - neuro was consulted by ED, who recommended orthostatic vitals- pending  - PT/ OT evaluation for disposition    # Uncomplicated UTI: worsening mental status and UA w/ positive leuk esterase and WBC  - ceftriaxone while inpatient, will switch to oral antibiotic on discharge  - blood and urine Cx pending    # Deconditioning and frailty: likely due to metastatic breast cancer  - PT/ OT evaluation   - protein supplements     # Metastatic breast cancer: w/ pleural effusions and bony mets.   H/o Rx with lumpectomy, adjuvant chemotherapy with Taxotere, whole breast radiotherapy, and 5 years of adjuvant Arimidex, completed in 2012. Found to have bony mets and metastatic pleural effusion in 12/2015  - continue PTA letrozole, since patient is on obs, we will consider if patient can bring this medication from home and can be verified by pharmacy  - follows up with Hem/ onc. Most recently stable disease noted on imaging 12/2016.   - Holding PTA calcium/ vit D while on obs stay. Receives Xgeva every 3 months, last dose on 2/27/2017      # CKD:   creatinine is at baseline  - avoid nephrotoxins    # Hx of CVA: Head CT during her recent admission showed a possible new R middle fronatl gyrus infarct. Stable chronic L MCA territory infarct and stable 5mm posterior parafalcine calcified meningioma.   -  continue warfarin dosing per pharmacist    # HTN: -180s.   - hydralazine prn for SBP > 170  - consider starting amlodipine 5mg daily    # Chronic microcytic anemia: Likely 2/2 Chemotherapy vs MDS. Vit B12 and folate were normal recently  - Hgb stable, continue to monitor    ##Other  - PPx: already anticoagulated /none for GI/senna and docusate for bowel  - FEN: regular diet  - IVF: none  - Code status: DNR/ DNI    Disposition:  Patient admitted to observation status to ensure hemodynamic stability and safe discharge plan    Rell Melara  PGY-3  Internal Medicine  [phone]    This patient will be staffed in am with attending physician        Chief Complaint   Fall  This history was obtained from the patient and her , who is a good historian, and a review of the medical record.       History of Present Illness    77 year old  female with a history of ER/MS-positive, HER-2 negative metastatic breast cancer,  CKD, CVA, HTN who presents with fall. She had two falls today, first at 11 am and second at 3:30 pm. Both seemed mechanical from history with legs giving out. The first one happened in the bathroom and  rushed to see her from the other room. The second one happened while moving from kitchen to bedroom. She was assisted by  when the second fall occurred, with legs giving out. She did not lose consciousness with any of the falls and did not hit her head either. She denies any palpitations, shortness of breath, chest pain or dizziness prior to the falls. Otherwise, she has normal appetite, denies any nausea, vomiting, abdominal pain. She has stable constipation. She denies any dysuria or fever, chills, sweats. Repots weight  loss of 40lbs over the last year. She denies any new focal weakness in arms or legs or facial deviation, has some stable aphasia and ataxia from previous MCA stroke.       Review of Systems   All 12 systems reviewed.  Relevant positives/negatives noted in HPI above        Past Medical History   (Reviewed and updated)  Past Medical History:   Diagnosis Date     CVA (cerebral infarction) 03/01/00    slurred speech, right facial droop partial hemiplagia     Diabetes mellitus      Fibromuscular dysplasia of renal artery (H)      Malignant neoplasm of breast (female), unspecified site 01/03    Breast cancer     MVR (mitral valve repair)      Unspecified essential hypertension           Past Surgical History   (Reviewed and updated)  Past Surgical History:   Procedure Laterality Date     BREAST LUMPECTOMY, RT/LT  04/06    Breast Lumpectomy RT/LT     BREAST LUMPECTOMY, RT/LT  06/06    redo for margins with radiation and chemo     C NONSPECIFIC PROCEDURE  1998    left knee surg torn ligament         Allergies   (Reviewed and updated)   Allergies   Allergen Reactions     Diuretic      Don't remember side effect     Zyrtec [Cetirizine Hcl]      Elevated blood pressure         Medications   (Reviewed and updated)    No current facility-administered medications on file prior to encounter.   Current Outpatient Prescriptions on File Prior to Encounter:  order for DME Equipment being ordered: portable commode   warfarin (JANTOVEN) 1 MG tablet Take 1 tablet (1mg) by mouth on Monday, Wednesday, Friday. Take 2 tablets (2 mg) by mouth on Tuesday and Thursday. (Patient taking differently: Take 2 mg by mouth daily Take 1 tablet (1mg) by mouth on Monday, Wednesday, Friday. Take 2 tablets (2 mg) by mouth on Tuesday and Thursday.)   letrozole (FEMARA) 2.5 MG tablet Take 1 tablet (2.5 mg) by mouth daily   magnesium hydroxide (MILK OF MAGNESIA) 400 MG/5ML suspension Take 30-60 mLs by mouth daily as needed for constipation or heartburn    sennosides (SENOKOT) 8.6 MG tablet Take 1 tablet by mouth 2 times daily May increase to 2 tabs twice daily if needed   simvastatin (ZOCOR) 20 MG tablet Take 1 tablet (20 mg) by mouth At Bedtime   oxybutynin (DITROPAN) 5 MG tablet TAKE 1 TABLET (5 MG) BY MOUTH 2 TIMES DAILY   order for DME Equipment being ordered:  Incontinence pads   Patient uses these tid   order for DME Equipment being ordered: wheeled walker   DOCUSATE SODIUM 1 tablet 2 times daily    Multiple Vitamins-Minerals (OCUVITE ADULT FORMULA PO) Take 1 tablet by mouth daily.   VIACTIV OR Take 1 chew tab by mouth 2 times daily. One in the morning and one at bedtime.          Family History   (Reviewed and updated)  Family History   Problem Relation Age of Onset     Hypertension Mother      Cardiovascular Mother      Prostate Cancer Father      Hypertension Father      Breast Cancer Maternal Grandmother      Cardiovascular Paternal Grandfather      Hypertension Brother      Depression Daughter      Hypertension Daughter      Psychotic Disorder Daughter      bipolar         Social History   (Reviewed and updated)  Social History   Substance Use Topics     Smoking status: Never Smoker     Smokeless tobacco: Never Used     Alcohol use No           Physical Exam   Vitals: Blood pressure (!) 200/102, pulse 108, temperature 98.4  F (36.9  C), temperature source Oral, resp. rate 18, weight 85.1 kg (187 lb 11.2 oz), SpO2 98 %, not currently breastfeeding.    Gen: laying in bed comfortably, NAD  HEENT: no scleral icterus, no conjunctival pallor, oral mucosa moist  CV: RRR, nl S1, S2, no MRG  Resp:breathing comfortably on RA, good b/l air entry, CTAB  Abd: NABS, soft, NT, ND  Ext: warm, peripheral pulses palpable, 1+ pedal edema upto mid leg  Neuro: awake, alert, oriented to time, place, person. CN II-XII intact, strength 5/5 in b/l. UE and LE sensations to soft touch intact b/l UE and LE. Gait was not assessed      Data   Labs:  All labs reviewed.        EKG:  Reviewed. NSR w/ PACs. No acute ST-T wave changes.     Microbiology:  Blood Cx, urine Cx pending    Imaging  No imaging obtained current admission    Ziopatch results and echo results were reviewed.

## 2017-05-02 NOTE — PROGRESS NOTES
Pt admitted to 6D from ER. Arrived via cart and transferred to bed with assist of 2 (stand and pivot). Vitals obtained. Pt ambulated to restroom with heavy assist of 1/difficulty due to no walker. Pt oriented to unit and plan of care.

## 2017-05-02 NOTE — PLAN OF CARE
Problem: Goal Outcome Summary  Goal: Goal Outcome Summary  PT 6D: PT Eval completed and treatment initiated. Bed mobility and transfers with min A. Pt ambulated 20' x2 with FWW and min A, significant forward trunk flexion with increased distance to FWW, slow, shuffled gait pattern with 2 episodes of festination, requires seated rest break. Orthostatics as follows: 178/100 (supine), 174/113 (sitting), 147/133 (standing), 108/62 (sitting, post ambulation), pt asymptomatic.     REC: pending further medical workup and diagnosis. Pt would benefit from continued therapy with deficits seeming to be of neuro origin. Safest discharge location at this time is TCU, however with unclear etiology of impairments, unsure how much benefit this would be.  Pt states she does not feel safe to go home, however declines TCU. Pt's  is available to assist 24/7, but states with recent increased frequency of falls he plans to assist her with all mobility if pt were to return home, has one daughter in area who can assist as able. Pt has 16 steps to main floor of home and would need to demonstrate safety with stairs prior to returning home. Will continue to follow and update recs appropriately.

## 2017-05-02 NOTE — DISCHARGE INSTRUCTIONS
_______________________  [facility]  [phone]  Fa  [phone]  ______________________

## 2017-05-02 NOTE — PROGRESS NOTES
05/02/17 1300   Quick Adds   Type of Visit Initial PT Evaluation   Living Environment   Lives With spouse   Living Arrangements house   Home Accessibility bed and bath are not on the first floor;bed and bath on same level;stairs (1 railing present);stairs within home;stairs to enter home   Number of Stairs to Enter Home 16  (8 + 8 with chair on landing for seated rest 1/2 way)   Stair Railings at Home inside, present at both sides   Transportation Available family or friend will provide   Living Environment Comment Pt lives with  who is available 24/7 to assist, also have a daughter that lives nearby that is availble to as able.    Self-Care   Dominant Hand right   Usual Activity Tolerance fair   Current Activity Tolerance poor   Regular Exercise no   Equipment Currently Used at Home bath bench;walker, rolling;grab bar   Functional Level Prior   Ambulation 1-->assistive equipment   Transferring 1-->assistive equipment   Toileting 1-->assistive equipment   Bathing 3-->assistive equipment and person   Dressing 0-->independent   Eating 0-->independent   Communication 2-->difficulty speaking (not related to language barrier)   Swallowing 0-->swallows foods/liquids without difficulty   Cognition 1 - attention or memory deficits   Fall history within last six months yes   Which of the above functional risks had a recent onset or change? ambulation;fall history;transferring   Prior Functional Level Comment Per pt and , pt is able to walk short distances in home (<50') with FWW, more recently pt's ambulation has worsened. Pt's  states he holds onto patient for stairs and guards appropriately. Per , until recently pt was fairly indepdendent with most mobility and ADLs, did receive assistance getting in and out of shower.   General Information   Onset of Illness/Injury or Date of Surgery - Date 05/01/17   Referring Physician Rell Melara MD   Patient/Family Goals Statement to return home    Pertinent History of Current Problem (include personal factors and/or comorbidities that impact the POC) Ms. Younger is a 76yo female with h/o ER/AK-positive, HER-2 negative metastatic breast cancer, CKD, CVA, HTN who presents with fall. History of recurrent falls (7-8 in the last year). Recently completed home PT. No signs of arrythmia on admission EKG. Holter in 3/2017 without any arrythmia. Echo 9/2015 with normal EF, no diastolic dysfunction. No structural heart disease. Possible that UTI is contributing factor to weakness. Per neuro,dysautonomia and parkinsonian signs raise question of neurodegenerative disease such as Multiple systems atrophy.   Precautions/Limitations fall precautions;immunosuppressed   Heart Disease Risk Factors High blood pressure;Lack of physical activity;Medical history;Gender;Age   General Observations Pt received supine in bed, agreeable to PT with encouragement, RN ok'd treatment session.   General Info Comments Activity: Ambulate with Assist (4x/day)   Cognitive Status Examination   Orientation orientation to person, place and time   Level of Consciousness alert   Follows Commands and Answers Questions 75% of the time   Personal Safety and Judgment at risk behaviors demonstrated   Pain Assessment   Patient Currently in Pain No   Integumentary/Edema   Integumentary/Edema Comments Bilateral LE edema   Range of Motion (ROM)   ROM Comment BLE WFL   Strength   Strength Comments Mild R hemiparesis, grossly 4/5 RLE, 4+/5- 5/5 LLE   Bed Mobility   Bed Mobility Comments Supine>sit with min A   Transfer Skills   Transfer Comments Sit<>stand CGA>min A   Gait   Gait Comments Ambulates with signifant forward trunk lean with increased distance from FWW, shuffled gait pattern, WBOS   Balance   Balance Comments Decreased standing static and dynamic balance requiring FWW and Assist to maintain balance   Sensory Examination   Sensory Perception Comments Pt denies numbness/tingling in extrimities,  "unreliable sensation test due to pt aphasia/mentation   Coordination   Coordination Comments Dysmetric finger-to-nose bilaterally   Muscle Tone   Muscle Tone Comments Brisk patellar reflex (R>L), 2 beat clonus on L   General Therapy Interventions   Planned Therapy Interventions gait training;balance training;ROM;risk factor education;home program guidelines;neuromuscular re-education;bed mobility training;transfer training;strengthening   Clinical Impression   Criteria for Skilled Therapeutic Intervention yes, treatment indicated   PT Diagnosis impaired functional mobility   Influenced by the following impairments decreased strength, neuromuscular coordination, balance, and activity tolerance   Functional limitations due to impairments gait, transfers, stairs, functional endurance   Clinical Presentation Evolving/Changing   Clinical Presentation Rationale Pt presents following 2 falls at home in the setting of significant PMH and worsened functional mobility impacting ability to return home safely and independently   Clinical Decision Making (Complexity) High complexity   Therapy Frequency` 5 times/week   Predicted Duration of Therapy Intervention (days/wks) 1 week   Anticipated Discharge Disposition Transitional Care Facility;Home with Assist;Home with Home Therapy   Risk & Benefits of therapy have been explained Yes   Patient, Family & other staff in agreement with plan of care Yes   [facility] TM \"6 Clicks\"   2016, Trustees of [facility], under license to [employer].  All rights reserved.   6 Clicks Short Forms Basic Mobility Inpatient Short Form   [facility]  \"6 Clicks\" V.2 Basic Mobility Inpatient Short Form   1. Turning from your back to your side while in a flat bed without using bedrails? 4 - None   2. Moving from lying on your back to sitting on the side of a flat bed without using bedrails? 3 - A Little   3. Moving to and from a bed to a chair (including a wheelchair)? " 3 - A Little   4. Standing up from a chair using your arms (e.g., wheelchair, or bedside chair)? 3 - A Little   5. To walk in hospital room? 3 - A Little   6. Climbing 3-5 steps with a railing? 2 - A Lot   Basic Mobility Raw Score (Score out of 24.Lower scores equate to lower levels of function) 18   Total Evaluation Time   Total Evaluation Time (Minutes) 15

## 2017-05-02 NOTE — PLAN OF CARE
Problem: Goal Outcome Summary  Goal: Goal Outcome Summary  OT/6D: Holding - OT orders received and acknowledged.  Per discussion with interdisciplinary team and given pt's observation status, pt only requires one discipline at this time for determination of discharge recommendations.  Should pt transition to inpatient status, OT will initiate evaluation to address inpatient OT goals related to ADL independence.  Please see PT note for specifics regarding current mobility status and D/C recommendations.

## 2017-05-02 NOTE — PLAN OF CARE
Problem: Discharge Planning  Goal: Discharge Planning (Adult, OB, Behavioral, Peds)  -diagnostic tests and consults completed and resulted  -vital signs normal or at patient baseline  -safe disposition plan has been identified  Nurse to notify provider when observation goals have been met and patient is ready for discharge.   Outcome: No Change  Observation Goals:     -diagnostic tests and consults completed and resulted.  OT and PT consults ordered for AM. Lactic and Blood culture ordered.  -vital signs normal or at patient baseline.  /102, MD aware.  -safe disposition plan has been identified

## 2017-05-02 NOTE — LETTER
5/2/2017       RE: Helen Younger  [address] [place] [place] [address]     Dear Colleague,    Thank you for referring your patient, Helen Younger, to the [facility]. Please see a copy of my visit note below.    Pt currently admitted.       Again, thank you for allowing me to participate in the care of your patient.      Sincerely,    Padmini Brunson PA-C

## 2017-05-03 NOTE — PLAN OF CARE
Problem: Discharge Planning  Goal: Discharge Planning (Adult, OB, Behavioral, Peds)  -diagnostic tests and consults completed and resulted  -vital signs normal or at patient baseline  -safe disposition plan has been identified  Nurse to notify provider when observation goals have been met and patient is ready for discharge.   Outpatient/Observation goals to be met before discharge home:        -diagnostic tests and consults completed and resulted - NO  -vital signs normal or at patient baseline - YES  -safe disposition plan has been identified - NO

## 2017-05-03 NOTE — PLAN OF CARE
Problem: Discharge Planning  Goal: Discharge Planning (Adult, OB, Behavioral, Peds)  -diagnostic tests and consults completed and resulted  -vital signs normal or at patient baseline  -safe disposition plan has been identified  Nurse to notify provider when observation goals have been met and patient is ready for discharge.   -diagnostic tests and consults completed and resulted - NO  -vital signs normal or at patient baseline - YES  -safe disposition plan has been identified - NO

## 2017-05-03 NOTE — PROGRESS NOTES
Text paged OT/PT re - Pt will be d/c home today and team would like you to see pt before she d/c.  Thanks,  Shara MATIAS [phone]

## 2017-05-03 NOTE — PROGRESS NOTES
Neurology Progress Note  Helen Younger  [id number]  May 3, 2017      Assessment/Recommendations:  Ms. Younger is a 77 year old female with history of bilateral MCA strokes (previously thought to be L MCA only, but CT 3/2017 shows R MCA as well), breast cancer, and frequent falls presents with two falls in one day.   Her exam is stable from previous.   It appears she has 2 chief problems, 1. orthostasis- which is documented on multiple orthostatic vitals and should be treated appropriately.    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  Given her poor response the levodopa in the past and prominent dysautonomia a parkinson's plus syndrome such as MSA should be considered.   We considered VIJI scan, but will defer to our movement disorder colleagues as an outpatient.      - Consider decreasing dose of oxybutynin  - HOB up at night  - Compression stockings  - Increase fluid intake  - 0.05 mg Fludrocortisone daily in the AM.    - Will need follow up in Neurology Movement disorders clinic upon discharge.        Subjective: No acute events overnight.      Physical Exam:  BP (!) 120/96 (BP Location: Right arm)  Pulse 78  Temp 98.8  F (37.1  C) (Oral)  Resp 16  Wt 82.1 kg (181 lb)  SpO2 96%  BMI 33.11 kg/m2  GEN: awake and alert, NAD   RESP: Non-labored breathing  GI: Nondistended   NEURO:   MS: AO x3. Expressive aphasia, able to name properly with single words. Able to follow 3 step commands.   CN:   Visual fields full, EN, Conjugate gaze, EOMI except for mildly limited upgaze.  Flat R NLF.  Hearing intact bilaterally. Normal neck turning, shoulder shrug. Tongue protrudes midline. Dysarthria with ataxic features more than expected for stroke.  .   Motor:Paratonia present, no tremor, mild right hemiparesis in arms and legs 4+/5.   Reflexes: Brisk and slightly more prominent on R. L toe upgoing, 2 beats clonus on L, postive Hoffmans on R.   Sensory: decreased on R mildly but present.    Coordination  / Gait: Walks with assist of 1, camptocormic, seems to be falling forward, wide based gait with short steps, difficulty with turns.   Gets fatigued with about 10 steps.         Pertinent Imaging and Labs:  Reviewed in chart    Discussed and seen with attending staff physician, Dr. Del Valle.       Anna Herrera DO  PGY-3    ATTENDING ADDENDUM: Patient discussed with resident Dr Herrera on 5/3/2017, but not seen. I agree with his  assessment and recs as above. Patient has definite orthostatic hypotension and this should be treated. We recommend starting Florinef 0.05 mg in am, compression stockings, elevation of HOB at night to 30 degrees (to minimize supine hypertension), and increasing her fluid intake. Would rather avoid midodrine due to risk of vasospasm in a patient with previous large left MCA stroke. The question is whether she has a syndrome of parkinsonism with dysautonomia, like MSA. The prominent camptocormia, AND very poor response to levodopa 2 years ago, point to this possibility. This is a very difficult diagnosis to make, and we may not be able to establish it during the current hospitalization. Recommend f/u in our Movement Disorder Clinic in 1-2 months to discuss above. Junito ([id number]) scan would be helpful to demonstrate a presynaptic dopaminergic defect, but we consulted Radiology and this test can NOT be done or reimbursed as inpatient. Because L-dopa can aggravate orthostatic hypotension, and she did not tolerate it at all 2 years ago (nausea, worsening gait), we do NOT recommend another empirical trial. We also do NOT recommend tilt test because OH was well documented by vital sign checks, and doing a tilt would not offer additional information.    Elias Del Valle MD

## 2017-05-03 NOTE — PLAN OF CARE
Problem: Discharge Planning  Goal: Discharge Planning (Adult, OB, Behavioral, Peds)  -diagnostic tests and consults completed and resulted  -vital signs normal or at patient baseline  -safe disposition plan has been identified  Nurse to notify provider when observation goals have been met and patient is ready for discharge.   -diagnostic tests and consults completed and resulted  -vital signs normal or at patient baseline  -safe disposition plan has been identified  Nurse to notify provider when observation goals have been met and patient is ready for discharge.   -diagnostic tests and consults completed and resulted - YES  -vital signs normal or at patient baseline - YES  -safe disposition plan has been identified - NO, pt recommends TCU as pt is not safe at home with assistance form , family refusing at this time.

## 2017-05-03 NOTE — PLAN OF CARE
Problem: Discharge Planning  Goal: Discharge Planning (Adult, OB, Behavioral, Peds)  -diagnostic tests and consults completed and resulted  -vital signs normal or at patient baseline  -safe disposition plan has been identified  Nurse to notify provider when observation goals have been met and patient is ready for discharge.   -diagnostic tests and consults completed and resulted - YES  -vital signs normal or at patient baseline - YES  -safe disposition plan has been identified - Yes, to d/c to TCU

## 2017-05-03 NOTE — PROGRESS NOTES
PRN Hydralazine given at 0030, BP dropped to 80's/40's, team notified. IV antibiotics given in 100 mg NS, BP now at 110/49. Will continue to monitor.

## 2017-05-03 NOTE — PROGRESS NOTES
"  Care Coordinator Progress Note     Admission Date/Time:  5/1/2017  Attending MD:  Zeenat Stein,*     Data  Chart reviewed, discussed with interdisciplinary team.   Patient was admitted for:    Weakness  Abnormal urinalysis  Fall, initial encounter.    Concerns with insurance coverage for discharge needs: None.  Current Living Situation: Patient lives with spouse.  Support System: Supportive  Services Involved: Home Care  Transportation: Family or Friend will provide  Barriers to Discharge: chronically ill and physical impairment    Coordination of Care and Referrals: Provided patient/family with options for Home Care and Skilled Nursing Facility, TCU.        Assessment  Spoke with patient at bedside.  Patient was admitted for weakness and has continued to have falls at home.  Family is able to provide transportation upon discharge.  Patient given MOON form for observation status.  Discussed PT recs for TCU for continued rehab and increased safety.  Also discussed the need to be able to get up stairs within the home.  Patient again declines TCU, stating \"no overnight\" stays.  Patient did agree to have home PT/OT.  Orders placed.    Spoke with  and expressed concerns with patient going home rather than TCU.  Patient has 16 stairs to get into the house and she could only do 3 with therapy today.   would be home 24/7 to assist.   is concerned about the observation status and not being able to afford TCU.  Updated SW and will meet with  later this afternoon.    _______________________  [facility]  Phone  [phone]  Fax  [phone]  ______________________         Plan  Anticipated Discharge Date: today/tomorrow  Anticipated Discharge Plan:  Home with home care    Dinorah Arteaga RN, BSN  Care Coordinator [address]  Pager: [phone]  Phone: [phone]        "

## 2017-05-03 NOTE — PLAN OF CARE
Problem: Goal Outcome Summary  Goal: Goal Outcome Summary  PT 6D: Ascends and descends 3 stairs only with CGA-min Ax2.  Most difficulty with upon descent.  Pt very fatigued following ascent and requires frequent vc for hand placement and sequencing.  No safe to perform stairs with Ax1.  Will need Ax2-3 on stairs.  Ambulates 20ft x2 with FWW and CGAx2.  Difficulty sequencing transfer and FWW.  PT recommends TCU placement.  Not safe to return home with A from .

## 2017-05-03 NOTE — PROGRESS NOTES
[facility] Internal Medicine Progress Note          Assessment and Plan:   Assessment: Ms. 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    # Fall:  History of recurrent falls (7-8 in the last year). Recently completed home PT. No signs of arrythmia on admission EKG. Holter in 3/2017 without any arrythmia. Echo 9/2015 with normal EF, no diastolic dysfunction. No structural heart disease. Possible that UTI is contributing factor to weakness. Fall most likely related to orthostatic hypotension and autonomic dysfunction. Per neuro,dysautonomia and parkinsonian signs raise question of neurodegenerative disease such as Multiple systems atrophy.  - appreciate neuro recs  - PT/OT recommend TCU placement  - compression stockings  - encourage po fluid intake  - start fludrocortisone 0.05mg daily  - follow up with Neurology Movement disorders clinic upon d/c    # Uncomplicated UTI: worsening mental status and UA w/ positive leuk esterase and WBC, though urine culture with no growth  - ceftriaxone while inpatient, will plan to switch to oral antibiotic on discharge for 5 day course     # Deconditioning and frailty: likely due to metastatic breast cancer, possible neurodegenerative disease  - PT/ OT recommend TCU  - protein supplements      # Metastatic breast cancer: w/ pleural effusions and bony mets.   H/o Rx with lumpectomy, adjuvant chemotherapy with Taxotere, whole breast radiotherapy, and 5 years of adjuvant Arimidex, completed in 2012. Found to have bony mets and metastatic pleural effusion in 12/2015  - continue PTA letrozole, since patient is on obs, plan for patient's  to bring from home  - continue PTA Ibrance  - follows up with Hem/ onc. Most recently stable disease noted on imaging 12/2016.   - Holding PTA calcium/ vit D while on obs stay. Receives Xgeva every 3 months, last dose on 2/27/2017      # CKD:  creatinine is  at baseline  - avoid nephrotoxins     # Hx of CVA: Head CT during her recent admission showed a possible new R middle fronatl gyrus infarct. Stable chronic L MCA territory infarct and stable 5mm posterior parafalcine calcified meningioma.   -  continue warfarin dosing per pharmacy recs     # HTN: -180s.   - hydralazine prn for SBP > 170     # Chronic microcytic anemia: Likely 2/2 Chemotherapy vs MDS. Vit B12 and folate were normal recently  - Hgb stable, continue to monitor        PPx: already anticoagulated /none for GI/senna and docusate for bowel   FEN: regular diet  -IVF: none   Code status: DNR/ DNI  Dispo: PT/OT recommending TCU. Patient has 16 steps to get into her house and was only able to climb 3 stairs with PT today. Family agreeable to TCU only if cost is covered. SW is looking into [facility].    Patient seen and discussed with Dr. Sarita Heart MD  Internal Medicine PGY-2  [phone]           Interval History:   No events overnight. Didn't sleep well last night. Denies dizziness, lightheadedness, headache, chest pain.              Medications:     Current Facility-Administered Medications   Medication     warfarin (COUMADIN) tablet 2 mg     fludrocortisone (FLORINEF) half-tab 50 mcg     docusate sodium (COLACE) capsule 100 mg     oxybutynin (DITROPAN) tablet 5 mg     sennosides (SENOKOT) tablet 1 tablet     naloxone (NARCAN) injection 0.1-0.4 mg     acetaminophen (TYLENOL) tablet 650 mg     ondansetron (ZOFRAN-ODT) ODT tab 4 mg    Or     ondansetron (ZOFRAN) injection 4 mg     letrozole (FEMARA) tablet 2.5 mg     Warfarin Therapy Reminder (Check START DATE - warfarin may be starting in the FUTURE)     cefTRIAXone (ROCEPHIN) 1 g vial to attach to  mL bag for ADULTS or NS 50 mL bag for PEDS     palbociclib (IBRANCE) capsule CHEMO 100 mg             Physical Exam:   Vitals were reviewed  Blood pressure 145/84, pulse 67, temperature 98.2  F (36.8  C), temperature source Oral, resp.  rate 16, weight 82.1 kg (181 lb), SpO2 98 %, not currently breastfeeding.    Physical Exam:   General: in NAD. Some speech latency and  HEENT: MMM, PERRLA, EOM intact  CV: RRR, normal S1S2, no murmur, clicks, rubs appreciated  Resp: Clear to auscultation bilaterally, no wheezes, rhonchi  Abd: Soft, non-tender, BS+, no masses appreciated  Extremities: Radial and pedal pulses intact and symmetric, no pedal edema  Neuro: Some speech latency and dysphasia, no cogwheel rididity         Data:   ROUTINE LABS (Last four results)  CMP    Recent Labs  Lab 05/02/17  0755 05/01/17  1903 05/01/17  1825     --  143   POTASSIUM 3.6  --  4.4   CHLORIDE 108  --  108   CO2 26  --  22   ANIONGAP 7  --  13   *  --  179*   BUN 19  --  22   CR 0.99  --  1.27*   GFRESTIMATED 54*  --  41*   GFRESTBLACK 66  --  50*   SHERYL 8.6  --  9.2   PROTTOTAL  --  6.9  --    ALBUMIN  --  3.5  --    BILITOTAL  --  1.0  --    ALKPHOS  --  40  --    AST  --  16  --    ALT  --  12  --      CBC    Recent Labs  Lab 05/03/17  0732 05/01/17  1903 05/01/17  1825   WBC 3.4* 5.0 Unsatisfactory specimen - clotted NOTIFIED CESIA SMITH RN ON ER 5/1/17 AT 1851 BY MO   RBC 3.03* 3.24* Unsatisfactory specimen - clotted NOTIFIED CESIA SMITH RN ON ER 5/1/17 AT 1851 BY MO   HGB 10.4* 10.9* Unsatisfactory specimen - clotted NOTIFIED CESIA SMITH RN ON ER 5/1/17 AT 1851 BY MO   HCT 31.8* 34.1* Unsatisfactory specimen - clotted NOTIFIED CESIA SMITH RN ON ER 5/1/17 AT 1851 BY MO   * 105* Unsatisfactory specimen - clotted NOTIFIED CESIA SMITH RN ON ER 5/1/17 AT 1851 BY MO   MCH 34.3* 33.6* Unsatisfactory specimen - clotted NOTIFIED CESIA SMITH RN ON ER 5/1/17 AT 1851 BY MO   [facility].0 Unsatisfactory specimen - clotted NOTIFIED CESIA SMITH RN ON ER 5/1/17 AT 1851 BY MO   RDW 15.2* 14.5 Unsatisfactory specimen - clotted NOTIFIED CESIA SMITH RN ON ER 5/1/17 AT 1851 BY MO    293 Unsatisfactory specimen - clotted NOTIFIED CESIA SMITH  RN ON ER 5/1/17 AT 1851 BY MO     INR    Recent Labs  Lab 05/03/17  0732 05/01/17  1903 05/01/17  1825   INR 2.30* 2.54* Canceled, Test credited Unsatisfactory specimen - clottedNOTIFIED FLORENCIO Paz AT ER,5/1/17 @ 1900 BY      Arterial Blood GasNo lab results found in last 7 days.

## 2017-05-04 NOTE — PROGRESS NOTES
Social Work Services Progress Note    Hospital Day: 3 (Observation)  Collaborated with:  Pt and her , Rony    Data:  SW consult to assist w/ TCU placement. Pt had previously been refusing placement as was . Late yesterday, pt and  finally agreed to look at TCU placement, per PT recommendation. Pt and  expressed concerns regarding insurance coverage for TCU. Writer explained that referrals would need to be sent to facilities, who would then verify insurance coverage.  expressed preference for the [facility] network and did not have preference for location.     Intervention:  D/C Planning    Assessment:  Pt and  appear to understand need for TCU placement in that pt is not safe to be at home, at this time.     Plan:    Anticipated Disposition:  Facility:  The following referrals have been initiated:   [facility] ([phone])  [facility]: No Beds  [facility] ([phone])  [facility] ([phone] f: [phone])    Barriers to d/c plan:  Possible insurance/fianncial, locating placement    Follow Up:  SW waiting to hear back from above facilities.     Addendum:    [facility]: No Beds  [facility]: Assessed and declined to accept  [facility]: Continuing to assess  [facility]: Assessing    Discussed w/  d/c planning progress.  now debating taking pt home. Writer again explained rationale for placement and that pt is not safe to be returning home at this time. Pt's  discussed getting in home help, but this still does not resolve the concern of pt not being able to do the 16 stairs that are required.  will to await final decision from above facilities.

## 2017-05-04 NOTE — PLAN OF CARE
Problem: Discharge Planning  Goal: Discharge Planning (Adult, OB, Behavioral, Peds)  -diagnostic tests and consults completed and resulted  -vital signs normal or at patient baseline  -safe disposition plan has been identified  Nurse to notify provider when observation goals have been met and patient is ready for discharge.   -diagnostic tests and consults completed and resulted- in progress  -vital signs normal or at patient baseline- No, BP elevated and positive othorstatic BP- bolus infusing  -safe disposition plan has been identified- in progress   Nurse to notify provider when observation goals have been met and patient is ready for discharge.

## 2017-05-04 NOTE — PROGRESS NOTES
Patient seen and examined today.   Exam stable from previously noted.   Orthostatic recommendations from yesterday instituted by primary team.  Recommend re-checking orthostatic vitals today to see if improved or resolved although suspect it may take a few days on Fludrocortisone.   - Follow up in Movement disorders clinic in 2-4 weeks for consideration of MSA work up.   - PT/OT/Primary team to arrange safe disposition  - If going to TCU would recommend repeat orthostatic vital check in 3-4 days.  - Inpatient Neurology will sign off at this time     Patient discussed with attending Dr. Ivon Herrera PGY-3  Neurology resident     ATTENDING ADDENDUM: Pt discussed today with resident Dr Herrera but not seen. Agree with his assessment and recs as above. Elias Del Valle MD

## 2017-05-04 NOTE — PLAN OF CARE
Problem: Discharge Planning  Goal: Discharge Planning (Adult, OB, Behavioral, Peds)  -diagnostic tests and consults completed and resulted  -vital signs normal or at patient baseline  -safe disposition plan has been identified  Nurse to notify provider when observation goals have been met and patient is ready for discharge.   Outpatient/Observation goals to be met before discharge home:      -diagnostic tests and consults completed and resulted- in progress  -vital signs normal or at patient baseline- No, BP elevated  -safe disposition plan has been identified- in progress   Nurse to notify provider when observation goals have been met and patient is ready for discharge.

## 2017-05-04 NOTE — PROVIDER NOTIFICATION
Text paged team at [phone] re- -2 6D  Nailaw Helen  Pt orthostatic BP positive. See flow sheet.  thanks,  Shara MATIAS [phone]

## 2017-05-04 NOTE — PROGRESS NOTES
Social Work Services Discharge Note      Patient Name:  Helen Younger     Anticipated Discharge Date:  5/4/2017    Discharge Disposition:   TCU:  [facility]  ([phone] f: [phone])       Pre-Admission Screening (PAS) online form has been completed.  The Level of Care (LOC) is:  Determined  Confirmation Code is:  [id number]  Patient/caregiver informed of referral to [facility] for Pre-Admission Screening for skilled nursing facility (SNF) placement and to expect a phone call post discharge from SNF.     Additional Services/Equipment Arranged:   requests transportation be arranged. HE transport arranged for 6pm.      Patient / Family response to discharge plan:  Pt and  are in agreement w/ plan to d/c to [facility].      Persons notified of above discharge plan:  Pt and , care team, TCU    Staff Discharge Instructions:  Please fax discharge orders and signed hard scripts for any controlled substances.  Please print a packet and send with patient.     CTS Handoff completed:  NO    Medicare Notice of Rights provided to the patient/family:  NO

## 2017-05-04 NOTE — PROGRESS NOTES
Patient discharge via wheelchair to [facility]. D/c package sent with HE.  PIV removed.Pt's home medication sent with pt's  Patient discharged.

## 2017-05-04 NOTE — PROVIDER NOTIFICATION
Text paged team at [phone]   -2 6D  Aren Cervantes  Pt's /86.Please address.  Thanks  Shara MATIAS [phone]

## 2017-05-04 NOTE — PLAN OF CARE
Problem: Discharge Planning  Goal: Discharge Planning (Adult, OB, Behavioral, Peds)  -diagnostic tests and consults completed and resulted  -vital signs normal or at patient baseline  -safe disposition plan has been identified  Nurse to notify provider when observation goals have been met and patient is ready for discharge.   Outcome: Improving  -diagnostic tests and consults completed and resulted: yes, refer to results.  -vital signs normal or at patient baseline:yes  -safe disposition plan has been identified: pending insurance coverage to TCU recommendation.   Nurse to notify provider when observation goals have been met and patient is ready for discharge.

## 2017-05-04 NOTE — PLAN OF CARE
Problem: Discharge Planning  Goal: Discharge Planning (Adult, OB, Behavioral, Peds)  -diagnostic tests and consults completed and resulted  -vital signs normal or at patient baseline  -safe disposition plan has been identified  Nurse to notify provider when observation goals have been met and patient is ready for discharge.   -diagnostic tests and consults completed and resulted- YES  -vital signs normal or at patient baseline- No, BP elevated and positive othorstatic BP  -safe disposition plan has been identified- YES   Nurse to notify provider when observation goals have been met and patient is ready for discharge.

## 2017-05-04 NOTE — PROGRESS NOTES
[facility] Internal Medicine Progress Note          Assessment and Plan:   Assessment: Ms. Younger is a 78yo female with h/o ER/SC-positive, HER-2 negative metastatic breast cancer, CKD, CVA, HTN who presents with fall.    # Fall:  History of recurrent falls (7-8 in the last year). Recently completed home PT. No signs of arrythmia on admission EKG. Holter in 3/2017 without any arrythmia. Echo 9/2015 with normal EF, no diastolic dysfunction. No structural heart disease. Fall most likely related to orthostatic hypotension and autonomic dysfunction. Per neuro,dysautonomia and parkinsonian signs raise question of neurodegenerative disease such as Multiple systems atrophy.  - appreciate neuro recs  - PT/OT recommend TCU placement, SW working on placement options  - compression stockings  - 500cc fluid bolus today  - start fludrocortisone 0.05mg daily  - follow up with Neurology Movement disorders clinic upon d/c    # Sterile pyruia: worsening mental status and UA w/ positive leuk esterase and WBC, though urine culture with no growth  - d/c ceftriaxone (received 3 days)     # Deconditioning and frailty: likely due to metastatic breast cancer, possible neurodegenerative disease  - PT/ OT recommend TCU  - protein supplements      # Metastatic breast cancer: w/ pleural effusions and bony mets.   H/o Rx with lumpectomy, adjuvant chemotherapy with Taxotere, whole breast radiotherapy, and 5 years of adjuvant Arimidex, completed in 2012. Found to have bony mets and metastatic pleural effusion in 12/2015  - continue PTA letrozole, since patient is on obs, plan for patient's  to bring from home  - continue PTA Ibrance  - follows up with Hem/ onc. Most recently stable disease noted on imaging 12/2016.   - Holding PTA calcium/ vit D while on obs stay. Receives Xgeva every 3 months, last dose on 2/27/2017      # CKD:  creatinine is at baseline  - avoid nephrotoxins     # Hx of CVA: Head CT during her recent admission  "showed a possible new R middle fronatl gyrus infarct. Stable chronic L MCA territory infarct and stable 5mm posterior parafalcine calcified meningioma.   -  continue warfarin dosing per pharmacy recs     # HTN: blood pressures labile, SBP 160s-170s then had precipitous drop with prn hydralazine  - prn hydralazine discontinued  - would only correct hypertension if persistently > 190        # Chronic microcytic anemia: Likely 2/2 Chemotherapy vs MDS. Vit B12 and folate were normal recently  - Hgb stable, continue to monitor        PPx: already anticoagulated /none for GI/senna and docusate for bowel   FEN: regular diet  -IVF: none   Code status: DNR/ DNI  Dispo: PT/OT recommending TCU. Patient has 16 steps to get into her house and was only able to climb 3 stairs with PT today. Continues to be orthostatic. Patient has poor insight into her deconditioning. Family agreeable to TCU only if cost is covered. SW is looking into [facility].    Patient seen and discussed with Dr. Sarita Heart MD  Internal Medicine PGY-2  [phone]           Interval History:   No events overnight. Reports feeling \"ok\". Orthostatics positive today. Flat affect this morning. Denies any belly pain, fevers, nausea. Appetite is good.           Medications:     Current Facility-Administered Medications   Medication     warfarin (COUMADIN) tablet 2 mg     0.9% sodium chloride BOLUS     fludrocortisone (FLORINEF) half-tab 50 mcg     docusate sodium (COLACE) capsule 100 mg     oxybutynin (DITROPAN) tablet 5 mg     sennosides (SENOKOT) tablet 1 tablet     naloxone (NARCAN) injection 0.1-0.4 mg     acetaminophen (TYLENOL) tablet 650 mg     ondansetron (ZOFRAN-ODT) ODT tab 4 mg    Or     ondansetron (ZOFRAN) injection 4 mg     letrozole (FEMARA) tablet 2.5 mg     Warfarin Therapy Reminder (Check START DATE - warfarin may be starting in the FUTURE)     palbociclib (IBRANCE) capsule CHEMO 100 mg             Physical Exam:   Vitals were " reviewed  Blood pressure (!) 165/97, pulse 67, temperature 98.3  F (36.8  C), temperature source Oral, resp. rate 16, weight 81.7 kg (180 lb 2 oz), SpO2 95 %, not currently breastfeeding.    Physical Exam:   General: in NAD. Some speech latency and  HEENT: MMM, PERRLA, EOM intact  CV: RRR, normal S1S2, no murmur, clicks, rubs appreciated  Resp: Clear to auscultation bilaterally, no wheezes, rhonchi  Abd: Soft, non-tender, BS+, no masses appreciated  Extremities: Radial and pedal pulses intact and symmetric, no pedal edema  Neuro: Some speech latency and dysphasia, no cogwheel rididity         Data:   ROUTINE LABS (Last four results)  CMP    Recent Labs  Lab 05/04/17  0759 05/02/17  0755 05/01/17  1903 05/01/17  1825    141  --  143   POTASSIUM 3.9 3.6  --  4.4   CHLORIDE 110* 108  --  108   CO2 22 26  --  22   ANIONGAP 12 7  --  13   * 146*  --  179*   BUN 18 19  --  22   CR 1.02 0.99  --  1.27*   GFRESTIMATED 53* 54*  --  41*   GFRESTBLACK 64 66  --  50*   SHERYL 8.6 8.6  --  9.2   PROTTOTAL  --   --  6.9  --    ALBUMIN  --   --  3.5  --    BILITOTAL  --   --  1.0  --    ALKPHOS  --   --  40  --    AST  --   --  16  --    ALT  --   --  12  --      CBC    Recent Labs  Lab 05/04/17  0759 05/03/17  0732 05/01/17  1903 05/01/17  1825   WBC 2.7* 3.4* 5.0 Unsatisfactory specimen - clotted NOTIFIED CESIA SMITH RN ON ER 5/1/17 AT 1851 BY MO   RBC 2.80* 3.03* 3.24* Unsatisfactory specimen - clotted NOTIFIED CESIA SMITH RN ON ER 5/1/17 AT 1851 BY MO   HGB 9.3* 10.4* 10.9* Unsatisfactory specimen - clotted NOTIFIED CESIA SMITH RN ON ER 5/1/17 AT 1851 BY MO   HCT 29.4* 31.8* 34.1* Unsatisfactory specimen - clotted NOTIFIED CESIA SMITH RN ON ER 5/1/17 AT 1851 BY MO   * 105* 105* Unsatisfactory specimen - clotted NOTIFIED CESIA SMITH RN ON ER 5/1/17 AT 1851 BY MO   MCH 33.2* 34.3* 33.6* Unsatisfactory specimen - clotted NOTIFIED CESIA SMITH RN ON ER 5/1/17 AT 1851 BY MO   MCHC 31.6 32.7 32.0  Unsatisfactory specimen - clotted NOTIFIED CESIA SMITH RN ON ER 5/1/17 AT 1851 BY MO   RDW 15.1* 15.2* 14.5 Unsatisfactory specimen - clotted NOTIFIED CESIA SMITH RN ON ER 5/1/17 AT 1851 BY MO    224 293 Unsatisfactory specimen - clotted NOTIFIED CESIA SMITH RN ON ER 5/1/17 AT 1851 BY MO     INR    Recent Labs  Lab 05/04/17  0759 05/03/17  0732 05/01/17  1903 05/01/17  1825   INR 2.47* 2.30* 2.54* Canceled, Test credited Unsatisfactory specimen - clottedNOTIFIED RN Enmanuel Paz AT ER,5/1/17 @ 1900 BY      Arterial Blood GasNo lab results found in last 7 days.

## 2017-05-04 NOTE — PLAN OF CARE
Problem: Discharge Planning  Goal: Discharge Planning (Adult, OB, Behavioral, Peds)  -diagnostic tests and consults completed and resulted  -vital signs normal or at patient baseline  -safe disposition plan has been identified  Nurse to notify provider when observation goals have been met and patient is ready for discharge.   -diagnostic tests and consults completed and resulted- in progress  -vital signs normal or at patient baseline- No, BP elevated and positive othorstatic BP  -safe disposition plan has been identified- in progress   Nurse to notify provider when observation goals have been met and patient is ready for discharge.

## 2017-05-04 NOTE — PLAN OF CARE
Problem: Discharge Planning  Goal: Discharge Planning (Adult, OB, Behavioral, Peds)  -diagnostic tests and consults completed and resulted  -vital signs normal or at patient baseline  -safe disposition plan has been identified  Nurse to notify provider when observation goals have been met and patient is ready for discharge.   Outcome: No Change  Outpatient/Observation goals to be met before discharge home:     -diagnostic tests and consults completed and resulted- in progress  -vital signs normal or at patient baseline- No, BP elevated  -safe disposition plan has been identified- in progress   Nurse to notify provider when observation goals have been met and patient is ready for discharge.

## 2017-05-04 NOTE — DISCHARGE SUMMARY
Medicine Discharge Summary  Helen Younger MRN: [id number]  1940  Primary care provider: Arin Shahid  ___________________________________          Date of Admission:  5/1/2017  Date of Discharge:  5/4/2017   Admitting Physician:  Rubén Correa MD  Discharge Physician:  Zeenat Stein  Discharging Service:  Internal Medicine, [place] [address]     Primary Provider: Arin Shahid         Reason for Admission:   Fall    From H&P 5/2/17:  77 year old  female with a history of ER/NM-positive, HER-2 negative metastatic breast cancer, CKD, CVA, HTN who presents with fall. She had two falls today, first at 11 am and second at 3:30 pm. Both seemed mechanical from history with legs giving out. The first one happened in the bathroom and  rushed to see her from the other room. The second one happened while moving from kitchen to bedroom. She was assisted by  when the second fall occurred, with legs giving out. She did not lose consciousness with any of the falls and did not hit her head either. She denies any palpitations, shortness of breath, chest pain or dizziness prior to the falls. Otherwise, she has normal appetite, denies any nausea, vomiting, abdominal pain. She has stable constipation. She denies any dysuria or fever, chills, sweats. Repots weight loss of 40lbs over the last year. She denies any new focal weakness in arms or legs or facial deviation, has some stable aphasia and ataxia from previous MCA stroke.           Discharge Diagnosis:   Orthostatic hypotension  Autonomic dysfunction  Sterile Pyuria  Deconditioning  Metastatic breast cancer with pleural effusions and bony mets  History of CVA             Procedures & Significant Findings:   UA: large leukocyte esterase, 25 WBC  Urine culture with < 10,000 urogenital geoff         Consultations:   Neurology         Hospital Course by Problem:    1. Fall 2/2  orthostatic hypotension  She has a history of recurrent falls (7-8 in the last year). Recently completed home PT. No signs of arrythmia or structural heart disease to suggest syncope. Orthostatics positive on admission with labile blood pressures noted. She received IV fluids for presumed volume depletion contributing to her orthostasis. Additionally, parkinsonism (shuffling gait, camptocormia, dysarthria) and autonomic dysfunction raise concern for neurodegenerative disease such as multiple systems atrophy.  -- fludrocortisone 0.05mg daily for orthostatics  --compression stockings  -- TCU for PT/OT  -- follow up with Neurology movement disorders clinic upon discharge    2. Deconditioning and frailty  The patient has 16 stairs to get into her home, and was able to only to 3 with physical therapy. Discussed with patient's  that her current living situation may not be sustainable long term if she continues to decline and that she may require a higher level of care in the future.    3. Orthostatic hypotension, autonomic dysfunction  Blood pressures very labile, with systolics 160-180. She was given IV hydralazine x 1 and BP precipitously dropped, requiring IV fluids. Would only treat elevated blood pressure if systolic blood pressure remains elevated > 180 while sitting and standing.  -- fludrocorticone as above  -- compression stockings    4. Sterile pyuria  Initially treated with ceftriaxone given UA with positive leuk esterase and WBC, though urine culture with no growth so ceftriaxone discontinued after day 3.     5. Metastatic breast cancer with pleural effusions and bony mets  History of lumpectomy, adjuvant chemotherapy with Taxotere, whole breast radiotherapy,a dn 5 years of adjuvant Arimidex completed in 2012. Found to have bony mets and metastatic pleural effusion in 12/2015. Stable disease noted on imaging 12/2016. Receives Xgeva every 3 months, last dose 2/27/2017.  -- continue PTA letrozole,  Ibrance    6. History of CVA  Dysarthria and dysphasia is at baseline, per her .  -- continue warfarin      Physical Exam on day of Discharge:  Blood pressure (!) 165/97, pulse 67, temperature 98.3  F (36.8  C), temperature source Oral, resp. rate 16, weight 81.7 kg (180 lb 2 oz), SpO2 95 %, not currently breastfeeding.  General: in NAD. Some speech latency and  HEENT: MMM, PERRLA, EOM intact  CV: RRR, normal S1S2, no murmur, clicks, rubs appreciated  Resp: Clear to auscultation bilaterally, no wheezes, rhonchi  Abd: Soft, non-tender, BS+, no masses appreciated  Extremities: Radial and pedal pulses intact and symmetric, no pedal edema  Neuro: Some speech latency and dysphasia, no cogwheel rididity             Pending Results:   none         Discharge Medications:     Current Discharge Medication List      CONTINUE these medications which have NOT CHANGED    Details   !! order for DME Equipment being ordered: portable commode  Qty: 1 Units, Refills: 0    Associated Diagnoses: Physical deconditioning      warfarin (JANTOVEN) 1 MG tablet Take 1 tablet (1mg) by mouth on Monday, Wednesday, Friday. Take 2 tablets (2 mg) by mouth on Tuesday and Thursday.  Qty: 30 tablet, Refills: 0    Associated Diagnoses: History of stroke      letrozole (FEMARA) 2.5 MG tablet Take 1 tablet (2.5 mg) by mouth daily  Qty: 90 tablet, Refills: 3    Associated Diagnoses: Malignant neoplasm of female breast, unspecified laterality, unspecified site of breast (H); Pleural effusion      magnesium hydroxide (MILK OF MAGNESIA) 400 MG/5ML suspension Take 30-60 mLs by mouth daily as needed for constipation or heartburn  Qty: 360 mL, Refills: 0    Associated Diagnoses: Constipation      sennosides (SENOKOT) 8.6 MG tablet Take 1 tablet by mouth 2 times daily May increase to 2 tabs twice daily if needed  Qty: 120 each, Refills: 0    Associated Diagnoses: Constipation      simvastatin (ZOCOR) 20 MG tablet Take 1 tablet (20 mg) by mouth At  Bedtime  Qty: 90 tablet, Refills: 1    Associated Diagnoses: Hyperlipidemia LDL goal <100      oxybutynin (DITROPAN) 5 MG tablet TAKE 1 TABLET (5 MG) BY MOUTH 2 TIMES DAILY  Refills: 11      !! order for DME Equipment being ordered:  Incontinence pads   Patient uses these tid  Qty: 100 each, Refills: 3    Associated Diagnoses: Mixed incontinence      !! order for DME Equipment being ordered: wheeled walker  Qty: 1 each, Refills: 0    Associated Diagnoses: Malignant neoplasm of female breast, unspecified laterality, unspecified site of breast (H); Pleural effusion      DOCUSATE SODIUM 1 tablet 2 times daily       Multiple Vitamins-Minerals (OCUVITE ADULT FORMULA PO) Take 1 tablet by mouth daily.      VIACTIV OR Take 1 chew tab by mouth 2 times daily. One in the morning and one at bedtime.     Associated Diagnoses: Chest pain       !! - Potential duplicate medications found. Please discuss with provider.               Discharge Instructions and Follow-Up:     Discharge Procedure Orders  Home care nursing referral   Referral Type: Home Health Therapies & Aides     Home Care PT Referral for Hospital Discharge   Referral Type: Home Health Therapies & Aides     Home Care OT Referral for Hospital Discharge     Home Care Social Service Referral for Hospital Discharge                   Discharge Disposition:   TCU          Condition on Discharge:   Discharge condition: Fair   Code status on discharge: DNR / DNI        Date of service: 5/4/2017    The patient was discussed with Dr. Stein.    Awilda Heart MD PGY-2  Internal Medicine  [phone]

## 2017-05-04 NOTE — PLAN OF CARE
Problem: Goal Outcome Summary  Goal: Goal Outcome Summary  OT 6D:  Per chart review and discussion with PT, no IP OT needs at this time.  Pt. assessed and d/c recs provided by PT.  Pt. to d/c to TCU to continue PT/OT in rehab setting.

## 2017-05-05 NOTE — TELEPHONE ENCOUNTER
This patient was discharged from [facility] on 05/04/2017.    Discharge Diagnosis: Cerebral Infarction, Unspecified Mechanism (H), Weakness    Follow-up instructions: Follow up with [facility] upon discharge from TCU.     A follow-up visit has not been scheduled.      Please follow-up with patient.

## 2017-05-05 NOTE — TELEPHONE ENCOUNTER
Forms received from: [facility]   Phone number listed: [phone]   Fax listed: [phone]  Date received: 05/05/2017  Form description: HHC and Plan of Care  Once forms are completed, please return to [facility] via fax.  Is patient requesting to be contacted when forms are completed: NA    Form placed: In provider's nurse basket  Romina Pierce

## 2017-05-05 NOTE — TELEPHONE ENCOUNTER
Patient is going to TCU, will be unable to call her for follow up.  Is advised to follow up with Neuro once D/C'd from TCU.    Routed to PCP for FYI.    Kamla Fofana RN  [facility]

## 2017-05-05 NOTE — PLAN OF CARE
Problem: Goal Outcome Summary  Goal: Goal Outcome Summary  Physical Therapy Discharge Summary     Reason for therapy discharge:    Discharged to transitional care facility.     Progress towards therapy goal(s). See goals on Care Plan in [facility] electronic health record for goal details.  Goals partially met.  Barriers to achieving goals:   discharge from facility.     Therapy recommendation(s):    Continued therapy is recommended.  Rationale/Recommendations:  TCU to progress strengthening and IND with functional mobility.

## 2017-05-08 NOTE — TELEPHONE ENCOUNTER
Forms received from: [facility]   Phone number listed: [phone]   Fax listed: [phone]  Date received: 05/08/2017  Form description: INR/Coumadin  Once forms are completed, please return to [facility] via fax.  Is patient requesting to be contacted when forms are completed: NA    Form placed: In provider's basket  Romina Pierce

## 2017-05-09 NOTE — TELEPHONE ENCOUNTER
Requested information faxed to number provided. Routing to provider to sign attached orders and sign encounter.

## 2017-05-09 NOTE — TELEPHONE ENCOUNTER
Meds reviewed.    Form placed in PCP's basket and encounter routed.    Kamla Fofana RN  [facility] [place]

## 2017-05-10 NOTE — TELEPHONE ENCOUNTER
Oral Chemotherapy Monitoring Program    Primary Oncologist: Dr. Landis  Primary Oncology Clinic: [facility]  Cancer Diagnosis: Breast      Therapy History: Confirmed patient will delay Ibrance cycle start one week until after Dr. Landis appointment 5/22, may continue Ibrance 100mg once daily for 21 days then 7 days off, cycle q28 days on cycle 5/24  2/3/16- Start date   3/30/17 to 4/18/17- Ibrance on hold due to persistent weakness and history of neutropenia  4/19/17- Ibrance cycle restarted       Drug Interaction Assessment: No new drug interactions.        Lab Monitoring Plan                              Monitoring Plan:      CBC, CMP   C2D1+   Call, CBC, CMP   C3D1+   Call, CBC, CMP   C4D1+   Call, CBC, CMP   C5D1+   Call, CBC, CMP   C6D1+   Call, CBC, CMP        C2D8+      C3D8+      C4D8+      C5D8+      C6D8+        CBC   C2D15+   CBC   C3D15+      C4D15+      C5D15+      C6D15+           C2D22+      C3D22+      C4D22+      C5D22+      C6D22+            Subjective/Objective:  Helen Younger is a 77 year old female contacted by phone for a follow-up visit for oral chemotherapy.  Spoke to her  German, he stated that he will not reorder Ibrance until after follow up appointment with Dr. Landis on 5/22. She was admitted to the hospital 5/2 after having two falls and going to the ED on 5/1. She was discharged on 5/4 with no medication changes. German states that Helen has persistent fatigue and weakness. He did not have any questions and appreciated the follow up. He was agreeable to follow up after the next oncology appointment if reorder is necessary.     ORAL CHEMOTHERAPY 5/26/2016 7/7/2016 10/6/2016 2/20/2017 3/24/2017 4/18/2017 5/10/2017   Drug Name Ibrance (Palbociclib) Ibrance (Palbociclib) Ibrance (Palbociclib) Ibrance (Palbociclib) Ibrance (Palbociclib) Ibrance (Palbociclib) Ibrance (Palbociclib)   Current Dosage 100mg 100mg 100mg 100mg 100mg 100mg 100mg   Current Schedule Daily Daily Daily Daily Daily Daily  Daily   Cycle Details 3 weeks on 1 week off 3 weeks on 1 week off 3 weeks on 1 week off 3 weeks on 1 week off 3 weeks on 1 week off 3 weeks on 1 week off 3 weeks on 1 week off   Start Date of Last Cycle 5/11/2016 7/7/2016 9/29/2016 2/2/2017 3/2/2017 3/2/2017 4/19/2017   Planned next cycle start date - - - 3/2/2017 3/30/2017 4/19/2017 5/24/2017   Doses missed in last 2 weeks 0 0 0 0 0 0 0   Adherence Assessment - - - Adherent Adherent Non-adherent Non-adherent   Reason for Non-adherence - - - - - Drug on hold Drug on hold   Adherence Intervention Recommended - - - - - None None   Adverse Effects Mucositis/mouth sores/ mouth pain Mucositis/mouth sores/ mouth pain Oral mucositis;Fatigue Fatigue Fatigue;Other (see note for details) No AE identified during assessment Fatigue   Oral Mucositis mild mild - - - - -   Fatigue - - - Grade 1 Grade 1 - Grade 2   Pharmacist Intervention(fatigue) - - - Yes Yes - No   Intervention(s) - - - Patient education Patient education - -   Other (see note for details) - - - - Low pulse - -   Pharmacist intervention? - - - - No - -   Home BPs not done - not needed not needed all BPs<140/90 all BPs<140/90 not needed   Any new drug interactions? - - - No No No No   Is the dose as ordered appropriate for the patient? - - - Yes Yes Yes Yes   Is the patient currently in pain? - - - No No No No   Has the patient been assessed within the past 6 months for depression? - - - Yes - Yes Yes   Has the patient missed any days of school, work, or other routine activity? - - - No No No Yes   Days missed in the past month: - - - - - - More than 5 days       Last PHQ-2 Score on record:   PHQ-2 ( 1999 [employer]) 2/27/2017 12/26/2016   Q1: Little interest or pleasure in doing things 0 0   Q2: Feeling down, depressed or hopeless 0 0   PHQ-2 Score 0 0       Patient does not report depression symptoms.      Vitals:  BP:   BP Readings from Last 1 Encounters:   05/04/17 166/86     Wt Readings from Last 1 Encounters:  "  05/04/17 81.7 kg (180 lb 2 oz)     Estimated body surface area is 1.89 meters squared as calculated from the following:    Height as of 3/25/17: 1.575 m (5' 2\").    Weight as of 5/4/17: 81.7 kg (180 lb 2 oz).    Labs:  Lab Results   Component Value Date     05/04/2017      Lab Results   Component Value Date    POTASSIUM 3.9 05/04/2017     Lab Results   Component Value Date    SHERYL 8.6 05/04/2017     Lab Results   Component Value Date    ALBUMIN 3.5 05/01/2017     Lab Results   Component Value Date    MAG 2.3 03/27/2017     Lab Results   Component Value Date    PHOS 2.0 03/27/2017     Lab Results   Component Value Date    BUN 18 05/04/2017     Lab Results   Component Value Date    CR 1.02 05/04/2017       Lab Results   Component Value Date    AST 16 05/01/2017     Lab Results   Component Value Date    ALT 12 05/01/2017     Lab Results   Component Value Date    BILITOTAL 1.0 05/01/2017       Lab Results   Component Value Date    WBC 2.7 05/04/2017     Lab Results   Component Value Date    HGB 9.3 05/04/2017     Lab Results   Component Value Date     05/04/2017     Lab Results   Component Value Date    ANEU 3.5 05/01/2017         Assessment:  The Ibrance cycle is delayed 1 week due to patient's persistent weakness. Helen went to the ED after having two falls on 5/1 and was discharged on 5/4.     Plan:  Ibrance cycle is delayed 1 week until after Dr. Landis appointment.      Follow-Up:  Dr. Landis appointment + labs 5/22/17.  Call patient for refill if needed following appointment.  Oral oncology management in 4 weeks.    Refill Due:  Next cycle may start 5/24/17. Current Rx has 2 refills remaining.        Thank you for the opportunity to participate in this patient's care,     Rio Freeman, PharmD  Therapy Management Oncology Pharmacist  [facility]   Phone: [phone]  "

## 2017-05-18 NOTE — PROGRESS NOTES
This writer also sent an email to [facility] on 4/17. After X3 attempts to contact [facility] , I have no received a response.     Per [facility], patient is still open to home care. It was restarted after [facility] discharge on 5/4.       This writer will check [facility] again in 2-3 weeks to check status.    Eve Unger

## 2017-05-22 NOTE — MR AVS SNAPSHOT
After Visit Summary   5/22/2017    Helen Younger    MRN: [id number]           Patient Information     Date Of Birth          1940        Visit Information        Provider Department      5/22/2017 4:00 PM Keisha Landis MD [facility]        Today's Diagnoses     Malignant neoplasm of female breast, unspecified laterality, unspecified site of breast (H)           Follow-ups after your visit        Who to contact     If you have questions or need follow up information about today's clinic visit or your schedule please contact [facility] directly at [phone].  Normal or non-critical lab and imaging results will be communicated to you by MyChart, letter or phone within 4 business days after the clinic has received the results. If you do not hear from us within 7 days, please contact the clinic through [employer] or phone. If you have a critical or abnormal lab result, we will notify you by phone as soon as possible.  Submit refill requests through [employer] or call your pharmacy and they will forward the refill request to us. Please allow 3 business days for your refill to be completed.          Additional Information About Your Visit        MyChart Information     [employer] gives you secure access to your electronic health record. If you see a primary care provider, you can also send messages to your care team and make appointments. If you have questions, please call your primary care clinic.  If you do not have a primary care provider, please call [phone] and they will assist you.        Care EveryWhere ID     This is your Care EveryWhere ID. This could be used by other organizations to access your [facility] medical records  [id number]        Your Vitals Were     Pulse Temperature Respirations Pulse Oximetry BMI (Body Mass Index)       88 98.2  F (36.8  C) (Oral) 16 98% 33.31 kg/m2        Blood Pressure from Last 3 Encounters:   05/22/17 98/61    05/04/17 166/86   04/17/17 132/89    Weight from Last 3 Encounters:   05/22/17 82.6 kg (182 lb 1.6 oz)   05/04/17 81.7 kg (180 lb 2 oz)   04/17/17 83.4 kg (183 lb 14.4 oz)              We Performed the Following     CA27.29  BREAST TUMOR MARKER     CEA     Comprehensive metabolic panel          Today's Medication Changes          These changes are accurate as of: 5/22/17 11:59 PM.  If you have any questions, ask your nurse or doctor.               These medicines have changed or have updated prescriptions.        Dose/Directions    warfarin 1 MG tablet   Commonly known as:  JANTOVEN   This may have changed:    - how much to take  - how to take this  - when to take this  - additional instructions   Used for:  History of stroke        Take 1 tablet (1mg) by mouth on Monday, Wednesday, Friday. Take 2 tablets (2 mg) by mouth on Tuesday and Thursday.   Quantity:  30 tablet   Refills:  0                Primary Care Provider Office Phone # Fax #    Arin Shahid [phone]       [address] [address]        Thank you!     Thank you for choosing [facility]  for your care. Our goal is always to provide you with excellent care. Hearing back from our patients is one way we can continue to improve our services. Please take a few minutes to complete the written survey that you may receive in the mail after your visit with us. Thank you!             Your Updated Medication List - Protect others around you: Learn how to safely use, store and throw away your medicines at www.disposemymeds.org.          This list is accurate as of: 5/22/17 11:59 PM.  Always use your most recent med list.                   Brand Name Dispense Instructions for use    DOCUSATE SODIUM      1 tablet 2 times daily       fludrocortisone 0.1 MG tablet    FLORINEF     Take 0.5 tablets (0.05 mg) by mouth daily       letrozole 2.5 MG tablet    FEMARA    90 tablet     Take 1 tablet (2.5 mg) by mouth daily       magnesium hydroxide 400 MG/5ML suspension    MILK OF MAGNESIA    360 mL    Take 30-60 mLs by mouth daily as needed for constipation or heartburn       OCUVITE ADULT FORMULA PO      Take 1 tablet by mouth daily.       * order for DME     1 each    Equipment being ordered: wheeled walker       * order for DME     100 each    Equipment being ordered:  Incontinence pads   Patient uses these tid       * order for DME     1 Units    Equipment being ordered: portable commode       palbociclib 100 MG capsule CHEMO    IBRANCE     Take 1 capsule (100 mg) by mouth daily (with breakfast)       sennosides 8.6 MG tablet    SENOKOT    120 each    Take 1 tablet by mouth 2 times daily May increase to 2 tabs twice daily if needed       simvastatin 20 MG tablet    ZOCOR    90 tablet    Take 1 tablet (20 mg) by mouth At Bedtime       VIACTIV PO      Take 1 chew tab by mouth 2 times daily. One in the morning and one at bedtime.       warfarin 1 MG tablet    JANTOVEN    30 tablet    Take 1 tablet (1mg) by mouth on Monday, Wednesday, Friday. Take 2 tablets (2 mg) by mouth on Tuesday and Thursday.       * Notice:  This list has 3 medication(s) that are the same as other medications prescribed for you. Read the directions carefully, and ask your doctor or other care provider to review them with you.

## 2017-05-22 NOTE — NURSING NOTE
"Oncology Rooming Note    May 22, 2017 5:10 PM   Helen Younger is a 77 year old female who presents for:    Chief Complaint   Patient presents with     Blood Draw     Labs collected peripherally by RN.      Oncology Clinic Visit     BREAST CA     Initial Vitals: BP 98/61  Pulse 88  Temp 98.2  F (36.8  C) (Oral)  Resp 16  Wt 82.6 kg (182 lb 1.6 oz)  SpO2 98%  BMI 33.31 kg/m2 Estimated body mass index is 33.31 kg/(m^2) as calculated from the following:    Height as of 3/25/17: 1.575 m (5' 2\").    Weight as of this encounter: 82.6 kg (182 lb 1.6 oz). Body surface area is 1.9 meters squared.  Data Unavailable Comment: Data Unavailable   No LMP recorded. Patient is postmenopausal.  Allergies reviewed: Yes  Medications reviewed: Yes    Medications: Medication refills not needed today.  Pharmacy name entered into 5app:    [facility]  [facility] [id number] [facility] - [place], [place] - [address] [place] [address] [address]VE [facility] - [place], [place] - [address] [facility] - [place], [place] - [address] [place] [address]    Clinical concerns: n/a     5 minutes for nursing intake (face to face time)     MATTHEW FISHER CMA              "

## 2017-05-22 NOTE — NURSING NOTE
Chief Complaint   Patient presents with     Blood Draw     Labs collected peripherally by RN.      Labs collected via venipuncture by RN.     Leisa Connors RN

## 2017-05-22 NOTE — PROGRESS NOTES
[facility]IN[facility]  ONCOLOGY FOLLOW-UP VISIT NOTE    PATIENT NAME: Helen Younger    YOB: 1940  MRN :[id number]  DATE OF VISIT: May 22, 2017    REASON FOR VISIT : follow-up of metastatic breast cancer .         DIAGNOSIS :   Helen Younger is 77 year old woman who, in March of 2006, was diagnosed with a left-sided breast cancer. She presented with pain in the left breast, and a diagnostic mammogram demonstrated abnormalities in the left breast. The region of abnormality was biopsied, and she was found to have an infiltrating ductal carcinoma. She subsequently went on to undergo a left-sided lumpectomy and sentinel lymph node biopsy at the [facility] by Dr. Salas Cook in April of 2006. The patient had a 2.5 x 2.1 cm tumor excised. It was a grade 2 infiltrating ductal carcinoma. Her sentinel lymph node biopsy was negative on frozen section, and final pathology did demonstrate micrometastasis measuring 0.2 cm. The tumor itself was ER-positive, NV-positive, and HER2-negative. The patient did have a positive margin for which she underwent a reexcision in June of 2006. The subsequent pathology was negative.     The patient was then seen by Dr. Jong Dacosta, and she was encouraged to receive adjuvant chemotherapy. She received three cycles of FEC every three weeks followed by Taxotere single-agent administered every three weeks for three cycles. She completed chemotherapy in mid to late October of 2006. Her only complication was neutropenic fever following her last dose, for which she was briefly hospitalized. She otherwise had no issues with residual neuropathies or any other known complications from her chemo.     The patient was started on adjuvant Arimidex on November 6, 2006. She also received whole breast radiotherapy with boost to the left breast and tumor bed from November 15, 2006, through January 5, 2007. This was performed by Dr. Nicholas at [facility]. It  is unknown if she received kevon radiation. The patient' portacath was removed in July of 2007. She completed five years of arimidex and remained off therapy.     She represented in December 2015 with shortness of breath and pleural effusion, requiring hospitalization. She had three thoracenteses while inpatient, with cytology revealing for a metastatic breast cancer, ER/CA-positive, HER-2 negative. On her workup, she also has disease in her bones after CT scan of the chest, abdomen and pelvis. She met with Dr. Landis on 1/11/2016 and was started on endocrine therapy with AI. Then added Ibrance on 2/3/2016. She was dose reduced after cycle 1 to 100 mg for cytopenias .    INTERVAL HISTORY: Helen presents to clinic for a routine visit. She is accompanied by her . She has had multiple hospitalizations in the past few months.  Most recently she was hospitalized from 05/01 to 05/04 after she presented with multiple falls.  She was discharged to a rehab center and is currently still residing there.  Her  states that she is gradually gaining her strength back.  She has been participating with physical therapy actively.  Her palbociclib was interrupted in March 2017 due to cytopenias and mucositis.  It was restarted on 04/19/2017.  She has been on 3 weeks on and 1 week off schedule.  She completed recently the 3-week course.  Today she feels better.  Her main complaint is urinary incontinence.  This has been an issue for quite a long time.  She tried anticholinergics in the past which had other unwanted significant side effects and she could not continue using this class of medications.  She denies having bowel incontinence.  She has no lower back pain, numbness or tingling sensation of her lower extremities. She has no fever or chills.  She reports a good appetite.  She states her energy is slowly improving.  She has been eating and drinking fairly well.  Her bowels are moving okay.  She denies constipation or  diarrhea.  She has no shortness of breath or cough.  She has no pain.  Overall, she is getting better gradually.        Otherwise, 10 point ROS negative     Meds  Current Outpatient Prescriptions   Medication     fludrocortisone (FLORINEF) 0.1 MG tablet     palbociclib (IBRANCE) 100 MG capsule CHEMO     order for DME     warfarin (JANTOVEN) 1 MG tablet     letrozole (FEMARA) 2.5 MG tablet     magnesium hydroxide (MILK OF MAGNESIA) 400 MG/5ML suspension     sennosides (SENOKOT) 8.6 MG tablet     simvastatin (ZOCOR) 20 MG tablet     order for DME     order for DME     DOCUSATE SODIUM     Multiple Vitamins-Minerals (OCUVITE ADULT FORMULA PO)     VIACTIV OR     No current facility-administered medications for this visit.           Allergies   Allergen Reactions     Diuretic      Don't remember side effect     Zyrtec [Cetirizine Hcl]      Elevated blood pressure       PHYSICAL EXAMINATION  BP 98/61  Pulse 88  Temp 98.2  F (36.8  C) (Oral)  Resp 16  Wt 82.6 kg (182 lb 1.6 oz)  SpO2 98%  BMI 33.31 kg/m2   Wt Readings from Last 4 Encounters:   05/22/17 82.6 kg (182 lb 1.6 oz)   05/04/17 81.7 kg (180 lb 2 oz)   04/17/17 83.4 kg (183 lb 14.4 oz)   03/30/17 86 kg (189 lb 9.5 oz)     GENERAL : Delightful woman . Alert and oriented , not in distress .   HEENT: Normocephalic head.  Anicteric sclerae. Moist buccal mucosa. No oral ulceration. Pupils are equal and reactive bilaterally. Normal extraocular eye movements.    NECK: Supple, no thyromegaly.   LYMPH:  No cervical, supraclavicular, axillary, epitrochlear, or inguinal lymphadenopathy.  LUNGS: Clear breath sounds bilaterally, no wheezing, no crackles.    CARDIOVASCULAR: S1 and S2 well heard, regular rate and rhythm, no murmur or gallop.   ABDOMEN: Soft, nontender, positive bowel sounds. No organomegaly was appreciated.  EXTREMITIES: No cyanosis, no clubbing, no edema.    SKIN: No skin rash, no purpura or petechiae.    NEUROLOGIC: Normal mental status. Alert and oriented  .Cranial nerves II through XII grossly intact.  No focal weakness. Sensory examination grossly intact.    LABS:  CBC   Lab Results   Component Value Date/Time    WBC 2.7 (L) 05/04/2017 07:59 AM    HGB 9.3 (L) 05/04/2017 07:59 AM     05/04/2017 07:59 AM    ANEU 3.5 05/01/2017 07:03 PM      BMP   Lab Results   Component Value Date/Time     05/22/2017 04:51 PM    POTASSIUM 3.4 05/22/2017 04:51 PM    BUN 19 05/22/2017 04:51 PM    CR 1.03 05/22/2017 04:51 PM    SHERYL 10.3 (H) 05/22/2017 04:51 PM      LFTs   Lab Results   Component Value Date/Time    BILITOTAL 0.7 05/22/2017 04:51 PM    ALKPHOS 44 05/22/2017 04:51 PM    AST 18 05/22/2017 04:51 PM    ALT 14 05/22/2017 04:51 PM     CEA  Lab Results   Component Value Date/Time    CEA 2.1 04/17/2017 01:03 PM    CEA 1.9 02/27/2017 02:17 PM    CEA 1.9 01/24/2017 02:23 PM    CEA 2.0 11/30/2016 02:31 PM    CEA 2.3 10/26/2016 01:24 PM    CEA 2.3 09/26/2016 10:00 AM    CEA 2.0 08/30/2016 01:14 PM    CEA 2.1 08/01/2016 11:08 AM    CEA 2.4 06/30/2016 01:45 PM    CEA 1.8 05/31/2016 01:37 PM     CA27-29  Lab Results   Component Value Date/Time    [id number] 40 (H) 04/17/2017 01:03 PM    [id number] 38 02/27/2017 02:17 PM    [id number] 34 01/24/2017 02:23 PM    [id number] 38 12/26/2016 01:48 PM    [id number] 36 11/30/2016 02:31 PM    [id number] 38 10/26/2016 01:24 PM    [id number] 40 (H) 09/26/2016 10:00 AM    [id number] 45 (H) 08/30/2016 01:14 PM    [id number] 35 08/01/2016 11:08 AM    [id number] 29 06/30/2016 01:45 PM         IMAGING:  Results for orders placed or performed during the hospital encounter of 05/22/17   PET Oncology Whole Body    Narrative    Combined Report of: PET and CT on 5/22/2017 3:43 PM :    1. PET of the neck, chest, abdomen, and pelvis.  2. PET CT Fusion for Attenuation Correction and Anatomical  Localization:  Images were evaluated in axial, coronal, and sagittal  planes in bone, soft tissue, and lung windows.   3. Diagnostic CT scan of the chest, abdomen, and pelvis with  intravenous  contrast for interpretation.  4. 3D MIP and PET-CT fused images were processed on an independent  workstation and archived to PACS and reviewed by a radiologist.    Technique:    1. PET: The patient received 10.5 mCi of F-18-FDG; the serum glucose  was 143 prior to administration, body weight was 81.7 kg. Images were  evaluated in the axial, sagittal, and coronal planes as well as the  rotational whole body MIP. Images were acquired from the Vertex to the  Feet.    UPTAKE WAS MEASURED AT 69 MINUTES.     2. CT: Volumetric acquisition for clinical interpretation of the  chest, abdomen, and pelvis acquired at 3 mm sections after the  uneventful administration of intravenous contrast. The chest, abdomen,  and pelvis were evaluated at 5 mm sections in bone, soft tissue, and  lung windows.  The patient received 98 cc of Isovue 370 intravenously  for the examination.  --    INDICATION: Malignant neoplasm of unspecified site of unspecified  female breast    ADDITIONAL INFORMATION OBTAINED FROM EMR: Left breast cancer status  post lumpectomy, chemotherapy, and radiation in 2006 with subsequent  development of metastatic disease in December 20, 2015, now status  post hormonal and Ibrance therapy    COMPARISON: PET/CT, 12/23/2016, 7/28/2016, and 4/4/2016    FINDINGS:     HEAD/NECK:  A hypermetabolic 5 mm nodule in the deep lobe of the left parotid  gland has a maximum SUV of 6.44) is unchanged in size from 4/4/2016,  likely an intraparotid lymph node.     There is unchanged encephalomalacia in the left frontal and temporal  lobes, consistent with a chronic infarction. The paranasal sinuses are  clear as are the mastoid air cells. The mucosal pharyngeal space, the  , prevertebral and carotid spaces are within normal limits.  No masses, mass effect or pathologically enlarged lymph nodes are  evident. Numerous thyroid nodules are unchanged and are non-FDG avid,  no further follow-up is required.    CHEST:  There is no  suspicious FDG uptake in the chest.      There are no pathologically enlarged mediastinal, hilar or axillary  lymph nodes. Stable postoperative changes in the left breast and  axilla. The heart is stably enlarged. A loculated left pleural  effusion with associated left lung opacities is similar to previous.  Stable reticular opacities in the left lung anteriorly, most  consistent with changes of radiation therapy. There are no suspicious  pulmonary nodules. No pericardial effusion or pneumothorax. Stable  partially calcified atherosclerotic plaque in the thoracic aorta and  its branches, the great vessels of the chest are otherwise within  normal limits.    ABDOMEN AND PELVIS:  There is no suspicious FDG uptake in the abdomen or pelvis.    Stable too small to characterize hepatic hypodensities without  associated abnormal FDG uptake, likely small cysts. Depending  calcified gallstones are unchanged without gallbladder wall thickening  or pericholecystic fluid to suggest cholecystitis. There is no  splenomegaly or evidence for splenic or pancreatic mass lesion. A  small splenule is unchanged. No There are no suspicious adrenal mass  lesions. There is symmetric nephrographic renal phase without  hydronephrosis. Simple renal cysts are unchanged. A cortical scar is  again noted on the right. Stable nonobstructing left renal calculi.  There is no evidence for diverticulitis, bowel obstruction or free  fluid.  The uterus and adnexa are within normal limits for the  patient's age. Stable partially calcified atherosclerotic plaque in  the abdominal aorta and its branches, the major abdominal vessels are  patent and normal in caliber.    EXTREMITIES:   No abnormal masses or hypermetabolic lesions    BONES:   Numerous sclerotic lesions are unchanged and do not demonstrate  increased FDG uptake. No new bone lesions are identified. Stable  degenerative changes in the spine, hips, and glenohumeral joints.      Impression     IMPRESSION:   1. In this patient with a history of metastatic breast cancer, there  is no evidence of recurrent disease.  2. Non-FDG avid sclerotic lesions throughout the skeleton are stable  and consistent with treated metastases. No new bone lesions are  identified.  3. Stable loculated left pleural effusion with associated atelectasis.    I have personally reviewed the examination and initial interpretation  and I agree with the findings.    MARIAM MANE MD     ASSESSMENT AND PLAN:  Helen Younger is a 77 year old female with history of ER/SC-positive, HER-2 negative breast cancer treated with lumpectomy, adjuvant chemotherapy with Taxotere, whole breast radiotherapy, and 5 years of adjuvant Arimidex, completed in 2012.   She represented in December 2015 with shortness of breath and pleural effusion, found to have metastatic disease involving the bones and pleural cavity.    1.  Metastatic breast cancer.  She is currently on palbociclib and letrozole with overall stable disease.  PET/CT scan from today showed stable disease without evidence of recurrent disease.  She has been tolerating this regimen fairly well.  Today her labs looks stable.  However, her CBC sample have clotted on the tube. Given that her count has been stable in the past, we would prefer to resume the palbociclib as scheduled.  Tumor markers from today are pending.  She will return back to clinic in a few weeks for followup.      2.  Bone metastasis.  She was on Xgeva in the past on a monthly basis.  She has now switched to every 3 months due to a large copay.  We will continue Xgeva every 3 months.      3.  Malignant pleural effusion.  She had multiple visits with thoracic surgeons in the past.  Currently she is not symptomatic.  She does not need intervention at this point.  However, if it gets worse she will be referred back to the Thoracic Surgery team.       4.  Deconditioning and recurrent falls.  She is currently at a rehab center.   They are anticipating that she will go back home in a week time.  We encouraged her to continue doing physical therapy and stay active.  She was advised to be cautious as to not fall.         Patient seen and plan discussed with Dr Landis.        Jodee Mandujano MD  Hematology and Oncology Fellow  [phone] (Pager)     Pt was seen and evaluated by me.  I edited the above note to reflect my evaluation.  Stable disease.  Continue AI and ibrance.      She is recovering at NH and hopes to return home soon.  Supportive care provided.    Keisha Landis MD

## 2017-05-22 NOTE — LETTER
5/22/2017       RE: Helen Younger  [address]  [place] [address]     Dear Colleague,    Thank you for referring your patient, Helen Younger, to the [facility]. Please see a copy of my visit note below.    [facility]  ONCOLOGY FOLLOW-UP VISIT NOTE    PATIENT NAME: Helen Younger    YOB: 1940  MRN :[id number]  DATE OF VISIT: May 22, 2017    REASON FOR VISIT : follow-up of metastatic breast cancer .         DIAGNOSIS :   Heeln Younger is 77 year old woman who, in March of 2006, was diagnosed with a left-sided breast cancer. She presented with pain in the left breast, and a diagnostic mammogram demonstrated abnormalities in the left breast. The region of abnormality was biopsied, and she was found to have an infiltrating ductal carcinoma. She subsequently went on to undergo a left-sided lumpectomy and sentinel lymph node biopsy at the [facility] by Dr. Salas Cook in April of 2006. The patient had a 2.5 x 2.1 cm tumor excised. It was a grade 2 infiltrating ductal carcinoma. Her sentinel lymph node biopsy was negative on frozen section, and final pathology did demonstrate micrometastasis measuring 0.2 cm. The tumor itself was ER-positive, SD-positive, and HER2-negative. The patient did have a positive margin for which she underwent a reexcision in June of 2006. The subsequent pathology was negative.     The patient was then seen by Dr. Jong Dacosta, and she was encouraged to receive adjuvant chemotherapy. She received three cycles of FEC every three weeks followed by Taxotere single-agent administered every three weeks for three cycles. She completed chemotherapy in mid to late October of 2006. Her only complication was neutropenic fever following her last dose, for which she was briefly hospitalized. She otherwise had no issues with residual neuropathies or any other known complications from her chemo.     The patient  was started on adjuvant Arimidex on November 6, 2006. She also received whole breast radiotherapy with boost to the left breast and tumor bed from November 15, 2006, through January 5, 2007. This was performed by Dr. Nicholas at [facility]. It is unknown if she received kevon radiation. The patient' portacath was removed in July of 2007. She completed five years of arimidex and remained off therapy.     She represented in December 2015 with shortness of breath and pleural effusion, requiring hospitalization. She had three thoracenteses while inpatient, with cytology revealing for a metastatic breast cancer, ER/SD-positive, HER-2 negative. On her workup, she also has disease in her bones after CT scan of the chest, abdomen and pelvis. She met with Dr. Landis on 1/11/2016 and was started on endocrine therapy with AI. Then added Ibrance on 2/3/2016. She was dose reduced after cycle 1 to 100 mg for cytopenias .    INTERVAL HISTORY: Helen presents to clinic for a routine visit. She is accompanied by her . She has had multiple hospitalizations in the past few months.  Most recently she was hospitalized from 05/01 to 05/04 after she presented with multiple falls.  She was discharged to a rehab center and is currently still residing there.  Her  states that she is gradually gaining her strength back.  She has been participating with physical therapy actively.  Her palbociclib was interrupted in March 2017 due to cytopenias and mucositis.  It was restarted on 04/19/2017.  She has been on 3 weeks on and 1 week off schedule.  She completed recently the 3-week course.  Today she feels better.  Her main complaint is urinary incontinence.  This has been an issue for quite a long time.  She tried anticholinergics in the past which had other unwanted significant side effects and she could not continue using this class of medications.  She denies having bowel incontinence.  She has no lower back pain, numbness or  tingling sensation of her lower extremities. She has no fever or chills.  She reports a good appetite.  She states her energy is slowly improving.  She has been eating and drinking fairly well.  Her bowels are moving okay.  She denies constipation or diarrhea.  She has no shortness of breath or cough.  She has no pain.  Overall, she is getting better gradually.        Otherwise, 10 point ROS negative     Meds  Current Outpatient Prescriptions   Medication     fludrocortisone (FLORINEF) 0.1 MG tablet     palbociclib (IBRANCE) 100 MG capsule CHEMO     order for DME     warfarin (JANTOVEN) 1 MG tablet     letrozole (FEMARA) 2.5 MG tablet     magnesium hydroxide (MILK OF MAGNESIA) 400 MG/5ML suspension     sennosides (SENOKOT) 8.6 MG tablet     simvastatin (ZOCOR) 20 MG tablet     order for DME     order for DME     DOCUSATE SODIUM     Multiple Vitamins-Minerals (OCUVITE ADULT FORMULA PO)     VIACTIV OR     No current facility-administered medications for this visit.           Allergies   Allergen Reactions     Diuretic      Don't remember side effect     Zyrtec [Cetirizine Hcl]      Elevated blood pressure       PHYSICAL EXAMINATION  BP 98/61  Pulse 88  Temp 98.2  F (36.8  C) (Oral)  Resp 16  Wt 82.6 kg (182 lb 1.6 oz)  SpO2 98%  BMI 33.31 kg/m2   Wt Readings from Last 4 Encounters:   05/22/17 82.6 kg (182 lb 1.6 oz)   05/04/17 81.7 kg (180 lb 2 oz)   04/17/17 83.4 kg (183 lb 14.4 oz)   03/30/17 86 kg (189 lb 9.5 oz)     GENERAL : Delightful woman . Alert and oriented , not in distress .   HEENT: Normocephalic head.  Anicteric sclerae. Moist buccal mucosa. No oral ulceration. Pupils are equal and reactive bilaterally. Normal extraocular eye movements.    NECK: Supple, no thyromegaly.   LYMPH:  No cervical, supraclavicular, axillary, epitrochlear, or inguinal lymphadenopathy.  LUNGS: Clear breath sounds bilaterally, no wheezing, no crackles.    CARDIOVASCULAR: S1 and S2 well heard, regular rate and rhythm, no  murmur or gallop.   ABDOMEN: Soft, nontender, positive bowel sounds. No organomegaly was appreciated.  EXTREMITIES: No cyanosis, no clubbing, no edema.    SKIN: No skin rash, no purpura or petechiae.    NEUROLOGIC: Normal mental status. Alert and oriented .Cranial nerves II through XII grossly intact.  No focal weakness. Sensory examination grossly intact.    LABS:  CBC   Lab Results   Component Value Date/Time    WBC 2.7 (L) 05/04/2017 07:59 AM    HGB 9.3 (L) 05/04/2017 07:59 AM     05/04/2017 07:59 AM    ANEU 3.5 05/01/2017 07:03 PM      BMP   Lab Results   Component Value Date/Time     05/22/2017 04:51 PM    POTASSIUM 3.4 05/22/2017 04:51 PM    BUN 19 05/22/2017 04:51 PM    CR 1.03 05/22/2017 04:51 PM    SHERYL 10.3 (H) 05/22/2017 04:51 PM      LFTs   Lab Results   Component Value Date/Time    BILITOTAL 0.7 05/22/2017 04:51 PM    ALKPHOS 44 05/22/2017 04:51 PM    AST 18 05/22/2017 04:51 PM    ALT 14 05/22/2017 04:51 PM     CEA  Lab Results   Component Value Date/Time    CEA 2.1 04/17/2017 01:03 PM    CEA 1.9 02/27/2017 02:17 PM    CEA 1.9 01/24/2017 02:23 PM    CEA 2.0 11/30/2016 02:31 PM    CEA 2.3 10/26/2016 01:24 PM    CEA 2.3 09/26/2016 10:00 AM    CEA 2.0 08/30/2016 01:14 PM    CEA 2.1 08/01/2016 11:08 AM    CEA 2.4 06/30/2016 01:45 PM    CEA 1.8 05/31/2016 01:37 PM     CA27-29  Lab Results   Component Value Date/Time    PN3638 40 (H) 04/17/2017 01:03 PM    [id number] 38 02/27/2017 02:17 PM    [id number] 34 01/24/2017 02:23 PM    [id number] 38 12/26/2016 01:48 PM    [id number] 36 11/30/2016 02:31 PM    [id number] 38 10/26/2016 01:24 PM    [id number] 40 (H) 09/26/2016 10:00 AM    [id number] 45 (H) 08/30/2016 01:14 PM    [id number] 35 08/01/2016 11:08 AM    [id number] 29 06/30/2016 01:45 PM         IMAGING:  Results for orders placed or performed during the hospital encounter of 05/22/17   PET Oncology Whole Body    Narrative    Combined Report of: PET and CT on 5/22/2017 3:43 PM :    1. PET of the neck, chest, abdomen, and pelvis.  2.  PET CT Fusion for Attenuation Correction and Anatomical  Localization:  Images were evaluated in axial, coronal, and sagittal  planes in bone, soft tissue, and lung windows.   3. Diagnostic CT scan of the chest, abdomen, and pelvis with  intravenous contrast for interpretation.  4. 3D MIP and PET-CT fused images were processed on an independent  workstation and archived to PACS and reviewed by a radiologist.    Technique:    1. PET: The patient received 10.5 mCi of F-18-FDG; the serum glucose  was 143 prior to administration, body weight was 81.7 kg. Images were  evaluated in the axial, sagittal, and coronal planes as well as the  rotational whole body MIP. Images were acquired from the Vertex to the  Feet.    UPTAKE WAS MEASURED AT 69 MINUTES.     2. CT: Volumetric acquisition for clinical interpretation of the  chest, abdomen, and pelvis acquired at 3 mm sections after the  uneventful administration of intravenous contrast. The chest, abdomen,  and pelvis were evaluated at 5 mm sections in bone, soft tissue, and  lung windows.  The patient received 98 cc of Isovue 370 intravenously  for the examination.  --    INDICATION: Malignant neoplasm of unspecified site of unspecified  female breast    ADDITIONAL INFORMATION OBTAINED FROM EMR: Left breast cancer status  post lumpectomy, chemotherapy, and radiation in 2006 with subsequent  development of metastatic disease in December 20, 2015, now status  post hormonal and Ibrance therapy    COMPARISON: PET/CT, 12/23/2016, 7/28/2016, and 4/4/2016    FINDINGS:     HEAD/NECK:  A hypermetabolic 5 mm nodule in the deep lobe of the left parotid  gland has a maximum SUV of 6.44) is unchanged in size from 4/4/2016,  likely an intraparotid lymph node.     There is unchanged encephalomalacia in the left frontal and temporal  lobes, consistent with a chronic infarction. The paranasal sinuses are  clear as are the mastoid air cells. The mucosal pharyngeal space, the  ,  prevertebral and carotid spaces are within normal limits.  No masses, mass effect or pathologically enlarged lymph nodes are  evident. Numerous thyroid nodules are unchanged and are non-FDG avid,  no further follow-up is required.    CHEST:  There is no suspicious FDG uptake in the chest.      There are no pathologically enlarged mediastinal, hilar or axillary  lymph nodes. Stable postoperative changes in the left breast and  axilla. The heart is stably enlarged. A loculated left pleural  effusion with associated left lung opacities is similar to previous.  Stable reticular opacities in the left lung anteriorly, most  consistent with changes of radiation therapy. There are no suspicious  pulmonary nodules. No pericardial effusion or pneumothorax. Stable  partially calcified atherosclerotic plaque in the thoracic aorta and  its branches, the great vessels of the chest are otherwise within  normal limits.    ABDOMEN AND PELVIS:  There is no suspicious FDG uptake in the abdomen or pelvis.    Stable too small to characterize hepatic hypodensities without  associated abnormal FDG uptake, likely small cysts. Depending  calcified gallstones are unchanged without gallbladder wall thickening  or pericholecystic fluid to suggest cholecystitis. There is no  splenomegaly or evidence for splenic or pancreatic mass lesion. A  small splenule is unchanged. No There are no suspicious adrenal mass  lesions. There is symmetric nephrographic renal phase without  hydronephrosis. Simple renal cysts are unchanged. A cortical scar is  again noted on the right. Stable nonobstructing left renal calculi.  There is no evidence for diverticulitis, bowel obstruction or free  fluid.  The uterus and adnexa are within normal limits for the  patient's age. Stable partially calcified atherosclerotic plaque in  the abdominal aorta and its branches, the major abdominal vessels are  patent and normal in caliber.    EXTREMITIES:   No abnormal masses or  hypermetabolic lesions    BONES:   Numerous sclerotic lesions are unchanged and do not demonstrate  increased FDG uptake. No new bone lesions are identified. Stable  degenerative changes in the spine, hips, and glenohumeral joints.      Impression    IMPRESSION:   1. In this patient with a history of metastatic breast cancer, there  is no evidence of recurrent disease.  2. Non-FDG avid sclerotic lesions throughout the skeleton are stable  and consistent with treated metastases. No new bone lesions are  identified.  3. Stable loculated left pleural effusion with associated atelectasis.    I have personally reviewed the examination and initial interpretation  and I agree with the findings.    MARIAM MANE MD     ASSESSMENT AND PLAN:  Helen Younger is a 77 year old female with history of ER/MD-positive, HER-2 negative breast cancer treated with lumpectomy, adjuvant chemotherapy with Taxotere, whole breast radiotherapy, and 5 years of adjuvant Arimidex, completed in 2012.   She represented in December 2015 with shortness of breath and pleural effusion, found to have metastatic disease involving the bones and pleural cavity.    1.  Metastatic breast cancer.  She is currently on palbociclib and letrozole with overall stable disease.  PET/CT scan from today showed stable disease without evidence of recurrent disease.  She has been tolerating this regimen fairly well.  Today her labs looks stable.  However, her CBC sample have clotted on the tube. Given that her count has been stable in the past, we would prefer to resume the palbociclib as scheduled.  Tumor markers from today are pending.  She will return back to clinic in a few weeks for followup.      2.  Bone metastasis.  She was on Xgeva in the past on a monthly basis.  She has now switched to every 3 months due to a large copay.  We will continue Xgeva every 3 months.      3.  Malignant pleural effusion.  She had multiple visits with thoracic surgeons in the  past.  Currently she is not symptomatic.  She does not need intervention at this point.  However, if it gets worse she will be referred back to the Thoracic Surgery team.       4.  Deconditioning and recurrent falls.  She is currently at a rehab center.  They are anticipating that she will go back home in a week time.  We encouraged her to continue doing physical therapy and stay active.  She was advised to be cautious as to not fall.         Patient seen and plan discussed with Dr Landis.        Jodee Mandujano MD  Hematology and Oncology Fellow  [phone] (Pager)     Pt was seen and evaluated by me.  I edited the above note to reflect my evaluation.  Stable disease.  Continue AI and ibrance.      She is recovering at NH and hopes to return home soon.  Supportive care provided.    Keisha Landis MD

## 2017-05-25 NOTE — PROGRESS NOTES
Called TCU: [facility] ([facility])  TCU: [phone] Fax: [phone] Spoke to nurse Sherice and will have Calcium drawn on May 30, 17 and fax to the [facility] that day for Dr Lnadis to review as recommended. Jolie Farley RN, BSN

## 2017-05-30 NOTE — TELEPHONE ENCOUNTER
"Helen's  called in with two questions. They are wondering if because her PET does not show recurrence that they would consider that remission (per Dr. Landis' note Helen has \"stable disease\"), and if she should continue to take Ibrance (per Dr. Landis' note yes, this should be continued). Went over this information with .    Mary Campos RN  "

## 2017-06-01 NOTE — PROGRESS NOTES
Received repeat calcium of 10.3 on 5/30/17 from [facility] at the TCU where patient resides. Discussed with Dr Landis Calcium result that is WNL. Jolie Farley RN, BSN

## 2017-06-02 NOTE — TELEPHONE ENCOUNTER
"German called back, he states they did not get around to addressing POLST with oncology, he states Helen was in ER and then in TCU for physical therapy and strengthening.  He states she is due to be discharged home tomorrow.   Advised him he could address this with discharging nurse/provider.   He doubts this would be useful, states \"they are not too organized there\" and he is not sure he will have contact with doctor before she leaves.  They will be getting [facility] once she is home.    Advised him to ask the nurse about POLST; perhaps the RN can assist them in filling one out or request SWS consult for this and then they can bring form for oncology to sign off on.    I again described to him the problem: the choice under the \"limited interventions\" section needs to have one or the other choice checked for intubation trial or no intubation.  German thanked me for \"sticking with this\" and verbalized understanding.  I advised I'd check back in a few weeks to follow up.  Postponing encounter for 3 weeks.  Ani Burnett RN  [facility]      "

## 2017-06-02 NOTE — TELEPHONE ENCOUNTER
Attempted to call patient/patient's  [facility] number, left message on answering service requesting call back to clinic to discuss.  See other encounter for Rony regarding another issue for him, Rony is the point of contact for Helen's care.  Ani Burnett RN  [facility]

## 2017-06-05 NOTE — TELEPHONE ENCOUNTER
Reason for Call: Request for an order or referral:    Order or referral being requested: Mariama with [facility] calling to request orders for Home Health, nursing 2 times a week for 1 week, decrease to 1 time a week for 3 weeks, 3 PRN, HHA twice a week for 4 weeks, PT/OT 1 Day 1 to eval and treat, ST and SW 1 Day 1 to eval    Date needed: as soon as possible    Has the patient been seen by the PCP for this problem? Not Applicable    Additional comments:     Phone number Patient can be reached at:  Other phone number:  [phone]    Best Time:  any    Can we leave a detailed message on this number?  YES    Call taken on 6/5/2017 at 11:52 AM by Romina Pierce

## 2017-06-05 NOTE — TELEPHONE ENCOUNTER
HHA/PT/OT/ST/SW ordered as requested per [place] [place] [facility] standing orders for Home Care, Assisted Living or Nursing Home Evaluations and Treatments, Mammogram (Reflex diagnostic), FIT test, Colonoscopy.     Kamla Fofana RN  [facility]

## 2017-06-06 NOTE — TELEPHONE ENCOUNTER
Reason for Call:  INR    Who is calling?  Home Care: [place]    Phone number:  [phone]    Fax number:  N/A    Name of caller: Vika    INR Value:  2.2    Are there any other concerns:  No, Just if there are any changes please call Vika    Can we leave a detailed message on this number? YES    Phone number patient can be reached at: Other phone number:  N/A      Thank you!  Evonne GORMAN  Patient Representative  [facility]      Call taken on 6/6/2017 at 1:07 PM by Evonne Kulkarni

## 2017-06-06 NOTE — MR AVS SNAPSHOT
Helen LONDON Younger   6/6/2017   Anticoagulation Therapy Visit    Description:  77 year old female   Provider:  Arin Shahid MD   Department:  Cp Nurse           INR as of 6/6/2017     Today's INR 2.2      Anticoagulation Summary as of 6/6/2017     INR goal 2.0-3.0   Today's INR 2.2   Full instructions 2 mg every day   Next INR check 6/13/2017    Indications   Long-term (current) use of anticoagulants [Z79.01] [Z79.01]  Cerebral infarction (H) [I63.9]         Contact Numbers     [facility]  Please call [phone] or [phone]  to cancel and/or reschedule your appointment.   Please call [phone] with any problems or questions regarding your therapy          June 2017 Details    Sun Mon Tue Wed Thu Fri Sat         1               2               3                 4               5               6      2 mg   See details      7      2 mg         8      2 mg         9      2 mg         10      2 mg           11      2 mg         12      2 mg         13            14               15               16               17                 18               19               20               21               22               23               24                 25               26               27               28               29               30                 Date Details   06/06 This INR check       Date of next INR:  6/13/2017         How to take your warfarin dose     To take:  2 mg Take 2 of the 1 mg tablets.

## 2017-06-06 NOTE — TELEPHONE ENCOUNTER
Reason for Call: Request for an order or referral:    Order or referral being requested: Home Physical Therapy 1 time a week for 1 week and 2 times a week for 3 weeks.    Date needed: as soon as possible    Has the patient been seen by the PCP for this problem? YES    Additional comments: Please call Leann, physical therapist, back for verbal orders.    Phone number Patient can be reached at:  Other phone number:  [place]: [phone]    Best Time:  Anytime    Can we leave a detailed message on this number?  YES    Call taken on 6/6/2017 at 1:32 PM by Tamara Hoskins

## 2017-06-06 NOTE — PROGRESS NOTES
ANTICOAGULATION FOLLOW-UP CLINIC VISIT    Patient Name:  Helen Younger  Date:  6/6/2017  Contact Type:  Telephone/ Marisol MATIAS [facility] [phone] reports patient is not on abx or steroid.  she has been on 2mg warfarin daily in rehab.    SUBJECTIVE:     Patient Findings     Positives Other complaints (pt was just d/c from rehab), No Problem Findings           OBJECTIVE    INR   Date Value Ref Range Status   06/06/2017 2.2  Final       ASSESSMENT / PLAN  INR assessment THER    Recheck INR In: 1 WEEK    INR Location Homecare INR      Anticoagulation Summary as of 6/6/2017     INR goal 2.0-3.0   Today's INR 2.2   Maintenance plan 2 mg (1 mg x 2) every day   Full instructions 2 mg every day   Weekly total 14 mg   Plan last modified Soumya Garza, RN (6/6/2017)   Next INR check 6/13/2017   Target end date     Indications   Long-term (current) use of anticoagulants [Z79.01] [Z79.01]  Cerebral infarction (H) [I63.9]         Anticoagulation Episode Summary     INR check location     Preferred lab     Send INR reminders to  ANTICO CLINIC    Comments       Anticoagulation Care Providers     Provider Role Specialty Phone number    Arin Shahid MD  Internal Medicine [phone]            See the Encounter Report to view Anticoagulation Flowsheet and Dosing Calendar (Go to Encounters tab in chart review, and find the Anticoagulation Therapy Visit)    Dosage adjustment made based on physician directed care plan.    Soumya Garza RN

## 2017-06-07 NOTE — PROGRESS NOTES
[facility] now requests orders and shares plan of care/discharge summaries for some patients through [employer].  Please REPLY TO THIS MESSAGE in order to give authorization for orders when needed.  This is considered a verbal order, you will still receive a faxed copy of orders for signature.  Thank you for your assistance in improving collaboration for our patients.    ORDER    ST to treat [id number] for communication, follow up on home exercises program from the TCU per Pt goals. Plan also to include monitoring of meds, pain, skin, and falls risk.    Pt presents with significant expressive language impairment across several domains. Her deficits have been long term since CVA in 2000. However, due to recent falls, health declines, etc., she had experienced an increased in word finding difficulty and per Pt/spouse was making some improvement at TCU. Based on this and on Pts goal to continue working on HEP, skilled ST is indicated to address goals below for max possible independence and QOL.    GOALS  To improve communication for safety in the home and community, by 6/29/17, the patient will  1. state/utilize 3 strategies to compensate for word fiding and speech intelligibility with minimal cues   2. complete moderate level word finding tasks at the phrase, sentence, and short conversation level w/ 80 percent acc and min cues  3. demo completion of HEP as directed in 80 percent of opps given min assistance    Aby Casillas MA, CCC-SLP  Speech-Language Pathologist  [facility]  [email or web address]  [phone]

## 2017-06-09 NOTE — TELEPHONE ENCOUNTER
Forms received from: [facility]   Phone number listed: [phone]   Fax listed: [phone]  Date received: 06/09/2017  Form description: PT 1 Week 1, 2 Week 3  Once forms are completed, please return to [facility] via fax.  Is patient requesting to be contacted when forms are completed: NA    Form placed: In provider's basket  Romina Pierce

## 2017-06-09 NOTE — TELEPHONE ENCOUNTER
Forms received from: [facility]   Phone number listed: [phone]   Fax listed: [phone]  Date received: 06/09/2017  Form description: SN-INR/Coumadin  Once forms are completed, please return to [facility] via fax.  Is patient requesting to be contacted when forms are completed: NA    Form placed: In provider's basket  Romina Pierce

## 2017-06-12 NOTE — TELEPHONE ENCOUNTER
Forms received from: [facility]   Phone number listed: [phone]   Fax listed: [phone]  Date received: 06/12/2017  Form description: PT 1 Week 1, ST 1 Day 1, SW 1 Day 1, SN 2 Week 1, 1 Week 3, 3 As Needed, OT 1 Week 1, HA 2 Week 4  Once forms are completed, please return to [facility] via fax.  Is patient requesting to be contacted when forms are completed: NA    Form placed: In provider's basket  Romina Pierce

## 2017-06-13 NOTE — PROGRESS NOTES
ANTICOAGULATION FOLLOW-UP CLINIC VISIT    Patient Name:  Helen Younger  Date:  6/13/2017  Contact Type:  Telephone/ [facility] calling in INR result.  reports meds are the same.  no changes or problems.  pt looks better this week than last week.    SUBJECTIVE:     Patient Findings     Positives No Problem Findings           OBJECTIVE    INR   Date Value Ref Range Status   06/13/2017 2.1  Final       ASSESSMENT / PLAN  INR assessment THER    Recheck INR In: 2 WEEKS    INR Location [facility] INR      Anticoagulation Summary as of 6/13/2017     INR goal 2.0-3.0   Today's INR 2.1   Maintenance plan 2 mg (1 mg x 2) every day   Full instructions 2 mg every day   Weekly total 14 mg   No change documented Soumya Garza RN   Plan last modified Soumya Garza RN (6/6/2017)   Next INR check 6/27/2017   Target end date     Indications   Long-term (current) use of anticoagulants [Z79.01] [Z79.01]  Cerebral infarction (H) [I63.9]         Anticoagulation Episode Summary     INR check location     Preferred lab     Send INR reminders to [facility]    Comments       Anticoagulation Care Providers     Provider Role Specialty Phone number    Arin Shahid MD  Internal Medicine [phone]            See the Encounter Report to view Anticoagulation Flowsheet and Dosing Calendar (Go to Encounters tab in chart review, and find the Anticoagulation Therapy Visit)    Dosage adjustment made based on physician directed care plan.    Soumya Garza RN

## 2017-06-13 NOTE — MR AVS SNAPSHOT
Helen LONDON Younger   6/13/2017   Anticoagulation Therapy Visit    Description:  77 year old female   Provider:  Arin Shahid MD   Department:  [facility]           INR as of 6/13/2017     Today's INR 2.1      Anticoagulation Summary as of 6/13/2017     INR goal 2.0-3.0   Today's INR 2.1   Full instructions 2 mg every day   Next INR check 6/27/2017    Indications   Long-term (current) use of anticoagulants [Z79.01] [Z79.01]  Cerebral infarction (H) [I63.9]         Contact Numbers     [facility]  Please call [phone] or [phone]  to cancel and/or reschedule your appointment.   Please call [phone] with any problems or questions regarding your therapy          June 2017 Details    Sun Mon Tue Wed Thu Fri Sat         1               2               3                 4               5               6               7               8               9               10                 11               12               13      2 mg   See details      14      2 mg         15      2 mg         16      2 mg         17      2 mg           18      2 mg         19      2 mg         20      2 mg         21      2 mg         22      2 mg         23      2 mg         24      2 mg           25      2 mg         26      2 mg         27            28               29               30                 Date Details   06/13 This INR check       Date of next INR:  6/27/2017         How to take your warfarin dose     To take:  2 mg Take 2 of the 1 mg tablets.

## 2017-06-14 NOTE — TELEPHONE ENCOUNTER
Forms received from: [facility]   Phone number listed: [phone]   Fax listed: [phone]  Date received: 06/14/2017  Form description: ST 5 Month 1  Once forms are completed, please return to [facility] via fax.  Is patient requesting to be contacted when forms are completed: NA    Form placed: In provider's basket  Romina Pierce

## 2017-06-14 NOTE — TELEPHONE ENCOUNTER
Forms received from: [facility]   Phone number listed: [phone]   Fax listed: [phone]  Date received: 06/14/2017  Form description: OT 2 Week 2  Once forms are completed, please return to [facility] via fax.  Is patient requesting to be contacted when forms are completed: NA    Form placed: In provider's basket  Romina Pierce

## 2017-06-15 NOTE — TELEPHONE ENCOUNTER
Reason for Call:  Other call back    Detailed comments: Ana Maria from  Home Care Called and has a few questions reguarding the patients Health Care Directive, please call back at [phone]    Phone Number can be reached at: [phone]  Best Time:Anytime  Can we leave a detailed message on this number? YES    Call taken on 6/15/2017 at 1:16 PM by Nicki Jimenez

## 2017-06-15 NOTE — TELEPHONE ENCOUNTER
I called Ana Maria with  home care, she states that Helen has an updated POLST now done.  Ana Maria will send for processing to the ACP team.    Ani Burnett RN  [facility]

## 2017-06-16 NOTE — TELEPHONE ENCOUNTER
Forms received from: [facility]   Phone number listed: [phone]   Fax listed: [phone]  Date received: 06/16/2017  Form description: SN-INR/Coumadin  Once forms are completed, please return to [facility] via fax.  Is patient requesting to be contacted when forms are completed: NA    Form placed: In provider's basket  Romina Pierce

## 2017-06-16 NOTE — TELEPHONE ENCOUNTER
See 6/15/17 phone encounter, home nursing states they have completed an updated POLST and will have this processed into Helen's chart.  Ani Burnett RN  [facility]

## 2017-06-20 NOTE — TELEPHONE ENCOUNTER
Forms received from: [facility]   Phone number listed: [phone]   Fax listed: [phone]  Date received: 06/20/2017  Form description: POLST  Once forms are completed, please return to [facility]  via fax.  Is patient requesting to be contacted when forms are completed: NA    Form placed: In provider's basket  Romina Pierce

## 2017-06-22 NOTE — MR AVS SNAPSHOT
After Visit Summary   6/22/2017    Helen Younger    MRN: [id number]           Patient Information     Date Of Birth          1940        Visit Information        Provider Department      6/22/2017 2:00 PM Jess Avalos PA-C [facility]        Today's Diagnoses     Malignant neoplasm of female breast, unspecified laterality, unspecified site of breast (H)    -  1       Follow-ups after your visit        Your next 10 appointments already scheduled     Jul 25, 2017  1:45 PM CDT   [facility] Draw with  [facility] LAB DRAW   [facility] ([facility])    [address] [place] [address]   [phone]            Jul 25, 2017  2:30 PM CDT   (Arrive by 2:15 PM)   Return Visit with Jess Avalos PA-C   [facility] ([facility])    [address] [place] [address]   [phone]            Aug 28, 2017  1:15 PM CDT   [facility] Draw with  [facility] LAB DRAW   [facility] ([facility])    [address] [place] [address]   [phone]            Aug 28, 2017  2:00 PM CDT   (Arrive by 1:45 PM)   Return Visit with Keisha Landis MD   [facility] ([facility])    [address] [place] [address]   [phone]              Future tests that were ordered for you today     Open Standing Orders        Priority Remaining Interval Expires Ordered    CBC with platelets differential Routine 10/10  4/22/2018 6/22/2017    Comprehensive metabolic panel Routine 10/10  4/22/2018 6/22/2017    CEA Routine 10/10  4/22/2018 6/22/2017    Ca27.29  breast tumor marker Routine 10/10  4/22/2018 6/22/2017            Who to contact     If you have questions or need follow up information about today's clinic visit or your  "schedule please contact [facility] directly at [phone].  Normal or non-critical lab and imaging results will be communicated to you by MyChart, letter or phone within 4 business days after the clinic has received the results. If you do not hear from us within 7 days, please contact the clinic through [employer] or phone. If you have a critical or abnormal lab result, we will notify you by phone as soon as possible.  Submit refill requests through [employer] or call your pharmacy and they will forward the refill request to us. Please allow 3 business days for your refill to be completed.          Additional Information About Your Visit        Triblio Information     [employer] gives you secure access to your electronic health record. If you see a primary care provider, you can also send messages to your care team and make appointments. If you have questions, please call your primary care clinic.  If you do not have a primary care provider, please call [phone] and they will assist you.        Care EveryWhere ID     This is your Care EveryWhere ID. This could be used by other organizations to access your [facility] medical records  [id number]        Your Vitals Were     Pulse Temperature Respirations Height Pulse Oximetry BMI (Body Mass Index)    78 98  F (36.7  C) (Oral) 15 1.575 m (5' 2\") 97% 32.74 kg/m2       Blood Pressure from Last 3 Encounters:   06/22/17 100/69   05/22/17 98/61   05/04/17 166/86    Weight from Last 3 Encounters:   06/22/17 81.2 kg (179 lb)   05/22/17 82.6 kg (182 lb 1.6 oz)   05/04/17 81.7 kg (180 lb 2 oz)                 Today's Medication Changes          These changes are accurate as of: 6/22/17  3:37 PM.  If you have any questions, ask your nurse or doctor.               These medicines have changed or have updated prescriptions.        Dose/Directions    warfarin 1 MG tablet   Commonly known as:  JUANJOSETOVEN   This may have changed:    - how much to take  - how to take " this  - when to take this  - additional instructions   Used for:  History of stroke        Take 1 tablet (1mg) by mouth on Monday, Wednesday, Friday. Take 2 tablets (2 mg) by mouth on Tuesday and Thursday.   Quantity:  30 tablet   Refills:  0                Primary Care Provider Office Phone # Fax #    Arin Shahid [phone]       [facility] [address] [facility] [address]        Equal Access to Services     YOMI LIAO : Hadii aad ku hadasho Soomaali, waaxda luqadaha, qaybta kaalmada adeegyada, waxay idiin hayaan adebrian chapa. So [facility] [phone].    ATENCIÓN: Si habla español, tiene a sanchez disposición servicios gratuitos de asistencia lingüística. [facility] [phone].    We comply with applicable federal civil rights laws and [place] laws. We do not discriminate on the basis of race, color, national origin, age, disability sex, sexual orientation or gender identity.            Thank you!     Thank you for choosing [facility]  for your care. Our goal is always to provide you with excellent care. Hearing back from our patients is one way we can continue to improve our services. Please take a few minutes to complete the written survey that you may receive in the mail after your visit with us. Thank you!             Your Updated Medication List - Protect others around you: Learn how to safely use, store and throw away your medicines at www.disposemymeds.org.          This list is accurate as of: 6/22/17  3:37 PM.  Always use your most recent med list.                   Brand Name Dispense Instructions for use Diagnosis    DOCUSATE SODIUM      1 tablet 2 times daily        fludrocortisone 0.1 MG tablet    FLORINEF     Take 0.5 tablets (0.05 mg) by mouth daily    Orthostatic hypotension       letrozole 2.5 MG tablet    FEMARA    90 tablet    Take 1 tablet (2.5 mg) by mouth daily    Malignant neoplasm of female breast, unspecified  laterality, unspecified site of breast (H), Pleural effusion       magnesium hydroxide 400 MG/5ML suspension    MILK OF MAGNESIA    360 mL    Take 30-60 mLs by mouth daily as needed for constipation or heartburn    Constipation       OCUVITE ADULT FORMULA PO      Take 1 tablet by mouth daily.        * order for DME     1 each    Equipment being ordered: wheeled walker    Malignant neoplasm of female breast, unspecified laterality, unspecified site of breast (H), Pleural effusion       * order for DME     100 each    Equipment being ordered:  Incontinence pads   Patient uses these tid    Mixed incontinence       * order for DME     1 Units    Equipment being ordered: portable commode    Physical deconditioning       palbociclib 100 MG capsule CHEMO    IBRANCE     Take 1 capsule (100 mg) by mouth daily (with breakfast)    Malignant neoplasm of female breast, unspecified laterality, unspecified site of breast (H)       sennosides 8.6 MG tablet    SENOKOT    120 each    Take 1 tablet by mouth 2 times daily May increase to 2 tabs twice daily if needed    Constipation       simvastatin 20 MG tablet    ZOCOR    90 tablet    Take 1 tablet (20 mg) by mouth At Bedtime    Hyperlipidemia LDL goal <100       VIACTIV PO      Take 1 chew tab by mouth 2 times daily. One in the morning and one at bedtime.    Chest pain       warfarin 1 MG tablet    JANTOVEN    30 tablet    Take 1 tablet (1mg) by mouth on Monday, Wednesday, Friday. Take 2 tablets (2 mg) by mouth on Tuesday and Thursday.    History of stroke       * Notice:  This list has 3 medication(s) that are the same as other medications prescribed for you. Read the directions carefully, and ask your doctor or other care provider to review them with you.

## 2017-06-22 NOTE — PROGRESS NOTES
HEMATOLOGY/ONCOLOGY PROGRESS NOTE  Jun 22, 2017    REASON FOR VISIT: follow-up of metastatic breast cancer    DIAGNOSIS:   The patient is a 76-year-old woman who, in March of 2006, was diagnosed with a left-sided breast cancer. She presented with pain in the left breast, and a diagnostic mammogram demonstrated abnormalities in the left breast. The region of abnormality was biopsied, and she was found to have an infiltrating ductal carcinoma. She subsequently went on to undergo a left-sided lumpectomy and sentinel lymph node biopsy at the [facility] by Dr. Salas Cook in April of 2006. The patient had a 2.5 x 2.1 cm tumor excised. It was a grade 2 infiltrating ductal carcinoma. Her sentinel lymph node biopsy was negative on frozen section, and final pathology did demonstrate micrometastasis measuring 0.2 cm. The tumor itself was ER-positive, AL-positive, and HER2-negative. The patient did have a positive margin for which she underwent a reexcision in June of 2006. The subsequent pathology was negative.     The patient was then seen by Dr. Jong Dacosta, and she was encouraged to receive adjuvant chemotherapy. She received three cycles of FEC every three weeks followed by Taxotere single-agent administered every three weeks for three cycles. She completed chemotherapy in mid to late October of 2006. Her only complication was neutropenic fever following her last dose, for which she was briefly hospitalized. She otherwise had no issues with residual neuropathies or any other known complications from her chemo.     The patient was started on adjuvant Arimidex on November 6, 2006. She also received whole breast radiotherapy with boost to the left breast and tumor bed from November 15, 2006, through January 5, 2007. [facility]. It is unknown if she received kevon radiation. The patient' portacath was removed in July of 2007. She completed five years of arimidex and  "remained off therapy.     She represented in December 2015 with shortness of breath and pleural effusion, requiring hospitalization. She had three thoracenteses while inpatient, with cytology revealing for a metastatic breast cancer, ER/OH-positive, HER-2 negative. On her workup, she also has disease in her bones after CT scan of the chest, abdomen and pelvis. She met with Dr. Landis on 1/11/2016 and was started on endocrine therapy with AI. She started on Ibrance on 2/3/2016. She was dose reduced after cycle 1 to 100 mg for cytopenias.    INTERVAL HISTORY:   Helen presents today with her  for follow-up. Nothing new since her last visit. She continues to tolerate Ibrance well with minimal toxicities. She feels she is managing well at home. She had a fall several weeks ago while getting \"tangled\" in things, but since then no further falls. No dizziness or lightheadedness. Breathing is stable without SOB or cough. She is eating well. She and her  restarted Meals on Wheels and this has been helpful. She denies any fevers, chills, mouth sores, nausea, vomiting, pain anywhere, abdominal pain, diarrhea, constipation, swelling, bleeding.    Otherwise, 10 point ROS negative     Current Outpatient Prescriptions   Medication Sig Dispense Refill     fludrocortisone (FLORINEF) 0.1 MG tablet Take 0.5 tablets (0.05 mg) by mouth daily       palbociclib (IBRANCE) 100 MG capsule CHEMO Take 1 capsule (100 mg) by mouth daily (with breakfast)       order for DME Equipment being ordered: portable commode 1 Units 0     warfarin (JANTOVEN) 1 MG tablet Take 1 tablet (1mg) by mouth on Monday, Wednesday, Friday. Take 2 tablets (2 mg) by mouth on Tuesday and Thursday. (Patient taking differently: Take 1-2 mg by mouth daily Take 1 tablet (1mg) by mouth on Fridays. Take 2 tablets (2 mg) by mouth all other days of the week.) 30 tablet 0     letrozole (FEMARA) 2.5 MG tablet Take 1 tablet (2.5 mg) by mouth daily 90 tablet 3     " "magnesium hydroxide (MILK OF MAGNESIA) 400 MG/5ML suspension Take 30-60 mLs by mouth daily as needed for constipation or heartburn 360 mL 0     sennosides (SENOKOT) 8.6 MG tablet Take 1 tablet by mouth 2 times daily May increase to 2 tabs twice daily if needed 120 each 0     simvastatin (ZOCOR) 20 MG tablet Take 1 tablet (20 mg) by mouth At Bedtime 90 tablet 1     order for DME Equipment being ordered:  Incontinence pads   Patient uses these tid 100 each 3     order for DME Equipment being ordered: wheeled walker 1 each 0     DOCUSATE SODIUM 1 tablet 2 times daily        Multiple Vitamins-Minerals (OCUVITE ADULT FORMULA PO) Take 1 tablet by mouth daily.       VIACTIV OR Take 1 chew tab by mouth 2 times daily. One in the morning and one at bedtime.             Allergies   Allergen Reactions     Diuretic      Don't remember side effect     Zyrtec [Cetirizine Hcl]      Elevated blood pressure       PHYSICAL EXAMINATION  /69  Pulse 78  Temp 98  F (36.7  C) (Oral)  Resp 15  Ht 1.575 m (5' 2\")  Wt 81.2 kg (179 lb)  SpO2 97%  BMI 32.74 kg/m2   Wt Readings from Last 4 Encounters:   05/22/17 82.6 kg (182 lb 1.6 oz)   05/04/17 81.7 kg (180 lb 2 oz)   04/17/17 83.4 kg (183 lb 14.4 oz)   03/30/17 86 kg (189 lb 9.5 oz)     Constitutional: Alert, oriented female in no visible distress. +Expressive aphasia. Examined in wheelchair today.  Eyes: PERRL. Anicteric sclerae.  ENT/Mouth: OM moist and pink without thrush.  CV: RRR, no murmurs appreciated.  Resp: Decreased breath sounds on the left base, stable. Breathing comfortably without accessory muscle usage.  Abdomen: Soft, non-tender, non-distended.  Extremities: No lower extremity edema appreciated.   Skin: Warm, dry.   Lymph: No cervical or supraclavicular lymphadenopathy appreciated.   Neuro: CN II-XII grossly intact.    LABS:  Results for RYLEE PURCELL (MRN [id number]) as of 6/22/2017 15:34   Ref. Range 6/22/2017 13:47   Sodium Latest Ref Range: 133 - 144 mmol/L " 138   Potassium Latest Ref Range: 3.4 - 5.3 mmol/L 3.6   Chloride Latest Ref Range: 94 - 109 mmol/L 105   Carbon Dioxide Latest Ref Range: 20 - 32 mmol/L 28   Urea Nitrogen Latest Ref Range: 7 - 30 mg/dL 28   Creatinine Latest Ref Range: 0.52 - 1.04 mg/dL 1.33 (H)   GFR Estimate Latest Ref Range: >60 mL/min/1.7m2 39 (L)   GFR Estimate If Black Latest Ref Range: >60 mL/min/1.7m2 47 (L)   Calcium Latest Ref Range: 8.5 - 10.1 mg/dL 9.2   Anion Gap Latest Ref Range: 3 - 14 mmol/L 6   Albumin Latest Ref Range: 3.4 - 5.0 g/dL 3.7   Protein Total Latest Ref Range: 6.8 - 8.8 g/dL 7.2   Bilirubin Total Latest Ref Range: 0.2 - 1.3 mg/dL 0.8   Alkaline Phosphatase Latest Ref Range: 40 - 150 U/L 41   ALT Latest Ref Range: 0 - 50 U/L 14   AST Latest Ref Range: 0 - 45 U/L 13   Glucose Latest Ref Range: 70 - 99 mg/dL 208 (H)   WBC Latest Ref Range: 4.0 - 11.0 10e9/L 2.4 (L)   Hemoglobin Latest Ref Range: 11.7 - 15.7 g/dL 10.8 (L)   Hematocrit Latest Ref Range: 35.0 - 47.0 % 32.7 (L)   Platelet Count Latest Ref Range: 150 - 450 10e9/L 113 (L)   :         IMPRESSION/PLAN:  Helen Younger is a 75 year old female with history of ER/KS-positive, HER-2 negative breast cancer treated with lumpectomy, adjuvant chemotherapy with Taxotere, whole breast radiotherapy, and 5 years of adjuvant Arimidex, completed in 2012. She represented in December 2015 with shortness of breath and pleural effusion, found to have developed metastatic disease involving the bones and pleural cavity.    Metastatic breast cancer: Currently on Letrozole and palbociclib with overall stable disease on imaging 5/2017. She tolerates this regimen well. She will continue with scans every 4-5 months as long as her tumor markers remain stable, and she doesn't had any signs of progressive disease.    Bone mets: Previously on monthly Xgeva, switched to every 3 months due to large co-pay. We will give at next visit.    Pleural effusions:  This has been stable on subsequent  "imaging, asymptomatic. Exam is stable today.    CKD/FEN: Encouraged adequate hydration with about 64 oz fluids daily. Creat, lytes stable. She has been doing better with eating and drinking.    Deconditioning/recurrent falls: thought to be secondary to orthostatic hypotension versus neurodegenerative disease. She has had one fall since that time in setting of getting \"tangled\" on things. No other falls and she and  feel they are managing well at home.  - follow-up with neurology   - remain off anti-hypertensives  - continue fludrocortisone    Jess Avalos PA-C  Hematology, Oncology, and Transplant  [facility]  [address] [place] [address]  [phone]        "

## 2017-06-23 NOTE — LETTER
Transition Communication Hand-off for Care Transitions to Next Level of Care Provider    Name: Helen Younger  MRN #: [id number]  Primary Care Provider: Arin Shahid     Primary Clinic: [address] [address]     Reason for Hospitalization:  Dizziness [R42]  Long-term (current) use of anticoagulants [Z79.01]  Hypertensive urgency [I16.0]  Admit Date/Time: 6/23/2017  6:20 PM  Discharge Date: 6/26/16  Payor Source: Payor: [employer] / Plan: [employer] / Product Type: HMO /       Plan of Care Goals/Milestone Events:   Patient Concerns: Hypertension    Patient Goals:   Short-term [facility]          Reason for Communication Hand-off Referral: Fragility    Discharge Plan:       Concern for non-adherence with plan of care: Yes  Discharge Needs Assessment:  Needs       Most Recent Value    Anticipated Changes Related to Illness inability to care for self    Equipment Currently Used at Home walker, rolling, wheelchair    Transportation Available family or friend will provide          Follow-up plan:  Future Appointments  Date Time Provider Department Center   7/3/2017 8:10 AM Heron Che APRN CNP [facility]   7/25/2017 1:45 PM  [facility]   7/25/2017 2:30 PM Jess Avalos PA-C [facility]   8/28/2017 1:15 PM  [facility]   8/28/2017 2:00 PM Keisha Landis MD [facility]       Any outstanding tests or procedures:        Referrals     Future Labs/Procedures    Occupational Therapy Adult Consult     Comments:    Evaluate and treat as clinically indicated.    Reason:  Weakness and history of stroke.    Physical Therapy Adult Consult     Comments:    Evaluate and treat as clinically indicated.    Reason: Frequent falls s/t hypotension.            Key Recommendations:    Pt was admitted to observation and d/c'ed to [facility].     Marianne Aparicio    AVS/Discharge Summary is the source of  truth; this is a helpful guide for improved communication of patient story

## 2017-06-23 NOTE — IP AVS SNAPSHOT
Helen Younger V #[id number] (CSN: [id number])  (77 year old F)  (Adm: 17)     [id number]               UNIT 6D OBSERVATION [facility]: [phone]            Patient Demographics     Patient Name Sex          Age SSN Address Phone    Helen Younger V Female 1940 (77 year old) [id number] [place] [address] [phone] (Home) *Preferred*      Emergency Contact(s)     Name Relation Home Work Mobile    ARLEEN YOUNGER Spouse [phone]    INEZ YOUNGER Daughter [phone]      Admission Information     Attending Provider Admitting Provider Admission Type Admission Date/Time    Gertrudis Payan MD Chung, Won Gabriel, MD Emergency 17  1820    Discharge Date Hospital Service Auth/Cert Status Service Area     Emergency Medicine     Unit Room/Bed Admission Status       UU U6D OBSERVATION 6515/6515-02 Admission (Confirmed)       Admission     Complaint    Long-term (current) use of anticoagulants [Z79.01], Dizziness      Hospital Account     Name Acct ID Class Status Primary Coverage    Helen Younger [id number] Observation Open UCARE - [employer]            Guarantor Account (for Hospital Account #[id number])     Name Relation to Pt Service Area Active? Acct Type    Helen Younger  FCS Yes Personal/Family    Address Phone          [address] [place] [id number] [phone](H)              Coverage Information (for Hospital Account #[id number])     F/O Payor/Plan Precert #    UCARE/[employer]ARE FOR SENIORS     Subscriber Subscriber #    Helen Younger [id number]    Address Phone    [address]  [place] [address] [phone]                                                INTERAGENCY TRANSFER FORM - PHYSICIAN ORDERS   2017                       UNIT 6D OBSERVATION [facility]: [phone]            Attending Provider: Gertrudis Payan MD   "   Allergies:  Diuretic, Zyrtec [Cetirizine Hcl]    Infection:  None   Service:  EMERGENCY ME    Ht:  1.575 m (5' 2\")   Wt:  81.2 kg (179 lb)   Admission Wt:  81.2 kg (179 lb)    BMI:  32.74 kg/m 2   BSA:  1.88 m 2            ED Clinical Impression     Diagnosis Description Comment Added By Time Added    Hypertensive urgency [I16.0] Hypertensive urgency [I16.0]  Dg Robles MD 6/23/2017  9:00 PM    Dizziness [R42] Dizziness [R42]  Dg Robles MD 6/23/2017  9:01 PM    Long-term (current) use of anticoagulants [Z79.01] Long-term (current) use of anticoagulants [Z79.01]  Dg Robles MD 6/23/2017  9:01 PM      Hospital Problems as of 6/26/2017              Priority Class Noted POA    Dizziness Medium  6/24/2017 Yes      Non-Hospital Problems as of 6/26/2017              Priority Class Noted    Hyperlipidemia LDL goal <100   9/28/2009    Breast cancer,left   9/28/2009    Cerebral infarction (H)   9/28/2009    Hypertension goal BP (blood pressure) < 140/90   9/7/2011    Advance Care Planning   10/24/2011    CKD (chronic kidney disease) stage 3, GFR 30-59 ml/min   10/24/2011    Health Care Home   3/9/2012    Cataract, mild, ou   4/18/2012    Melanocytic nevus   5/2/2012    Type 2 diabetes, HbA1C goal < 8% (H)   12/18/2013    Macular degeneration (senile) of retina   1/30/2014    Posterior vitreous detachment   1/30/2014    Peripheral vascular disease (H) Medium  9/17/2014    Obesity Medium  10/25/2015    Type 2 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) Medium  10/25/2015    Type 2 diabetes mellitus with autonomic neuropathy (H) Medium  10/25/2015    SOB (shortness of breath) Medium  12/28/2015    Pleural effusion on left Medium  12/28/2015    Pleural effusion Medium  12/29/2015    Malignant neoplasm of female breast, unspecified laterality, unspecified site of breast   1/11/2016    Malignant pleural effusion Medium  2/25/2016    Long-term (current) use of anticoagulants [Z79.01] " Medium  2/26/2016    Pneumonia Medium  3/26/2017    Weakness Medium  5/2/2017      Code Status History     Date Active Date Inactive Code Status Order ID Comments User Context    6/26/2017 12:28 PM  DNR/DNI [id number]  Lazaro Augustine, APRN CNP Outpatient    6/24/2017 12:32 AM 6/26/2017 12:28 PM DNR/DNI [id number]  Augustinpetronapreet Augustinfani Mouna, APRN CNP Inpatient    5/2/2017  1:15 AM 5/4/2017  8:25 PM DNR/DNI [id number]  Rell Melara MD Inpatient    5/2/2017  1:14 AM 5/2/2017  1:14 AM Full Code [id number]  Rell Melara MD Inpatient    3/27/2017  1:54 PM 5/2/2017  1:14 AM DNR/DNI [id number]  Caterina Bush PA-C Outpatient    3/26/2017  1:05 AM 3/27/2017  1:54 PM DNR/DNI [id number]  Lorrie Wagner MD Inpatient    11/15/2016  3:12 PM 3/26/2017  1:05 AM DNR/DNI [id number]  Justine Hay CMA Outpatient    1/6/2016  2:00 PM 11/15/2016  3:12 PM Full Code [id number]  Rio Malone MD Outpatient    12/28/2015  9:01 PM 1/6/2016  2:00 PM Full Code [id number]  Philomena Bob MD Inpatient      Current Code Status     Date Active Code Status Order ID Comments User Context       Prior      Summary of Visit     Reason for your hospital stay       Falls s/t to orthostatic hypotension.                Medication Review      CONTINUE these medications which may have CHANGED, or have new prescriptions. If we are uncertain of the size of tablets/capsules you have at home, strength may be listed as something that might have changed.        Dose / Directions Comments    bisacodyl 5 MG EC tablet   Commonly known as:  DULCOLAX   Indication:  Constipation   This may have changed:    - when to take this  - reasons to take this   Used for:  Constipation, unspecified constipation type        Dose:  5 mg   Take 1 tablet (5 mg) by mouth daily as needed for constipation   Quantity:  30 tablet   Refills:  0        fludrocortisone 0.1 MG tablet   Commonly known as:  FLORINEF   This may have changed:  how much to take   Used for:   Orthostatic hypotension        Dose:  0.1 mg   Take 1 tablet (0.1 mg) by mouth daily   Quantity:  20 tablet   Refills:  0        sennosides 8.6 MG tablet   Commonly known as:  SENOKOT   This may have changed:    - when to take this  - reasons to take this   Used for:  Constipation, unspecified constipation type        Dose:  1 tablet   Take 1 tablet by mouth 2 times daily as needed for constipation May increase to 2 tabs twice daily if needed   Quantity:  120 each   Refills:  0        warfarin 1 MG tablet   Commonly known as:  JANTOVEN   Indication:  Blood Clot in the Brain   This may have changed:    - how much to take  - how to take this  - when to take this  - additional instructions   Used for:  History of stroke        Dose:  2 mg   Take 2 tablets (2 mg) by mouth daily Take 1 tablet (1mg) by mouth on Fridays. Take 2 tablets (2 mg) by mouth all other days of the week.   Quantity:  20 tablet   Refills:  0          CONTINUE these medications which have NOT CHANGED        Dose / Directions Comments    letrozole 2.5 MG tablet   Commonly known as:  FEMARA   Used for:  Malignant neoplasm of female breast, unspecified estrogen receptor status, unspecified laterality, unspecified site of breast (H)        Dose:  2.5 mg   Take 1 tablet (2.5 mg) by mouth daily   Quantity:  90 tablet   Refills:  3    Patient will take her home supply.       magnesium hydroxide 400 MG/5ML suspension   Commonly known as:  MILK OF MAGNESIA   Used for:  Constipation, unspecified constipation type        Dose:  30-60 mL   Take 30-60 mLs by mouth daily as needed for constipation or heartburn   Quantity:  360 mL   Refills:  0        OCUVITE ADULT FORMULA PO        Dose:  1 tablet   Take 1 tablet by mouth daily.   Refills:  0        * order for DME   Used for:  Malignant neoplasm of female breast, unspecified laterality, unspecified site of breast, Pleural effusion        Equipment being ordered: wheeled walker   Quantity:  1 each   Refills:  0         * order for DME   Used for:  Mixed incontinence        Equipment being ordered:  Incontinence pads   Patient uses these tid   Quantity:  100 each   Refills:  3        * order for DME   Used for:  Physical deconditioning        Equipment being ordered: portable commode   Quantity:  1 Units   Refills:  0        palbociclib 100 MG capsule CHEMO   Commonly known as:  IBRANCE   Indication:  Hormone Receptor-Positive, 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       simvastatin 20 MG tablet   Commonly known as:  ZOCOR   Used for:  Hyperlipidemia LDL goal <100        Dose:  20 mg   Take 1 tablet (20 mg) by mouth At Bedtime   Quantity:  90 tablet   Refills:  1        VIACTIV PO   Used for:  Chest pain        Dose:  1 chew tab   Take 1 chew tab by mouth 2 times daily. One in the morning and one at bedtime.   Refills:  0        * Notice:  This list has 3 medication(s) that are the same as other medications prescribed for you. Read the directions carefully, and ask your doctor or other care provider to review them with you.            After Care     Activity - Up with assistive device           Activity - Up with nursing assistance       Please keep head of bed at 30 degrees and above as florinef can cause a life threatening hypertension with long period of laying flat.       Advance Diet as Tolerated       Follow this diet upon discharge: Diabetic (2000 ADA)       Encourage PO fluids       Patient was positive for orthostasis.       Fall precautions           General info for SNF       Length of Stay Estimate: Short Term Care: Estimated # of Days <30  Condition at Discharge: Stable  Level of care:skilled   Rehabilitation Potential: Good  Admission H&P remains valid  and up-to-date: Yes  Recent Chemotherapy: Date: Patient receives Xgeva in every 3 months, last dose was  2/27/2017. She is also on daily Ibrance and Letrozole orally.                  Use Nursing Home Standing Orders: Yes.       Glucose monitor nursing POCT       Per facility's protocol as patient has a diet controlled DM type 2.       Intake and output       All oral intake and output needs to be monitored as patient has a orthostasis.       Mantoux instructions       Give two-step Mantoux (PPD) Per Facility Policy. However, risk and benefit needs to be weighed as patient is on chemotherapy.       Seizure precautions       Patient had unresponsive episode and neurology recommended for seizure precautions.             Referrals     Occupational Therapy Adult Consult       Evaluate and treat as clinically indicated.    Reason:  Weakness and history of stroke.       Physical Therapy Adult Consult       Evaluate and treat as clinically indicated.    Reason: Frequent falls s/t hypotension.             Follow-Up Appointment Instructions     Follow Up and recommended labs and tests       Follow up with primary care provider in a week fo BP, falls s/t autonomic orthostatic hypotension, DM II, chronic microcytic anemia that is likely related to her chemotherapy, anticoagulation monitoring and managing.  The following labs/tests are recommended: CBC, CMP, and INR. Patient has a f/u with oncology on 7/25/17, and motility disorder specialist on 7/3/17.             Your next 10 appointments already scheduled     Jul 03, 2017  8:10 AM CDT   (Arrive by 7:55 AM)   New Movement Disorder with RAVEN Loya CNP   [facility] ([facility])    [address] [place] [address]  3rd Floor  [facility] [address]   [phone]            Jul 25, 2017  1:45 PM CDT   [facility] with  [facility]   [facility] ([facility])    [address] [place] [address]  "[facility] [address]   [phone]            Jul 25, 2017  2:30 PM CDT   (Arrive by 2:15 PM)   Return Visit with Jess Avalos PA-C   [facility] ([facility])    [address] [place] [address]  2nd [facility] [address]   [phone]            Aug 28, 2017  1:15 PM CDT   [facility] Draw with [facility]   [facility] ([facility])    [address] [place] [address]   [phone]            Aug 28, 2017  2:00 PM CDT   (Arrive by 1:45 PM)   Return Visit with Keisha Landis MD   [facility] ([facility])    [address] [place] [address]   [phone]              Statement of Approval     Ordered          06/26/17 1229  I have reviewed and agree with all the recommendations and orders detailed in this document.  EFFECTIVE NOW     Approved and electronically signed by:  Lazaro Augustine APRN CNP                                                 INTERAGENCY TRANSFER FORM - NURSING   6/23/2017                       UNIT 6D OBSERVATION [facility]: [phone]            Attending Provider: Gertrudis Payan MD     Allergies:  Diuretic, Zyrtec [Cetirizine Hcl]    Infection:  None   Service:  EMERGENCY ME    Ht:  1.575 m (5' 2\")   Wt:  81.2 kg (179 lb)   Admission Wt:  81.2 kg (179 lb)    BMI:  32.74 kg/m 2   BSA:  1.88 m 2            Advance Directives        Does patient have a scanned Advance Directive/ACP document in EPIC?           Yes        Immunizations     Name Date      Influenza (High Dose) 3 valent vaccine 11/10/16     Influenza (High Dose) 3 valent vaccine 10/09/15     Influenza (High Dose) 3 valent vaccine 10/23/14     Influenza (High Dose) 3 valent vaccine 10/08/13     Influenza (High Dose) 3 valent vaccine 10/09/12     Influenza (High Dose) 3 valent vaccine 10/27/11  " "   Influenza (IIV3) 09/30/10     Influenza (IIV3) 09/28/09     Influenza (IIV3) 12/18/08     Pneumococcal (PCV 13) 10/09/15     Pneumococcal 23 valent 10/24/11     TD (ADULT, 7+) 07/14/16     Tdap (Adacel,Boostrix) 04/24/06     Zoster vaccine, live 12/12/07       ASSESSMENT     Discharge Profile Flowsheet     DISCHARGE NEEDS ASSESSMENT     Patient's communication style  spoken language (English or Bilingual) 06/24/17 0100    Anticipated Changes Related to Illness  inability to care for self 06/24/17 0100   FINAL RESOURCES      Equipment Currently Used at Home  walker, rolling;wheelchair 06/24/17 1217   Resources List  Home Care 03/27/17 1416    Transportation Available  family or friend will provide 06/24/17 1217   Referrals Placed  Community Resources;Housekeeping or Chore Agency;Meals on Wheels;Senior Linkage Line 10/20/16 1545    Equipment Used at Home  none 12/29/15 1546   SKIN      FUNCTIONAL LEVEL CURRENT     Inspection  Partial (identify areas NOT inspected) 06/26/17 0818    Change in Functional Status Since Onset of Current Illness/Injury  -- (increased weakness last few weeks) 03/26/17 0130   Skin WDL  WDL 06/26/17 0818    GASTROINTESTINAL (ADULT,PEDIATRIC,OB)     Skin areas NOT inspected  Nose 06/26/17 0012    GI WDL  ex;stool 06/26/17 0818   SAFETY      Last Bowel Movement  06/25/17 06/25/17 1947   Safety WDL  WDL 06/26/17 0818    COMMUNICATION ASSESSMENT     Safety Factors  side rails raised x 3 (  seizures pads in place) 06/26/17 0012                 Assessment WDL (Within Defined Limits) Definitions           Safety WDL     Effective: 09/28/15    Row Information: <b>WDL Definition:</b> Bed in low position, wheels locked; call light in reach; upper side rails up x 2; ID band on<br> <font color=\"gray\"><i>Item=AS safety wdl>>List=AS safety wdl>>Version=F14</i></font>      Skin WDL     Effective: 09/28/15    Row Information: <b>WDL Definition:</b> Warm; dry; intact; elastic; without discoloration; pressure " "points without redness<br> <font color=\"gray\"><i>Item=AS skin wdl>>List=AS skin wdl>>Version=F14</i></font>      Vitals     Vital Signs Flowsheet     VITAL SIGNS     BMI (Calculated)  32.81 06/23/17 1715    Temp  97.7  F (36.5  C) 06/26/17 0735   EKG MONITORING      Temp src  Oral 06/26/17 0735   Cardiac Regularity  Regular 06/23/17 2021    Resp  16 06/26/17 0735   Cardiac Rhythm  ST 06/23/17 2021    Pulse  68 06/25/17 1052   LEDY COMA SCALE      Heart Rate  52 06/26/17 0735   Best Eye Response  4-->(E4) spontaneous 06/26/17 0810    Pulse/Heart Rate Source  Monitor 06/26/17 0735   Best Motor Response  6-->(M6) obeys commands 06/26/17 0810    BP  (!)  166/95 06/26/17 0754   Best Verbal Response  4-->(V4) confused 06/26/17 0810    BP Location  Right arm 06/26/17 0754   Ledy Coma Scale Score  14 06/26/17 0810    LYING ORTHOSTATIC BP     POSITIONING      Lying Orthostatic BP  130/74 06/26/17 0950   Body Position  independently positioning;supine, head elevated 06/26/17 0818    Lying Orthostatic Pulse  65 bpm 06/26/17 0950   Head of Bed (HOB)  HOB at 30 degrees 06/26/17 0818    OXYGEN THERAPY     Chair  Upright in chair 06/24/17 2126    SpO2  97 % 06/26/17 0735   DAILY CARE      O2 Device  None (Room air) 06/26/17 0735   Activity Type  up ad nora 06/26/17 0818    PAIN/COMFORT     Activity Level of Assistance  assistance, 2 people 06/26/17 0818    Patient Currently in Pain  no 06/26/17 0810   ECG      Preferred Pain Scale  CAPA (Clinically Aligned Pain Assessment) ([facility] Adults Only) 06/26/17 0810   ECG Rhythm  Sinus rhythm 06/24/17 0822    0-10 Pain Scale  0 06/23/17 2216   Ectopy  None 06/24/17 0822    CLINICALLY ALIGNED PAIN ASSESSMENT (CAPA) ([facility] ADULTS ONLY)     Lead Monitored  Lead II 06/24/17 0822    Comfort  negligible pain 06/24/17 0035   SITTING ORTHOSTATIC BP      HEIGHT AND WEIGHT     Sitting Orthostatic BP  124/89 06/26/17 0950    Height  1.575 m (5' 2\") 06/23/17 " 1715   Sitting Orthostatic Pulse  71 bpm 06/26/17 0950    Height Method  Stated 06/23/17 1715   STANDING ORTHOSTATIC BP      Weight  81.2 kg (179 lb) 06/23/17 1715   Standing Orthostatic BP  92/65 06/26/17 0950    BSA (Calculated - sq m)  1.88 06/23/17 1715   Standing Orthostatic Pulse  82 bpm 06/26/17 0950            Patient Lines/Drains/Airways Status    Active LINES/DRAINS/AIRWAYS     Name: Placement date: Placement time: Site: Days: Last dressing change:    Chest Tube 1 Left Pleural 10 Kyrgyz 12/30/15   1152   Pleural   544     Peripheral IV 06/23/17 Left Upper forearm 06/23/17   1955   Upper forearm   2     Rash 12/28/15 2100 lower abdomen 12/28/15   2100    545             Patient Lines/Drains/Airways Status    Active PICC/CVC     None            Intake/Output Detail Report     Date Intake     Output Net    Shift P.O. I.V. IV Piggyback Total Urine Total       Day 06/25/17 0000 - 06/25/17 0659 -- -- -- -- -- -- 0    Krissy 06/25/17 0700 - 06/25/17 1459 -- -- -- -- -- -- 0    Noc 06/25/17 1500 - 06/25/17 2359 440 -- -- 440 -- -- 440    Day 06/26/17 0000 - 06/26/17 0659 -- -- -- -- 200 200 -200    Krissy 06/26/17 0700 - 06/26/17 1459 -- -- -- -- 200 200 -200      Last Void/BM       Most Recent Value    Urine Occurrence 1 at 06/25/2017 2200    Stool Occurrence 1 at 06/25/2017 1942      Case Management/Discharge Planning     Case Management/Discharge Planning Flowsheet     REFERRAL INFORMATION     FINAL RESOURCES      Arrived From  other (see comments) (admit to obs) 05/02/17 0130   Equipment Currently Used at Home  walker, rolling;wheelchair 06/24/17 1217    LIVING ENVIRONMENT     Resources List  Home Care 03/27/17 1416    Lives With  spouse 06/24/17 1217   Referrals Placed  Community Resources;Housekeeping or Chore Agency;Meals on Wheels;Senior Linkage Line 10/20/16 1545    Living Arrangements  house 06/24/17 1217   ABUSE RISK SCREEN      Provides Primary Care For  no one, unable/limited ability to care for self  06/24/17 0100   QUESTION TO PATIENT:  Has a member of your family or a partner(now or in the past) intimidated, hurt, manipulated, or controlled you in any way?  no 06/24/17 0100    COPING/STRESS     QUESTION TO PATIENT: Do you feel safe going back to the place where you are living?  yes 06/24/17 0100    Major Change/Loss/Stressor  hospitalization 06/25/17 0725   OBSERVATION: Is there reason to believe there has been maltreatment of a vulnerable adult (ie. Physical/Sexual/Emotional abuse, self neglect, lack of adequate food, shelter, medical care, or financial exploitation)?  no 06/24/17 0100    DISCHARGE PLANNING     (R) MENTAL HEALTH SUICIDE RISK      Anticipated Changes Related to Illness  inability to care for self 06/24/17 0100   Are you depressed or being treated for depression?  No 06/24/17 0100    Transportation Available  family or friend will provide 06/24/17 1217   HOMICIDE RISK      Equipment Used at Home  none 12/29/15 1546   Homicidal Ideation  no 06/24/17 0100                  UNIT 6D OBSERVATION [facility]: [phone]            Medication Administration Report for Helen Younger as of 06/26/17 1231   Legend:    Given Hold Not Given Due Canceled Entry Other Actions    Time Time (Time) Time  Time-Action       Inactive    Active    Linked        Medications 06/20/17 06/21/17 06/22/17 06/23/17 06/24/17 06/25/17 06/26/17    acetaminophen (TYLENOL) tablet 650 mg  Dose: 650 mg Freq: EVERY 4 HOURS PRN Route: PO  PRN Reasons: mild pain,fever  Start: 06/24/17 0032   Admin Instructions: Alternate ibuprofen (if ordered) with acetaminophen.  Maximum acetaminophen dose from all sources = 75 mg/kg/day not to exceed 4 grams/day.               fludrocortisone (FLORINEF) tablet 0.1 mg  Dose: 0.1 mg Freq: DAILY Route: PO  Start: 06/24/17 1218        1351 (0.1 mg)-Given        0920 (0.1 mg)-Given        0808 (0.1 mg)-Given           glucose 40 % gel 15-30 g  Dose: 15-30 g Freq: EVERY 15 MIN PRN Route: PO  PRN  Reason: low blood sugar  Start: 06/24/17 0146   Admin Instructions: Give 15 g for BG 51 to 69 mg/dL IF patient is conscious and able to swallow. Give 30 g for BG less than or equal to 50 mg/dL IF patient is conscious and able to swallow. Do NOT give glucose gel via enteral tube.  IF patient has enteral tube: give apple juice 120 mL (4 oz or 15 g of CHO) via enteral tube for BG 51 to 69 mg/dL.  Give apple juice 240 mL (8 oz or 30 g of CHO) via enteral tube for BG less than or equal to 50 mg/dL.    ~Oral gel is preferable for conscious and able to swallow patient.   ~IF gel unavailable or patient refuses may provide apple juice 120 mL (4 oz or 15 g of CHO). Document juice on I and O flowsheet.              Or  dextrose 50 % injection 25-50 mL  Dose: 25-50 mL Freq: EVERY 15 MIN PRN Route: IV  PRN Reason: low blood sugar  Start: 06/24/17 0146   Admin Instructions: Use if have IV access, BG less than 70 mg/dL and meet dose criteria below:  Dose if conscious and alert (or disorientated) and NPO = 25 mL  Dose if unconscious / not alert = 50 mL  Vesicant.              Or  glucagon injection 1 mg  Dose: 1 mg Freq: EVERY 15 MIN PRN Route: SC  PRN Reason: low blood sugar  PRN Comment: May repeat x 1 only  Start: 06/24/17 0146   Admin Instructions: May give SQ or IM. ONLY use glucagon IF patient has NO IV access AND is UNABLE to swallow AND blood glucose is LESS than or EQUAL to 50 mg/dL.               letrozole (FEMARA) tablet 2.5 mg  Dose: 2.5 mg Freq: DAILY Route: PO  Start: 06/25/17 0800         0920 (2.5 mg)-Given        0808 (2.5 mg)-Given           magnesium sulfate 4 g in 100 mL sterile water (premade)  Dose: 4 g Freq: EVERY 4 HOURS PRN Route: IV  PRN Reason: magnesium supplementation  Start: 06/24/17 0032   Admin Instructions: For serum Mg++ less than 1.6 mg/dL  Give 4 g and recheck magnesium level 2 hours after dose, and next AM.               naloxone (NARCAN) injection 0.1-0.4 mg  Dose: 0.1-0.4 mg Freq: EVERY 2 MIN  PRN Route: IV  PRN Reason: opioid reversal  Start: 06/24/17 0032   Admin Instructions: For respiratory rate LESS than or EQUAL to 8.  Partial reversal dose:  0.1 mg titrated q 2 minutes for Analgesia Side Effects Monitoring Sedation Level of 3 (frequently drowsy, arousable, drifts to sleep during conversation).Full reversal dose:  0.4 mg bolus for Analgesia Side Effects Monitoring Sedation Level of 4 (somnolent, minimal or no response to stimulation).               ondansetron (ZOFRAN-ODT) ODT tab 4 mg  Dose: 4 mg Freq: EVERY 6 HOURS PRN Route: PO  PRN Reason: nausea  Start: 06/24/17 0032   Admin Instructions: This is Step 1 of nausea and vomiting management.  If nausea not resolved in 15 minutes, go to Step 2 prochlorperazine (COMPAZINE). Do not push through foil backing. Peel back foil and gently remove. Place on tongue immediately. Administration with liquid unnecessary              Or  ondansetron (ZOFRAN) injection 4 mg  Dose: 4 mg Freq: EVERY 6 HOURS PRN Route: IV  PRN Reasons: nausea,vomiting  Start: 06/24/17 0032   Admin Instructions: This is Step 1 of nausea and vomiting management.  If nausea not resolved in 15 minutes, go to Step 2 prochlorperazine (COMPAZINE).  Irritant.               potassium chloride (KLOR-CON) Packet 20-40 mEq  Dose: 20-40 mEq Freq: EVERY 2 HOURS PRN Route: ORAL OR FEED  PRN Reason: potassium supplementation  Start: 06/24/17 0032   Admin Instructions: Use if unable to tolerate tablets.  If Serum K+ 3.0-3.3, dose = 60 mEq po total dose (40 mEq x1 followed in 2 hours by 20 mEq x1). Recheck K+ level 4 hours after dose and the next AM.  If Serum K+ 2.5-2.9, dose = 80 mEq po total dose (40 mEq Q2H x2). Recheck K+ level 4 hours after dose and the next AM.  If Serum K+ less than 2.5, See IV order.  Dissolve packet contents in 4-8 ounces of cold water or juice.               potassium chloride 10 mEq in 100 mL intermittent infusion  Dose: 10 mEq Freq: EVERY 1 HOUR PRN Route: IV  PRN Reason:  potassium supplementation  Start: 06/24/17 0032   Admin Instructions: Infuse via PERIPHERAL LINE or CENTRAL LINE. Use for central line replacement if patient weight less than 65 kg, if patient is on TPN with high potassium content or if unit does not stock 20 mEq bags.   If Serum K+ 3.0-3.3, dose = 10 mEq/hr x4 doses (40 mEq IV total dose). Recheck K+ level 2 hours after dose and the next AM.   If Serum K+ less than 3.0, dose = 10 mEq/hr x6 doses (60 mEq IV total dose). Recheck K+ level 2 hours after dose and the next AM.               potassium chloride 10 mEq in 100 mL intermittent infusion with 10 mg lidocaine  Dose: 10 mEq Freq: EVERY 1 HOUR PRN Route: IV  PRN Reason: potassium supplementation  Start: 06/24/17 0032   Admin Instructions: Infuse via PERIPHERAL LINE. Use potassium with lidocaine for pain with peripheral administration.  If Serum K+ 3.0-3.3, dose = 10 mEq/hr x4 doses (40 mEq IV total dose). Recheck K+ level 2 hours after dose and the next AM.  If Serum K+ less than 3.0, dose = 10 mEq/hr x6 doses (60 mEq IV total dose). Recheck K+ level 2 hours after dose and the next AM.               potassium chloride 20 mEq in 50 mL intermittent infusion  Dose: 20 mEq Freq: EVERY 1 HOUR PRN Route: IV  PRN Reason: potassium supplementation  Start: 06/24/17 0032   Admin Instructions: Infuse via CENTRAL LINE Only. May need EKG if less than 65 kg or on TPN - Max rate is 0.3 mEq/kg/hr for patients not on EKG monitoring.   If Serum K+ 3.0-3.3, dose = 20 mEq/hr x2 doses (40 mEq IV total dose). Recheck K+ level 2 hours after dose and the next AM.  If Serum K+ less than 3.0, dose = 20 mEq/hr x3 doses (60 mEq IV total dose). Recheck K+ level 2 hours after dose and the next AM.               potassium chloride SA (K-DUR/KLOR-CON M) CR tablet 20-40 mEq  Dose: 20-40 mEq Freq: EVERY 2 HOURS PRN Route: PO  PRN Reason: potassium supplementation  Start: 06/24/17 0032   Admin Instructions: Use if able to take PO.   If Serum K+  3.0-3.3, dose = 60 mEq po total dose (40 mEq x1 followed in 2 hours by 20 mEq x1). Recheck K+ level 4 hours after dose and the next AM.  If Serum K+ 2.5-2.9, dose = 80 mEq po total dose (40 mEq Q2H x2). Recheck K+ level 4 hours after dose and the next AM.  If Serum K+ less than 2.5, See IV order.  DO NOT CRUSH.               simvastatin (ZOCOR) tablet 20 mg  Dose: 20 mg Freq: AT BEDTIME Route: PO  Start: 06/24/17 2200        2140 (20 mg)-Given        2108 (20 mg)-Given        [ ] 2200           sodium phosphate 15 mmol in D5W intermittent infusion  Dose: 15 mmol Freq: DAILY PRN Route: IV  PRN Reason: phosphorous supplementation  Last Dose: Stopped (06/24/17 2001)  Start: 06/24/17 0032   Admin Instructions: For serum phosphorus level 2-2.4  Do not infuse Phosphorus in the same line as TPN.   Give 15 mmol and recheck phosphorus level next AM.         1547 (15 mmol)-New Bag       1716 (15 mmol)-Rate/Dose Verify       1838 (15 mmol)-Rate/Dose Verify       2001-Stopped             sodium phosphate 20 mmol in D5W intermittent infusion  Dose: 20 mmol Freq: EVERY 6 HOURS PRN Route: IV  PRN Reason: phosphorous supplementation  Start: 06/24/17 0032   Admin Instructions: For serum phosphorus level 1.1-1.9  Do not infuse Phosphorus in the same line as TPN.   Give 20 mmol and recheck phosphorus level 2 hours after last dose and next AM.               sodium phosphate 25 mmol in D5W intermittent infusion  Dose: 25 mmol Freq: EVERY 8 HOURS PRN Route: IV  PRN Reason: phosphorous supplementation  Start: 06/24/17 0032   Admin Instructions: For serum phosphorus level less than 1.1  Do not infuse Phosphorus in the same line as TPN.   Give 25 mmol and recheck phosphorus level 2 hours after last dose and next AM.               Warfarin Therapy Reminder (Check START DATE - warfarin may be starting in the FUTURE)  Freq: CONTINUOUS PRN Route: XX  Start: 06/24/17 0032   Admin Instructions: *Note to reorder warfarin daily*  Pharmacy Warfarin  Dosing Service  Patient is on Warfarin Therapy - check for daily order              Future Medications  Medications 06/20/17 06/21/17 06/22/17 06/23/17 06/24/17 06/25/17 06/26/17       warfarin (COUMADIN) tablet 2 mg  Dose: 2 mg Freq: ONCE AT 6PM Route: PO  Start: 06/26/17 1800          [ ] 1800          Completed Medications  Medications 06/20/17 06/21/17 06/22/17 06/23/17 06/24/17 06/25/17 06/26/17         Dose: 1,000 mL Freq: ONCE Route: IV  Last Dose: Stopped (06/23/17 2150)  Start: 06/23/17 1832   End: 06/23/17 2150 2000 (1,000 mL)-New Bag       2150-Stopped                Dose: 10 mg Freq: ONCE Route: IV  Start: 06/23/17 2054   End: 06/23/17 2115 2115 (10 mg)-Given                Dose: 1 mg Freq: ONCE Route: IV  Start: 06/23/17 2030   End: 06/23/17 2045   Admin Instructions: For IV PUSH: Dilute with equal volume of NS.        2045 (1 mg)-Given                Dose: 4 mL Freq: ONCE Route: IV  Start: 06/24/17 0900   End: 06/24/17 0900        0900 (4 mL)-Given               Dose: 2 mg Freq: ONCE AT 6PM Route: PO  Start: 06/25/17 1800   End: 06/25/17 1734         1734 (2 mg)-Given              Dose: 3 mg Freq: ONCE AT 6PM Route: PO  Start: 06/24/17 1800   End: 06/24/17 1726        1726 (3 mg)-Given            Discontinued Medications  Medications 06/20/17 06/21/17 06/22/17 06/23/17 06/24/17 06/25/17 06/26/17         Dose: 500 mL Freq: ONCE Route: IV  Start: 06/24/17 0130   End: 06/24/17 0114        0114-Med Discontinued           Rate: 75 mL/hr Freq: CONTINUOUS Route: IV  Last Dose: Stopped (06/24/17 0823)  Start: 06/24/17 0045   End: 06/24/17 0623   Admin Instructions: Start after NS bolus complete         0211 ( )-New Bag       0300 ( )-Rate/Dose Verify       0400 ( )-Rate/Dose Verify       0500 ( )-Rate/Dose Verify       0600 ( )-Rate/Dose Verify       0623-Med Discontinued  0823-Stopped               Rate: 125 mL/hr Freq: CONTINUOUS Route: IV  Start: 06/23/17 1833   End: 06/24/17 0025   Admin  Instructions: Administer after the bolus.         0025-Med Discontinued  (0102)-Not Given               Dose: 5 mg Freq: DAILY Route: PO  Indications of Use: CONSTIPATION  Start: 06/24/17 0800   End: 06/25/17 2007   Admin Instructions: DO NOT CRUSH.         0911 (5 mg)-Given        0919 (5 mg)-Given       2007-Med Discontinued          Dose: 0.05 mg Freq: DAILY Route: PO  Start: 06/24/17 0800   End: 06/24/17 1218        1218-Med Discontinued  (1222)-Not Given               Dose: 2.5 mg Freq: DAILY Route: PO  Start: 06/24/17 0945   End: 06/24/17 1026        (1008)-Not Given [C]       1026-Med Discontinued           Dose: 2.5 mg Freq: DAILY Route: PO  Start: 06/24/17 0800   End: 06/24/17 1146        0911 (2.5 mg)-Given       1146-Med Discontinued           Start: 06/23/17 1855   End: 06/24/17 0021   Admin Instructions: BRIDGETTE STEPHEN: daneinet override         0021-Med Discontinued           Dose: 4 mg Freq: EVERY 30 MIN PRN Route: IV  PRN Reasons: nausea,vomiting  Start: 06/23/17 2209   End: 06/24/17 0047   Admin Instructions: May repeat in 30 minutes as needed, up to 3 doses.  Irritant.        2213 (4 mg)-Given        0047-Med Discontinued           Dose: 100 mg Freq: DAILY WITH BREAKFAST Route: PO  Indications of Use: HORMONE RECEPTOR-POSITIVE, HER2 NEGATIVE BREAST CANCER  Start: 06/24/17 1200   End: 06/25/17 0834   Admin Instructions: Take with food, avoid grapefruit, take whole capsule - do not open, crush or chew.<br>Patient will bring from home.         1351 (100 mg)-Given        0834-Med Discontinued  (0908)-Not Given              Dose: 100 mg Freq: DAILY WITH BREAKFAST Route: PO  Indications of Use: HORMONE RECEPTOR-POSITIVE, HER2 NEGATIVE BREAST CANCER  Start: 06/24/17 0045   End: 06/24/17 0940   Admin Instructions: Take with food, avoid grapefruit, take whole capsule - do not open, crush or chew.         0940-Med Discontinued  (0954)-Not Given       0940-Med Discontinued            Medications 06/20/17  "06/21/17 06/22/17 06/23/17 06/24/17 06/25/17 06/26/17               INTERAGENCY TRANSFER FORM - NOTES (H&P, Discharge Summary, Consults, Procedures, Therapies)   6/23/2017                       UNIT 6D OBSERVATION [facility]: [phone]               History & Physicals      H&P by Chris Gagnon APRN CNP at 6/23/2017 10:02 PM     Author:  Chris Gagnon APRN CNP Service:  Emergency Medicine Author Type:  Nurse Practitioner    Filed:  6/24/2017  7:12 AM Date of Service:  6/23/2017 10:02 PM Creation Time:  6/23/2017 10:02 PM    Status:  Cosign Needed :  Chris Gagnon APRN CNP (Nurse Practitioner)    Cosign Required:  Yes             Per ED Physician note. \"[MM1.1] Helen Younger is a 77 year old female who presents with lightheadedness and vertiginous symptoms that started today. She has a history of stroke in April 2000, breast cancer 2006 with recurrence and complications. At baseline she has delayed verbal response but otherwise has normal mentation. She had admission here to [facility] for UTI, was discharged to rehab facility where she spent 28 days. She did well with physical therapy. She was discharged to home 2 weeks ago with home health aide who helps assist her with ADLs.  states that over the past 3 months she has fallen a dozen times due to weakness and while trying to ambulate to and from bathroom. Usually she ends up falling on her bottom. Daughter notes she fell yesterday. She did not witness the fall but did see her after the fall, but found her immediately after the fall laying on the ground on her side with her head close to the wall. Daughter is unsure if she hit her head or not. No loss of consciousness with this. Today patient was complaining of dizziness and lightheadedness to point where home health aide did not give her a shower. Afterwards her sponge bath she wanted to lay down in bed as she was extremely dizzy.  was concerned given that her " "blood pressure is quite high today compared to her baseline and this dizziness is new. Patient felt both lightheaded and vertiginous symptoms. She does not feel dizzy here, states she feels much improved. She doesn t know if symptoms get better with her eyes closed.  states that she has not complained of dizziness in the past, and typically her blood pressures have been much lower.  Patient is on coumadin. \"[MM1.2]    In the ED, Vital Signs:[MM1.1] Temperature 98.4; Heart rate 77; Respiratory rate 18; /101; Oxygen Saturation 96% Room Air[MM1.3]  Lab work: Sodium 140; Potassium 3.7; BUN 23; Creatinine 1.11; GFR 48; Anion gap 2; Pro ; Troponin < 0.015; WBC 2.2; Hgb 10.1; Hct 30.9; Platelets 108; RBC 3.08; RDW 16.7; Absolute Neutrophil 1.1; INR 2.35[MM1.1];[MM1.4] Glucose 156.[MM1.5] Urinalysis needs to be collected.[MM1.4]  Medication:[MM1.1] Hydralazine 10 mg IV x 1; Lorazepam 1 mg IV x 1; NS IV 1 liter bolus x 1; Zofran 4 mg IV x 1[MM1.6]  EKG:[MM1.1] Vent rate 64 BPM; CO interval 170 ms; QRS duration 94 ms; QTc 466 ms; Sinus Rhythm, Normal ECG.[MM1.6]  Imaging:  CT HEAD W/O CONTRAST 6/23/2017 7:28 PM     Provided History: head injury? on coumadin     Comparison: 3/25/2017, 10/18/2016, 4/13/2010..     Technique: Using multidetector thin collimation helical acquisition  technique, axial, coronal and sagittal CT images from the skull base  to the vertex were obtained without intravenous contrast.      Findings:    Stable encephalomalacia in the left middle cerebral artery vascular  territory. No intracranial hemorrhage, mass effect, or midline shift.  The ventricles are proportionate to the cerebral sulci. No acute loss  of gray-white differentiation. The basal cisterns are patent. Moderate  generalized cerebral volume loss, and moderate periventricular and  subcortical matter hypoattenuation bilaterally. Unchanged 5 mm  calcified meningioma along the right lateral aspect of the " "posterior  falx.     The visualized paranasal sinuses are clear. The mastoid air cells are  clear. Debris within the external auditory canals, right greater than  left, presumably cerumen.          Impression:   1. No acute intracranial pathology.  2. Stable chronic left MCA territory infarct.  3. Stable subcentimeter posterior parafalcine calcified meningioma.       Past Medical History:   Diagnosis Date     CVA (cerebral infarction) 03/01/00    slurred speech, right facial droop partial hemiplagia     Diabetes mellitus      Fibromuscular dysplasia of renal artery (H)      Malignant neoplasm of breast (female), unspecified site 01/03    Breast cancer     MVR (mitral valve repair)      Unspecified essential hypertension[MM1.1]      Past Surgical History:   Procedure Laterality Date     BREAST LUMPECTOMY, RT/LT  04/06    Breast Lumpectomy RT/LT     BREAST LUMPECTOMY, RT/LT  06/06    redo for margins with radiation and chemo     C NONSPECIFIC PROCEDURE  1998    left knee surg torn ligament     Family History   Problem Relation Age of Onset     Hypertension Mother      Cardiovascular Mother      Prostate Cancer Father      Hypertension Father      Breast Cancer Maternal Grandmother      Cardiovascular Paternal Grandfather      Hypertension Brother      Depression Daughter      Hypertension Daughter      Psychotic Disorder Daughter      bipolar     Social History     Social History     Marital status:      Spouse name: N/A     Number of children: N/A     Years of education: N/A     Occupational History     Not on file.     Social History Main Topics     Smoking status: Never Smoker     Smokeless tobacco: Never Used     Alcohol use No     Drug use: No     Sexual activity: No     Other Topics Concern     Parent/Sibling W/ Cabg, Mi Or Angioplasty Before 65f 55m? No     Social History Narrative    .  Lives with  in [facility] with a \"tuckunder\" garage.  2 daughters.[MM1.7]       Allergies:   Allergies " "  Allergen Reactions     Diuretic      Don't remember side effect     Zyrtec [Cetirizine Hcl]      Elevated blood pressure[MM1.1]       PCP: Arin Myles[MM1.8]ryan[MM1.9]MD ethan[MM1.8]      Active Problems:    * No active hospital problems. *    Blood pressure (!) 175/94, temperature 98.2  F (36.8  C), temperature source Oral, resp. rate 17, height 1.575 m (5' 2\"), weight 81.2 kg (179 lb), SpO2 98 %, not currently breastfeeding.    Review of Systems   Constitutional: Negative for[MM1.1] chills[MM1.10],[MM1.1] diaphoresis[MM1.10],[MM1.1] fever[MM1.10] and[MM1.1] malaise/fatigue[MM1.10].   HENT: Negative for[MM1.1] congestion[MM1.10],[MM1.1] ear pain[MM1.10],[MM1.1] hearing loss[MM1.10],[MM1.1] nosebleeds[MM1.10],[MM1.1] sore throat[MM1.10] and[MM1.1] tinnitus[MM1.10].    Eyes: Negative for[MM1.1] blurred vision[MM1.10],[MM1.1] double vision[MM1.10] and[MM1.1] photophobia[MM1.10].   Respiratory: Negative for[MM1.1] cough[MM1.10],[MM1.1] hemoptysis[MM1.10],[MM1.1] sputum production[MM1.10],[MM1.1] shortness of breath[MM1.10],[MM1.1] wheezing[MM1.10] and[MM1.1] stridor[MM1.10].    Cardiovascular: Negative for[MM1.1] chest pain[MM1.10],[MM1.1] palpitations[MM1.10],[MM1.1] orthopnea[MM1.10] and[MM1.1] claudication[MM1.10].   Gastrointestinal: Positive for[MM1.1] nausea[MM1.10]. Negative for[MM1.1] abdominal pain[MM1.10],[MM1.1] blood in stool[MM1.10],[MM1.1] constipation[MM1.10],[MM1.1] diarrhea[MM1.10],[MM1.1] heartburn[MM1.10],[MM1.1] melena[MM1.10] and[MM1.1] vomiting[MM1.10].   Genitourinary: Negative for[MM1.1] dysuria[MM1.10],[MM1.1] flank pain[MM1.10],[MM1.1] frequency[MM1.10],[MM1.1] hematuria[MM1.10] and[MM1.1] urgency[MM1.10].   Musculoskeletal: Negative for[MM1.1] back pain[MM1.10],[MM1.1] joint pain[MM1.10],[MM1.1] myalgias[MM1.10] and[MM1.1] neck pain[MM1.10].[MM1.1]        Unsteady gait[MM1.11]   Skin: Negative for[MM1.1] itching[MM1.10] and[MM1.1] rash[MM1.10].   Neurological: Positive for[MM1.1] " dizziness[MM1.10]. Negative for[MM1.1] tingling[MM1.10],[MM1.1] tremors[MM1.10],[MM1.1] sensory change[MM1.10],[MM1.1] speech change[MM1.10],[MM1.1] focal weakness[MM1.10],[MM1.1] loss of consciousness[MM1.10],[MM1.1] weakness[MM1.10] and[MM1.1] headaches[MM1.10].[MM1.1]        ? seizures[MM1.10]   Psychiatric/Behavioral: Negative for[MM1.1] substance abuse[MM1.10]. The patient[MM1.1] is not nervous/anxious[MM1.10].        Physical Exam   Constitutional: She is[MM1.1] oriented to person, place, and time[MM1.10]. She appears[MM1.1] well-developed[MM1.10] and[MM1.1] well-nourished[MM1.10].[MM1.1] No distress[MM1.10].   HENT:   Head:[MM1.1] Normocephalic[MM1.10] and[MM1.1] atraumatic[MM1.10].   Left Ear:[MM1.1] External ear[MM1.10] normal.   Nose:[MM1.1] Nose normal[MM1.10].   Mouth/Throat: No[MM1.1] oropharyngeal exudate[MM1.10].[MM1.1]   Dy oral mucous membranes[MM1.10]   Eyes:[MM1.1] Conjunctivae[MM1.10] and[MM1.1] EOM[MM1.10] are normal.[MM1.1] Pupils are equal, round, and reactive to light[MM1.10]. Right eye exhibits[MM1.1] no discharge[MM1.10]. Left eye exhibits[MM1.1] no discharge[MM1.10].[MM1.1] No scleral icterus[MM1.10].   Neck:[MM1.1] Normal range of motion[MM1.10].[MM1.1] Neck supple[MM1.10].[MM1.1] No JVD[MM1.10] present.[MM1.1] No tracheal deviation[MM1.10] present.[MM1.1] No thyromegaly[MM1.10] present.   Cardiovascular:[MM1.1] Normal rate[MM1.10],[MM1.1] regular rhythm[MM1.10],[MM1.1] S1 normal[MM1.10],[MM1.1] S2 normal[MM1.10],[MM1.1] normal heart sounds[MM1.10] and[MM1.1] intact distal pulses[MM1.10].    Pulmonary/Chest:[MM1.1] Effort normal[MM1.10]. No[MM1.1] stridor[MM1.10]. No[MM1.1] respiratory distress[MM1.10]. She has[MM1.1] no wheezes[MM1.10]. She has[MM1.1] no rales[MM1.10]. She exhibits[MM1.1] no tenderness[MM1.10].   Abdominal:[MM1.1] Soft[MM1.10]. She exhibits[MM1.1] no distension[MM1.10] and[MM1.1] no mass[MM1.10]. There is[MM1.1] no tenderness[MM1.10]. There is[MM1.1] no rebound[MM1.10]  and[MM1.1] no guarding[MM1.10].   Musculoskeletal:[MM1.1] Normal range of motion[MM1.10]. She exhibits no[MM1.1] edema[MM1.10],[MM1.1] tenderness[MM1.10] or[MM1.1] deformity[MM1.10].   Lymphadenopathy:     She has[MM1.1] no cervical adenopathy[MM1.10].   Neurological: She is[MM1.1] alert[MM1.10] and[MM1.1] oriented to person, place, and time[MM1.10]. She has[MM1.1] normal reflexes[MM1.10]. No[MM1.1] cranial nerve deficit[MM1.10].[MM1.1]   Right facial droop-chronic  Dysarthria & Dysphagia, chronic  EOM intact without nystagmus  Negative pronator drift  Negative babinski  5/5 RUE Motor Strength  5/5 LUE Motor Strength  5/5 RLE Motor Strength  5/5 LLE Motor Strength[MM1.12]   Skin: Skin is[MM1.1] warm[MM1.10] and[MM1.1] dry[MM1.10].[MM1.1] No rash[MM1.10] noted. She is[MM1.1] not diaphoretic[MM1.10]. No[MM1.1] erythema[MM1.10]. No[MM1.1] pallor[MM1.10].   Psychiatric: Her[MM1.1] behavior is normal[MM1.10].[MM1.1] Judgment[MM1.10] and[MM1.1] thought content[MM1.10] normal.[MM1.1]   Flat Affect   Nursing note[MM1.10] and[MM1.1] vitals[MM1.10] reviewed.[MM1.1]      Assessment and Plan: 77 year old[MM1.13] [MM1.14] female[MM1.13] with a history of ER/IN-positive, HER-2 negative metastatic breast cancer,  CKD, CVA, HTN who presented to the emergency department with dizziness and uncontrolled hypertension. Admitted to the observation unit under Vertigo and Hypertension protocol.[MM1.14]    ## 1. Dizziness/Vertigo:[MM1.3]   ## 2. Falls:[MM1.15] Admitted to medicine observation 05/02/2017 for falls, suspected uncomplicated UTI and orthostatic hypotension d/t autonomic dysfunction.[MM1.16] Recently discharged[MM1.17] to home w/ home care[MM1.18] from TCU 2 weeks ago[MM1.17] due to multiple falls and[MM1.16] deconditioning[MM1.17].[MM1.16]   -[MM1.17] CT head revealed no acute intracranial etiology, noted stable chronic left MCA territory infarct and stable sub centimeter posterior parafalcine calcified  meningioma.[MM1.4]   -[MM1.19] Patient has had 2 falls since returning home, both mechanical in nature- last fall was[MM1.20] 2 days ago, tripped by legs of walker and fell on her left side- denies pain or injury, denies hitting head, neck or back pain.[MM1.19] Denies[MM1.21] worsening weakness,[MM1.15] chest pain, shortness of breath,[MM1.21] cough or chills.[MM1.15]  - Today, experienced dizziness ' stating room spinning ' upon attempting to get into the shower. Feels very unsteady on her feet.   Uses walker for ambulation.[MM1.22]  - Avoid meclizine due to advanced age per Up-to-date: Meclizine on [place] Criteria as first generation antihistamine resulting in increased risk of confusion and CNS depression. Also avoid due to reduced clearance (Helen has CKD stage 3).[MM1.23]   March 2017: TSH 1.97 & Vitamin B12  327[MM1.17]  -[MM1.23] O[MM1.21]rthostatic vital signs  -[MM1.23] Straight cath for urine and send UA r/o UTI (done in ED).  - NS IV[MM1.21] 75 ml/hour[MM1.14]  - Fall Precautions[MM1.21]  - Bedside echocardiogram in am[MM1.24]  - Continuous cardiac monitoring & pulse oximetry[MM1.25]  - PT evaluation in am  - Magnesium, Phosphorus (add-on)[MM1.21]  - Given her history of cerebral infarction[MM1.26] will consult neurology input, appreciate recommendations[MM1.13]  [MM1.23]    ##[MM1.19] 3[MM1.15]. ? Seizures:[MM1.19] For the past 6 months,[MM1.14] Per , patient has unresponsive episodes[MM1.19] ([MM1.25] breathing[MM1.19] on her own[MM1.25]) where patient has staring episodes, eyes rolled back, becomes rigid and does not respond to 's voice->which last 30 seconds to 1 minute. No extremity tremors or bowel or bladder incontinence.  - Seizure precautions  - Neurology consult, appreciate recommendations.[MM1.19]    ##[MM1.3] 4[MM1.15].[MM1.3] Uncontrolled[MM1.14] Hypertension:[MM1.3]   ##[MM1.21] 5[MM1.15]. Orthostatic Hypotension, Autonomic Dysfunction:[MM1.21]  -[MM1.27] Recently  admitted[MM1.3] to medicine observation[MM1.19] 05/02/2017 for falls, Orthostatic hypotension, autonomic dysfunction. Blood pressures very labile, with systolics 160-180. She was given IV hydralazine x 1 and BP precipitously dropped, requiring IV fluids.[MM1.3] Given Fludrocortisone of which patient's  states patient has not been taking- because he was unaware of medication.[MM1.28]   Today,[MM1.27] BP in /101-> 215/110->217/115->225/123  After Hydralazine[MM1.3] 10 mg IV x 1, became nauseous vomiting. Nausea resolved after Zofran. Repeat BP[MM1.21] 178/108->175/94[MM1.3]->157/92[MM1.19]  BP upon admission 161/106[MM1.24]->144/91[MM1.27]    - Vital signs every 4 hours  - Avoid IV antihypertensives unless SBP > 200 x 2  - Given multiple hospital visits of hypertension with SBP > 200, will obtain Cardiology consult in am, appreciate recommendations.[MM1.21]  ##[MM1.29] 6[MM1.15]. Acute on Chronic Kidney Injury: Creatinine 1.1[MM1.29]1/GFR 48 (baseline 1.03). Received NS IV 1 liter bolus in ED[MM1.30]  - Avoid nephrotoxic agents[MM1.31]  - CMP in am  ##[MM1.30] 7[MM1.15]. Hx: Cerebral Infarction[MM1.30] (April 2000)[MM1.32] on chronic anticoagulation: INR[MM1.30] 2.35. Takes Jantoven 2 mg po daily per .  - Pharmacy to follow INR and manage Jantoven dosing during admission.    -[MM1.32] Dysarthria & Dysphagia, chronic and at baseline per   -  Tolerates r[MM1.22]egular diet with thin liquids. No swallowing issues per .  - Used to have residual right sided weakness which resolved with PT.[MM1.32]  ##[MM1.33] 8[MM1.15]. Metastatic breast cancer with pleural effusions and bony mets: History of lumpectomy, adjuvant chemotherapy with Taxotere, whole breast radiotherapy,[MM1.33] and[MM1.34] 5 years of adjuvant Arimidex completed in 2012. Found to have bony mets and metastatic pleural effusion in 12/2015. Stable disease noted on imaging 12/2016. Receives Xgeva every 3 months, last dose  2/27/2017.  - Continue PTA Letrozole, Ibrance.  ##[MM1.33] 9[MM1.15].[MM1.33] Diabetes mellitus, Type 2, diet controlled: 07/14/2016  Hemoglobin A1c 5.0  - Blood sugar checks BID & HS  - Hypoglycemia protocol[MM1.13]  ##[MM1.35] 10[MM1.15]. Chronic microcytic anemia: Hemoglobin stable 10.1 (baseline[MM1.35] 9-10)[MM1.15] ;  Likely due to Chemotherapy vs MDS. Vit B12 and folate were normal recently  - Repeat CBC in am[MM1.35]  ## 11. Abnormal NM MPI Adenosine test with LV EF 70% (01/21/2017): Coronary angiogram 02/18/2017 showed focal CAD with no significant lesions.   - Troponin < 0.015. EKG with no ST-T wave changes and no acute ischemia.[MM1.25]  - Serial Troponin every 4 hours x 2  - Continous telemetry[MM1.14]    06/24/2017 @ 23:50:[MM1.5] U[MM1.14]rinalysis ketones 40, glucose 70, negative for nitrites, blood or leukocyte esterace.[MM1.5] NS IV @ 75 ml/hour for ketonuria.[MM1.36]  Magnesium 2.0; Phosphorus 2.4[MM1.5]  06/24/2017 @ 06:00: Received NS  ml. Will discontinue IV fluids.[MM1.37] Hx: Pleural Effusion. Do not want to overload patient.[MM1.38]   Encourage po fluids.[MM1.37]      DVT Prophylaxis:  Already anticoagulated; mechanical  GI prophylaxis: Regular diet  Bowel: docusate   FEN: Regular diet  IVF: NS IV @ 75 ml/hour  Code status: DNR/ DNI (verified by patient and )[MM1.17]  Disposition: Pending resolution of dizziness; Neurology and Cardiology consults completed; Stable vital signs and lab work.[MM1.12]    Chris Gagnon, APRN, CNP  ED Observation Unit  [facility][MM1.6]  6/23/2017[MM1.1]     Revision History        User Key Date/Time User Provider Type Action    > MM1.20 6/24/2017  7:12 AM Chris Gagnon APRN CNP Nurse Practitioner Sign     MM1.12 6/24/2017  6:43 AM Chris Gagnon APRN CNP Nurse Practitioner Sign     [N/A] 6/24/2017  6:12 AM Chris Gagnon APRN CNP Nurse Practitioner Sign     MM1.18 6/24/2017  6:11 AM  Mualuko, Mueni Mouna, APRN CNP Nurse Practitioner Sign     [N/A] 6/24/2017  6:09 AM Mualuko, Mueni Mouna, APRN CNP Nurse Practitioner Sign     MM1.38 6/24/2017  6:08 AM Mualuko, Mueni Mouna, APRN CNP Nurse Practitioner Incomplete Revision     MM1.37 6/24/2017  6:06 AM Mualuko, Mueni Mouna, APRN CNP Nurse Practitioner      MM1.36 6/24/2017  4:10 AM Mualuko, Mueni Mouna, APRN CNP Nurse Practitioner Sign     MM1.31 6/24/2017  4:04 AM Mualuko, Mueni Mouna, APRN CNP Nurse Practitioner Sign     MM1.14 6/24/2017  3:53 AM Mualuko, Mueni Mouna, APRN CNP Nurse Practitioner      MM1.5 6/24/2017  3:47 AM Mualuko, Mueni Mouna, APRN CNP Nurse Practitioner      MM1.28 6/24/2017  1:49 AM Mualuko, Mueni Mouna, APRN CNP Nurse Practitioner      MM1.27 6/24/2017  1:46 AM Mualuko, Mueni Mouna, APRN CNP Nurse Practitioner      MM1.25 6/24/2017  1:28 AM Mualuko, Mueni Mouna, APRN CNP Nurse Practitioner      MM1.34 6/24/2017  1:27 AM Mualuko, Mueni Mouna, APRN CNP Nurse Practitioner      MM1.9 6/24/2017  1:26 AM Mualuko, Mueni Mouna, APRN CNP Nurse Practitioner      MM1.11 6/24/2017  1:25 AM Mualuko, Mueni Mouna, APRN CNP Nurse Practitioner      MM1.24 6/24/2017  1:15 AM Mualuko, Mueni Mouna, APRN CNP Nurse Practitioner      MM1.22 6/24/2017  1:07 AM Mualuko, Mueni Mouna, APRN CNP Nurse Practitioner      MM1.16 6/24/2017  1:04 AM Mualuko, Mueni Mouna, APRN CNP Nurse Practitioner      MM1.17 6/24/2017 12:57 AM Mualuko, Mueni Mouna, APRN CNP Nurse Practitioner      MM1.15 6/24/2017 12:54 AM Chris Gagnon, APRN CNP Nurse Practitioner      MM1.35 6/24/2017 12:52 AM Chris Gagonn, APRN CNP Nurse Practitioner      MM1.13 6/24/2017 12:46 AM Chris Gagnon, APRN CNP Nurse Practitioner      MM1.33 6/24/2017 12:36 AM Chris Gagnon, APRN CNP Nurse Practitioner      MM1.32 6/24/2017 12:31 AM Chris Gagnon, APRN CNP Nurse  Practitioner      MM1.30 2017 12:27 AM Mualuko, Mueni Mouna, APRN CNP Nurse Practitioner      MM1.29 2017 12:24 AM Mualuko, Mueni Mouna, APRN CNP Nurse Practitioner      MM1.26 2017 12:15 AM Mualuko, Mueni Mouna, APRN CNP Nurse Practitioner      MM1.21 2017 12:02 AM Mualuko, Mueni Mouna, APRN CNP Nurse Practitioner      MM1.23 2017 11:56 PM Mualuko, Mueni Mouna, APRN CNP Nurse Practitioner      MM1.10 2017 11:08 PM Mualuko, Mueni Mouna, APRN CNP Nurse Practitioner      MM1.19 2017 10:39 PM Mualuko, Mueni Mouna, APRN CNP Nurse Practitioner      MM1.8 2017 10:24 PM Mualuko, Mueni Mouna, APRN CNP Nurse Practitioner      MM1.7 2017 10:23 PM Mualuko, Mueni Mouna, APRN CNP Nurse Practitioner      MM1.4 2017 10:20 PM Mualuko, Mueni Mouna, APRN CNP Nurse Practitioner      MM1.6 2017 10:17 PM Mualuko, Mueni Mouna, APRN CNP Nurse Practitioner      MM1.2 2017 10:15 PM Mualuko, Mueni Mouna, APRN CNP Nurse Practitioner      MM1.3 2017 10:08 PM Mualuko, Mueni Mouna, APRN CNP Nurse Practitioner      MM1.1 2017 10:02 PM Mualuko, Mueni Mouna, APRN CNP Nurse Practitioner                   Discharge Summaries     No notes of this type exist for this encounter.         Consult Notes      Consults by Elias Del Valle MD at 2017  9:03 AM     Author:  Elias Del Valle MD Service:  Neuro ICU Author Type:  Physician    Filed:  2017  8:03 AM Date of Service:  2017  9:03 AM Creation Time:  2017  9:03 AM    Status:  Signed :  Elias Del Valle MD (Physician)         [facility]: [place]    General Neurology Consult    Patient Name:  Helen Younger  MRN:  [id number]    :  1940  Date of Admission:  2017  Date of Service:  2017  Primary care provider:  Arin Shahid      History of Present Illness: 77F  "hx suspected neurocognitive disorder with orthostatic hypotension and rigidity, PVD, obesity,[MR1.1] prior stroke on warfarin,[MR1.2] DM2, CKD p/a 3x month hx of increasing falls and recurrent[MR1.1] unresponsive[MR1.3] episodes. Patient was admitted and seen by neurology in May 2017 for increasing episodes of falling and orthostatic hypotension. 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 Unclear why patient hasn't seen that f/u.[MR1.1] MRI in 2016 showed old large L MCA encephalomalacia and generalized atrophy.[MR1.3]      reports 3x month hx of worsening falls (ambulates with walker at baseline), ongoing dysphagia, dysarthria, dizziness. In ED, had /123 and strongly positive orthostatic blood pressures (172/102 -> 68/23 standing).[MR1.1] CTH unremarkable.  also reports 6x month hx of spells characterized by eyes rolling back and patient being unresponsive for 30-60s w/o any shaking activity. Unclear if pt regain faculties quickly or not.     Has hx of stroke in 1999 and is on warfarin, per , because of \"carotid artery problems.\" no hx of afib. Has baseline mild expressive aphasia.[MR1.2]     Neurology consulted to address the orthostatic hypotension as well as the new spells.[MR1.3]     ROS: A 10-point ROS is negative unless indicated above.      Allergies:  Allergies   Allergen Reactions     Diuretic      Don't remember side effect     Zyrtec [Cetirizine Hcl]      Elevated blood pressure       Medications:    Prescriptions Prior to Admission   Medication Sig Dispense Refill Last Dose     bisacodyl (DULCOLAX) 5 MG EC tablet Take 5 mg by mouth daily   6/22/2017 at 1600     palbociclib (IBRANCE) 100 MG capsule CHEMO Take 1 capsule (100 mg) by mouth daily (with breakfast)   6/22/2017 at 1600     warfarin (JANTOVEN) 1 MG tablet Take 1 tablet (1mg) by mouth on " Monday, Wednesday, Friday. Take 2 tablets (2 mg) by mouth on Tuesday and Thursday. (Patient taking differently: Take 2 mg by mouth daily Take 1 tablet (1mg) by mouth on Fridays. Take 2 tablets (2 mg) by mouth all other days of the week.) 30 tablet 0 6/22/2017 at 1600     letrozole (FEMARA) 2.5 MG tablet Take 1 tablet (2.5 mg) by mouth daily 90 tablet 3 6/22/2017 at 1600     simvastatin (ZOCOR) 20 MG tablet Take 1 tablet (20 mg) by mouth At Bedtime 90 tablet 1 6/22/2017 at 1600     Multiple Vitamins-Minerals (OCUVITE ADULT FORMULA PO) Take 1 tablet by mouth daily.   6/22/2017 at 1600     VIACTIV OR Take 1 chew tab by mouth 2 times daily. One in the morning and one at bedtime.    6/22/2017 at 1600     fludrocortisone (FLORINEF) 0.1 MG tablet Take 0.5 tablets (0.05 mg) by mouth daily   Unknown at am     order for DME Equipment being ordered: portable commode 1 Units 0 Taking     magnesium hydroxide (MILK OF MAGNESIA) 400 MG/5ML suspension Take 30-60 mLs by mouth daily as needed for constipation or heartburn 360 mL 0 More than a month at am     sennosides (SENOKOT) 8.6 MG tablet Take 1 tablet by mouth 2 times daily May increase to 2 tabs twice daily if needed 120 each 0 More than a month at am     order for DME Equipment being ordered:  Incontinence pads   Patient uses these tid 100 each 3 Taking     order for DME Equipment being ordered: wheeled walker 1 each 0 Taking       PMH:  Past Medical History:   Diagnosis Date     CVA (cerebral infarction) 03/01/00    slurred speech, right facial droop partial hemiplagia     Diabetes mellitus      Fibromuscular dysplasia of renal artery (H)      Malignant neoplasm of breast (female), unspecified site 01/03    Breast cancer     MVR (mitral valve repair)      Unspecified essential hypertension      Past Surgical History:   Procedure Laterality Date     BREAST LUMPECTOMY, RT/LT  04/06    Breast Lumpectomy RT/LT     BREAST LUMPECTOMY, RT/LT  06/06    redo for margins with radiation  and chemo     C NONSPECIFIC PROCEDURE  1998    left knee surg torn ligament       Social History:[MR1.1]  Social History   Substance Use Topics     Smoking status: Never Smoker     Smokeless tobacco: Never Used     Alcohol use No[MR1.3]       Family History:[MR1.1]    Family History   Problem Relation Age of Onset     Hypertension Mother      Cardiovascular Mother      Prostate Cancer Father      Hypertension Father      Breast Cancer Maternal Grandmother      Cardiovascular Paternal Grandfather      Hypertension Brother      Depression Daughter      Hypertension Daughter      Psychotic Disorder Daughter      bipolar[MR1.3]       Physical Examination:   Vitals:  B/P: 195/120, T: 99.4, P: Data Unavailable, R: 16  General:[MR1.1]  Sitting up in chair[MR1.3]  HEENT:  NC/AT, no icterus, op pink and moist, no ear or nose drainage.   Cardiac:  RRR, no m/r/g  Chest:  CTAB  Abdomen:  S/NT/ND  Extremities:  No LE swelling.    Skin:  No rash or lesion.      Neuro Exam:  Mental status:[MR1.1] alert, does not pay much attention to examiner or examination but does answer all questions eventually, usually with a long gap b/t question and answer. Disoriented. Follows commands.[MR1.3]   Cranial nerves:[MR1.1] unable to look up at all, otherwise EOMI. Pupils equal and reactive. Normal saccades. Tongue midline.[MR1.3]   Motor:[MR1.1] 5/5 diffusely. No tremor.[MR1.3]   Reflexes:[MR1.1] 3+ in UEs, 2+ in LEs. +palmomental, +grasp, +glabellar.[MR1.3]   Sensory:[MR1.1] intact to light touch BL[MR1.3]  Coordination:[MR1.1] FTN intact[MR1.3]  Gait:[MR1.1] walks with assist of one. Mildly wide gait with short steps, difficulty turning.[MR1.3]     Investigations:  Data     CMP   Recent Labs  Lab 06/24/17  0755 06/23/17 1955 06/22/17  1347    140 138   POTASSIUM 3.5 3.7 3.6   CHLORIDE 108 106 105   CO2 27 31 28   ANIONGAP 6 2* 6   * 156* 208*   BUN 18 23 28   CR 0.88 1.11* 1.33*   GFRESTIMATED 62 48* 39*   GFRESTBLACK 75 58* 47*    SHERYL 8.6 9.4 9.2   MAG  --  2.0  --    PHOS  --  2.4*  --    PROTTOTAL 6.6*  --  7.2   ALBUMIN 3.4  --  3.7   BILITOTAL 1.5*  --  0.8   ALKPHOS 37*  --  41   AST 12  --  13   ALT 15  --  14        CBC   Recent Labs  Lab 06/24/17  0755 06/23/17 1955 06/22/17  1347   WBC 2.3* 2.2* 2.4*   RBC 2.98* 3.08* 3.14*   HGB 9.8* 10.1* 10.8*   HCT 30.4* 30.9* 32.7*   * 100 104*   MCH 32.9 32.8 34.4*   MCHC 32.2 32.7 33.0   RDW 16.8* 16.7* 16.4*   PLT 99* 108* 113*       INR, PTT   Recent Labs  Lab 06/23/17 1955   INR 2.35*        Arterial Blood Gas No lab results found in last 7 days.    UA    Recent Labs  Lab 06/23/17  2354   COLOR Light Yellow   APPEARANCE Clear   URINEGLC 70*   URINEBILI Negative   URINEKETONE 40*   SG 1.012   UBLD Negative   URINEPH 7.0   PROTEIN Negative   NITRITE Negative   LEUKEST Negative       Micro No lab results found in last 7 days.       Radiological Data  Data   Recent Results (from the past 48 hour(s))   CT Head w/o Contrast    Narrative    CT HEAD W/O CONTRAST 6/23/2017 7:28 PM    Provided History: head injury? on coumadin    Comparison: 3/25/2017, 10/18/2016, 4/13/2010..    Technique: Using multidetector thin collimation helical acquisition  technique, axial, coronal and sagittal CT images from the skull base  to the vertex were obtained without intravenous contrast.     Findings:    Stable encephalomalacia in the left middle cerebral artery vascular  territory. No intracranial hemorrhage, mass effect, or midline shift.  The ventricles are proportionate to the cerebral sulci. No acute loss  of gray-white differentiation. The basal cisterns are patent. Moderate  generalized cerebral volume loss, and moderate periventricular and  subcortical matter hypoattenuation bilaterally. Unchanged 5 mm  calcified meningioma along the right lateral aspect of the posterior  falx.    The visualized paranasal sinuses are clear. The mastoid air cells are  clear. Debris within the external auditory  canals, right greater than  left, presumably cerumen.       Impression    Impression:   1. No acute intracranial pathology.  2. Stable chronic left MCA territory infarct.  3. Stable subcentimeter posterior parafalcine calcified meningioma.    I have personally reviewed the examination and initial interpretation  and I agree with the findings.    GALA ALCAZAR MD          ==========================================================    ASSESSMENT/PLAN: 77F hx suspected neurocognitive disorder with orthostatic hypotension and rigidity, PVD, obesity,[MR1.1] prior stroke on warfarin,[MR1.2] DM2, CKD p/a 3x month hx of increasing falls and recurrent[MR1.1] unresponsive[MR1.3] episodes.     ##[MR1.1] orthostatic hypotension: no signs of dehydration and is in fact hypertensive on admission. Likely a component of whatever neurocognitive disorder is taking place.  --increase PO fluid intake  --fludrocortisone[MR1.3] increase to 0.1mg daily. Will be 3-4 days before we really see effect.  --consult cardiology if further dose increases needed.[MR1.4]   --HOB up at night[MR1.3] - patient can develop life-threatening hypertension if flat for prolonged periods of time on fludrocortisone.[MR1.4]     ## unresponsive episodes:[MR1.3] pt is at risk of seizure with hx of prior stroke, however given the history that the patient is not typically confused at all after the spell, we think that orthostatic syncope is much more likely to cause her symptoms.[MR1.4]     ## neurocognitive disorder: hx of cognitive decline, brain atrophy, parkinsonian gait, impaired upgaze, orthostatic hypotension/dysautonomia and incontinence. 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 Patient needs f/u with movement disorder specialist, was recommended at last admission and has not happened yet. Please ensure f/u after eventual DC.[MR1.3]     Neurology will sign off. Please don't hesitate to contact us if further questions.[MR1.4]      =========================================================    This patient was seen & discussed with my attending, [MR1.1] Ivon[MR1.3], who agrees with my assessment and plan.     Miller Gunter   Neurology  [phone][MR1.1]    ATTENDING ADDENDUM: Pt discussed on the phone with resident Dr Gunter on 2017 but I did not have the chance to see her. Agree with his assessment and recommendations as above. Elias Del Valle MD[GM1.1]       Revision History        User Key Date/Time User Provider Type Action    > GM1.1 2017  8:03 AM Elias Del Valle MD Physician Sign     MR1.4 2017 12:05 PM Enmanuel Gunter MD Resident Sign     MR1.3 2017  9:11 AM Enmanuel Gunter MD Resident      MR1.2 2017  9:08 AM Enmanuel Gunter MD Resident      MR1.1 2017  9:03 AM Enmanuel Gunter MD Resident             Consults by Nichole Ugalde LICSW at 2017  4:17 PM     Author:  Nichole Ugalde LICSW Service:  Social Work Author Type:      Filed:  2017  4:17 PM Date of Service:  2017  4:17 PM Creation Time:  2017  3:58 PM    Status:  Signed :  Nichole Ugalde LICSW ()     Consult Orders:    1. Social Work IP Consult [[id number]] ordered by Leah Santacruz APRN CNP at 17 1218                Social Work: Assessment with Discharge Plan    Patient Name:  Helen Younger  :  1940  Age:  77 year old  MRN:  [id number]  Risk/Complexity Score:     Completed assessment with:  Patient (fell asleep during conversation) and  Rony    Presenting Information   Reason for Referral:  Discharge plan  Date of Intake:  2017  Referral Source:  Physician  Decision Maker:  Patient   Alternate Decision Maker:    Health Care Directive:  Copy in Chart - both POLST and HCD  Living Situation:  House  Previous Functional Status:  Assistance with Transportation:  , Meals:  , Laundry:  , Housekeeping:  ,  "Bathing:  [facility] 2x/week from [facility] and , Medication Management:  , Appointments:   and Financial:  . Patient was able to OOB or a chair and walk with FWW to BR, she would watch TV and could walk into the bedroom to take a nap.  Patient and family understanding of hospitalization:  \"We are here for her blood pressure, her deficits from her CVA on 4.1.2000, and Breast Cancer (takes 2 chemo pills every 21 days and then 1 week off)  Cultural/Language/Spiritual Considerations:  N/a  Adjustment to Illness:   is struggling to understand the impact of her medical concerns - feels he needs to have more conversation with medical team to understand \"I don't know if the low blood pressure and high blood pressure mean anything\" \"I need more information - what can we expect.\"     Physical Health  Reason for Admission:[EL1.1]    1. Hypertensive urgency    2. Dizziness    3. Long-term (current) use of anticoagulants [Z79.01][EL1.2]      Services Needed/Recommended:  TCU    Mental Health/Chemical Dependency  Diagnosis:  None listed  Support/Services in Place:  n/a  Services Needed/Recommended:  n/a    Support System  Significant relationship at present time:   Rony  Family of origin is available for support:  Yes - daughter Evonne lives in [facility]; she is a  in [place]; she would be able to help more in the summer but also has her own family  Other support available:  She is currently open to [facility] with RN 1x/wk, HHA 2x/week; OT (discharged); PT 2x/wk; SLP 1/x/wk; SW (one time visit); MOWs (started 1 week ago), They have a BSC, 2 walkers, chair lift from garage up to living area (they have a ranch home with walk out basement and tuck under garage - chair lift is from garage to main level), grab bars, shower chair with handheld shower  Gaps in support system:  none  Patient is caregiver to:  None     Provider Information   Primary Care Physician:  Arin Shahid   " [phone]   Clinic:  [facility] [address] / [place] [facility]      :  For Breast Cancer listed in EPIC - Jolie Farley RN    Financial   Income Source:  Social security detention  Financial Concerns:  none  Insurance:    Payor/Plan Subscriber Name Rel Member # Group #   [employer] - [employer]  [id number]      [address]       Discharge Plan   Patient and family discharge goal:  Unclear at this time - considering bringing her home and resuming care needs along with getting a hospital bed OR TCU (perhaps not returning to [place]   Provided education on discharge plan:  YES  Patient agreeable to discharge plan:  Not sure what plan will be until  has spoken with medical team  A list of Medicare Certified Facilities was provided to the patient and/or family to encourage patient choice. Patient's choices for facility are:  Provided list and  will look over - [facility]  Will NH provide Skilled rehabilitation or complex medical:  YES  General information regarding anticipated insurance coverage and possible out of pocket cost was discussed. Patient and patient's family are aware patient may incur the cost of transportation to the facility, pending insurance payment: NO  Barriers to discharge:  Patient medical readiness and decision from patient/ about what they would like to do    Discharge Recommendations   Anticipated Disposition:  TCU vs home with resumption of [facility] and hospital bed  Transportation Needs:  Other:  TBD  Name of Transportation Company and Phone:  N/a      Additional comments   Patient was unaware of PT rec for TCU but endorsed it was not a surprise to her. Patient and  lived in [place], [place] until 1991 when they moved back to [place].  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 After her CVA on 4.1.2000 she medically retired - her  feels she was ready to retire and \"she did not cry big tears\" when she was no longer working. Most recently patient was at [facility] for 28 days in May/June - she has been home for 1.5 weeks and had the [facility] set up prior to her discharge.  is not sure if he would want to have her return to [place] if they decide on TCU and he has the list to see what other TCU options exist near their home. Updated provider to patient's  wishing to speak with them.     Nichole FELIX  6/24/2017    ON CALL PAGER   0800 - 1600   [phone]    ON CALL COVERAGE AFTER 1600  [phone][EL1.1]       Revision History        User Key Date/Time User Provider Type Action    > EL1.2 6/24/2017  4:17 PM Nichole Ugalde LICSW  Sign     EL1.1 6/24/2017  3:58 PM Nichole Ugalde LICSW                       Progress Notes - Physician (Notes for yesterday and today)      Progress Notes by Kevin Carrera MD at 6/26/2017  9:38 AM     Author:  Kevin Carrera MD Service:  (none) Author Type:  Physician    Filed:  6/26/2017  9:42 AM Date of Service:  6/26/2017  9:38 AM Creation Time:  6/26/2017  9:38 AM    Status:  Signed :  Kevin Carrera MD (Physician)         Helen Younger is a 77 year old female patient.  1. Hypertensive urgency    2. Dizziness    3. Long-term (current) use of anticoagulants [Z79.01]      Past Medical History:   Diagnosis Date     CVA (cerebral infarction) 03/01/00    slurred speech, right facial droop partial hemiplagia     Diabetes mellitus      Fibromuscular dysplasia of renal artery (H)      Malignant neoplasm of breast (female), unspecified site 01/03    Breast cancer     MVR (mitral valve repair)      Unspecified essential hypertension      No current outpatient prescriptions on file.     Allergies   Allergen Reactions     Diuretic      Don't remember side effect     " "Zyrtec [Cetirizine Hcl]      Elevated blood pressure     Active Problems:    Dizziness    Blood pressure (!) 166/95, pulse 68, temperature 97.7  F (36.5  C), temperature source Oral, resp. rate 16, height 1.575 m (5' 2\"), weight 81.2 kg (179 lb), SpO2 97 %, not currently breastfeeding.    Subjective:  Symptoms:  Stable.  (Dizziness with standing up).    Diet:  Adequate intake.    Activity level: Impaired due to weakness.    Pain:  She reports no pain.      Objective:  General Appearance:  Comfortable.    Vital signs: (most recent): Blood pressure (!) 166/95, pulse 68, temperature 97.7  F (36.5  C), temperature source Oral, resp. rate 16, height 1.575 m (5' 2\"), weight 81.2 kg (179 lb), SpO2 97 %, not currently breastfeeding.  Vital signs are normal.    Output: Producing urine.    HEENT: Normal HEENT exam.    Lungs:  Normal respiratory rate and normal effort.  Breath sounds clear to auscultation.    Heart: Normal rate.  Regular rhythm.  S1 normal and S2 normal.    Extremities: Normal range of motion.    Neurological: Patient is alert and oriented to person, place and time.    Skin:  Warm.    Abdomen: Abdomen is soft.  Bowel sounds are normal.   There is no abdominal tenderness.     Pupils:  Pupils are equal, round, and reactive to light.    Pulses: Distal pulses are intact.      Assessment:    Condition: In stable condition.  Unchanged.   (77 yof with met breast ca, ?movement disorder per Neuro presents with Orthostatic hypotension, unresponsive episodes.Neuro input appreciated.).     Plan:   (Will plan to DC to TCU today. Will increase dose of florinef as recommended by Neuro, follow up with their [facility] clinic ?[facility].).       Kevin Carrera MD  6/26/2017    The pt was seen and examined by myself. The case reviewed and the plan was discussed with the VANGIE.[SH1.1]     Revision History        User Key Date/Time User Provider Type Action    > SH1.1 6/26/2017  9:42 AM Kevin Carrera MD Physician Sign          "   Progress Notes by Padmini Mercer PA-C at 6/25/2017 11:00 PM     Author:  Padmini Mercer PA-C Service:  Emergency Medicine Author Type:  Physician Assistant    Filed:  6/26/2017  6:06 AM Date of Service:  6/25/2017 11:00 PM Creation Time:  6/25/2017  8:00 PM    Status:  Signed :  Padmini Mercer PA-C (Physician Assistant)         ED OBSERVATION PROGRESS NOTE:  S: Helen Younger is a 77 year old female with a history of ER/KS-positive, HER-2 negative metastatic breast cancer,  CKD, CVA, HTN who presented to the emergency department with dizziness and uncontrolled hypertension.     Pending TCU placement currently.  Patient and nursing staff report 2 episodes of loose stools today.  Patient denies associated fever, nausea/vomiting, or abdominal pain.  Denies dizziness/lightheadedness, chest pain, or shortness of breath.  Has no concerns this evening.        Chief Complaint   Patient presents with     Dizziness     1. Hypertensive urgency    2. Dizziness    3. Long-term (current) use of anticoagulants [Z79.01]        Problem List:  Patient Active Problem List   Diagnosis     Hyperlipidemia LDL goal <100     Breast cancer,left     Cerebral infarction (H)     Hypertension goal BP (blood pressure) < 140/90     Advance Care Planning     CKD (chronic kidney disease) stage 3, GFR 30-59 ml/min     Health Care Home     Cataract, mild, ou     Melanocytic nevus     Type 2 diabetes, HbA1C goal < 8% (H)     Macular degeneration (senile) of retina     Posterior vitreous detachment     Peripheral vascular disease (H)     Obesity     Type 2 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)     Type 2 diabetes mellitus with autonomic neuropathy (H)     SOB (shortness of breath)     Pleural effusion on left     Pleural effusion     Malignant neoplasm of female breast, unspecified laterality, unspecified site of breast     Malignant pleural effusion     Long-term (current) use of anticoagulants  "[Z79.01]     Pneumonia     Weakness     Dizziness       MEDS:   No current outpatient prescriptions on file.       ALLERGIES:    Allergies   Allergen Reactions     Diuretic      Don't remember side effect     Zyrtec [Cetirizine Hcl]      Elevated blood pressure       O:BP (!) 192/109  Pulse 68  Temp 98.1  F (36.7  C) (Oral)  Resp 16  Ht 1.575 m (5' 2\")  Wt 81.2 kg (179 lb)  SpO2 97%  Breastfeeding? No  BMI 32.74 kg/m2    Exam:  Constitutional: healthy, alert and no distress  Head: Normocephalic. No masses, lesions, tenderness or abnormalities  Cardiovascular: No lifts, heaves, or thrills. RRR. No murmurs, clicks gallops or rub  Respiratory: Good diaphragmatic excursion. Lungs clear  Gastrointestinal: Abdomen soft, non-tender. BS normal. No masses, organomegaly  Musculoskeletal: extremities normal- no gross deformities noted, normal muscle tone  Skin: no suspicious lesions or rashes  Neurologic: Alert and oriented. Speech is slowed, but not slurred. No facial droop  Psychiatric: mentation appears normal and affect normal/bright        A/P:  Helen Younger is a 77 year old female with a history of ER/FL-positive, HER-2 negative metastatic breast cancer,  CKD, CVA, HTN who presented to the emergency department with dizziness and uncontrolled hypertension.       1. Orthostatic hypotension: Admitted to medicine observation 05/02/2017 for falls, suspected uncomplicated UTI and orthostatic hypotension d/t autonomic dysfunction. Recently discharged to home w/ home care from TCU 2 weeks ago due to multiple falls and deconditioning. CT head negative for ICH X 2. Patient has had 2 falls since returning home, both mechanical in nature- last fall was 2 days ago, tripped by legs of walker and fell on her left side- denies pain or injury, denies hitting head, neck or back pain. Denies worsening weakness, chest pain, shortness of breath, cough or chills.  +orthostatic hypotension this visit with orthostatic vital signs " initially: /102--->68/23.  Bedside echo showed no significant changes since 2015.  Serial troponins negative X 3.  PT evaluated and recommended d/c to TCU, social work aware and will discuss with patient and spouse. Neurology consulted, recommended keeping HOB>30 degrees at night, increasing florinef to 0.1 mg daily, follow up in [facility] clinic.  Today's orthostatic BP: 170/92-->99/81.  and daughter deciding between TCU and home.   - Orthostatic vital signs Q shift  - keep HOB>30 degrees (expecially at night)  - Fall Precautions  - Continuous cardiac monitoring   - increase florinef 0.1 mg daily  - follow up in [facility] clinic  - cardiology consult if florinef increased to 1.5 mg or if persistent SBP>200  - social work involved        2. Unresponsive episodes: For the past 6 months, Per , patient has unresponsive episodes ( breathing on her own) where patient has staring episodes, eyes rolled back, becomes limp, and does not respond to 's voice->which last 30 seconds to 1 minute. No extremity tremors or bowel or bladder incontinence.  Discussed with neurology and since patient does not have any obvious post ictal confusion this is likely secondary to orthostatic syncope instead of seizures.    - Seizure precautions    3. Diarrhea: Patient had two loose stools 6/25/17. No fever, no nausea/vomiting, no abdominal pain. Patient denies loose stools at home. No recent antibiotics.[RA1.1] Received dulcolax twice since admission, may be contributing.  - Hold all laxatives[RA1.2]  - Collect stool samples for infectious cause if persistent[RA1.1], or develops fever/abdominal pain[RA1.2]       Chronic Problems:   #CKD: creatinine 0.88.    #Hx: Cerebral Infarction (April 2000) on chronic anticoagulation: INR 2.10. Takes Jantoven 2 mg po daily per .  - Pharmacy to follow INR and manage Jantoven dosing during admission.      ##Metastatic breast cancer with pleural effusions and bony mets: History of  lumpectomy, adjuvant chemotherapy with Taxotere, whole breast radiotherapy, and 5 years of adjuvant Arimidex completed in 2012. Found to have bony mets and metastatic pleural effusion in 12/2015. Stable disease noted on imaging 12/2016. Receives Xgeva every 3 months, last dose 2/27/2017.  - Continue PTA Letrozole, Ibrance (use patient's home dose).  ##Diabetes mellitus, Type 2, diet controlled: 07/14/2016  Hemoglobin A1c 6.4.  - Blood sugar checks BID & HS  - Hypoglycemia protocol  ##Chronic microcytic anemia: Hemoglobin stable 10.1 (baseline 9-10) ;  Likely due to Chemotherapy vs MDS. Vit B12 and folate were normal recently.     Consult: cardiology  FEN: moderate consistent  Code Status: DNR/DNI  Disposition: pending TCU placement      Signed:  Padmini Mercer PA-C   June 25, 2017[RA1.1]        Revision History        User Key Date/Time User Provider Type Action    > RA1.2 6/26/2017  6:06 AM Padmini Mercer PA-C Physician Assistant Sign     RA1.1 6/25/2017  8:00 PM Padmini Mercer PA-C Physician Assistant                   Procedure Notes     No notes of this type exist for this encounter.         Progress Notes - Therapies (Notes from 06/23/17 through 06/26/17)      Progress Notes by Jin Pat PT at 6/24/2017 12:23 PM     Author:  Jin Pat PT Service:  Acute IP Rehab Author Type:  Physical Therapist    Filed:  6/24/2017 12:23 PM Date of Service:  6/24/2017 12:23 PM Creation Time:  6/24/2017 12:23 PM    Status:  Signed :  Jin Pat, PT (Physical Therapist)            06/24/17 1200   Quick Adds   Type of Visit Initial PT Evaluation       Present no   Living Environment   Lives With spouse   Living Arrangements house   Home Accessibility stairs to enter home;stairs within home   Number of Stairs to Enter Home 8   Number of Stairs Within Home 10   Transportation Available family or friend will provide   Living Environment Comment  available  to assist at home 24 hours   Self-Care   Usual Activity Tolerance fair   Current Activity Tolerance poor   Regular Exercise no   Equipment Currently Used at Home walker, rolling;wheelchair   Functional Level Prior   Ambulation 1-->assistive equipment   Transferring 1-->assistive equipment   Fall history within last six months yes   Number of times patient has fallen within last six months (numerous, patient unable to remember)   Prior Functional Level Comment mod independent household ambulator with FWW, recently has had numerous falls   General Information   Onset of Illness/Injury or Date of Surgery - Date 06/23/17   Referring Physician Chris Gagnon CNP   Patient/Family Goals Statement unable to state   Pertinent History of Current Problem (include personal factors and/or comorbidities that impact the POC) Helen Younger is a 77 year old female who presents with lightheadedness and vertiginous symptoms that started today. She has a history of stroke in April 2000, breast cancer 2006 with recurrence and complications. At baseline she has delayed verbal response but otherwise has normal mentation. She had admission here to [facility] for UTI, was discharged to rehab facility where she spent 28 days. She did well with physical therapy. She was discharged to home 2 weeks ago with home health aide who helps assist her with ADLs.  states that over the past 3 months she has fallen a dozen times due to weakness and while trying to ambulate to and from bathroom. Usually she ends up falling on her bottom. Daughter notes she fell yesterday. She did not witness the fall but did see her after the fall, but found her immediately after the fall laying on the ground on her side with her head close to the wall. Daughter is unsure if she hit her head or not. No loss of consciousness with this. Today patient was complaining of dizziness and lightheadedness to point where home health aide did not give her a shower. Afterwards her  "sponge bath she wanted to lay down in bed as she was extremely dizzy.  was concerned given that her blood pressure is quite high today compared to her baseline and this dizziness is new. Patient felt both lightheaded and vertiginous symptoms. She does not feel dizzy here, states she feels much improved. She doesn t know if symptoms get better with her eyes closed.  states that she has not complained of dizziness in the past, and typically her blood pressures have been much lower.  Patient is on coumadin.    Cognitive Status Examination   Orientation orientation to person, place and time   Level of Consciousness lethargic/somnolent   Follows Commands and Answers Questions 100% of the time   Personal Safety and Judgment impaired   Memory impaired   Bed Mobility   Bed Mobility Comments supine > sit min assist   Transfer Skills   Transfer Comments sit > stand min assist with FWW   Gait   Gait Comments ambulated 20' in room with FWW and min assist.  slow comfort, short shuffling steps.    Balance   Balance Comments needs UE support in standing   Clinical Impression   Criteria for Skilled Therapeutic Intervention evaluation only   PT Diagnosis impaired functional mobility s/p numerous falls   Influenced by the following impairments decreased balance, decreased functional endurance   Functional limitations due to impairments impaired bed mobility, impaired transfers, impaired gait   Clinical Presentation Evolving/Changing   Clinical Presentation Rationale uncertain prognosis, comorbidities   Clinical Decision Making (Complexity) Moderate complexity   Therapy Frequency` other (see comments)  (evaluation only)   Predicted Duration of Therapy Intervention (days/wks) evaluation only   Anticipated Discharge Disposition Transitional Care Facility   Risk & Benefits of therapy have been explained Yes   Patient, Family & other staff in agreement with plan of care Yes   [facility] AM-PAC  \"6 Clicks\" V.2 Basic " "Mobility Inpatient Short Form   1. Turning from your back to your side while in a flat bed without using bedrails? 3 - A Little   2. Moving from lying on your back to sitting on the side of a flat bed without using bedrails? 3 - A Little   3. Moving to and from a bed to a chair (including a wheelchair)? 3 - A Little   4. Standing up from a chair using your arms (e.g., wheelchair, or bedside chair)? 3 - A Little   5. To walk in hospital room? 3 - A Little   6. Climbing 3-5 steps with a railing? 2 - A Lot   Basic Mobility Raw Score (Score out of 24.Lower scores equate to lower levels of function) 17   Total Evaluation Time   Total Evaluation Time (Minutes) 25[JD1.1]        Revision History        User Key Date/Time User Provider Type Action    > JD1.1 6/24/2017 12:23 PM Jin Pat, PT Physical Therapist Sign                                                      INTERAGENCY TRANSFER FORM - LAB / IMAGING / EKG / EMG RESULTS   6/23/2017                       UNIT 6D OBSERVATION [facility] BANK: [phone]            Unresulted Labs (24h ago through future)    Start       Ordered    06/25/17 0700  INR  (warfarin (COUMADIN) Pharmacy Consult-INITIAL ORDER)  DAILY,   Routine      06/24/17 0032    Unscheduled  Potassium  (Potassium Replacement - \"Standard\" - For K levels less than 3.4 mmol/L - UU,UR,UA,RH,SH,PH,WY )  CONDITIONAL (SPECIFY),   Routine     Comments:  Obtain Potassium Level for these conditions:  *IF no potassium result within 24 hours before initiation of order set, draw potassium level with next lab collect.    *2 HOURS AFTER last IV potassium replacement dose and 4 hours after an oral replacement dose.  *Next morning after potassium dose.     Repeat Potassium Replacement if necessary.    06/24/17 0032    Unscheduled  Magnesium  (Magnesium Replacement -  Adult - \"Standard\" - Replacement for all levels less than 1.6 mg/dL )  CONDITIONAL (SPECIFY),   Routine     Comments:  Obtain Magnesium Level for these " "conditions:  *IF no magnesium result within 24 hrs before initiation of order set, draw magnesium level with next lab collect.    *2 HOURS AFTER last magnesium replacement dose when magnesium replacement given for level less than 1.6   *Next morning after magnesium dose.     Repeat Magnesium Replacement if necessary.    06/24/17 0032    Unscheduled  Phosphorus  (or    SODIUM Phosphate - \"Standard\" - Replacement for levels less than or equal to 2.4 mg/dL )  CONDITIONAL (SPECIFY),   Routine     Comments:  *IF no phosphorus result within 24 hours before initiation of order set, draw phosphorus level with next lab collect.    *2 HOURS AFTER last phosphorus replacement dose for levels less than 2.0.  *Next morning after phosphorus dose.     Repeat Phosphorus Replacement if necessary.    06/24/17 0032         Lab Results - 3 Days      Glucose by meter [[id number]] (Abnormal)  Resulted: 06/26/17 1000, Result status: Final result    Ordering provider: Dg Robles MD  06/26/17 0953 Resulting lab: POINT OF CARE TEST, GLUCOSE    Specimen Information    Type Source Collected On     06/26/17 0953          Components       Value Reference Range Flag Lab   Glucose 167 70 - 99 mg/dL H 170            INR [[id number]] (Abnormal)  Resulted: 06/26/17 0733, Result status: Final result    Ordering provider: Chris Gagnon APRN CNP  06/26/17 0100 Resulting lab: [facility]    Specimen Information    Type Source Collected On   Blood  06/26/17 0649          Components       Value Reference Range Flag Lab   INR 2.48 0.86 - 1.14 H 51            Glucose by meter [[id number]] (Abnormal)  Resulted: 06/25/17 2335, Result status: Final result    Ordering provider: Dg Robles MD  06/25/17 2329 Resulting lab: POINT OF CARE TEST, GLUCOSE    Specimen Information    Type Source Collected On     06/25/17 2329          Components       Value Reference Range Flag Lab   Glucose 128 70 - 99 mg/dL H " 170            Glucose by meter [[id number]] (Abnormal)  Resulted: 06/25/17 1216, Result status: Final result    Ordering provider: Dg Robles MD  06/25/17 1211 Resulting lab: POINT OF CARE TEST, GLUCOSE    Specimen Information    Type Source Collected On     06/25/17 1211          Components       Value Reference Range Flag Lab   Glucose 145 70 - 99 mg/dL H 170            Hemoglobin A1c [[id number]] (Abnormal)  Resulted: 06/25/17 0923, Result status: Final result    Ordering provider: Chris Gagnon APRN CNP  06/25/17 0100 Resulting lab: [facility]    Specimen Information    Type Source Collected On   Blood  06/25/17 0832          Components       Value Reference Range Flag Lab   Hemoglobin A1C 6.4 4.3 - 6.0 % H 51            INR [[id number]] (Abnormal)  Resulted: 06/25/17 0915, Result status: Final result    Ordering provider: Chris Gagnon APRN CNP  06/25/17 0100 Resulting lab: [facility]    Specimen Information    Type Source Collected On   Blood  06/25/17 0832          Components       Value Reference Range Flag Lab   INR 2.46 0.86 - 1.14 H 51            Glucose by meter [[id number]] (Abnormal)  Resulted: 06/24/17 2146, Result status: Final result    Ordering provider: Dg Robles MD  06/24/17 2138 Resulting lab: POINT OF CARE TEST, GLUCOSE    Specimen Information    Type Source Collected On     06/24/17 2138          Components       Value Reference Range Flag Lab   Glucose 199 70 - 99 mg/dL H 170            INR [[id number]] (Abnormal)  Resulted: 06/24/17 1334, Result status: Final result    Ordering provider: Azul Diloln MD  06/24/17 1031 Resulting lab: [facility]    Specimen Information    Type Source Collected On   Blood  06/24/17 1248          Components       Value Reference Range Flag Lab   INR 2.10 0.86 - 1.14 H 51            Magnesium [[id number]]  Resulted: 06/24/17  1236, Result status: Final result    Ordering provider: Chris Gagnon APRN CNP  06/24/17 0755 Resulting lab: [facility]    Specimen Information    Type Source Collected On     06/24/17 0755          Components       Value Reference Range Flag Lab   Magnesium 1.9 1.6 - 2.3 mg/dL  51            Phosphorus [[id number]] (Abnormal)  Resulted: 06/24/17 1236, Result status: Final result    Ordering provider: Chris Gagnon APRN CNP  06/24/17 0755 Resulting lab: [facility]    Specimen Information    Type Source Collected On     06/24/17 0755          Components       Value Reference Range Flag Lab   Phosphorus 2.2 2.5 - 4.5 mg/dL L 51            Troponin I [[id number]]  Resulted: 06/24/17 1139, Result status: Final result    Ordering provider: Chris Gagnon APRN CNP  06/24/17 0755 Resulting lab: [facility]    Specimen Information    Type Source Collected On     06/24/17 0755          Components       Value Reference Range Flag Lab   Troponin I ES 0.016 0.000 - 0.045 ug/L  51   Comment:         The 99th percentile for upper reference range is 0.045 ug/L.  Troponin values   in   the range of 0.045 - 0.120 ug/L may be associated with risks of adverse   clinical events.              Comprehensive metabolic panel [[id number]] (Abnormal)  Resulted: 06/24/17 0839, Result status: Final result    Ordering provider: Chris Gagnon APRN CNP  06/24/17 0100 Resulting lab: [facility]    Specimen Information    Type Source Collected On   Blood  06/24/17 0755          Components       Value Reference Range Flag Lab   Sodium 141 133 - 144 mmol/L  51   Potassium 3.5 3.4 - 5.3 mmol/L  51   Chloride 108 94 - 109 mmol/L  51   Carbon Dioxide 27 20 - 32 mmol/L  51   Anion Gap 6 3 - 14 mmol/L  51   Glucose 133 70 - 99 mg/dL H 51   Urea Nitrogen 18 7 - 30 mg/dL  51    Creatinine 0.88 0.52 - 1.04 mg/dL  51   GFR Estimate 62 >60 mL/min/1.7m2  51   Comment:  Non  GFR Calc   GFR Estimate If Black 75 >60 mL/min/1.7m2  51   Comment:  African American GFR Calc   Calcium 8.6 8.5 - 10.1 mg/dL  51   Bilirubin Total 1.5 0.2 - 1.3 mg/dL H 51   Albumin 3.4 3.4 - 5.0 g/dL  51   Protein Total 6.6 6.8 - 8.8 g/dL L 51   Alkaline Phosphatase 37 40 - 150 U/L L 51   ALT 15 0 - 50 U/L  51   AST 12 0 - 45 U/L  51            Glucose by meter [[id number]] (Abnormal)  Resulted: 06/24/17 0825, Result status: Final result    Ordering provider: Dg Robles MD  06/24/17 0810 Resulting lab: POINT OF CARE TEST, GLUCOSE    Specimen Information    Type Source Collected On     06/24/17 0810          Components       Value Reference Range Flag Lab   Glucose 128 70 - 99 mg/dL H 170            CBC with platelets differential [[id number]] (Abnormal)  Resulted: 06/24/17 0808, Result status: Final result    Ordering provider: Chirs Gagnon APRN CNP  06/24/17 0100 Resulting lab: [facility]    Specimen Information    Type Source Collected On   Blood  06/24/17 0755          Components       Value Reference Range Flag Lab   WBC 2.3 4.0 - 11.0 10e9/L L 51   RBC Count 2.98 3.8 - 5.2 10e12/L L 51   Hemoglobin 9.8 11.7 - 15.7 g/dL L 51   Hematocrit 30.4 35.0 - 47.0 % L 51    78 - 100 fl H 51   MCH 32.9 26.5 - 33.0 pg  51   MCHC 32.2 31.5 - 36.5 g/dL  51   RDW 16.8 10.0 - 15.0 % H 51   Platelet Count 99 150 - 450 10e9/L L 51   Diff Method Automated Method   51   % Neutrophils 51.4 %  51   % Lymphocytes 41.3 %  51   % Monocytes 6.5 %  51   % Eosinophils 0.4 %  51   % Basophils 0.4 %  51   % Immature Granulocytes 0.0 %  51   Nucleated RBCs 0 0 /100  51   Absolute Neutrophil 1.2 1.6 - 8.3 10e9/L L 51   Absolute Lymphocytes 1.0 0.8 - 5.3 10e9/L  51   Absolute Monocytes 0.2 0.0 - 1.3 10e9/L  51   Absolute Eosinophils 0.0 0.0 - 0.7 10e9/L  51   Absolute  Basophils 0.0 0.0 - 0.2 10e9/L  51   Abs Immature Granulocytes 0.0 0 - 0.4 10e9/L  51   Absolute Nucleated RBC 0.0   51            Glucose by meter [[id number]] (Abnormal)  Resulted: 06/24/17 0620, Result status: Final result    Ordering provider: Dg Robles MD  06/24/17 0612 Resulting lab: POINT OF CARE TEST, GLUCOSE    Specimen Information    Type Source Collected On     06/24/17 0612          Components       Value Reference Range Flag Lab   Glucose 134 70 - 99 mg/dL H 170            Troponin I [[id number]]  Resulted: 06/24/17 0511, Result status: Final result    Ordering provider: Chris Gagnon APRN CNP  06/24/17 0353 Resulting lab: [facility]    Specimen Information    Type Source Collected On   Blood  06/24/17 0431          Components       Value Reference Range Flag Lab   Troponin I ES -- 0.000 - 0.045 ug/L  51   Result:         <0.015  The 99th percentile for upper reference range is 0.045 ug/L.  Troponin values in   the range of 0.045 - 0.120 ug/L may be associated with risks of adverse   clinical events.              Magnesium [[id number]]  Resulted: 06/24/17 0053, Result status: Final result    Ordering provider: Dg Robles MD  06/23/17 1955 Resulting lab: [facility]    Specimen Information    Type Source Collected On     06/23/17 1955          Components       Value Reference Range Flag Lab   Magnesium 2.0 1.6 - 2.3 mg/dL  51            Phosphorus [[id number]] (Abnormal)  Resulted: 06/24/17 0053, Result status: Final result    Ordering provider: Dg Robles MD  06/23/17 1955 Resulting lab: [facility]    Specimen Information    Type Source Collected On     06/23/17 1955          Components       Value Reference Range Flag Lab   Phosphorus 2.4 2.5 - 4.5 mg/dL L 51            UA reflex to Microscopic and Culture [[id number]] (Abnormal)  Resulted: 06/24/17 0042, Result status:  Final result    Ordering provider: Dg Robles MD  06/23/17 1832 Resulting lab: [facility]    Specimen Information    Type Source Collected On   Urine Urine catheter 06/23/17 2354          Components       Value Reference Range Flag Lab   Color Urine Light Yellow   51   Appearance Urine Clear   51   Glucose Urine 70 NEG mg/dL A 51   Bilirubin Urine Negative NEG  51   Ketones Urine 40 NEG mg/dL A 51   Specific Gravity Urine 1.012 1.003 - 1.035  51   Blood Urine Negative NEG  51   pH Urine 7.0 5.0 - 7.0 pH  51   Protein Albumin Urine Negative NEG mg/dL  51   Urobilinogen mg/dL Normal 0.0 - 2.0 mg/dL  51   Nitrite Urine Negative NEG  51   Leukocyte Esterase Urine Negative NEG  51   Source Catheterized Urine   51            Basic metabolic panel [[id number]] (Abnormal)  Resulted: 06/23/17 2028, Result status: Final result    Ordering provider: Dg Robles MD  06/23/17 1832 Resulting lab: [facility]    Specimen Information    Type Source Collected On   Blood  06/23/17 1955          Components       Value Reference Range Flag Lab   Sodium 140 133 - 144 mmol/L  51   Potassium 3.7 3.4 - 5.3 mmol/L  51   Chloride 106 94 - 109 mmol/L  51   Carbon Dioxide 31 20 - 32 mmol/L  51   Anion Gap 2 3 - 14 mmol/L L 51   Glucose 156 70 - 99 mg/dL H 51   Urea Nitrogen 23 7 - 30 mg/dL  51   Creatinine 1.11 0.52 - 1.04 mg/dL H 51   GFR Estimate 48 >60 mL/min/1.7m2 L 51   Comment:  Non  GFR Calc   GFR Estimate If Black 58 >60 mL/min/1.7m2 L 51   Comment:  African American GFR Calc   Calcium 9.4 8.5 - 10.1 mg/dL  51            Troponin I [[id number]]  Resulted: 06/23/17 2028, Result status: Final result    Ordering provider: Dg Robles MD  06/23/17 1832 Resulting lab: [facility]    Specimen Information    Type Source Collected On   Blood  06/23/17 1955          Components       Value Reference Range Flag  Lab   Troponin I ES -- 0.000 - 0.045 ug/L  51   Result:         <0.015  The 99th percentile for upper reference range is 0.045 ug/L.  Troponin values in   the range of 0.045 - 0.120 ug/L may be associated with risks of adverse   clinical events.              Nt probnp inpatient (BNP) [[id number]]  Resulted: 06/23/17 2028, Result status: Final result    Ordering provider: Dg Robles MD  06/23/17 1832 Resulting lab: [facility]    Specimen Information    Type Source Collected On   Blood  06/23/17 1955          Components       Value Reference Range Flag Lab   N-Terminal Pro BNP Inpatient 815 0 - 1800 pg/mL  51   Comment:         Reference range shown and results flagged as abnormal are suggested inpatient   cut points for confirming diagnosis if CHF in an acute setting. Establishing   a   baseline value for each individual patient is useful for follow-up. An   inpatient or emergency department NT-proPBNP <300 pg/mL effectively rules out   acute CHF, with 99% negative predictive value.  The outpatient non-acute reference range for ruling out CHF is:   0-125 pg/mL (age 18 to less than 75)   0-450 pg/mL (age 75 yrs and older)              INR [[id number]] (Abnormal)  Resulted: 06/23/17 2021, Result status: Final result    Ordering provider: Dg Robles MD  06/23/17 1836 Resulting lab: [facility]    Specimen Information    Type Source Collected On   Blood  06/23/17 1955          Components       Value Reference Range Flag Lab   INR 2.35 0.86 - 1.14 H 51            CBC with platelets differential [[id number]] (Abnormal)  Resulted: 06/23/17 2012, Result status: Final result    Ordering provider: Dg Robles MD  06/23/17 1832 Resulting lab: [facility]    Specimen Information    Type Source Collected On   Blood  06/23/17 1955          Components       Value Reference Range Flag Lab   WBC 2.2 4.0 - 11.0  10e9/L L 51   RBC Count 3.08 3.8 - 5.2 10e12/L L 51   Hemoglobin 10.1 11.7 - 15.7 g/dL L 51   Hematocrit 30.9 35.0 - 47.0 % L 51    78 - 100 fl  51   MCH 32.8 26.5 - 33.0 pg  51   MCHC 32.7 31.5 - 36.5 g/dL  51   RDW 16.7 10.0 - 15.0 % H 51   Platelet Count 108 150 - 450 10e9/L L 51   Diff Method Automated Method   51   % Neutrophils 52.3 %  51   % Lymphocytes 41.7 %  51   % Monocytes 5.0 %  51   % Eosinophils 0.5 %  51   % Basophils 0.5 %  51   % Immature Granulocytes 0.0 %  51   Nucleated RBCs 0 0 /100  51   Absolute Neutrophil 1.1 1.6 - 8.3 10e9/L L 51   Absolute Lymphocytes 0.9 0.8 - 5.3 10e9/L  51   Absolute Monocytes 0.1 0.0 - 1.3 10e9/L  51   Absolute Eosinophils 0.0 0.0 - 0.7 10e9/L  51   Absolute Basophils 0.0 0.0 - 0.2 10e9/L  51   Abs Immature Granulocytes 0.0 0 - 0.4 10e9/L  51   Absolute Nucleated RBC 0.0   51            Testing Performed By     Lab - Abbreviation Name Director Address Valid Date Range    51 - Unknown [facility] Unknown [address] [facility] [address] 12/31/14 1010 - Present    170 - Unknown POINT OF CARE TEST, GLUCOSE Unknown Unknown 10/31/11 1114 - Present               Imaging Results - 3 Days      CT Head w/o Contrast [[id number]]  Resulted: 06/24/17 1058, Result status: Final result    Ordering provider: Leah Santacruz APRN CNP  06/24/17 0918 Resulted by: Dick Gautam MD Gencturk, Mehmet, MD    Performed: 06/24/17 1021 - 06/24/17 1030 Resulting lab: RADIOLOGY RESULTS    Narrative:       CT HEAD W/O CONTRAST 6/24/2017 10:30 AM    Provided History: unwitnessed fall on anticoagulation    Comparison: Head CT 5/23/2017.    Technique: Using multidetector thin collimation helical acquisition  technique, axial, coronal and sagittal CT images from the skull base  to the vertex were obtained without intravenous contrast.     Findings:    No intracranial hemorrhage, mass effect, or midline shift. Mild  generalized parenchymal loss. The  ventricles are proportionate to the  cerebral sulci. Unchanged encephalomalacia from chronic left MCA  territory infarct. No new areas of gray-white matter differentiation  loss. Mild periventricular and subcortical low-attenuation, consistent  with chronic small ischemic disease. The basal cisterns are patent.  Unchanged dural calcifications and probable small right falcine  meningioma.    Small mucous retention cyst versus polyp in the left sphenoid locule.  The remainder of the paranasal sinuses are clear.       Impression:       Impression:  1. No acute intracranial pathology.  2. Chronic left MCA territory infarct and chronic small vessel  ischemic disease.    I have personally reviewed the examination and initial interpretation  and I agree with the findings.    GALA ALCAZAR MD      CT Head w/o Contrast [[id number]]  Resulted: 06/23/17 1939, Result status: Final result    Ordering provider: Dg Robles MD  06/23/17 1849 Resulted by: Gala Alcazar MD Ames, Jeffrey Charles, MD    Performed: 06/23/17 1917 - 06/23/17 1928 Resulting lab: RADIOLOGY RESULTS    Narrative:       CT HEAD W/O CONTRAST 6/23/2017 7:28 PM    Provided History: head injury? on coumadin    Comparison: 3/25/2017, 10/18/2016, 4/13/2010..    Technique: Using multidetector thin collimation helical acquisition  technique, axial, coronal and sagittal CT images from the skull base  to the vertex were obtained without intravenous contrast.     Findings:    Stable encephalomalacia in the left middle cerebral artery vascular  territory. No intracranial hemorrhage, mass effect, or midline shift.  The ventricles are proportionate to the cerebral sulci. No acute loss  of gray-white differentiation. The basal cisterns are patent. Moderate  generalized cerebral volume loss, and moderate periventricular and  subcortical matter hypoattenuation bilaterally. Unchanged 5 mm  calcified meningioma along the right lateral aspect of the  posterior  falx.    The visualized paranasal sinuses are clear. The mastoid air cells are  clear. Debris within the external auditory canals, right greater than  left, presumably cerumen.       Impression:       Impression:   1. No acute intracranial pathology.  2. Stable chronic left MCA territory infarct.  3. Stable subcentimeter posterior parafalcine calcified meningioma.    I have personally reviewed the examination and initial interpretation  and I agree with the findings.    GALA ALCAZAR MD      Testing Performed By     Lab - Abbreviation Name Director Address Valid Date Range    104 - Rad Rslts RADIOLOGY RESULTS Unknown Unknown 05 1553 - Present               ECG/EMG Results      Echocardiogram Complete [[id number]]  Resulted: 17, Result status: In process    Ordering provider: Chris Gagnon APRN CNP  17 Performed: 17 - 17    Resulting lab: [facility]             ECHO COMPLETE WITH OPTISON [[id number]]  Resulted: 17, Result status: Edited Result - FINAL    Ordering provider: Chris Gagnon APRN CNP  17 Resulted by: Oscar Olivares MD    Performed: 17 - 17 Resulting lab: RADIOLOGY RESULTS    Narrative:       [id number]  [id number]  [id number]  [id number]^[email or web address]^CHRIS^DUKE           [facility]  Echocardiography Laboratory  [address] [place] [id number]     Name: RYLEE PURCELL  MRN: [id number]  : 1940  Study Date: 2017 08:26 AM  Age: 77 yrs  Gender: Female  Patient Location: [facility]  Reason For Study: Dizziness  Ordering Physician: CHRIS GAGNON  Performed By: Perez Taylor RDCS     BSA: 1.8 m2  Height: 62 in  Weight: 179 lb  HR: 75  BP: 134/115 mmHg  _____________________________________________________________________________  __        Procedure  Complete Portable Echo Adult. Contrast Optison. Echocardiogram with two-  dimensional,  color and spectral Doppler performed. Optison (NDC #8430-6648-95)  given intravenously. Patient was given 4 ml mixture of 3 ml Optison and 6 ml  saline. 5 ml wasted.  _____________________________________________________________________________  __        Interpretation Summary  Borderline (EF 50-55%) reduced left ventricular function is present.  Global right ventricular function is normal.  Mild aortic insufficiency is present.  The inferior vena cava is normal. Estimated mean right atrial pressure is <3  mmHg.  Mild dilatation of the aorta is present. Ascending aorta 4.1 cm.  No pericardial effusion is present.  This study was compared with the study from 9/3/2015. The LVEF is marginally  lower. The size of the ascendic aorta is similar.  _____________________________________________________________________________  __        Left Ventricle  Left ventricular size is normal. Left ventricular wall thickness is normal.  Borderline (EF 50-55%) reduced left ventricular function is present. Normal  left ventricular filling for age. No regional wall motion abnormalities are  seen.     Right Ventricle  The right ventricle is normal size. Global right ventricular function is  normal.     Atria  The right atria appears normal. Mild left atrial enlargement is present.     Mitral Valve  The mitral valve is normal.        Aortic Valve  Mild aortic valve sclerosis is present. Mild aortic insufficiency is present.     Tricuspid Valve  The tricuspid valve is normal. Trace tricuspid insufficiency is present. Right  ventricular systolic pressure is 29mmHg above the right atrial pressure.     Pulmonic Valve  The pulmonic valve is normal.     Vessels  The inferior vena cava is normal. Mild dilatation of the aorta is present.  Ascending aorta 4.1 cm. Estimated mean right atrial pressure is <3 mmHg.     Pericardium  No pericardial effusion is present.        Miscellaneous  A left pleural effusion is present.     Compared to Previous  Study  This study was compared with the study from 9/3/2015 . The LVEF is marginally  lower. The size of the ascendic aorta is similar.  _____________________________________________________________________________  __     MMode/2D Measurements & Calculations  IVSd: 1.1 cm  LVIDd: 4.9 cm  LVIDs: 3.8 cm  LVPWd: 0.89 cm  FS: 22.7 %  EDV(Teich): 111.5 ml  ESV(Teich): 60.8 ml  LV mass(C)d: 167.1 grams  LV mass(C)dI: 91.6 grams/m2  asc Aorta Diam: 4.1 cm  LVOT diam: 2.1 cm  LVOT area: 3.5 cm2  LA Volume (BP): 75.2 ml     LA Volume Index (BP): 41.3 ml/m2        Doppler Measurements & Calculations  MV E max corin: 57.2 cm/sec  MV A max corin: 65.5 cm/sec  MV E/A: 0.87  MV dec time: 0.18 sec  Ao V2 max: 169.0 cm/sec  Ao max P.0 mmHg  RADHIKA(V,D): 2.4 cm2  LV V1 max P.4 mmHg  LV V1 max: 116.0 cm/sec  Lateral E/e': 8.3  Medial E/e': 13.9           _____________________________________________________________________________  __           Report approved by: Crystal Mendoza 2017 01:45 PM       1    Type Source Collected On     17 0826          View Image (below)              Encounter-Level Documents:     There are no encounter-level documents.      Order-Level Documents:     There are no order-level documents.

## 2017-06-23 NOTE — TELEPHONE ENCOUNTER
Reason for call:  Patient reporting a symptom    Symptom or request: elevated bp     Duration (how long have symptoms been present):     Have you been treated for this before? No    Additional comments: dizzy and increased weakness     Phone Number patient can be reached at:  Other phone number:  [phone]     Best Time:  anytime    Can we leave a detailed message on this number:  YES    Call taken on 6/23/2017 at 3:29 PM by Denise Toro

## 2017-06-23 NOTE — LETTER
Health Information Management Services               Recipient:  [facility]  PH: [phone]  F: [phone]          Sender:  Marianne Aparicio  [address]   Phone: [phone]  Pager: [phone]  Unit: [phone]          Date: June 25, 2017  Patient Name:  Helen Younger  Routing Message:  Pt is likely ready to discharge tomorrow to [facility].  Please let us know if she can be accepted.  Thank you,          The documents accompanying this notice contain confidential information belonging to the sender.  This information is intended only for the use of the individual or entity named above.  The authorized recipient of this information is prohibited from disclosing this information to any other party and is required to destroy the information after its stated need has been fulfilled, unless otherwise required by state law.      If you are not the intended recipient, you are hereby notified that any disclosure, copying, distribution or action taken in reliance on the contents of these documents is strictly prohibited. If you have received this document in error, please notify [facility] immediately at [phone].  You may return the document via fax ([phone]) or return mail  (Health Information Management, , [address] [place] [address]).

## 2017-06-23 NOTE — IP AVS SNAPSHOT
[address] Observation [address] [address]    Phone:  [phone]    Fax:  [phone]                                       After Visit Summary   6/23/2017    Helen Younger    MRN: [id number]           After Visit Summary Signature Page     I have received my discharge instructions, and my questions have been answered. I have discussed any challenges I see with this plan with the nurse or doctor.    ..........................................................................................................................................  Patient/Patient Representative Signature      ..........................................................................................................................................  Patient Representative Print Name and Relationship to Patient    ..................................................               ................................................  Date                                            Time    ..........................................................................................................................................  Reviewed by Signature/Title    ...................................................              ..............................................  Date                                                            Time

## 2017-06-23 NOTE — IP AVS SNAPSHOT
MRN:[id number]                      After Visit Summary   6/23/2017    Helen Younger    MRN: [id number]           Thank you!     Thank you for choosing [facility] for your care. Our goal is always to provide you with excellent care. Hearing back from our patients is one way we can continue to improve our services. Please take a few minutes to complete the written survey that you may receive in the mail after you visit with us. Thank you!        Patient Information     Date Of Birth          1940        Designated Caregiver       Most Recent Value    Caregiver    Will someone help with your care after discharge? yes    Name of designated caregiver German    Phone number of caregiver same    Caregiver address same      About your hospital stay     You were admitted on:  June 24, 2017 You last received care in the:  Unit 6D Observation [facility]    You were discharged on:  June 26, 2017        Reason for your hospital stay       Falls s/t to orthostatic hypotension.                  Who to Call     For medical emergencies, please call 911.  For non-urgent questions about your medical care, please call your primary care provider or clinic, [phone]          Attending Provider     Provider Specialty    Dg Robles MD Emergency Medicine    Azul Dillon MD Emergency Medicine    Gertrudis Payan MD Emergency Medicine       Primary Care Provider Office Phone # Fax #    Arin Shahid [phone]      After Care Instructions     Activity - Up with assistive device           Activity - Up with nursing assistance       Please keep head of bed at 30 degrees and above as florinef can cause a life threatening hypertension with long period of laying flat.            Advance Diet as Tolerated       Follow this diet upon discharge: Diabetic (2000 ADA)            Encourage PO fluids       Patient was positive for orthostasis.            Fall precautions            General info for SNF       Length of Stay Estimate: Short Term Care: Estimated # of Days <30  Condition at Discharge: Stable  Level of care:skilled   Rehabilitation Potential: Good  Admission H&P remains valid and up-to-date: Yes  Recent Chemotherapy: Date: Patient receives Xgeva in every 3 months, last dose was  2/27/2017. She is also on daily Ibrance and Letrozole orally.                  Use Nursing Home Standing Orders: Yes.            Glucose monitor nursing POCT       Per facility's protocol as patient has a diet controlled DM type 2.            Intake and output       All oral intake and output needs to be monitored as patient has a orthostasis.            Mantoux instructions       Give two-step Mantoux (PPD) Per Facility Policy. However, risk and benefit needs to be weighed as patient is on chemotherapy.            Seizure precautions       Patient had unresponsive episode and neurology recommended for seizure precautions.                  Follow-up Appointments     Follow Up and recommended labs and tests       Follow up with primary care provider in a week fo BP, falls s/t autonomic orthostatic hypotension, DM II, chronic microcytic anemia that is likely related to her chemotherapy, anticoagulation monitoring and managing.  The following labs/tests are recommended: CBC, CMP, and INR. Patient has a f/u with oncology on 7/25/17, and motility disorder specialist on 7/3/17.                  Your next 10 appointments already scheduled     Jul 03, 2017  8:10 AM CDT   (Arrive by 7:55 AM)   New Movement Disorder with RAVEN Loya CNP   [facility] ([facility])    [address] [place] [address]  3rd [facility] [id number] [phone]            Jul 25, 2017  1:45 PM CDT   [facility] Draw with  [facility]   [facility] ([facility])    [address] [place] [id number] [phone]            Jul  25, 2017  2:30 PM CDT   (Arrive by 2:15 PM)   Return Visit with Jess Avalos PA-C   [facility] ([facility])    [address] [place] [id number]   [phone]            Aug 28, 2017  1:15 PM CDT   [facility] Draw with  [facility]   [facility])    [address] [place] [id number]   [phone]            Aug 28, 2017  2:00 PM CDT   (Arrive by 1:45 PM)   Return Visit with Keisha Landis MD   [facility] ([facility])    [address] [place] [id number]   [phone]              Additional Services     Occupational Therapy Adult Consult       Evaluate and treat as clinically indicated.    Reason:  Weakness and history of stroke.            Physical Therapy Adult Consult       Evaluate and treat as clinically indicated.    Reason: Frequent falls s/t hypotension.                  Warfarin Instruction     You have started taking a medicine called warfarin. This is a blood-thinning medicine (anticoagulant). It helps prevent and treat blood clots.      Before leaving the hospital, make sure you know how much to take and how long to take it.      You will need regular blood tests to make sure your blood is clotting safely. It is very important to see your doctor for regular blood tests.    Talk to your doctor before taking any new medicine (this includes over-the-counter drugs and herbal products). Many medicines can interact with warfarin. This may cause more bleeding or too much clotting.     Eating a lot of vitamin K--found in green, leafy vegetables--can change the way warfarin works in your body. Do NOT avoid these foods. Instead, try to eat the same amount each day.     Bleeding is the most common side-effect of warfarin. You may notice bleeding gums, a bloody nose, bruises  "and bleeding longer when you cut yourself. See a doctor at once if:   o You cough up blood  o You find blood in your stool (poop)  o You have a deep cut, or a cut that bleeds longer than 10 minutes   o You have a bad cut, hard fall, accident or hit your head (go to urgent care or the emergency room).    For women who can get pregnant: This medicine can harm an unborn baby. Be very careful not to get pregnant while taking this medicine. If you think you might be pregnant, call your doctor right away.    For more information, read \"Guide to Warfarin Therapy,  the booklet you received in the hospital.        Pending Results     No orders found from 6/21/2017 to 6/24/2017.            Statement of Approval     Ordered          06/26/17 1229  I have reviewed and agree with all the recommendations and orders detailed in this document.  EFFECTIVE NOW     Approved and electronically signed by:  Lazaro Augustine APRN CNP             Admission Information     Date & Time Provider Department Dept. Phone    6/23/2017 Gertrudis Payan MD Unit 6D Observation [facility] [place] [place] [phone]      Your Vitals Were     Blood Pressure Pulse Temperature Respirations Height Weight    166/95 (BP Location: Right arm) 68 97.7  F (36.5  C) (Oral) 16 1.575 m (5' 2\") 81.2 kg (179 lb)    Pulse Oximetry BMI (Body Mass Index)                97% 32.74 kg/m2          MyChart Information     [employer] gives you secure access to your electronic health record. If you see a primary care provider, you can also send messages to your care team and make appointments. If you have questions, please call your primary care clinic.  If you do not have a primary care provider, please call [phone] and they will assist you.        Care EveryWhere ID     This is your Care EveryWhere ID. This could be used by other organizations to access your [facility] medical records  [id number]        Equal Access to Services     YOMI LIAO AH: Alison corona " murray Carrion, waaxda luqadaha, qaybta kaalmada alexandre, cain alainain hayaan florybrian horn lanasirjose tay Phan [facility] [phone].    ATENCIÓN: Si marlys fowler, tiene a sanchez disposición servicios gratuitos de asistencia lingüística. Reema al [phone].    We comply with applicable federal civil rights laws and [place] laws. We do not discriminate on the basis of race, color, national origin, age, disability sex, sexual orientation or gender identity.               Review of your medicines      CONTINUE these medicines which may have CHANGED, or have new prescriptions. If we are uncertain of the size of tablets/capsules you have at home, strength may be listed as something that might have changed.        Dose / Directions    bisacodyl 5 MG EC tablet   Commonly known as:  DULCOLAX   Indication:  Constipation   This may have changed:    - when to take this  - reasons to take this   Used for:  Constipation, unspecified constipation type        Dose:  5 mg   Take 1 tablet (5 mg) by mouth daily as needed for constipation   Quantity:  30 tablet   Refills:  0       fludrocortisone 0.1 MG tablet   Commonly known as:  FLORINEF   This may have changed:  how much to take   Used for:  Orthostatic hypotension        Dose:  0.1 mg   Take 1 tablet (0.1 mg) by mouth daily   Quantity:  20 tablet   Refills:  0       sennosides 8.6 MG tablet   Commonly known as:  SENOKOT   This may have changed:    - when to take this  - reasons to take this   Used for:  Constipation, unspecified constipation type        Dose:  1 tablet   Take 1 tablet by mouth 2 times daily as needed for constipation May increase to 2 tabs twice daily if needed   Quantity:  120 each   Refills:  0       warfarin 1 MG tablet   Commonly known as:  JANTOVEN   Indication:  Blood Clot in the Brain   This may have changed:    - how much to take  - how to take this  - when to take this  - additional instructions   Used for:  History of stroke        Dose:  2 mg   Take 2  tablets (2 mg) by mouth daily Take 1 tablet (1mg) by mouth on Fridays. Take 2 tablets (2 mg) by mouth all other days of the week.   Quantity:  20 tablet   Refills:  0         CONTINUE these medicines which have NOT CHANGED        Dose / Directions    letrozole 2.5 MG tablet   Commonly known as:  FEMARA   Used for:  Malignant neoplasm of female breast, unspecified estrogen receptor status, unspecified laterality, unspecified site of breast (H)        Dose:  2.5 mg   Take 1 tablet (2.5 mg) by mouth daily   Quantity:  90 tablet   Refills:  3       magnesium hydroxide 400 MG/5ML suspension   Commonly known as:  MILK OF MAGNESIA   Used for:  Constipation, unspecified constipation type        Dose:  30-60 mL   Take 30-60 mLs by mouth daily as needed for constipation or heartburn   Quantity:  360 mL   Refills:  0       OCUVITE ADULT FORMULA PO        Dose:  1 tablet   Take 1 tablet by mouth daily.   Refills:  0       * order for DME   Used for:  Malignant neoplasm of female breast, unspecified laterality, unspecified site of breast, Pleural effusion        Equipment being ordered: wheeled walker   Quantity:  1 each   Refills:  0       * order for DME   Used for:  Mixed incontinence        Equipment being ordered:  Incontinence pads   Patient uses these tid   Quantity:  100 each   Refills:  3       * order for DME   Used for:  Physical deconditioning        Equipment being ordered: portable commode   Quantity:  1 Units   Refills:  0       palbociclib 100 MG capsule CHEMO   Commonly known as:  IBRANCE   Indication:  Hormone Receptor-Positive, 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 One in the morning and one at bedtime.   Refills:  0       * Notice:  This list has 3 medication(s) that are the same as other medications prescribed for you. Read the directions carefully, and ask your doctor or other care provider to review them with you.         Where to get your medicines      Some of these will need a paper prescription and others can be bought over the counter. Ask your nurse if you have questions.     Bring a paper prescription for each of these medications     fludrocortisone 0.1 MG tablet       You don't need a prescription for these medications     bisacodyl 5 MG EC tablet    letrozole 2.5 MG tablet    magnesium hydroxide 400 MG/5ML suspension    palbociclib 100 MG capsule CHEMO    sennosides 8.6 MG tablet    simvastatin 20 MG tablet    warfarin 1 MG tablet                Protect others around you: Learn how to safely use, store and throw away your medicines at www.disposemymeds.org.             Medication List: This is a list of all your medications and when to take them. Check marks below indicate your daily home schedule. Keep this list as a reference.      Medications           Morning Afternoon Evening Bedtime As Needed    bisacodyl 5 MG EC tablet   Commonly known as:  DULCOLAX   Take 1 tablet (5 mg) by mouth daily as needed for constipation   Last time this was given:  5 mg on 6/25/2017  9:19 AM                                fludrocortisone 0.1 MG tablet   Commonly known as:  FLORINEF   Take 1 tablet (0.1 mg) by mouth daily   Last time this was given:  0.1 mg on 6/26/2017  8:08 AM                                letrozole 2.5 MG tablet   Commonly known as:  FEMARA   Take 1 tablet (2.5 mg) by mouth daily   Last time this was given:  2.5 mg on 6/26/2017  8:08 AM                                 magnesium hydroxide 400 MG/5ML suspension   Commonly known as:  MILK OF MAGNESIA   Take 30-60 mLs by mouth daily as needed for constipation or heartburn                                OCUVITE ADULT FORMULA PO   Take 1 tablet by mouth daily.                                * order for DME   Equipment being ordered: wheeled walker                                * order for DME   Equipment being ordered:  Incontinence pads   Patient uses these tid                                * order for DME   Equipment being ordered: portable commode                                palbociclib 100 MG capsule CHEMO   Commonly known as:  IBRANCE   Take 1 capsule (100 mg) by mouth daily (with breakfast)   Last time this was given:  100 mg on 6/24/2017  1:51 PM                                sennosides 8.6 MG tablet   Commonly known as:  SENOKOT   Take 1 tablet by mouth 2 times daily as needed for constipation May increase to 2 tabs twice daily if needed                                simvastatin 20 MG tablet   Commonly known as:  ZOCOR   Take 1 tablet (20 mg) by mouth At Bedtime   Last time this was given:  20 mg on 6/25/2017  9:08 PM                                VIACTIV PO   Take 1 chew tab by mouth 2 times daily. One in the morning and one at bedtime.                                warfarin 1 MG tablet   Commonly known as:  JANTOVEN   Take 2 tablets (2 mg) by mouth daily Take 1 tablet (1mg) by mouth on Fridays. Take 2 tablets (2 mg) by mouth all other days of the week.   Last time this was given:  2 mg on 6/25/2017  5:34 PM                                * Notice:  This list has 3 medication(s) that are the same as other medications prescribed for you. Read the directions carefully, and ask your doctor or other care provider to review them with you.

## 2017-06-23 NOTE — TELEPHONE ENCOUNTER
Per [facility] PT: Patient is feeling really dizzy, lightheaded (especially when laying flat), and weak.  This is a new weakness.  He reports her BP was 189/115 at 1pm; he re-took BP 15 mins ago and it is 166/108.    Huddled with [facility], who advised patient needs to be seen in ER for evaluation.  RN relayed to PT, who stated family and patient are reluctant to go to ER.  RN advised that patient needs to be seen with new onset weakness and elevated BP.  PT will inform patient and family.    Kamla Fofana RN  [facility]

## 2017-06-23 NOTE — ED NOTES
"Helen comes to the ED today for evaluation of elevated BP and intermittent dizziness today.  She denies recent weight gain/swelling, CP, or SOB.  Home BP medications were discontinued in the past due to BP being \"too low\" per her .  "

## 2017-06-23 NOTE — ED PROVIDER NOTES
[place] EMERGENCY DEPARTMENT ([facility])  June 23, 2017  ED 3 6:41 PM   History     Chief Complaint   Patient presents with     Dizziness     The history is provided by the patient, medical records, a relative and the spouse (daughter and ).     Helen Younger is a 77 year old female who presents with lightheadedness and vertiginous symptoms that started today. She has a history of stroke in April 2000, breast cancer 2006 with recurrence and complications. At baseline she has delayed verbal response but otherwise has normal mentation. She had admission here to [facility] for UTI, was discharged to rehab facility where she spent 28 days. She did well with physical therapy. She was discharged to home 2 weeks ago with home health aide who helps assist her with ADLs.  states that over the past 3 months she has fallen a dozen times due to weakness and while trying to ambulate to and from bathroom. Usually she ends up falling on her bottom. Daughter notes she fell yesterday. She did not witness the fall but did see her after the fall, but found her immediately after the fall laying on the ground on her side with her head close to the wall. Daughter is unsure if she hit her head or not. No loss of consciousness with this. Today patient was complaining of dizziness and lightheadedness to point where home health aide did not give her a shower. Afterwards her sponge bath she wanted to lay down in bed as she was extremely dizzy.  was concerned given that her blood pressure is quite high today compared to her baseline and this dizziness is new. Patient felt both lightheaded and vertiginous symptoms. She does not feel dizzy here, states she feels much improved. She doesn t know if symptoms get better with her eyes closed.  states that she has not complained of dizziness in the past, and typically her blood pressures have been much lower.  Patient is on coumadin.     I have reviewed the  Medications, Allergies, Past Medical and Surgical History, and Social History in the Epic system.  PAST MEDICAL HISTORY:   Past Medical History:   Diagnosis Date     CVA (cerebral infarction) 03/01/00    slurred speech, right facial droop partial hemiplagia     Diabetes mellitus      Fibromuscular dysplasia of renal artery (H)      Malignant neoplasm of breast (female), unspecified site 01/03    Breast cancer     MVR (mitral valve repair)      Unspecified essential hypertension        PAST SURGICAL HISTORY:   Past Surgical History:   Procedure Laterality Date     BREAST LUMPECTOMY, RT/LT  04/06    Breast Lumpectomy RT/LT     BREAST LUMPECTOMY, RT/LT  06/06    redo for margins with radiation and chemo     C NONSPECIFIC PROCEDURE  1998    left knee surg torn ligament       FAMILY HISTORY:   Family History   Problem Relation Age of Onset     Hypertension Mother      Cardiovascular Mother      Prostate Cancer Father      Hypertension Father      Breast Cancer Maternal Grandmother      Cardiovascular Paternal Grandfather      Hypertension Brother      Depression Daughter      Hypertension Daughter      Psychotic Disorder Daughter      bipolar       SOCIAL HISTORY:   Social History   Substance Use Topics     Smoking status: Never Smoker     Smokeless tobacco: Never Used     Alcohol use No       Discharge Medication List as of 6/26/2017 12:31 PM      CONTINUE these medications which have CHANGED    Details   warfarin (JANTOVEN) 1 MG tablet Take 2 tablets (2 mg) by mouth daily Take 1 tablet (1mg) by mouth on Fridays. Take 2 tablets (2 mg) by mouth all other days of the week., Disp-20 tablet, Transitional      simvastatin (ZOCOR) 20 MG tablet Take 1 tablet (20 mg) by mouth At Bedtime, Disp-90 tablet, R-1, Transitional      palbociclib (IBRANCE) 100 MG capsule CHEMO Take 1 capsule (100 mg) by mouth daily (with breakfast), TransitionalPatient will take her home supply.      fludrocortisone (FLORINEF) 0.1 MG tablet Take 1  "tablet (0.1 mg) by mouth daily, Disp-20 tablet, R-0, Local Print      bisacodyl (DULCOLAX) 5 MG EC tablet Take 1 tablet (5 mg) by mouth daily as needed for constipation, Disp-30 tablet, Transitional      magnesium hydroxide (MILK OF MAGNESIA) 400 MG/5ML suspension Take 30-60 mLs by mouth daily as needed for constipation or heartburn, Disp-360 mL, R-0, Transitional      sennosides (SENOKOT) 8.6 MG tablet Take 1 tablet by mouth 2 times daily as needed for constipation May increase to 2 tabs twice daily if needed, Disp-120 each, R-0, Transitional      letrozole (FEMARA) 2.5 MG tablet Take 1 tablet (2.5 mg) by mouth daily, Disp-90 tablet, R-3, TransitionalPatient will take her home supply.         CONTINUE these medications which have NOT CHANGED    Details   Multiple Vitamins-Minerals (OCUVITE ADULT FORMULA PO) Take 1 tablet by mouth daily., Historical      VIACTIV OR Take 1 chew tab by mouth 2 times daily. One in the morning and one at bedtime. , Historical      !! order for DME Equipment being ordered: portable commodeDisp-1 Units, R-0, Local Print      !! order for DME Equipment being ordered:  Incontinence pads   Patient uses these tidDisp-100 each, R-3, Local Print      !! order for DME Equipment being ordered: wheeled walkerDisp-1 each, R-0, Local Print       !! - Potential duplicate medications found. Please discuss with provider.             Allergies   Allergen Reactions     Diuretic      Don't remember side effect     Zyrtec [Cetirizine Hcl]      Elevated blood pressure        Review of Systems    ROS: 14 point ROS neg other than the symptoms noted above in the HPI.      Physical Exam   BP: (!) 183/101  Heart Rate: 77  Temp: 98.4  F (36.9  C)  Resp: 18  Height: 157.5 cm (5' 2\")  Weight: 81.2 kg (179 lb)  SpO2: 96 %    Physical Exam  Exam:  Constitutional: healthy, alert and no distress  Head: Normocephalic. No masses, lesions, tenderness or abnormalities  Neck: Neck supple. No adenopathy. Thyroid symmetric, " normal size,, Carotids without bruits.  ENT: ENT exam normal, no neck nodes or sinus tenderness  Cardiovascular: negative, PMI normal. No lifts, heaves, or thrills. RRR. No murmurs, clicks gallops or rub  Respiratory: negative, Percussion normal. Good diaphragmatic excursion. Lungs clear  Gastrointestinal: Abdomen soft, non-tender. BS normal. No masses, organomegaly  : Deferred  Musculoskeletal: extremities normal- no gross deformities noted, gait normal and normal muscle tone  Skin: no suspicious lesions or rashes  Neurologic: Gait normal. Reflexes normal and symmetric. Sensation grossly WNL.  Psychiatric: mentation appears normal and affect normal/bright  Hematologic/Lymphatic/Immunologic: Normal cervical lymph nodes    ED Course     ED Course     Procedures             EKG Interpretation:      Interpreted by Dg Robles MD  Time reviewed: 6:55 PM  Symptoms at time of EKG: dizziness, lightheadedness    Rhythm: normal sinus   Rate: 64  Axis: normal  Ectopy: none  Conduction: normal  ST Segments/ T Waves: No ST-T wave changes  Q Waves: none  Comparison to prior: Unchanged    Clinical Impression: normal EKG    CT Head w/o Contrast (Final result) Result time: 06/24/17 10:58:52     Final result by Gala Gautam MD (06/24/17 10:58:52)     Impression:     Impression:  1. No acute intracranial pathology.  2. Chronic left MCA territory infarct and chronic small vessel  ischemic disease.    I have personally reviewed the examination and initial interpretation  and I agree with the findings.    GALA GAUTAM MD     Narrative:     CT HEAD W/O CONTRAST 6/24/2017 10:30 AM    Provided History: unwitnessed fall on anticoagulation    Comparison: Head CT 5/23/2017.    Technique: Using multidetector thin collimation helical acquisition  technique, axial, coronal and sagittal CT images from the skull base  to the vertex were obtained without intravenous contrast.     Findings:    No intracranial hemorrhage, mass effect, or  midline shift. Mild  generalized parenchymal loss. The ventricles are proportionate to the  cerebral sulci. Unchanged encephalomalacia from chronic left MCA  territory infarct. No new areas of gray-white matter differentiation  loss. Mild periventricular and subcortical low-attenuation, consistent  with chronic small ischemic disease. The basal cisterns are patent.  Unchanged dural calcifications and probable small right falcine  meningioma.    Small mucous retention cyst versus polyp in the left sphenoid locule.  The remainder of the paranasal sinuses are clear.                  CT Head w/o Contrast (Final result) Result time: 06/23/17 19:39:32     Final result by Gala Gautam MD (06/23/17 19:39:32)     Impression:     Impression:   1. No acute intracranial pathology.  2. Stable chronic left MCA territory infarct.  3. Stable subcentimeter posterior parafalcine calcified meningioma.    I have personally reviewed the examination and initial interpretation  and I agree with the findings.    GALA GAUTAM MD     Narrative:     CT HEAD W/O CONTRAST 6/23/2017 7:28 PM    Provided History: head injury? on coumadin    Comparison: 3/25/2017, 10/18/2016, 4/13/2010..    Technique: Using multidetector thin collimation helical acquisition  technique, axial, coronal and sagittal CT images from the skull base  to the vertex were obtained without intravenous contrast.     Findings:    Stable encephalomalacia in the left middle cerebral artery vascular  territory. No intracranial hemorrhage, mass effect, or midline shift.  The ventricles are proportionate to the cerebral sulci. No acute loss  of gray-white differentiation. The basal cisterns are patent. Moderate  generalized cerebral volume loss, and moderate periventricular and  subcortical matter hypoattenuation bilaterally. Unchanged 5 mm  calcified meningioma along the right lateral aspect of the posterior  falx.    The visualized paranasal sinuses are clear. The mastoid  air cells are  clear. Debris within the external auditory canals, right greater than  left, presumably cerumen.               Labs Ordered and Resulted from Time of ED Arrival Up to the Time of Departure from the ED   CBC WITH PLATELETS DIFFERENTIAL - Abnormal; Notable for the following:        Result Value    WBC 2.2 (*)     RBC Count 3.08 (*)     Hemoglobin 10.1 (*)     Hematocrit 30.9 (*)     RDW 16.7 (*)     Platelet Count 108 (*)     Absolute Neutrophil 1.1 (*)     All other components within normal limits   BASIC METABOLIC PANEL - Abnormal; Notable for the following:     Anion Gap 2 (*)     Glucose 156 (*)     Creatinine 1.11 (*)     GFR Estimate 48 (*)     GFR Estimate If Black 58 (*)     All other components within normal limits   INR - Abnormal; Notable for the following:     INR 2.35 (*)     All other components within normal limits   TROPONIN I   NT PROBNP INPATIENT   STRAIGHT CATH FOR URINE       Medications   0.9% sodium chloride BOLUS (0 mLs Intravenous Stopped 6/23/17 2150)   LORazepam (ATIVAN) injection 1 mg (1 mg Intravenous Given 6/23/17 2045)   hydrALAZINE (APRESOLINE) injection 10 mg (10 mg Intravenous Given 6/23/17 2115)   perflutren diluted 1mL to 2mL with saline (OPTISON) diluted injection 4 mL (4 mLs Intravenous Given 6/24/17 0900)   warfarin (COUMADIN) tablet 3 mg (3 mg Oral Given 6/24/17 1726)   warfarin (COUMADIN) tablet 2 mg (2 mg Oral Given 6/25/17 1734)        Assessments & Plan (with Medical Decision Making)   This is a 77-year-old female who is currently here with her family with dizziness.  Patient has multiple medical history including stroke in the past.  Patient was discharged from rehab approximately 2 weeks ago to home and was seen by her physical therapist who was concerned secondary to her feeling dizzy and sent her into the emergency department for further evaluation.  Patient does have history of somewhat of a frequent falls.  Last fall was yesterday and is unclear whether  or not she had hit her head.  She is currently on Coumadin.  Patient states that at this moment she is not having a type of dizziness or vertigo symptoms.  Patient denies any neuro changes aside from her previous stroke-related deficits.  Physical exam is unremarkable and given her recent injury as well as her multiple medical issues, we will check some basic labs as well as head CT.  Check a urinalysis for possible urinary tract infection    Head CT do not show any acute changes at this time. But given her inability to take care of self, will jordy further care and eval in hospital. Will also tx for hypertensive urgency as well.       I have reviewed the nursing notes.    I have reviewed the findings, diagnosis, plan and need for follow up with the patient.    Discharge Medication List as of 6/26/2017 12:31 PM          Final diagnoses:   Hypertensive urgency   Dizziness   Long-term (current) use of anticoagulants [Z79.01]   Jennifer PITTS, am serving as a trained medical scribe to document services personally performed by Dg Robles MD, based on the provider's statements to me.  This document has been checked and approved by Dr. Robles.    Dg PITTS MD, was physically present and have reviewed and verified the accuracy of this note documented by Jennifer Childs medical scribe.      6/23/2017   [facility], [place], EMERGENCY DEPARTMENT     Dg Robles MD  07/02/17 1031

## 2017-06-24 PROBLEM — R42 DIZZINESS: Status: ACTIVE | Noted: 2017-01-01

## 2017-06-24 NOTE — PROGRESS NOTES
06/24/17 1200   Quick Adds   Type of Visit Initial PT Evaluation       Present no   Living Environment   Lives With spouse   Living Arrangements house   Home Accessibility stairs to enter home;stairs within home   Number of Stairs to Enter Home 8   Number of Stairs Within Home 10   Transportation Available family or friend will provide   Living Environment Comment  available to assist at home 24 hours   Self-Care   Usual Activity Tolerance fair   Current Activity Tolerance poor   Regular Exercise no   Equipment Currently Used at Home walker, rolling;wheelchair   Functional Level Prior   Ambulation 1-->assistive equipment   Transferring 1-->assistive equipment   Fall history within last six months yes   Number of times patient has fallen within last six months (numerous, patient unable to remember)   Prior Functional Level Comment mod independent household ambulator with FWW, recently has had numerous falls   General Information   Onset of Illness/Injury or Date of Surgery - Date 06/23/17   Referring Physician Chris Gagnon CNP   Patient/Family Goals Statement unable to state   Pertinent History of Current Problem (include personal factors and/or comorbidities that impact the POC) Helen Younger is a 77 year old female who presents with lightheadedness and vertiginous symptoms that started today. She has a history of stroke in April 2000, breast cancer 2006 with recurrence and complications. At baseline she has delayed verbal response but otherwise has normal mentation. She had admission here to [facility] for UTI, was discharged to rehab facility where she spent 28 days. She did well with physical therapy. She was discharged to home 2 weeks ago with home health aide who helps assist her with ADLs.  states that over the past 3 months she has fallen a dozen times due to weakness and while trying to ambulate to and from bathroom. Usually she ends up falling on her bottom. Daughter notes  she fell yesterday. She did not witness the fall but did see her after the fall, but found her immediately after the fall laying on the ground on her side with her head close to the wall. Daughter is unsure if she hit her head or not. No loss of consciousness with this. Today patient was complaining of dizziness and lightheadedness to point where home health aide did not give her a shower. Afterwards her sponge bath she wanted to lay down in bed as she was extremely dizzy.  was concerned given that her blood pressure is quite high today compared to her baseline and this dizziness is new. Patient felt both lightheaded and vertiginous symptoms. She does not feel dizzy here, states she feels much improved. She doesn t know if symptoms get better with her eyes closed.  states that she has not complained of dizziness in the past, and typically her blood pressures have been much lower.  Patient is on coumadin.    Cognitive Status Examination   Orientation orientation to person, place and time   Level of Consciousness lethargic/somnolent   Follows Commands and Answers Questions 100% of the time   Personal Safety and Judgment impaired   Memory impaired   Bed Mobility   Bed Mobility Comments supine > sit min assist   Transfer Skills   Transfer Comments sit > stand min assist with FWW   Gait   Gait Comments ambulated 20' in room with FWW and min assist.  slow comfort, short shuffling steps.    Balance   Balance Comments needs UE support in standing   Clinical Impression   Criteria for Skilled Therapeutic Intervention evaluation only   PT Diagnosis impaired functional mobility s/p numerous falls   Influenced by the following impairments decreased balance, decreased functional endurance   Functional limitations due to impairments impaired bed mobility, impaired transfers, impaired gait   Clinical Presentation Evolving/Changing   Clinical Presentation Rationale uncertain prognosis, comorbidities   Clinical Decision  "Making (Complexity) Moderate complexity   Therapy Frequency` other (see comments)  (evaluation only)   Predicted Duration of Therapy Intervention (days/wks) evaluation only   Anticipated Discharge Disposition Transitional Care Facility   Risk & Benefits of therapy have been explained Yes   Patient, Family & other staff in agreement with plan of care Yes   [facility] AM-PAC  \"6 Clicks\" V.2 Basic Mobility Inpatient Short Form   1. Turning from your back to your side while in a flat bed without using bedrails? 3 - A Little   2. Moving from lying on your back to sitting on the side of a flat bed without using bedrails? 3 - A Little   3. Moving to and from a bed to a chair (including a wheelchair)? 3 - A Little   4. Standing up from a chair using your arms (e.g., wheelchair, or bedside chair)? 3 - A Little   5. To walk in hospital room? 3 - A Little   6. Climbing 3-5 steps with a railing? 2 - A Lot   Basic Mobility Raw Score (Score out of 24.Lower scores equate to lower levels of function) 17   Total Evaluation Time   Total Evaluation Time (Minutes) 25     "

## 2017-06-24 NOTE — H&P
"Per ED Physician note. \" Helen Younger is a 77 year old female who presents with lightheadedness and vertiginous symptoms that started today. She has a history of stroke in April 2000, breast cancer 2006 with recurrence and complications. At baseline she has delayed verbal response but otherwise has normal mentation. She had admission here to [facility] for UTI, was discharged to rehab facility where she spent 28 days. She did well with physical therapy. She was discharged to home 2 weeks ago with home health aide who helps assist her with ADLs.  states that over the past 3 months she has fallen a dozen times due to weakness and while trying to ambulate to and from bathroom. Usually she ends up falling on her bottom. Daughter notes she fell yesterday. She did not witness the fall but did see her after the fall, but found her immediately after the fall laying on the ground on her side with her head close to the wall. Daughter is unsure if she hit her head or not. No loss of consciousness with this. Today patient was complaining of dizziness and lightheadedness to point where home health aide did not give her a shower. Afterwards her sponge bath she wanted to lay down in bed as she was extremely dizzy.  was concerned given that her blood pressure is quite high today compared to her baseline and this dizziness is new. Patient felt both lightheaded and vertiginous symptoms. She does not feel dizzy here, states she feels much improved. She doesn t know if symptoms get better with her eyes closed.  states that she has not complained of dizziness in the past, and typically her blood pressures have been much lower.  Patient is on coumadin. \"    In the ED, Vital Signs: Temperature 98.4; Heart rate 77; Respiratory rate 18; /101; Oxygen Saturation 96% Room Air  Lab work: Sodium 140; Potassium 3.7; BUN 23; Creatinine 1.11; GFR 48; Anion gap 2; Pro ; Troponin < 0.015; WBC 2.2; Hgb 10.1; Hct 30.9; " Platelets 108; RBC 3.08; RDW 16.7; Absolute Neutrophil 1.1; INR 2.35; Glucose 156. Urinalysis needs to be collected.  Medication: Hydralazine 10 mg IV x 1; Lorazepam 1 mg IV x 1; NS IV 1 liter bolus x 1; Zofran 4 mg IV x 1  EKG: Vent rate 64 BPM; DC interval 170 ms; QRS duration 94 ms; QTc 466 ms; Sinus Rhythm, Normal ECG.  Imaging:  CT HEAD W/O CONTRAST 6/23/2017 7:28 PM     Provided History: head injury? on coumadin     Comparison: 3/25/2017, 10/18/2016, 4/13/2010..     Technique: Using multidetector thin collimation helical acquisition  technique, axial, coronal and sagittal CT images from the skull base  to the vertex were obtained without intravenous contrast.      Findings:    Stable encephalomalacia in the left middle cerebral artery vascular  territory. No intracranial hemorrhage, mass effect, or midline shift.  The ventricles are proportionate to the cerebral sulci. No acute loss  of gray-white differentiation. The basal cisterns are patent. Moderate  generalized cerebral volume loss, and moderate periventricular and  subcortical matter hypoattenuation bilaterally. Unchanged 5 mm  calcified meningioma along the right lateral aspect of the posterior  falx.     The visualized paranasal sinuses are clear. The mastoid air cells are  clear. Debris within the external auditory canals, right greater than  left, presumably cerumen.          Impression:   1. No acute intracranial pathology.  2. Stable chronic left MCA territory infarct.  3. Stable subcentimeter posterior parafalcine calcified meningioma.       Past Medical History:   Diagnosis Date     CVA (cerebral infarction) 03/01/00    slurred speech, right facial droop partial hemiplagia     Diabetes mellitus      Fibromuscular dysplasia of renal artery (H)      Malignant neoplasm of breast (female), unspecified site 01/03    Breast cancer     MVR (mitral valve repair)      Unspecified essential hypertension      Past Surgical History:   Procedure Laterality  "Date     BREAST LUMPECTOMY, RT/LT  04/06    Breast Lumpectomy RT/LT     BREAST LUMPECTOMY, RT/LT  06/06    redo for margins with radiation and chemo     C NONSPECIFIC PROCEDURE  1998    left knee surg torn ligament     Family History   Problem Relation Age of Onset     Hypertension Mother      Cardiovascular Mother      Prostate Cancer Father      Hypertension Father      Breast Cancer Maternal Grandmother      Cardiovascular Paternal Grandfather      Hypertension Brother      Depression Daughter      Hypertension Daughter      Psychotic Disorder Daughter      bipolar     Social History     Social History     Marital status:      Spouse name: N/A     Number of children: N/A     Years of education: N/A     Occupational History     Not on file.     Social History Main Topics     Smoking status: Never Smoker     Smokeless tobacco: Never Used     Alcohol use No     Drug use: No     Sexual activity: No     Other Topics Concern     Parent/Sibling W/ Cabg, Mi Or Angioplasty Before 65f 55m? No     Social History Narrative    .  [place] a \"[employer]\" [place].  2 daughters.       Allergies:   Allergies   Allergen Reactions     Diuretic      Don't remember side effect     Zyrtec [Cetirizine Hcl]      Elevated blood pressure       PCP: Arin Shahid MD      Active Problems:    * No active hospital problems. *    Blood pressure (!) 175/94, temperature 98.2  F (36.8  C), temperature source Oral, resp. rate 17, height 1.575 m (5' 2\"), weight 81.2 kg (179 lb), SpO2 98 %, not currently breastfeeding.    Review of Systems   Constitutional: Negative for chills, diaphoresis, fever and malaise/fatigue.   HENT: Negative for congestion, ear pain, hearing loss, nosebleeds, sore throat and tinnitus.    Eyes: Negative for blurred vision, double vision and photophobia.   Respiratory: Negative for cough, hemoptysis, sputum production, shortness of breath, wheezing and stridor.    Cardiovascular: " Negative for chest pain, palpitations, orthopnea and claudication.   Gastrointestinal: Positive for nausea. Negative for abdominal pain, blood in stool, constipation, diarrhea, heartburn, melena and vomiting.   Genitourinary: Negative for dysuria, flank pain, frequency, hematuria and urgency.   Musculoskeletal: Negative for back pain, joint pain, myalgias and neck pain.        Unsteady gait   Skin: Negative for itching and rash.   Neurological: Positive for dizziness. Negative for tingling, tremors, sensory change, speech change, focal weakness, loss of consciousness, weakness and headaches.        ? seizures   Psychiatric/Behavioral: Negative for substance abuse. The patient is not nervous/anxious.        Physical Exam   Constitutional: She is oriented to person, place, and time. She appears well-developed and well-nourished. No distress.   HENT:   Head: Normocephalic and atraumatic.   Left Ear: External ear normal.   Nose: Nose normal.   Mouth/Throat: No oropharyngeal exudate.   Dy oral mucous membranes   Eyes: Conjunctivae and EOM are normal. Pupils are equal, round, and reactive to light. Right eye exhibits no discharge. Left eye exhibits no discharge. No scleral icterus.   Neck: Normal range of motion. Neck supple. No JVD present. No tracheal deviation present. No thyromegaly present.   Cardiovascular: Normal rate, regular rhythm, S1 normal, S2 normal, normal heart sounds and intact distal pulses.    Pulmonary/Chest: Effort normal. No stridor. No respiratory distress. She has no wheezes. She has no rales. She exhibits no tenderness.   Abdominal: Soft. She exhibits no distension and no mass. There is no tenderness. There is no rebound and no guarding.   Musculoskeletal: Normal range of motion. She exhibits no edema, tenderness or deformity.   Lymphadenopathy:     She has no cervical adenopathy.   Neurological: She is alert and oriented to person, place, and time. She has normal reflexes. No cranial nerve  deficit.   Right facial droop-chronic  Dysarthria & Dysphagia, chronic  EOM intact without nystagmus  Negative pronator drift  Negative babinski  5/5 RUE Motor Strength  5/5 LUE Motor Strength  5/5 RLE Motor Strength  5/5 LLE Motor Strength   Skin: Skin is warm and dry. No rash noted. She is not diaphoretic. No erythema. No pallor.   Psychiatric: Her behavior is normal. Judgment and thought content normal.   Flat Affect   Nursing note and vitals reviewed.      Assessment and Plan: 77 year old  female with a history of ER/CO-positive, HER-2 negative metastatic breast cancer,  CKD, CVA, HTN who presented to the emergency department with dizziness and uncontrolled hypertension. Admitted to the observation unit under Vertigo and Hypertension protocol.    ## 1. Dizziness/Vertigo:   ## 2. Falls: Admitted to medicine observation 05/02/2017 for falls, suspected uncomplicated UTI and orthostatic hypotension d/t autonomic dysfunction. Recently discharged to home w/ home care from TCU 2 weeks ago due to multiple falls and deconditioning.   - CT head revealed no acute intracranial etiology, noted stable chronic left MCA territory infarct and stable sub centimeter posterior parafalcine calcified meningioma.   - Patient has had 2 falls since returning home, both mechanical in nature- last fall was 2 days ago, tripped by legs of walker and fell on her left side- denies pain or injury, denies hitting head, neck or back pain. Denies worsening weakness, chest pain, shortness of breath, cough or chills.  - Today, experienced dizziness ' stating room spinning ' upon attempting to get into the shower. Feels very unsteady on her feet.   Uses walker for ambulation.  - Avoid meclizine due to advanced age per Up-to-date: Meclizine on [employer] Criteria as first generation antihistamine resulting in increased risk of confusion and CNS depression. Also avoid due to reduced clearance (Helen has CKD stage 3).   March 2017: TSH 1.97 &  Vitamin B12  327  - Orthostatic vital signs  - Straight cath for urine and send UA r/o UTI (done in ED).  - NS IV 75 ml/hour  - Fall Precautions  - Bedside echocardiogram in am  - Continuous cardiac monitoring & pulse oximetry  - PT evaluation in am  - Magnesium, Phosphorus (add-on)  - Given her history of cerebral infarction will consult neurology input, appreciate recommendations      ## 3. ? Seizures: For the past 6 months, Per , patient has unresponsive episodes ( breathing on her own) where patient has staring episodes, eyes rolled back, becomes rigid and does not respond to 's voice->which last 30 seconds to 1 minute. No extremity tremors or bowel or bladder incontinence.  - Seizure precautions  - Neurology consult, appreciate recommendations.    ## 4. Uncontrolled Hypertension:   ## 5. Orthostatic Hypotension, Autonomic Dysfunction:  - Recently admitted to medicine observation 05/02/2017 for falls, Orthostatic hypotension, autonomic dysfunction. Blood pressures very labile, with systolics 160-180. She was given IV hydralazine x 1 and BP precipitously dropped, requiring IV fluids. Given Fludrocortisone of which patient's  states patient has not been taking- because he was unaware of medication.   Today, BP in /101-> 215/110->217/115->225/123  After Hydralazine 10 mg IV x 1, became nauseous vomiting. Nausea resolved after Zofran. Repeat /108->175/94->157/92  BP upon admission 161/106->144/91    - Vital signs every 4 hours  - Avoid IV antihypertensives unless SBP > 200 x 2  - Given multiple hospital visits of hypertension with SBP > 200, will obtain Cardiology consult in am, appreciate recommendations.  ## 6. Acute on Chronic Kidney Injury: Creatinine 1.11/GFR 48 (baseline 1.03). Received NS IV 1 liter bolus in ED  - Avoid nephrotoxic agents  - CMP in am  ## 7. Hx: Cerebral Infarction (April 2000) on chronic anticoagulation: INR 2.35. Takes Jantoven 2 mg po daily per .  -  Pharmacy to follow INR and manage Jantoven dosing during admission.    - Dysarthria & Dysphagia, chronic and at baseline per   -  Tolerates regular diet with thin liquids. No swallowing issues per .  - Used to have residual right sided weakness which resolved with PT.  ## 8. Metastatic breast cancer with pleural effusions and bony mets: History of lumpectomy, adjuvant chemotherapy with Taxotere, whole breast radiotherapy, and 5 years of adjuvant Arimidex completed in 2012. Found to have bony mets and metastatic pleural effusion in 12/2015. Stable disease noted on imaging 12/2016. Receives Xgeva every 3 months, last dose 2/27/2017.  - Continue PTA Letrozole, Ibrance.  ## 9. Diabetes mellitus, Type 2, diet controlled: 07/14/2016  Hemoglobin A1c 5.0  - Blood sugar checks BID & HS  - Hypoglycemia protocol  ## 10. Chronic microcytic anemia: Hemoglobin stable 10.1 (baseline 9-10) ;  Likely due to Chemotherapy vs MDS. Vit B12 and folate were normal recently  - Repeat CBC in am  ## 11. Abnormal NM MPI Adenosine test with LV EF 70% (01/21/2017): Coronary angiogram 02/18/2017 showed focal CAD with no significant lesions.   - Troponin < 0.015. EKG with no ST-T wave changes and no acute ischemia.  - Serial Troponin every 4 hours x 2  - Continous telemetry    06/24/2017 @ 23:50: Urinalysis ketones 40, glucose 70, negative for nitrites, blood or leukocyte esterace. NS IV @ 75 ml/hour for ketonuria.  Magnesium 2.0; Phosphorus 2.4  06/24/2017 @ 06:00: Received NS  ml. Will discontinue IV fluids. Hx: Pleural Effusion. Do not want to overload patient.   Encourage po fluids.      DVT Prophylaxis:  Already anticoagulated; mechanical  GI prophylaxis: Regular diet  Bowel: docusate   FEN: Regular diet  IVF: NS IV @ 75 ml/hour  Code status: DNR/ DNI (verified by patient and )  Disposition: Pending resolution of dizziness; Neurology and Cardiology consults completed; Stable vital signs and lab work.    Chris CASTILLO  RAVEN Gagnon, CNP  ED Observation Unit  [facility]  6/23/2017

## 2017-06-24 NOTE — PLAN OF CARE
Problem: Discharge Planning  Goal: Discharge Planning (Adult, OB, Behavioral, Peds)  Outpatient/Observation goals to be met before discharge home:      -diagnostic tests and consults completed and resulted : No  -vital signs normal or at patient baseline : No  -tolerating oral intake to maintain hydration : No  - Dizziness resolved : no  -returns to baseline functional status; edis   -safe disposition plan has been identified: No

## 2017-06-24 NOTE — PROGRESS NOTES
[facility]  Daily Progress Note          Assessment & Plan:   Helen Younger is a 77 year old female with a history of ER/IA-positive, HER-2 negative metastatic breast cancer,  CKD, CVA, HTN who presented to the emergency department with dizziness and uncontrolled hypertension.     1. Orthostatic hypotension: Admitted to medicine observation 05/02/2017 for falls, suspected uncomplicated UTI and orthostatic hypotension d/t autonomic dysfunction. Recently discharged to home w/ home care from TCU 2 weeks ago due to multiple falls and deconditioning. CT head negative for ICH X 2. Patient has had 2 falls since returning home, both mechanical in nature- last fall was 2 days ago, tripped by legs of walker and fell on her left side- denies pain or injury, denies hitting head, neck or back pain. Denies worsening weakness, chest pain, shortness of breath, cough or chills.  +orthostatic hypotension this am.  /102--->68/23.  Bedside echo showed no significant changes since 2015.  Serial troponins negative X 3.  PT evaluated and recommended d/c to TCU, social work aware and will discuss with patient and spouse. Neurology consulted, recommended keeping HOB>30 degrees at night, increasing florinef to 0.1 mg daily, follow up in MDS clinic  - Orthostatic vital signs Q shift  - keep HOB>30 degrees (expecially at night)  - Fall Precautions  - Continuous cardiac monitoring   - increase florinef 0.1 mg daily  - follow up in MDS clinic  -cardiology consult if florinef increased to 1.5 mg.  Curbsided with cardiology and they currently recommend no anti hypertensive medications given severe orthostatic hypotension         2. Unresponsive episodes: For the past 6 months, Per , patient has unresponsive episodes ( breathing on her own) where patient has staring episodes, eyes rolled back, becomes limp, and does not respond to 's voice->which last 30 seconds to 1 minute. No extremity  tremors or bowel or bladder incontinence.  Discussed with neurology and since patient does not have any obvious post ictal confusion this is likely secondary to orthostatic syncope instead of seizures.    - Seizure precautions     Chronic Problems:   #CKD: creatinine 0.88.    #Hx: Cerebral Infarction (April 2000) on chronic anticoagulation: INR 2.10. Takes Jantoven 2 mg po daily per .  - Pharmacy to follow INR and manage Jantoven dosing during admission.     ##Metastatic breast cancer with pleural effusions and bony mets: History of lumpectomy, adjuvant chemotherapy with Taxotere, whole breast radiotherapy, and 5 years of adjuvant Arimidex completed in 2012. Found to have bony mets and metastatic pleural effusion in 12/2015. Stable disease noted on imaging 12/2016. Receives Xgeva every 3 months, last dose 2/27/2017.  - Continue PTA Letrozole, Ibrance (use patient's home dose).  ##Diabetes mellitus, Type 2, diet controlled: 07/14/2016  Hemoglobin A1c 5.9  - Blood sugar checks BID & HS  - Hypoglycemia protocol  ##Chronic microcytic anemia: Hemoglobin stable 10.1 (baseline 9-10) ;  Likely due to Chemotherapy vs MDS. Vit B12 and folate were normal recently      FEN: moderate CHO diet  Lines: PIV  Prophylaxis: on anticoagulation for prior stroke          Consults:   Neurology, cardiology         Discharge Planning:   TCU depending on placement via         Interval History:   Helen is doing ok this am, denies pain or nausea, but states she feels dizzy.  Unable to explain what the dizziness feels like.  Per , she has fallen 2 times since d/c from the TCU, doesn't think she hit her head, states that she lands on her butt most of the time.  Describes the unresponsive episodes as her not responding to verbal or painful stimuli, no jerking movements, her body is limp.  No bowel or bladder incontinence.  No post ictal confusion.  Lasts about 30 seconds-1 minute.    ROS:   Constitutional: No  "fevers/chills. Tolerating diet.   Cardiovascular: No chest pain or palpitations.   Respiratory: No cough or SOB.   GI: No abdominal pain. No N/V.      : No urinary complaints.   Musculoskeletal: Denies pain.           Physical Exam:   BP (!) 195/120  Temp 99.4  F (37.4  C) (Oral)  Resp 16  Ht 1.575 m (5' 2\")  Wt 81.2 kg (179 lb)  SpO2 95%  Breastfeeding? No  BMI 32.74 kg/m2     GENERAL: Alert and oriented X 2.  NAD. Expressive aphasia.  HEENT: Anicteric sclera. Mucous membranes moist. EOMs intact w/o nystagmus, slight right sided facial droop.  CV: RRR. S1, S2. No murmurs appreciated.   RESPIRATORY: Effort normal. Lungs CTAB with no wheezing, rales, rhonchi.   GI: Abdomen soft and non distended with normoactive bowel sounds present in all quadrants. No tenderness, rebound, guarding.   NEUROLOGICAL: No focal deficits. Moves all extremities.  Negative babinski, normal strength  EXTREMITIES: No peripheral edema. Intact bilateral pedal pulses.   SKIN: No jaundice. No rashes.     Medication list reviewed.   Labs and imaging were reviewed.     RAVEN Oconnell, CNP  Ascom #[id number]    "

## 2017-06-24 NOTE — CONSULTS
"Social Work: Assessment with Discharge Plan    Patient Name:  Helen Younger  :  1940  Age:  77 year old  MRN:  [id number]  Risk/Complexity Score:     Completed assessment with:  Patient (fell asleep during conversation) and  Rony    Presenting Information   Reason for Referral:  Discharge plan  Date of Intake:  2017  Referral Source:  Physician  Decision Maker:  Patient   Alternate Decision Maker:    Health Care Directive:  Copy in Chart - both POLST and HCD  Living Situation:  House  Previous Functional Status:  Assistance with Transportation:  , Meals:  , Laundry:  , Housekeeping:  , Bathing:  [facility] 2x/week from [facility] and , Medication Management:  , Appointments:   and Financial:  . Patient was able to OOB or a chair and walk with FWW to BR, she would watch TV and could walk into the bedroom to take a nap.  Patient and family understanding of hospitalization:  \"We are here for her blood pressure, her deficits from her CVA on 2000, and Breast Cancer (takes 2 chemo pills every 21 days and then 1 week off)  Cultural/Language/Spiritual Considerations:  N/a  Adjustment to Illness:   is struggling to understand the impact of her medical concerns - feels he needs to have more conversation with medical team to understand \"I don't know if the low blood pressure and high blood pressure mean anything\" \"I need more information - what can we expect.\"     Physical Health  Reason for Admission:    1. Hypertensive urgency    2. Dizziness    3. Long-term (current) use of anticoagulants [Z79.01]      Services Needed/Recommended:  TCU    Mental Health/Chemical Dependency  Diagnosis:  None listed  Support/Services in Place:  n/a  Services Needed/Recommended:  n/a    Support System  Significant relationship at present time:   Rony  Family of origin is available for support:  Yes - daughter Evonne lives in [facility]; she is a "  in [place]; she would be able to help more in the summer but also has her own family  Other support available:  She is [facility] open to [facility] with RN 1x/wk, HHA 2x/week; OT (discharged); PT 2x/wk; SLP 1/x/wk; SW (one time visit); MOWs (started 1 week ago), They have a BSC, 2 walkers, chair lift from garage up to living area (they have a ranch home with walk out basement and tuck under garage - chair lift is from garage to main level), grab bars, shower chair with handheld shower  Gaps in support system:  none  Patient is caregiver to:  None     Provider Information   Primary Care Physician:  Arin Shahid   [phone]   Clinic:  [address]*      :  For Breast Cancer listed in EPIC - Jolie Farley RN    Financial   Income Source:  Social security long term  Financial Concerns:  none  Insurance:    Payor/Plan Subscriber Name Rel Member # Group #   UC[employer] - [employer] FOR S[employer]* RYLEE PURCELL  [id number]       [id number]       Discharge Plan   Patient and family discharge goal:  Unclear at this time - considering bringing her home and resuming care needs along with getting a hospital bed OR TCU (perhaps not returning to [place]   Provided education on discharge plan:  YES  Patient agreeable to discharge plan:  Not sure what plan will be until  has spoken with medical team  A list of Medicare Certified Facilities was provided to the patient and/or family to encourage patient choice. Patient's choices for facility are:  Provided list and  will look over - prefers to stay around [facility] area  Will NH provide Skilled rehabilitation or complex medical:  YES  General information regarding anticipated insurance coverage and possible out of pocket cost was discussed. Patient and patient's family are aware patient may incur the cost of transportation to the facility, pending insurance payment: NO  Barriers to discharge:   "Patient medical readiness and decision from patient/ about what they would like to do    Discharge Recommendations   Anticipated Disposition:  TCU vs home with resumption of [facility] and hospital bed  Transportation Needs:  Other:  TBD  Name of Transportation Company and Phone:  N/a      Additional comments   Patient was unaware of PT rec for TCU but endorsed it was not a surprise to her. Patient and  lived in [place], [place] until 1991 when they moved back to [place].  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 After her CVA on 4.1.2000 she medically retired - her  feels she was ready to retire and \"she did not cry big tears\" when she was no longer working. Most recently patient was at [facility] for 28 days in May/June - she has been home for 1.5 weeks and had the [facility] set up prior to her discharge.  is not sure if he would want to have her return to [facility] if they decide on TCU and he has the list to see what other TCU options exist near their home. Updated provider to patient's  wishing to speak with them.     Nichole CALDERON LICSW  6/24/2017    ON CALL PAGER   0800 - 1600   [phone]    ON CALL COVERAGE AFTER 1600  [phone]    "

## 2017-06-24 NOTE — CONSULTS
"[facility]: [place]    General Neurology Consult    Patient Name:  Helen Younger  MRN:  [id number]    :  1940  Date of Admission:  2017  Date of Service:  2017  Primary care provider:  Arin Shahid      History of Present Illness: 77F hx suspected neurocognitive disorder with orthostatic hypotension and rigidity, PVD, obesity, prior stroke on warfarin, DM2, CKD p/a 3x month hx of increasing falls and recurrent unresponsive episodes. Patient was admitted and seen by neurology in May 2017 for increasing episodes of falling and orthostatic hypotension. 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 Unclear why patient hasn't seen that f/u. MRI in 2016 showed old large L MCA encephalomalacia and generalized atrophy.      reports 3x month hx of worsening falls (ambulates with walker at baseline), ongoing dysphagia, dysarthria, dizziness. In ED, had /123 and strongly positive orthostatic blood pressures (172/102 -> 68/23 standing). CTH unremarkable.  also reports 6x month hx of spells characterized by eyes rolling back and patient being unresponsive for 30-60s w/o any shaking activity. Unclear if pt regain faculties quickly or not.     Has hx of stroke in  and is on warfarin, per , because of \"carotid artery problems.\" no hx of afib. Has baseline mild expressive aphasia.     Neurology consulted to address the orthostatic hypotension as well as the new spells.     ROS: A 10-point ROS is negative unless indicated above.      Allergies:  Allergies   Allergen Reactions     Diuretic      Don't remember side effect     Zyrtec [Cetirizine Hcl]      Elevated blood pressure       Medications:    Prescriptions Prior to Admission   Medication Sig Dispense Refill Last Dose     bisacodyl (DULCOLAX) 5 MG EC tablet Take 5 mg " by mouth daily   6/22/2017 at 1600     palbociclib (IBRANCE) 100 MG capsule CHEMO Take 1 capsule (100 mg) by mouth daily (with breakfast)   6/22/2017 at 1600     warfarin (JANTOVEN) 1 MG tablet Take 1 tablet (1mg) by mouth on Monday, Wednesday, Friday. Take 2 tablets (2 mg) by mouth on Tuesday and Thursday. (Patient taking differently: Take 2 mg by mouth daily Take 1 tablet (1mg) by mouth on Fridays. Take 2 tablets (2 mg) by mouth all other days of the week.) 30 tablet 0 6/22/2017 at 1600     letrozole (FEMARA) 2.5 MG tablet Take 1 tablet (2.5 mg) by mouth daily 90 tablet 3 6/22/2017 at 1600     simvastatin (ZOCOR) 20 MG tablet Take 1 tablet (20 mg) by mouth At Bedtime 90 tablet 1 6/22/2017 at 1600     Multiple Vitamins-Minerals (OCUVITE ADULT FORMULA PO) Take 1 tablet by mouth daily.   6/22/2017 at 1600     VIACTIV OR Take 1 chew tab by mouth 2 times daily. One in the morning and one at bedtime.    6/22/2017 at 1600     fludrocortisone (FLORINEF) 0.1 MG tablet Take 0.5 tablets (0.05 mg) by mouth daily   Unknown at am     order for DME Equipment being ordered: portable commode 1 Units 0 Taking     magnesium hydroxide (MILK OF MAGNESIA) 400 MG/5ML suspension Take 30-60 mLs by mouth daily as needed for constipation or heartburn 360 mL 0 More than a month at am     sennosides (SENOKOT) 8.6 MG tablet Take 1 tablet by mouth 2 times daily May increase to 2 tabs twice daily if needed 120 each 0 More than a month at am     order for DME Equipment being ordered:  Incontinence pads   Patient uses these tid 100 each 3 Taking     order for DME Equipment being ordered: wheeled walker 1 each 0 Taking       PMH:  Past Medical History:   Diagnosis Date     CVA (cerebral infarction) 03/01/00    slurred speech, right facial droop partial hemiplagia     Diabetes mellitus      Fibromuscular dysplasia of renal artery (H)      Malignant neoplasm of breast (female), unspecified site 01/03    Breast cancer     MVR (mitral valve repair)       Unspecified essential hypertension      Past Surgical History:   Procedure Laterality Date     BREAST LUMPECTOMY, RT/LT  04/06    Breast Lumpectomy RT/LT     BREAST LUMPECTOMY, RT/LT  06/06    redo for margins with radiation and chemo     C NONSPECIFIC PROCEDURE  1998    left knee surg torn ligament       Social History:  Social History   Substance Use Topics     Smoking status: Never Smoker     Smokeless tobacco: Never Used     Alcohol use No       Family History:    Family History   Problem Relation Age of Onset     Hypertension Mother      Cardiovascular Mother      Prostate Cancer Father      Hypertension Father      Breast Cancer Maternal Grandmother      Cardiovascular Paternal Grandfather      Hypertension Brother      Depression Daughter      Hypertension Daughter      Psychotic Disorder Daughter      bipolar       Physical Examination:   Vitals:  B/P: 195/120, T: 99.4, P: Data Unavailable, R: 16  General:  Sitting up in chair  HEENT:  NC/AT, no icterus, op pink and moist, no ear or nose drainage.   Cardiac:  RRR, no m/r/g  Chest:  CTAB  Abdomen:  S/NT/ND  Extremities:  No LE swelling.    Skin:  No rash or lesion.      Neuro Exam:  Mental status: alert, does not pay much attention to examiner or examination but does answer all questions eventually, usually with a long gap b/t question and answer. Disoriented. Follows commands.   Cranial nerves: unable to look up at all, otherwise EOMI. Pupils equal and reactive. Normal saccades. Tongue midline.   Motor: 5/5 diffusely. No tremor.   Reflexes: 3+ in UEs, 2+ in LEs. +palmomental, +grasp, +glabellar.   Sensory: intact to light touch BL  Coordination: FTN intact  Gait: walks with assist of one. Mildly wide gait with short steps, difficulty turning.     Investigations:  Data     CMP   Recent Labs  Lab 06/24/17  0755 06/23/17  1955 06/22/17  1347    140 138   POTASSIUM 3.5 3.7 3.6   CHLORIDE 108 106 105   CO2 27 31 28   ANIONGAP 6 2* 6   * 156* 208*    BUN 18 23 28   CR 0.88 1.11* 1.33*   GFRESTIMATED 62 48* 39*   GFRESTBLACK 75 58* 47*   SHERYL 8.6 9.4 9.2   MAG  --  2.0  --    PHOS  --  2.4*  --    PROTTOTAL 6.6*  --  7.2   ALBUMIN 3.4  --  3.7   BILITOTAL 1.5*  --  0.8   ALKPHOS 37*  --  41   AST 12  --  13   ALT 15  --  14        CBC   Recent Labs  Lab 06/24/17  0755 06/23/17 1955 06/22/17  1347   WBC 2.3* 2.2* 2.4*   RBC 2.98* 3.08* 3.14*   HGB 9.8* 10.1* 10.8*   HCT 30.4* 30.9* 32.7*   * 100 104*   MCH 32.9 32.8 34.4*   MCHC 32.2 32.7 33.0   RDW 16.8* 16.7* 16.4*   PLT 99* 108* 113*       INR, PTT   Recent Labs  Lab 06/23/17 1955   INR 2.35*        Arterial Blood Gas No lab results found in last 7 days.    UA    Recent Labs  Lab 06/23/17  2354   COLOR Light Yellow   APPEARANCE Clear   URINEGLC 70*   URINEBILI Negative   URINEKETONE 40*   SG 1.012   UBLD Negative   URINEPH 7.0   PROTEIN Negative   NITRITE Negative   LEUKEST Negative       Micro No lab results found in last 7 days.       Radiological Data  Data   Recent Results (from the past 48 hour(s))   CT Head w/o Contrast    Narrative    CT HEAD W/O CONTRAST 6/23/2017 7:28 PM    Provided History: head injury? on coumadin    Comparison: 3/25/2017, 10/18/2016, 4/13/2010..    Technique: Using multidetector thin collimation helical acquisition  technique, axial, coronal and sagittal CT images from the skull base  to the vertex were obtained without intravenous contrast.     Findings:    Stable encephalomalacia in the left middle cerebral artery vascular  territory. No intracranial hemorrhage, mass effect, or midline shift.  The ventricles are proportionate to the cerebral sulci. No acute loss  of gray-white differentiation. The basal cisterns are patent. Moderate  generalized cerebral volume loss, and moderate periventricular and  subcortical matter hypoattenuation bilaterally. Unchanged 5 mm  calcified meningioma along the right lateral aspect of the posterior  falx.    The visualized paranasal  sinuses are clear. The mastoid air cells are  clear. Debris within the external auditory canals, right greater than  left, presumably cerumen.       Impression    Impression:   1. No acute intracranial pathology.  2. Stable chronic left MCA territory infarct.  3. Stable subcentimeter posterior parafalcine calcified meningioma.    I have personally reviewed the examination and initial interpretation  and I agree with the findings.    GALA ALCAZAR MD          ==========================================================    ASSESSMENT/PLAN: 77F hx suspected neurocognitive disorder with orthostatic hypotension and rigidity, PVD, obesity, prior stroke on warfarin, DM2, CKD p/a 3x month hx of increasing falls and recurrent unresponsive episodes.     ## orthostatic hypotension: no signs of dehydration and is in fact hypertensive on admission. Likely a component of whatever neurocognitive disorder is taking place.  --increase PO fluid intake  --fludrocortisone increase to 0.1mg daily. Will be 3-4 days before we really see effect.  --consult cardiology if further dose increases needed.   --HOB up at night - patient can develop life-threatening hypertension if flat for prolonged periods of time on fludrocortisone.     ## unresponsive episodes: pt is at risk of seizure with hx of prior stroke, however given the history that the patient is not typically confused at all after the spell, we think that orthostatic syncope is much more likely to cause her symptoms.     ## neurocognitive disorder: hx of cognitive decline, brain atrophy, parkinsonian gait, impaired upgaze, orthostatic hypotension/dysautonomia and incontinence. 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 Patient needs f/u with movement disorder specialist, was recommended at last admission and has not happened yet. Please ensure f/u after eventual DC.     Neurology will sign off. Please don't hesitate to contact us if further questions.      =========================================================    This patient was seen & discussed with my attending, Dr. Del Valle, who agrees with my assessment and plan.     Miller Danielsmami   Neurology  [phone]    ATTENDING ADDENDUM: Pt discussed on the phone with resident Dr Gunter on 6/24/2017 but I did not have the chance to see her. Agree with his assessment and recommendations as above. Elias Del Valle MD

## 2017-06-24 NOTE — PLAN OF CARE
Problem: Goal Outcome Summary  Goal: Goal Outcome Summary  PT 6D OBS: Evaluation completed.  Patient requiring min assist to transfer OOB, ambulation limited to 20' with FWW and min assist.  Positional testing performed due to inconsistent reports of new dizziness and patient being poor historian.  Positional testing negative for BPPV.       Recommend transfer to TCU setting due to limited tolerance to activity and dependence with functional mobility.  Given potential for MSA diagnosis in Neurology work up, also recommend longer term consideration of increased assistance at home/LTC options pending final diagnosis/prognosis.

## 2017-06-24 NOTE — PHARMACY-ANTICOAGULATION SERVICE
Clinical Pharmacy - Warfarin Dosing Consult     Pharmacy has been consulted to manage this patient s warfarin therapy.  Indication: Other - specify in comments  Therapy Goal: INR 2-3  Warfarin Prior to Admission: Yes  Warfarin PTA Regimen: 2mg daily  Significant drug interactions: None  Dose Comments: Patient has a h/o a CVA    INR   Date Value Ref Range Status   06/24/2017 2.10 (H) 0.86 - 1.14 Final   06/23/2017 2.35 (H) 0.86 - 1.14 Final       Recommend warfarin 3 mg today.  Pharmacy will monitor Helen Younger daily and order warfarin doses to achieve specified goal.      Please contact pharmacy as soon as possible if the warfarin needs to be held for a procedure or if the warfarin goals change.      Thank you for the consult.  Елена Marr, PharmD

## 2017-06-24 NOTE — PLAN OF CARE
Problem: Discharge Planning  Goal: Discharge Planning (Adult, OB, Behavioral, Peds)  Outpatient/Observation goals to be met before discharge home:  -diagnostic tests and consults completed and resulted: No   -vital signs normal or at patient baseline: No   -tolerating oral intake to maintain hydration: No  - Dizziness resolved: Monitoring   -returns to baseline functional status: No   -safe disposition plan has been identified: No

## 2017-06-24 NOTE — PLAN OF CARE
Problem: Discharge Planning  Goal: Discharge Planning (Adult, OB, Behavioral, Peds)  Outpatient/Observation goals to be met before discharge home:  -diagnostic tests and consults completed and resulted: No   -vital signs normal or at patient baseline: No   -tolerating oral intake to maintain hydration: Yes   - Dizziness resolved: Monitoring   -returns to baseline functional status: No   -safe disposition plan has been identified: No

## 2017-06-24 NOTE — PROGRESS NOTES
S:    Patient has no complaints.  No new problems since admission.  Patient still has hypotension related to her autonomic dysfunction.    O: Vital signs Patient has a BP of 92/62.  No complaints of SOB or CP. Afebrile   CV:  RRR, no murmur   Lungs:  Clear to A/P   Abdomen:  Soft, BS+, NT, ND, no mass   Extremities: no edema   Neuro:  A and Ox2, CN 2-12 intact, patient has chronic dysarthria and dysphagia.  EOMS intact.  Motor strength 5/5 in all extremities.  Negative babinski.    A/P:  1. Dizziness/Falls:  Patient has had several falls at home.  CT reviewed from yesterday shows no acute bleed or new infarct.  Patient is on Jantoven.  Patient's EKG and troponins are normal.  Patient states she had tangled up in something on the floor and that caused her falls at home.  Neurology to come see patient for formal consult.  Patient does have autonomic dysfunction.  Patient's electrolytes and blood work shows no significant new abnormalities.  Patient has ongoing leukopenia, neutropenia.  No signs of infection.  UA unremarkable. Need PT evaluation to assess patient safety issues at home.  Patient may need TCU placement in the near future. Repeat CT given falls and she is on Jantoven.  INR 2.35.  No evidence of head trauma.     2.   Seizures/Spells- patient has had 30 second episodes of unresponsiveness where she stares off and eyes roll back.  Note from VANGIE last evening states she becomes rigid, but husbands states she becomes limp.  No post ictal signs. No urinary or bowel incontinence.  Will have neuro come to se patient for assessment.     3.   Hypertension/Hypotension:  Would hold BP meds for now as her BP is labile.  Agree that Lisinopril would be the best choice for BP control if needed.      4.   CKD:   Creatinine did come down to 0.88 from 1.11.  Continue to monitor and avoid nephrotoxic agents.    5.   CVA 4/2000:  No change in neuro status.  Continue Jantoven as prescribed.     6.   Type 2 DM:      7.   Disposition:  Patient most likely will need TCU placement, but await PT assessment and Neuro eval.

## 2017-06-25 NOTE — PLAN OF CARE
Problem: Discharge Planning  Goal: Discharge Planning (Adult, OB, Behavioral, Peds)  Outpatient/Observation goals to be met before discharge home:     -diagnostic tests and consults completed and resulted: NO  -vital signs normal or at patient baseline: NO  -tolerating oral intake to maintain hydration: YES  - Dizziness resolved: YES  -returns to baseline functional status: NO  -safe disposition plan has been identified: NO    Nurse to notify provider when observation goals have been met and patient is ready for discharge.     Patient very sleepy at this time, blood pressure elevated but positive orthostatic, bed alarm on.  Will continue to monitor and follow POC.

## 2017-06-25 NOTE — PROGRESS NOTES
Social Work Services Progress Note    Hospital Day: 3  Date of Initial Social Work Evaluation:  6/24/17  Collaborated with:  Pt, spouse    Data:  Pt is a 77 year old female being followed by SW for discharge planning to TCU.    Intervention:  SW met with pt and spouse this morning.  Pt and spouse's choice is to discharge to TCU.  Pt and spouse would like referrals sent to the following facilities:      - [facility]  PH: [phone]  F: [phone]     - [facility]  PH: [phone]  F: [phone]    Pt's spouse states that last admission, pt was on OBS and was discharged to [facility] without having to pay privately.  Pt and spouse state that normally their insurance will cover TCU.  SW will notify family if there are any additional out of pocket costs.  Spouse plans to drive pt to TCU when medically ready to discharge.    Assessment:  Spouse involved and supportive.  Spouse would like for pt to get medically stable and physically stronger before going home.    Plan:    Anticipated Disposition:  Facility:  TCU chosen by pt and spouse.    Barriers to d/c plan:  Medical stability, bed availability    Follow Up:  SW to hand off to weekday SW to follow up on referrals made today.    MANUEL Albrecht, APSW  6[address] Unit   Phone: [phone]  Pager: [phone]  Unit: [phone]    ___________________________________________________________________________________________________________________________________________________    Referrals in process:   [facility]  PH: [phone]  F: [phone]    [facility]  PH: [phone]  F: [phone]     Referrals Discontinued:  NA    Community Case Management/Community Services in place:   None

## 2017-06-25 NOTE — PLAN OF CARE
Problem: Discharge Planning  Goal: Discharge Planning (Adult, OB, Behavioral, Peds)  Outpatient/Observation goals to be met before discharge home:  -diagnostic tests and consults completed and resulted : No, PT done, neurology and cardiology consulted. CT done.   -vital signs normal or at patient baseline : No  -tolerating oral intake to maintain hydration : yes  - Dizziness resolved : denies dizziness  -returns to baseline functional status; monitoring  -safe disposition plan has been identified: No, SW consulted TCU vs. home

## 2017-06-25 NOTE — PLAN OF CARE
Problem: Discharge Planning  Goal: Discharge Planning (Adult, OB, Behavioral, Peds)  Outpatient/Observation goals to be met before discharge home:  diagnostic tests and consults completed and resulted : No, PT done, neurology and cardiology consulted. CT done.   -vital signs normal or at patient baseline : No  -tolerating oral intake to maintain hydration : yes  - Dizziness resolved : denies dizziness  -returns to baseline functional status; monitoring  -safe disposition plan has been identified: No, SW consulted TCU vs. home

## 2017-06-25 NOTE — PROGRESS NOTES
[facility]  Daily Progress Note          Assessment & Plan:   Helen Younger is a 77 year old female with a history of ER/ME-positive, HER-2 negative metastatic breast cancer,  CKD, CVA, HTN who presented to the emergency department with dizziness and uncontrolled hypertension.      1. Orthostatic hypotension: Admitted to medicine observation 05/02/2017 for falls, suspected uncomplicated UTI and orthostatic hypotension d/t autonomic dysfunction. Recently discharged to home w/ home care from TCU 2 weeks ago due to multiple falls and deconditioning. CT head negative for ICH X 2. Patient has had 2 falls since returning home, both mechanical in nature- last fall was 2 days ago, tripped by legs of walker and fell on her left side- denies pain or injury, denies hitting head, neck or back pain. Denies worsening weakness, chest pain, shortness of breath, cough or chills.  +orthostatic hypotension this visit with orthostatic vital signs initially: /102--->68/23.  Bedside echo showed no significant changes since 2015.  Serial troponins negative X 3.  PT evaluated and recommended d/c to TCU, social work aware and will discuss with patient and spouse. Neurology consulted, recommended keeping HOB>30 degrees at night, increasing florinef to 0.1 mg daily, follow up in MDS clinic.  Today's orthostatic BP: 170/92-->99/81.  and daughter deciding between TCU and home.   - Orthostatic vital signs Q shift  - keep HOB>30 degrees (expecially at night)  - Fall Precautions  - Continuous cardiac monitoring   - increase florinef 0.1 mg daily  - follow up in MDS clinic  -cardiology consult if florinef increased to 1.5 mg or if persistent SBP>200  -social work involved           2. Unresponsive episodes: For the past 6 months, Per , patient has unresponsive episodes ( breathing on her own) where patient has staring episodes, eyes rolled back, becomes limp, and does not respond to  "'s voice->which last 30 seconds to 1 minute. No extremity tremors or bowel or bladder incontinence.  Discussed with neurology and since patient does not have any obvious post ictal confusion this is likely secondary to orthostatic syncope instead of seizures.    - Seizure precautions      Chronic Problems:   #CKD: creatinine 0.88.    #Hx: Cerebral Infarction (April 2000) on chronic anticoagulation: INR 2.10. Takes Jantoven 2 mg po daily per .  - Pharmacy to follow INR and manage Jantoven dosing during admission.      ##Metastatic breast cancer with pleural effusions and bony mets: History of lumpectomy, adjuvant chemotherapy with Taxotere, whole breast radiotherapy, and 5 years of adjuvant Arimidex completed in 2012. Found to have bony mets and metastatic pleural effusion in 12/2015. Stable disease noted on imaging 12/2016. Receives Xgeva every 3 months, last dose 2/27/2017.  - Continue PTA Letrozole, Ibrance (use patient's home dose).  ##Diabetes mellitus, Type 2, diet controlled: 07/14/2016  Hemoglobin A1c 6.4.  - Blood sugar checks BID & HS  - Hypoglycemia protocol  ##Chronic microcytic anemia: Hemoglobin stable 10.1 (baseline 9-10) ;  Likely due to Chemotherapy vs MDS. Vit B12 and folate were normal recently       FEN: moderate CHO diet  Lines: PIV  Prophylaxis: anticoagulation          Consults:   neurology         Discharge Planning:   TCU vs. home        Interval History:   Helen is doing ok today, denies dizziness, pain, or other complaints.  Eating well.  Would like to get out of the hospital today.  not yet here this am.    ROS:   Constitutional: No fevers/chills. Tolerating diet.   Cardiovascular: No chest pain or palpitations.   Respiratory: No cough or SOB.   GI: No abdominal pain. No N/V.      : No urinary complaints.   Musculoskeletal: Denies pain.           Physical Exam:   BP (!) 219/119  Pulse 68  Temp 97.9  F (36.6  C) (Oral)  Resp 16  Ht 1.575 m (5' 2\")  Wt 81.2 kg " (179 lb)  SpO2 94%  Breastfeeding? No  BMI 32.74 kg/m2     GENERAL: Alert and oriented x 3. NAD. Expressive aphasia.  HEENT: Anicteric sclera. Mucous membranes slightly dry. Slight right sided facial droop.  CV: RRR. S1, S2. No murmurs appreciated.   RESPIRATORY: Effort normal. Lungs CTAB with no wheezing, rales, rhonchi.   GI: Abdomen soft and non distended with normoactive bowel sounds present in all quadrants. No tenderness, rebound, guarding.   NEUROLOGICAL: No focal deficits. Moves all extremities.    EXTREMITIES: No peripheral edema. Intact bilateral pedal pulses.   SKIN: No jaundice. No rashes.     Medication list reviewed.   Labs and imaging were reviewed.     RAVEN Oconnell, CNP  Pager: [phone]

## 2017-06-25 NOTE — PLAN OF CARE
Problem: Discharge Planning  Goal: Discharge Planning (Adult, OB, Behavioral, Peds)  Outpatient/Observation goals to be met before discharge home:     -diagnostic tests and consults completed and resulted: NO  -vital signs normal or at patient baseline: NO  -tolerating oral intake to maintain hydration: YES  - Dizziness resolved: NO, still c/o dizziness when up to bathroom  -returns to baseline functional status: NO  -safe disposition plan has been identified: NO    Nurse to notify provider when observation goals have been met and patient is ready for discharge.

## 2017-06-25 NOTE — PROGRESS NOTES
"Observation Unit Progress Note    Date of Encounter: 06/24/2017    Observation Staff Physician: Dr. Deonte Fajardo    Reason for Admission: Dizziness, falls and uncontrolled hypertension.      Observation Goals:  -diagnostic tests and consults completed and resulted  -vital signs normal or at patient baseline  -tolerating oral intake to maintain hydration  - dizziness resolved  -returns to baseline functional status  -safe disposition plan has been identified    Subjective: Patient denies complaints, states dizziness resolved. Good appetite. Voiding appropriately. Daughter at bedside who asked reasons/benefits for patient returning back to the TCU. I informed daughter:   1. TCU recommendation per physical therapy  \" due to limited tolerance to activity and dependence with functional mobility.\"  2. Patient continues to have falls and on Jantoven which makes patient high risk for bleeding and injury.  3. Due to persistent orthostatic hypotension, patient at higher risk for falls. Need for Fludrocortisone to take effect (3-4 days per neurology).      I explained to daughter and patient, though given my recommendation, it is still patient and family's decision on TCU.      Plan of care for shift:  ## 1. Orthostatic hypotension: Admitted to medicine observation 05/02/2017 for falls, suspected uncomplicated UTI and orthostatic hypotension d/t autonomic dysfunction. Recently discharged to home w/ home care from TCU 2 weeks ago due to multiple falls and deconditioning. CT head negative for ICH X 2. Patient has had 2 falls since returning home, both mechanical in nature- last fall was 2 days ago, tripped by legs of walker and fell on her left side- denies pain or injury, denies hitting head, neck or back pain. Denies worsening weakness, chest pain, shortness of breath, cough or chills.  +orthostatic hypotension this am:    /102--->68/23.  Bedside echo showed no significant changes since 2015.  Serial troponins negative X " 3.  PT evaluated and recommended d/c to TCU, social work aware and will discuss with patient and spouse. Neurology consulted, recommended keeping HOB>30 degrees at night, increasing florinef to 0.1 mg daily, follow up in MDS clinic.  /105; 176/103; 174/104 (discussed case with Dr. Fajardo, at this point, continue to hold anti-hypertensives due to rebound orthostatic hypotension).  Orthostatic Vital Signs @ 22:00: 174/104->164/96->116/73 (asymptomatic).    - Orthostatic vital signs Q shift  - keep HOB>30 degrees (especially at HS)  - Fall Precautions  - Continuous cardiac monitoring   - increase Fludrocortisone 0.1 mg daily  - follow up in MDS clinic  -cardiology consult if florinef increased to 1.5 mg.  Curbsided with cardiology and they currently recommend no anti hypertensive medications given severe orthostatic hypotension       ## 2. Unresponsive episodes: For the past 6 months, Per , patient has unresponsive episodes ( breathing on her own) where patient has staring episodes, eyes rolled back, becomes limp, and does not respond to 's voice->which last 30 seconds to 1 minute. No extremity tremors or bowel or bladder incontinence.  Discussed with neurology and since patient does not have any obvious post ictal confusion this is likely secondary to orthostatic syncope instead of seizures.    - Seizure precautions      Chronic Problems:   ## 3. CKD: Stable  - Avoid nephrotoxic agents  - Renally adjust antibiotics    ## 4. Hx: Cerebral Infarction (April 2000) on chronic anticoagulation: INR 2.10. Takes Jantoven 2 mg po daily per .  - Pharmacy to follow INR and manage Jantoven dosing during admission.      ## 5. Metastatic breast cancer with pleural effusions and bony mets: History of lumpectomy, adjuvant chemotherapy with Taxotere, whole breast radiotherapy, and 5 years of adjuvant Arimidex completed in 2012. Found to have bony mets and metastatic pleural effusion in 12/2015. Stable disease  noted on imaging 12/2016. Receives Xgeva every 3 months, last dose 2/27/2017.  - Continue PTA Letrozole, Ibrance (use patient's home dose).  ## 6. Diabetes mellitus, Type 2, diet controlled: 07/14/2016  Hemoglobin A1c 5.9  - Blood sugar checks BID & HS  - Hypoglycemia protocol  ## 7. Chronic microcytic anemia: Hemoglobin stable 10.1 (baseline 9-10) ;  Likely due to Chemotherapy vs MDS. Vit B12 and folate were normal recently.  - Follow oncology-hematalogy outpatient  ## 8. Thrombocytopenia: Platelets 108->99. No petechiae or bleeding. Likely due to chemotherapy.  - Follow oncology-hematology outpatient     Disposition:  1. Pending TCU placement    Vital Signs: T 98.1; HR 77; RR 16; /105; Oxygen Saturation 94% Room Air  Exam:  Physical Exam   Constitutional: Much more alert and engaging today than when I saw her yesterday.   Oriented to person, place, time. Appears well-developed and well-nourished.   HENT:   Head: Normocephalic and atraumatic.   Neck: Normal range of motion.   Pulmonary/Chest: Effort normal. No respiratory distress.   Neurological: Alert and oriented to person, place and time.  Right facial droop-chronic  Dysarthria & Dysphagia, chronic  EOM intact without nystagmus  Negative pronator drift  Negative babinski  5/5 RUE Motor Strength  5/5 LUE Motor Strength  5/5 RLE Motor Strength  5/5 LLE Motor Strength   Skin: Skin is warm and dry. No rash noted. She is not diaphoretic. No erythema. No pallor.   Psychiatric: Her behavior is normal. Judgment and thought content normal.   Flat Affect     Pertinent Labs/tests:  Orthostatic Vital Signs @ 22:00: 174/104->164/96->116/73 (asymptomatic)    Hgb 9.8  Hct 30.4  WBC 2.3  Platelets 99  Sodium 141  Potassium 3.5  BUN 18  Creatinine 0.88/GFR 62  Glucose 128  INR 2.10    FEN/GI Prophylaxis: Moderate CHO diet  Lines: PIV  DVT Prophylaxis: already anticoagulated/Mechanical  Code Status: DNR/DNI    Discussed case with Dr. Tana Gagnon, APRN,  CNP  ED Observation Unit  [facility]

## 2017-06-25 NOTE — PLAN OF CARE
Problem: Discharge Planning  Goal: Discharge Planning (Adult, OB, Behavioral, Peds)  Outpatient/Observation goals to be met before discharge home:     -diagnostic tests and consults completed and resulted: NO  -vital signs normal or at patient baseline: NO  -tolerating oral intake to maintain hydration: YES  - Dizziness resolved: NO, still c/o dizziness when up to bathroom  -returns to baseline functional status: NO  -safe disposition plan has been identified: NO    Nurse to notify provider when observation goals have been met and patient is ready for discharge.      Patient very sleepy at this time, blood pressure elevated but positive orthostatic, bed alarm on.  Will continue to monitor and follow POC.

## 2017-06-26 NOTE — PROGRESS NOTES
[facility] now requests orders and shares plan of care/discharge summaries for some patients through [facility]. Thank you for your assistance in improving collaboration for our patients.    MD [facility]   Hospital/TCU- Pt has been admitted to [facility] as of 6/23 in the evening and remains there. Her admitting dx were dizziness and lightheadedness. She has been under observation status the entire time. Today, per  and [facility] records, she will be transferring to [facility] TCU/SNF. Her length of stay there is unknown.     Aby Casillas MA, CCC-SLP  Speech-Language Pathologist  [facility]  [email or web address]  [phone]

## 2017-06-26 NOTE — PLAN OF CARE
Problem: Discharge Planning  Goal: Discharge Planning (Adult, OB, Behavioral, Peds)  PRIMARY DIAGNOSIS: VERTIGO     OUTPATIENT/OBSERVATION GOALS TO BE MET BEFORE DISCHARGE  1. Orthostatic performed: YES + ortho, MD aware, dizziness with ambulation.Seizues pads in place, and bed alarm applied     2. Completion of appropriate imaging: Yes      3. Tolerating PO medications: YES      4. Return to near baseline physical activity: NO  .  5. Cleared for discharge by consultants  Discharge Planner Nurse   Safe discharge environment identified: YES, TCU placement pending  Barriers to dischargeNO:       Entered by: Gabi Maldonado 06/26/2017 5:20 AM     Please review provider order for any additional goals  Nurse to notify provider when observation goals have been met and patient is ready for discharge  Fluids encouraged when awake. Assist of 2 to bedside commode, gait slow and unsteady.HOB elevated 30 degree

## 2017-06-26 NOTE — PROGRESS NOTES
"Helen Younger is a 77 year old female patient.  1. Hypertensive urgency    2. Dizziness    3. Long-term (current) use of anticoagulants [Z79.01]      Past Medical History:   Diagnosis Date     CVA (cerebral infarction) 03/01/00    slurred speech, right facial droop partial hemiplagia     Diabetes mellitus      Fibromuscular dysplasia of renal artery (H)      Malignant neoplasm of breast (female), unspecified site 01/03    Breast cancer     MVR (mitral valve repair)      Unspecified essential hypertension      No current outpatient prescriptions on file.     Allergies   Allergen Reactions     Diuretic      Don't remember side effect     Zyrtec [Cetirizine Hcl]      Elevated blood pressure     Active Problems:    Dizziness    Blood pressure (!) 166/95, pulse 68, temperature 97.7  F (36.5  C), temperature source Oral, resp. rate 16, height 1.575 m (5' 2\"), weight 81.2 kg (179 lb), SpO2 97 %, not currently breastfeeding.    Subjective:  Symptoms:  Stable.  (Dizziness with standing up).    Diet:  Adequate intake.    Activity level: Impaired due to weakness.    Pain:  She reports no pain.      Objective:  General Appearance:  Comfortable.    Vital signs: (most recent): Blood pressure (!) 166/95, pulse 68, temperature 97.7  F (36.5  C), temperature source Oral, resp. rate 16, height 1.575 m (5' 2\"), weight 81.2 kg (179 lb), SpO2 97 %, not currently breastfeeding.  Vital signs are normal.    Output: Producing urine.    HEENT: Normal HEENT exam.    Lungs:  Normal respiratory rate and normal effort.  Breath sounds clear to auscultation.    Heart: Normal rate.  Regular rhythm.  S1 normal and S2 normal.    Extremities: Normal range of motion.    Neurological: Patient is alert and oriented to person, place and time.    Skin:  Warm.    Abdomen: Abdomen is soft.  Bowel sounds are normal.   There is no abdominal tenderness.     Pupils:  Pupils are equal, round, and reactive to light.    Pulses: Distal pulses are intact.  "     Assessment:    Condition: In stable condition.  Unchanged.   (77 yof with met breast ca, ?movement disorder per Neuro presents with Orthostatic hypotension, unresponsive episodes.Neuro input appreciated.).     Plan:   (Will plan to DC to TCU today. Will increase dose of florinef as recommended by Neuro, follow up with their MDS clinic ?[facility].).       Kevin Carrera MD  6/26/2017    The pt was seen and examined by myself. The case reviewed and the plan was discussed with the VANGIE.

## 2017-06-26 NOTE — PLAN OF CARE
Problem: Discharge Planning  Goal: Discharge Planning (Adult, OB, Behavioral, Peds)  Outpatient/Observation goals to be met before discharge home:  -Diagnostic tests and consults completed and resulted: No   -Vital signs normal or at patient baseline: Yes   -Tolerating oral intake to maintain hydration: Yes  -Dizziness resolved: No,dizziness noted with ambulation when upright and ambulating  -Returns to baseline functional status: No   -Safe disposition plan has been identified : TCU placement in progress.   Loose BM on shift, VANGIE notified.Positive ortho BP. Bed alarm and seizures pads for safety

## 2017-06-26 NOTE — PROGRESS NOTES
Social Work Services Discharge Note      Patient Name:  Helen Younger     Anticipated Discharge Date:  6/26/2017    Discharge Disposition:   TCU:  [facility]  ([phone] fax: [phone])       Pre-Admission Screening (PAS) online form has been completed.  The Level of Care (LOC) is:  Determined  Confirmation Code is:  [id number]  Patient/caregiver informed of referral to [facility] for Pre-Admission Screening for skilled nursing facility (SNF) placement and to expect a phone call post discharge from SNF.     Additional Services/Equipment Arranged:  None- to provide transportation.     Patient / Family response to discharge plan:  Facility requesting that pt bring her home chemo medications and family is agreeable to this as they did this when they were recently at same facility.      Persons notified of above discharge plan:  Pt,  [phone]), care team and TCU.     Staff Discharge Instructions:  Please fax discharge orders and signed hard scripts for any controlled substances.  Please print a packet and send with patient.     CTS Handoff completed:  YES    Medicare Notice of Rights provided to the patient/family:  YES

## 2017-06-26 NOTE — PLAN OF CARE
Problem: Discharge Planning  Goal: Discharge Planning (Adult, OB, Behavioral, Peds)  Outpatient/Observation goals to be met before discharge home:     -Diagnostic tests and consults completed and resulted - Yes  -Vital signs normal or at patient baseline - No  -Tolerating oral intake to maintain hydration -Yes  - Dizziness resolved - No  -Returns to baseline functional status - No  -Safe disposition plan has been identified - No

## 2017-06-26 NOTE — PROGRESS NOTES
IV's removed.  Discharge packet given to , pt. Going to TCU.  Pt. Has all belongings.   is driving pt. To TCU

## 2017-06-26 NOTE — PROGRESS NOTES
"ED OBSERVATION PROGRESS NOTE:  S: Helen Younger is a 77 year old female with a history of ER/FL-positive, HER-2 negative metastatic breast cancer,  CKD, CVA, HTN who presented to the emergency department with dizziness and uncontrolled hypertension.     Pending TCU placement currently.  Patient and nursing staff report 2 episodes of loose stools today.  Patient denies associated fever, nausea/vomiting, or abdominal pain.  Denies dizziness/lightheadedness, chest pain, or shortness of breath.  Has no concerns this evening.        Chief Complaint   Patient presents with     Dizziness     1. Hypertensive urgency    2. Dizziness    3. Long-term (current) use of anticoagulants [Z79.01]        Problem List:  Patient Active Problem List   Diagnosis     Hyperlipidemia LDL goal <100     Breast cancer,left     Cerebral infarction (H)     Hypertension goal BP (blood pressure) < 140/90     Advance Care Planning     CKD (chronic kidney disease) stage 3, GFR 30-59 ml/min     Health Care Home     Cataract, mild, ou     Melanocytic nevus     Type 2 diabetes, HbA1C goal < 8% (H)     Macular degeneration (senile) of retina     Posterior vitreous detachment     Peripheral vascular disease (H)     Obesity     Type 2 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)     Type 2 diabetes mellitus with autonomic neuropathy (H)     SOB (shortness of breath)     Pleural effusion on left     Pleural effusion     Malignant neoplasm of female breast, unspecified laterality, unspecified site of breast     Malignant pleural effusion     Long-term (current) use of anticoagulants [Z79.01]     Pneumonia     Weakness     Dizziness       MEDS:   No current outpatient prescriptions on file.       ALLERGIES:    Allergies   Allergen Reactions     Diuretic      Don't remember side effect     Zyrtec [Cetirizine Hcl]      Elevated blood pressure       O:BP (!) 192/109  Pulse 68  Temp 98.1  F (36.7  C) (Oral)  Resp 16  Ht 1.575 m (5' 2\")  Wt " 81.2 kg (179 lb)  SpO2 97%  Breastfeeding? No  BMI 32.74 kg/m2    Exam:  Constitutional: healthy, alert and no distress  Head: Normocephalic. No masses, lesions, tenderness or abnormalities  Cardiovascular: No lifts, heaves, or thrills. RRR. No murmurs, clicks gallops or rub  Respiratory: Good diaphragmatic excursion. Lungs clear  Gastrointestinal: Abdomen soft, non-tender. BS normal. No masses, organomegaly  Musculoskeletal: extremities normal- no gross deformities noted, normal muscle tone  Skin: no suspicious lesions or rashes  Neurologic: Alert and oriented. Speech is slowed, but not slurred. No facial droop  Psychiatric: mentation appears normal and affect normal/bright        A/P:  Helen Younger is a 77 year old female with a history of ER/OH-positive, HER-2 negative metastatic breast cancer,  CKD, CVA, HTN who presented to the emergency department with dizziness and uncontrolled hypertension.       1. Orthostatic hypotension: Admitted to medicine observation 05/02/2017 for falls, suspected uncomplicated UTI and orthostatic hypotension d/t autonomic dysfunction. Recently discharged to home w/ home care from TCU 2 weeks ago due to multiple falls and deconditioning. CT head negative for ICH X 2. Patient has had 2 falls since returning home, both mechanical in nature- last fall was 2 days ago, tripped by legs of walker and fell on her left side- denies pain or injury, denies hitting head, neck or back pain. Denies worsening weakness, chest pain, shortness of breath, cough or chills.  +orthostatic hypotension this visit with orthostatic vital signs initially: /102--->68/23.  Bedside echo showed no significant changes since 2015.  Serial troponins negative X 3.  PT evaluated and recommended d/c to TCU, social work aware and will discuss with patient and spouse. Neurology consulted, recommended keeping HOB>30 degrees at night, increasing florinef to 0.1 mg daily, follow up in [facility] clinic.  Today's  orthostatic BP: 170/92-->99/81.  and daughter deciding between TCU and home.   - Orthostatic vital signs Q shift  - keep HOB>30 degrees (expecially at night)  - Fall Precautions  - Continuous cardiac monitoring   - increase florinef 0.1 mg daily  - follow up in [facility] clinic  - cardiology consult if florinef increased to 1.5 mg or if persistent SBP>200  - social work involved        2. Unresponsive episodes: For the past 6 months, Per , patient has unresponsive episodes ( breathing on her own) where patient has staring episodes, eyes rolled back, becomes limp, and does not respond to 's voice->which last 30 seconds to 1 minute. No extremity tremors or bowel or bladder incontinence.  Discussed with neurology and since patient does not have any obvious post ictal confusion this is likely secondary to orthostatic syncope instead of seizures.    - Seizure precautions    3. Diarrhea: Patient had two loose stools 6/25/17. No fever, no nausea/vomiting, no abdominal pain. Patient denies loose stools at home. No recent antibiotics. Received dulcolax twice since admission, may be contributing.  - Hold all laxatives  - Collect stool samples for infectious cause if persistent, or develops fever/abdominal pain       Chronic Problems:   #CKD: creatinine 0.88.    #Hx: Cerebral Infarction (April 2000) on chronic anticoagulation: INR 2.10. Takes Jantoven 2 mg po daily per .  - Pharmacy to follow INR and manage Jantoven dosing during admission.      ##Metastatic breast cancer with pleural effusions and bony mets: History of lumpectomy, adjuvant chemotherapy with Taxotere, whole breast radiotherapy, and 5 years of adjuvant Arimidex completed in 2012. Found to have bony mets and metastatic pleural effusion in 12/2015. Stable disease noted on imaging 12/2016. Receives Xgeva every 3 months, last dose 2/27/2017.  - Continue PTA Letrozole, Ibrance (use patient's home dose).  ##Diabetes mellitus, Type 2, diet  controlled: 07/14/2016  Hemoglobin A1c 6.4.  - Blood sugar checks BID & HS  - Hypoglycemia protocol  ##Chronic microcytic anemia: Hemoglobin stable 10.1 (baseline 9-10) ;  Likely due to Chemotherapy vs MDS. Vit B12 and folate were normal recently.     Consult: cardiology  FEN: moderate consistent  Code Status: DNR/DNI  Disposition: pending TCU placement      Signed:  Padmini Mercer PA-C   June 25, 2017

## 2017-06-26 NOTE — DISCHARGE SUMMARY
ischarge Summary    Helen Younger MRN# [id number]   YOB: 1940 Age: 77 year old     Date of Admission:  6/23/2017  Date of Discharge:  6/26/2017  Admitting Physician:  Dg Robles MD  Discharge Physician:  Contact: RIC CHAWLA MD  Discharging Service:  Emergency Medicine     Primary Provider: Arin Shahid          Discharge Diagnosis:     Dizziness    * No resolved hospital problems. *               Discharge Disposition:   Discharged to rehabilitation facility           Condition on Discharge:   Discharge condition: Stable   Code status on discharge: DNR / DNI           Procedures:   Imaging performed:   Head CT     Cardiology procedures perfromed:   ECHO             Discharge Medications:     Current Discharge Medication List      CONTINUE these medications which have NOT CHANGED    Details   bisacodyl (DULCOLAX) 5 MG EC tablet Take 5 mg by mouth daily      palbociclib (IBRANCE) 100 MG capsule CHEMO Take 1 capsule (100 mg) by mouth daily (with breakfast)    Associated Diagnoses: Malignant neoplasm of female breast, unspecified laterality, unspecified site of breast      warfarin (JANTOVEN) 1 MG tablet Take 1 tablet (1mg) by mouth on Monday, Wednesday, Friday. Take 2 tablets (2 mg) by mouth on Tuesday and Thursday.  Qty: 30 tablet, Refills: 0    Associated Diagnoses: History of stroke      letrozole (FEMARA) 2.5 MG tablet Take 1 tablet (2.5 mg) by mouth daily  Qty: 90 tablet, Refills: 3    Associated Diagnoses: Malignant neoplasm of female breast, unspecified laterality, unspecified site of breast; Pleural effusion      simvastatin (ZOCOR) 20 MG tablet Take 1 tablet (20 mg) by mouth At Bedtime  Qty: 90 tablet, Refills: 1    Associated Diagnoses: Hyperlipidemia LDL goal <100      Multiple Vitamins-Minerals (OCUVITE ADULT FORMULA PO) Take 1 tablet by mouth daily.      VIACTIV OR Take 1 chew tab by mouth 2 times daily. One in the morning and one at bedtime.     Associated Diagnoses:  Chest pain      fludrocortisone (FLORINEF) 0.1 MG tablet Take 0.5 tablets (0.05 mg) by mouth daily    Associated Diagnoses: Orthostatic hypotension      !! order for DME Equipment being ordered: portable commode  Qty: 1 Units, Refills: 0    Associated Diagnoses: Physical deconditioning      magnesium hydroxide (MILK OF MAGNESIA) 400 MG/5ML suspension Take 30-60 mLs by mouth daily as needed for constipation or heartburn  Qty: 360 mL, Refills: 0    Associated Diagnoses: Constipation      sennosides (SENOKOT) 8.6 MG tablet Take 1 tablet by mouth 2 times daily May increase to 2 tabs twice daily if needed  Qty: 120 each, Refills: 0    Associated Diagnoses: Constipation      !! order for DME Equipment being ordered:  Incontinence pads   Patient uses these tid  Qty: 100 each, Refills: 3    Associated Diagnoses: Mixed incontinence      !! order for DME Equipment being ordered: wheeled walker  Qty: 1 each, Refills: 0    Associated Diagnoses: Malignant neoplasm of female breast, unspecified laterality, unspecified site of breast; Pleural effusion       !! - Potential duplicate medications found. Please discuss with provider.                Consultations:   Consultation during this admission received from neurology             Brief History of Illness:   77 year old  female with a history of ER/NJ-positive, HER-2 negative metastatic breast cancer,  CKD, CVA, HTN who presented to the emergency department with dizziness and uncontrolled hypertension. Admitted to the observation unit under Vertigo and Hypertension protocol.       Hospital Course:   1. Orthostatic hypotension: Admitted to medicine observation 05/02/2017 for falls, suspected uncomplicated UTI and orthostatic hypotension d/t autonomic dysfunction. Recently discharged to home w/ home care from TCU 2 weeks ago due to multiple falls and deconditioning. CT head negative for ICH X 2. Patient has had 2 falls since returning home, both mechanical in nature- last  fall was 2 days ago, tripped by legs of walker and fell on her left side- denies pain or injury, denies hitting head, neck or back pain. Denies worsening weakness, chest pain, shortness of breath, cough or chills.  +orthostatic hypotension this visit with orthostatic vital signs initially: /102--->68/23.  Bedside echo showed no significant changes since 2015.  Serial troponins negative X 3.  PT evaluated and recommended discharge to TCU, family agreed with placement and social work has set up for patient to go to [facility]. Neurology consulted, recommended keeping HOB>30 degrees at night, medication increase and a follow up with movement disorder specialist. Florinef was increased to 0.1 mg daily, although blood pressure still drops with standing patient has tolerated using the commode. Thus far patient's BP is ranging between 219/119--->166/95.     - Orthostatic vital signs Q shift  - keep HOB>30 degrees (expecially at night)  - Fall Precautions  - Continue with daily 0.1mg of florinef   - Follow up in movement disorder specialist clinic          2. Unresponsive episodes: For the past 6 months, Per , patient has unresponsive episodes ( breathing on her own) where patient has staring episodes, eyes rolled back, becomes limp, and does not respond to 's voice->which last 30 seconds to 1 minute. No extremity tremors or bowel or bladder incontinence.  Discussed with neurology and since patient does not have any obvious post ictal confusion this is likely secondary to orthostatic syncope instead of seizures.      - Seizure precautions     3. Diarrhea: Patient had two loose stools 6/25/17. No fever, no nausea/vomiting, no abdominal pain. Patient denies loose stools at home. No recent antibiotics. Received dulcolax twice since admission, may be was a contributing factor.    - Hold all laxatives but if patient develops constipation can restart laxatives. However, collect stool samples for  "infectious cause if persistent, or develops fever/abdominal pain       Chronic Problems:   #CKD: Creatinine 0.88, which is likely improved with IVF.    #Hx: Cerebral Infarction (April 2000) on chronic anticoagulation: INR 2.48. Takes Jantoven mg po daily per .   - TCU/PCP to follow up INR and manage Jantoven.  - Patient scheduled an appointment with oncology on 7/25/17      ##Metastatic breast cancer with pleural effusions and bony mets: History of lumpectomy, adjuvant chemotherapy with Taxotere, whole breast radiotherapy, and 5 years of adjuvant Arimidex completed in 2012. Found to have bony mets and metastatic pleural effusion in 12/2015. Stable disease noted on imaging 12/2016. Receives Xgeva every 3 months, last dose 2/27/2017.  - Continue PTA Letrozole, Ibrance (use patient's home medication).  ##Diabetes mellitus, Type 2, diet controlled: 07/14/2016  Hemoglobin A1c 6.4.  - TCU will continue to check blood sugar checks and needs a f/u with PCP  ##Chronic microcytic anemia: Hemoglobin stable 9.8 (baseline 9-10) ;  Likely due to Chemotherapy vs MDS. Vit B12 and folate were normal recently.            Final Day of Progress before Discharge:       Physical Exam:  Blood pressure (!) 166/95, pulse 68, temperature 97.7  F (36.5  C), temperature source Oral, resp. rate 16, height 1.575 m (5' 2\"), weight 81.2 kg (179 lb), SpO2 97 %, not currently breastfeeding.    EXAM:  'Constitutional: healthy, alert and no distress   Head: Normocephalic. No masses, lesions, tenderness or abnormalities   Neck: Neck supple. No adenopathy. Thyroid symmetric, normal size,, Carotids without bruits.   ENT: ENT exam normal, no neck nodes or sinus tenderness   Cardiovascular: RRR. No murmurs, clicks gallops or rub   Respiratory: Good diaphragmatic excursion. Lungs clear bilaterally.   Gastrointestinal: Obese, BS normal, no masses, organomegaly.   Musculoskeletal: extremities normal- no gross deformities noted, gait normal and normal " muscle tone   Skin: no suspicious lesions or rashes   Neurologic: Gait normal. Reflexes normal and symmetric. Sensation grossly WNL.   Psychiatric: mentation appears normal and affect normal/bright   Hematologic/Lymphatic/Immunologic: normal ant/post cervical, axillary, supraclavicular and inguinal        Data:  All laboratory data reviewed             Significant Results:   None  Results for orders placed or performed during the hospital encounter of 06/23/17   CT Head w/o Contrast    Narrative    CT HEAD W/O CONTRAST 6/23/2017 7:28 PM    Provided History: head injury? on coumadin    Comparison: 3/25/2017, 10/18/2016, 4/13/2010..    Technique: Using multidetector thin collimation helical acquisition  technique, axial, coronal and sagittal CT images from the skull base  to the vertex were obtained without intravenous contrast.     Findings:    Stable encephalomalacia in the left middle cerebral artery vascular  territory. No intracranial hemorrhage, mass effect, or midline shift.  The ventricles are proportionate to the cerebral sulci. No acute loss  of gray-white differentiation. The basal cisterns are patent. Moderate  generalized cerebral volume loss, and moderate periventricular and  subcortical matter hypoattenuation bilaterally. Unchanged 5 mm  calcified meningioma along the right lateral aspect of the posterior  falx.    The visualized paranasal sinuses are clear. The mastoid air cells are  clear. Debris within the external auditory canals, right greater than  left, presumably cerumen.       Impression    Impression:   1. No acute intracranial pathology.  2. Stable chronic left MCA territory infarct.  3. Stable subcentimeter posterior parafalcine calcified meningioma.    I have personally reviewed the examination and initial interpretation  and I agree with the findings.    GALA ALCAZAR MD   CT Head w/o Contrast    Narrative    CT HEAD W/O CONTRAST 6/24/2017 10:30 AM    Provided History: unwitnessed fall  on anticoagulation    Comparison: Head CT 5/23/2017.    Technique: Using multidetector thin collimation helical acquisition  technique, axial, coronal and sagittal CT images from the skull base  to the vertex were obtained without intravenous contrast.     Findings:    No intracranial hemorrhage, mass effect, or midline shift. Mild  generalized parenchymal loss. The ventricles are proportionate to the  cerebral sulci. Unchanged encephalomalacia from chronic left MCA  territory infarct. No new areas of gray-white matter differentiation  loss. Mild periventricular and subcortical low-attenuation, consistent  with chronic small ischemic disease. The basal cisterns are patent.  Unchanged dural calcifications and probable small right falcine  meningioma.    Small mucous retention cyst versus polyp in the left sphenoid locule.  The remainder of the paranasal sinuses are clear.       Impression    Impression:  1. No acute intracranial pathology.  2. Chronic left MCA territory infarct and chronic small vessel  ischemic disease.    I have personally reviewed the examination and initial interpretation  and I agree with the findings.    GALA ALCAZAR MD   CBC with platelets differential   Result Value Ref Range    WBC 2.2 (L) 4.0 - 11.0 10e9/L    RBC Count 3.08 (L) 3.8 - 5.2 10e12/L    Hemoglobin 10.1 (L) 11.7 - 15.7 g/dL    Hematocrit 30.9 (L) 35.0 - 47.0 %     78 - 100 fl    MCH 32.8 26.5 - 33.0 pg    MCHC 32.7 31.5 - 36.5 g/dL    RDW 16.7 (H) 10.0 - 15.0 %    Platelet Count 108 (L) 150 - 450 10e9/L    Diff Method Automated Method     % Neutrophils 52.3 %    % Lymphocytes 41.7 %    % Monocytes 5.0 %    % Eosinophils 0.5 %    % Basophils 0.5 %    % Immature Granulocytes 0.0 %    Nucleated RBCs 0 0 /100    Absolute Neutrophil 1.1 (L) 1.6 - 8.3 10e9/L    Absolute Lymphocytes 0.9 0.8 - 5.3 10e9/L    Absolute Monocytes 0.1 0.0 - 1.3 10e9/L    Absolute Eosinophils 0.0 0.0 - 0.7 10e9/L    Absolute Basophils 0.0 0.0 - 0.2  10e9/L    Abs Immature Granulocytes 0.0 0 - 0.4 10e9/L    Absolute Nucleated RBC 0.0    Basic metabolic panel   Result Value Ref Range    Sodium 140 133 - 144 mmol/L    Potassium 3.7 3.4 - 5.3 mmol/L    Chloride 106 94 - 109 mmol/L    Carbon Dioxide 31 20 - 32 mmol/L    Anion Gap 2 (L) 3 - 14 mmol/L    Glucose 156 (H) 70 - 99 mg/dL    Urea Nitrogen 23 7 - 30 mg/dL    Creatinine 1.11 (H) 0.52 - 1.04 mg/dL    GFR Estimate 48 (L) >60 mL/min/1.7m2    GFR Estimate If Black 58 (L) >60 mL/min/1.7m2    Calcium 9.4 8.5 - 10.1 mg/dL   Troponin I   Result Value Ref Range    Troponin I ES  0.000 - 0.045 ug/L     <0.015  The 99th percentile for upper reference range is 0.045 ug/L.  Troponin values in   the range of 0.045 - 0.120 ug/L may be associated with risks of adverse   clinical events.     Nt probnp inpatient (BNP)   Result Value Ref Range    N-Terminal Pro BNP Inpatient 815 0 - 1800 pg/mL   UA reflex to Microscopic and Culture   Result Value Ref Range    Color Urine Light Yellow     Appearance Urine Clear     Glucose Urine 70 (A) NEG mg/dL    Bilirubin Urine Negative NEG    Ketones Urine 40 (A) NEG mg/dL    Specific Gravity Urine 1.012 1.003 - 1.035    Blood Urine Negative NEG    pH Urine 7.0 5.0 - 7.0 pH    Protein Albumin Urine Negative NEG mg/dL    Urobilinogen mg/dL Normal 0.0 - 2.0 mg/dL    Nitrite Urine Negative NEG    Leukocyte Esterase Urine Negative NEG    Source Catheterized Urine    INR   Result Value Ref Range    INR 2.35 (H) 0.86 - 1.14   Magnesium   Result Value Ref Range    Magnesium 2.0 1.6 - 2.3 mg/dL   Phosphorus   Result Value Ref Range    Phosphorus 2.4 (L) 2.5 - 4.5 mg/dL   Comprehensive metabolic panel   Result Value Ref Range    Sodium 141 133 - 144 mmol/L    Potassium 3.5 3.4 - 5.3 mmol/L    Chloride 108 94 - 109 mmol/L    Carbon Dioxide 27 20 - 32 mmol/L    Anion Gap 6 3 - 14 mmol/L    Glucose 133 (H) 70 - 99 mg/dL    Urea Nitrogen 18 7 - 30 mg/dL    Creatinine 0.88 0.52 - 1.04 mg/dL    GFR  Estimate 62 >60 mL/min/1.7m2    GFR Estimate If Black 75 >60 mL/min/1.7m2    Calcium 8.6 8.5 - 10.1 mg/dL    Bilirubin Total 1.5 (H) 0.2 - 1.3 mg/dL    Albumin 3.4 3.4 - 5.0 g/dL    Protein Total 6.6 (L) 6.8 - 8.8 g/dL    Alkaline Phosphatase 37 (L) 40 - 150 U/L    ALT 15 0 - 50 U/L    AST 12 0 - 45 U/L   CBC with platelets differential   Result Value Ref Range    WBC 2.3 (L) 4.0 - 11.0 10e9/L    RBC Count 2.98 (L) 3.8 - 5.2 10e12/L    Hemoglobin 9.8 (L) 11.7 - 15.7 g/dL    Hematocrit 30.4 (L) 35.0 - 47.0 %     (H) 78 - 100 fl    MCH 32.9 26.5 - 33.0 pg    MCHC 32.2 31.5 - 36.5 g/dL    RDW 16.8 (H) 10.0 - 15.0 %    Platelet Count 99 (L) 150 - 450 10e9/L    Diff Method Automated Method     % Neutrophils 51.4 %    % Lymphocytes 41.3 %    % Monocytes 6.5 %    % Eosinophils 0.4 %    % Basophils 0.4 %    % Immature Granulocytes 0.0 %    Nucleated RBCs 0 0 /100    Absolute Neutrophil 1.2 (L) 1.6 - 8.3 10e9/L    Absolute Lymphocytes 1.0 0.8 - 5.3 10e9/L    Absolute Monocytes 0.2 0.0 - 1.3 10e9/L    Absolute Eosinophils 0.0 0.0 - 0.7 10e9/L    Absolute Basophils 0.0 0.0 - 0.2 10e9/L    Abs Immature Granulocytes 0.0 0 - 0.4 10e9/L    Absolute Nucleated RBC 0.0    Troponin I   Result Value Ref Range    Troponin I ES  0.000 - 0.045 ug/L     <0.015  The 99th percentile for upper reference range is 0.045 ug/L.  Troponin values in   the range of 0.045 - 0.120 ug/L may be associated with risks of adverse   clinical events.     Glucose by meter   Result Value Ref Range    Glucose 134 (H) 70 - 99 mg/dL   Glucose by meter   Result Value Ref Range    Glucose 128 (H) 70 - 99 mg/dL   INR   Result Value Ref Range    INR 2.10 (H) 0.86 - 1.14   Troponin I   Result Value Ref Range    Troponin I ES 0.016 0.000 - 0.045 ug/L   Magnesium   Result Value Ref Range    Magnesium 1.9 1.6 - 2.3 mg/dL   Phosphorus   Result Value Ref Range    Phosphorus 2.2 (L) 2.5 - 4.5 mg/dL   Glucose by meter   Result Value Ref Range    Glucose 199 (H) 70 -  "99 mg/dL   INR   Result Value Ref Range    INR 2.46 (H) 0.86 - 1.14   Hemoglobin A1c   Result Value Ref Range    Hemoglobin A1C 6.4 (H) 4.3 - 6.0 %   Glucose by meter   Result Value Ref Range    Glucose 145 (H) 70 - 99 mg/dL   Glucose by meter   Result Value Ref Range    Glucose 128 (H) 70 - 99 mg/dL   INR   Result Value Ref Range    INR 2.48 (H) 0.86 - 1.14   Glucose by meter   Result Value Ref Range    Glucose 167 (H) 70 - 99 mg/dL   EKG 12-lead, tracing only   Result Value Ref Range    Interpretation ECG Click View Image link to view waveform and result    Social Work IP Consult    Narrative    Nichole Ugalde LICSW     2017  4:17 PM  Social Work: Assessment with Discharge Plan    Patient Name:  Helen Younger  :  1940  Age:  77 year old  MRN:  [id number]  Risk/Complexity Score:     Completed assessment with:  Patient (fell asleep during   conversation) and  Rony    Presenting Information   Reason for Referral:  Discharge plan  Date of Intake:  2017  Referral Source:  Physician  Decision Maker:  Patient   Alternate Decision Maker:    Health Care Directive:  Copy in Chart - both POLST and HCD  Living Situation:  House  Previous Functional Status:  Assistance with Transportation:    , Meals:  , Laundry:  , Housekeeping:    , Bathing:  [facility]HA 2x/week from [facility] and , Medication   Management:  , Appointments:   and Financial:    . Patient was able to OOB or a chair and walk with FWW to   BR, she would watch TV and could walk into the bedroom to take a   nap.  Patient and family understanding of hospitalization:  \"We are   here for her blood pressure, her deficits from her CVA on   2000, and Breast Cancer (takes 2 chemo pills every 21 days   and then 1 week off)  Cultural/Language/Spiritual Considerations:  N/a  Adjustment to Illness:   is struggling to understand the   impact of her medical concerns - feels he needs to " "have more   conversation with medical team to understand \"I don't know if the   low blood pressure and high blood pressure mean anything\" \"I need   more information - what can we expect.\"     Physical Health  Reason for Admission:    1. Hypertensive urgency    2. Dizziness    3. Long-term (current) use of anticoagulants [Z79.01]      Services Needed/Recommended:  TCU    Mental Health/Chemical Dependency  Diagnosis:  None listed  Support/Services in Place:  n/a  Services Needed/Recommended:  n/a    Support System  Significant relationship at present time:   Rony  Family of origin is available for support:  Yes - daughter   Evonne lives in [facility]; she is a  in [place];   she would be able to help more in the summer but also has her own   family  Other support available:  She is currently open to [facility] with RN   1x/wk, HHA 2x/week; OT (discharged); PT 2x/wk; SLP 1/x/wk; SW   (one time visit); MOWs (started 1 week ago), They have a BSC, 2   walkers, chair lift from garage up to living area (they have a   ranch home with walk out basement and tuck under garage - chair   lift is from garage to main level), grab bars, shower chair with   handheld shower  Gaps in support system:  none  Patient is caregiver to:  None     Provider Information   Primary Care Physician:  Arin Shahid   [phone]   Clinic:  [address] / [facility]      :  For Breast Cancer listed in EPIC - Jolie Farley RN    Financial   Income Source:  Social security senior living  Financial Concerns:  none  Insurance:    Payor/Plan Subscriber Name Rel Member # Group #   [employer] - [employer] FOR SENRYLEE LEWIS  [id number]       [id number]       Discharge Plan   Patient and family discharge goal:  Unclear at this time -   considering bringing her home and resuming care needs along with   getting a hospital bed OR TCU (perhaps not returning to [place]   Provided " "education on discharge plan:  YES  Patient agreeable to discharge plan:  Not sure what plan will be   until  has spoken with medical team  A list of Medicare Certified Facilities was provided to the   patient and/or family to encourage patient choice. Patient's   choices for facility are:  Provided list and  will look   over - prefers to stay around [facility] area  Will NH provide Skilled rehabilitation or complex medical:  YES  General information regarding anticipated insurance coverage and   possible out of pocket cost was discussed. Patient and patient's   family are aware patient may incur the cost of transportation to   the facility, pending insurance payment: NO  Barriers to discharge:  Patient medical readiness and decision   from patient/ about what they would like to do    Discharge Recommendations   Anticipated Disposition:  TCU vs home with resumption of [facility]   and hospital bed  Transportation Needs:  Other:  TBD  Name of Transportation Company and Phone:  N/a      Additional comments   Patient was unaware of PT rec for TCU but endorsed it was not a   surprise to her. Patient and  lived in [place], [place]   until 1991 when they moved back to [place].  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 After her CVA on   4.1.2000 she medically retired - her  feels she was ready   to retire and \"she did not cry big tears\" when she was no longer   working. Most recently patient was at [facility] for 28 days   in May/June - she has been home for 1.5 weeks and had the [facility]   set up prior to her discharge.  is not sure if he would   want to have her return to [place] if they decide on TCU and   he has the list to see what other TCU options exist near their   home. Updated provider to patient's  wishing to speak with "   them.     Nichole Ugalde MSW LICSW  2017    ON CALL PAGER   0800 - 1600   [phone]    ON CALL COVERAGE AFTER 1600  [phone]     ECHO COMPLETE WITH OPTISON    Narrative    [id number]  [id number]  [id number]  [id number]^FAMILIAKASIA^CHET^[facility]ELong [facility]  [address] [place] [id number]     Name: RYLEE PURCELL  MRN: [id number]  : 1940  Study Date: 2017 08:26 AM  Age: 77 yrs  Gender: Female  Patient Location: [facility]  Reason For Study: Dizziness  Ordering Physician: CHET RICHARDSON  Performed By: Perez Taylor RDCS     BSA: 1.8 m2  Height: 62 in  Weight: 179 lb  HR: 75  BP: 134/115 mmHg  _____________________________________________________________________________  __        Procedure  Complete Portable Echo Adult. Contrast Optison. Echocardiogram with two-  dimensional, color and spectral Doppler performed. Optison (NDC #6920-3916-52)  given intravenously. Patient was given 4 ml mixture of 3 ml Optison and 6 ml  saline. 5 ml wasted.  _____________________________________________________________________________  __        Interpretation Summary  Borderline (EF 50-55%) reduced left ventricular function is present.  Global right ventricular function is normal.  Mild aortic insufficiency is present.  The inferior vena cava is normal. Estimated mean right atrial pressure is <3  mmHg.  Mild dilatation of the aorta is present. Ascending aorta 4.1 cm.  No pericardial effusion is present.  This study was compared with the study from 9/3/2015. The LVEF is marginally  lower. The size of the ascendic aorta is similar.  _____________________________________________________________________________  __        Left Ventricle  Left ventricular size is normal. Left ventricular wall thickness is normal.  Borderline (EF 50-55%) reduced left ventricular function is present. Normal  left ventricular filling for age. No regional wall motion  abnormalities are  seen.     Right Ventricle  The right ventricle is normal size. Global right ventricular function is  normal.     Atria  The right atria appears normal. Mild left atrial enlargement is present.     Mitral Valve  The mitral valve is normal.        Aortic Valve  Mild aortic valve sclerosis is present. Mild aortic insufficiency is present.     Tricuspid Valve  The tricuspid valve is normal. Trace tricuspid insufficiency is present. Right  ventricular systolic pressure is 29mmHg above the right atrial pressure.     Pulmonic Valve  The pulmonic valve is normal.     Vessels  The inferior vena cava is normal. Mild dilatation of the aorta is present.  Ascending aorta 4.1 cm. Estimated mean right atrial pressure is <3 mmHg.     Pericardium  No pericardial effusion is present.        Miscellaneous  A left pleural effusion is present.     Compared to Previous Study  This study was compared with the study from 9/3/2015 . The LVEF is marginally  lower. The size of the ascendic aorta is similar.  _____________________________________________________________________________  __     MMode/2D Measurements & Calculations  IVSd: 1.1 cm  LVIDd: 4.9 cm  LVIDs: 3.8 cm  LVPWd: 0.89 cm  FS: 22.7 %  EDV(Teich): 111.5 ml  ESV(Teich): 60.8 ml  LV mass(C)d: 167.1 grams  LV mass(C)dI: 91.6 grams/m2  asc Aorta Diam: 4.1 cm  LVOT diam: 2.1 cm  LVOT area: 3.5 cm2  LA Volume (BP): 75.2 ml     LA Volume Index (BP): 41.3 ml/m2        Doppler Measurements & Calculations  MV E max corin: 57.2 cm/sec  MV A max corin: 65.5 cm/sec  MV E/A: 0.87  MV dec time: 0.18 sec  Ao V2 max: 169.0 cm/sec  Ao max P.0 mmHg  RADHIKA(V,D): 2.4 cm2  LV V1 max P.4 mmHg  LV V1 max: 116.0 cm/sec  Lateral E/e': 8.3  Medial E/e': 13.9           _____________________________________________________________________________  __           Report approved by: Crystal Mendoza 2017 01:45 PM         Recent Results (from the past 48 hour(s))   CT Head w/o  Contrast    Narrative    CT HEAD W/O CONTRAST 6/24/2017 10:30 AM    Provided History: unwitnessed fall on anticoagulation    Comparison: Head CT 5/23/2017.    Technique: Using multidetector thin collimation helical acquisition  technique, axial, coronal and sagittal CT images from the skull base  to the vertex were obtained without intravenous contrast.     Findings:    No intracranial hemorrhage, mass effect, or midline shift. Mild  generalized parenchymal loss. The ventricles are proportionate to the  cerebral sulci. Unchanged encephalomalacia from chronic left MCA  territory infarct. No new areas of gray-white matter differentiation  loss. Mild periventricular and subcortical low-attenuation, consistent  with chronic small ischemic disease. The basal cisterns are patent.  Unchanged dural calcifications and probable small right falcine  meningioma.    Small mucous retention cyst versus polyp in the left sphenoid locule.  The remainder of the paranasal sinuses are clear.       Impression    Impression:  1. No acute intracranial pathology.  2. Chronic left MCA territory infarct and chronic small vessel  ischemic disease.    I have personally reviewed the examination and initial interpretation  and I agree with the findings.    GALA ALCAZAR MD                Pending Results:   Unresulted Labs Ordered in the Past 30 Days of this Admission     No orders found from 4/24/2017 to 6/24/2017.                  Discharge Instructions and Follow-Up:   No discharge procedures on file.       Attestation:  Lazaro Augustine CNP  ED Observation unit

## 2017-06-27 NOTE — TELEPHONE ENCOUNTER
Oral Chemotherapy Monitoring Program    Primary Oncologist: Dr. Landis  Primary Oncology Clinic: [facility]  Cancer Diagnosis: Breast      Therapy History:  confirmed Ibrance 100mg once daily for 21 days then 7 days off, start 7/2/2017  2/3/16- Start date   3/30/17 to 4/18/17- Ibrance on hold due to persistent weakness and history of neutropenia  4/19/17- Ibrance cycle restarted       Drug Interaction Assessment: No new drug interactions.        Lab Monitoring Plan                                         Monitoring Plan:      CBC, CMP   C2D1+   Call, CBC, CMP   C3D1+   Call, CBC, CMP   C4D1+   Call, CBC, CMP   C5D1+   Call, CBC, CMP   C6D1+   Call, CBC, CMP        C2D8+      C3D8+      C4D8+      C5D8+      C6D8+        CBC   C2D15+   CBC   C3D15+      C4D15+      C5D15+      C6D15+           C2D22+      C3D22+      C4D22+      C5D22+      C6D22+            Subjective/Objective:  Helen Youngre is a 77 year old female contacted by phone for a follow-up visit for oral chemotherapy. Spoke with German () who stated she presented to the emergency department with dizziness and uncontrolled hypertension on 6/23/2017. She is now at [facility], a transitional care facility. He reports she is stable since discharge. Denies medication changes and side effects from Ibrance. She has continued fatigue. Coordinated delivery with nurse Camacho at [facility], phone [phone]. Helen is getting letrozole from long-term care pharmacy and Ibrance from [facility].       ORAL CHEMOTHERAPY 7/7/2016 10/6/2016 2/20/2017 3/24/2017 4/18/2017 5/10/2017 6/27/2017   Drug Name Ibrance (Palbociclib) Ibrance (Palbociclib) Ibrance (Palbociclib) Ibrance (Palbociclib) Ibrance (Palbociclib) Ibrance (Palbociclib) Ibrance (Palbociclib)   Current Dosage 100mg 100mg 100mg 100mg 100mg 100mg 100mg   Current Schedule Daily Daily Daily Daily Daily Daily Daily   Cycle Details 3 weeks on 1 week off 3 weeks  on 1 week off 3 weeks on 1 week off 3 weeks on 1 week off 3 weeks on 1 week off 3 weeks on 1 week off 3 weeks on 1 week off   Start Date of Last Cycle 7/7/2016 9/29/2016 2/2/2017 3/2/2017 3/2/2017 4/19/2017 6/4/2017   Planned next cycle start date - - 3/2/2017 3/30/2017 4/19/2017 5/24/2017 7/2/2017   Doses missed in last 2 weeks 0 0 0 0 0 0 0   Adherence Assessment - - Adherent Adherent Non-adherent Non-adherent Adherent   Reason for Non-adherence - - - - Drug on hold Drug on hold -   Adherence Intervention Recommended - - - - None None -   Adverse Effects Mucositis/mouth sores/ mouth pain Oral mucositis;Fatigue Fatigue Fatigue;Other (see note for details) No AE identified during assessment Fatigue No AE identified during assessment   Oral Mucositis mild - - - - - -   Fatigue - - Grade 1 Grade 1 - Grade 2 -   Pharmacist Intervention(fatigue) - - Yes Yes - No -   Intervention(s) - - Patient education Patient education - - -   Other (see note for details) - - - Low pulse - - -   Pharmacist intervention? - - - No - - -   Home BPs - not needed not needed all BPs<140/90 all BPs<140/90 not needed not needed   Any new drug interactions? - - No No No No No   Is the dose as ordered appropriate for the patient? - - Yes Yes Yes Yes Yes   Is the patient currently in pain? - - No No No No No   Has the patient been assessed within the past 6 months for depression? - - Yes - Yes Yes Yes   Has the patient missed any days of school, work, or other routine activity? - - No No No Yes Yes   Days missed in the past month: - - - - - More than 5 days More than 5 days       Last PHQ-2 Score on record:   PHQ-2 ( 1999 [employer]) 2/27/2017 12/26/2016   Q1: Little interest or pleasure in doing things 0 0   Q2: Feeling down, depressed or hopeless 0 0   PHQ-2 Score 0 0       Patient does not report depression symptoms.      Vitals:  BP:   BP Readings from Last 1 Encounters:   06/26/17 (!) 166/95     Wt Readings from Last 1 Encounters:   06/23/17  "81.2 kg (179 lb)     Estimated body surface area is 1.88 meters squared as calculated from the following:    Height as of 6/23/17: 1.575 m (5' 2\").    Weight as of 6/23/17: 81.2 kg (179 lb).    Labs:  Lab Results   Component Value Date     06/24/2017      Lab Results   Component Value Date    POTASSIUM 3.5 06/24/2017     Lab Results   Component Value Date    SHERYL 8.6 06/24/2017     Lab Results   Component Value Date    ALBUMIN 3.4 06/24/2017     Lab Results   Component Value Date    MAG 1.9 06/24/2017     Lab Results   Component Value Date    PHOS 2.2 06/24/2017     Lab Results   Component Value Date    BUN 18 06/24/2017     Lab Results   Component Value Date    CR 0.88 06/24/2017       Lab Results   Component Value Date    AST 12 06/24/2017     Lab Results   Component Value Date    ALT 15 06/24/2017     Lab Results   Component Value Date    BILITOTAL 1.5 06/24/2017       Lab Results   Component Value Date    WBC 2.3 06/24/2017     Lab Results   Component Value Date    HGB 9.8 06/24/2017     Lab Results   Component Value Date    PLT 99 06/24/2017     Lab Results   Component Value Date    ANEU 1.2 06/24/2017           Assessment:  German () brought Helen to the emergency department with dizziness and uncontrolled hypertension on 6/23/17. Helen is now at a transitional care unit. Ibrance therapy is stable. She continues to tolerate Ibrance with minimal side effects.    Plan:  Continue Ibrance, cycle starts 7/2/17.       Follow-Up:  Clinic appiontment and labs 7/25/17.   Dr. Landis appointment and labs 8/28/17.    Refill Due:  Deliver 6/29/17.  Next rx release 7/10/2017.          Thank you for the opportunity to participate in this patient's care,      Rio Freeamn, PharmD  Therapy Management Oncology Pharmacist  [facility]   Phone: [phone]    "

## 2017-06-29 NOTE — TELEPHONE ENCOUNTER
The call center didn't follow protocol, so Mehdi told them to cancel & reschedule pt.  1)  Pt hasn't been seen by any movement disorders provider  2)  Motor assessment slot was randomly picked; & a 70 min slot was created inappropriately.

## 2017-06-29 NOTE — MR AVS SNAPSHOT
Helen Younger   6/29/2017   Anticoagulation Therapy Visit    Description:  77 year old female   Provider:  Arin Shahid MD   Department:  C[facility] Nurse           INR as of 6/29/2017     Today's INR 2.48 (6/26/2017)      Anticoagulation Summary as of 6/29/2017     INR goal 2.0-3.0   Today's INR 2.48 (6/26/2017)   Full instructions 2 mg every day   Next INR check 7/10/2017    Indications   Long-term (current) use of anticoagulants [Z79.01] [Z79.01]  Cerebral infarction (H) [I63.9]         Contact Numbers     [facility]  Please call [phone] or [phone]  to cancel and/or reschedule your appointment.   Please call [phone] with any problems or questions regarding your therapy          June 2017 Details    Sun Mon Tue Wed Thu Fri Sat         1               2               3                 4               5               6               7               8               9               10                 11               12               13               14               15               16               17                 18               19               20               21               22               23               24                 25               26               27               28               29      2 mg   See details      30      2 mg           Date Details   06/29 This INR check               How to take your warfarin dose     To take:  2 mg Take 2 of the 1 mg tablets.           July 2017 Details    Sun Mon Tue Wed Thu Fri Sat           1      2 mg           2      2 mg         3      2 mg         4      2 mg         5      2 mg         6      2 mg         7      2 mg         8      2 mg           9      2 mg         10            11               12               13               14               15                 16               17               18               19               20               21               22                 23               24               25                26               27               28               29                 30               31                     Date Details   No additional details    Date of next INR:  7/10/2017         How to take your warfarin dose     To take:  2 mg Take 2 of the 1 mg tablets.

## 2017-06-29 NOTE — TELEPHONE ENCOUNTER
Called patient to discuss the reason the appointment with Lissett on 7/3 was cancelled as I do not see the reason. I am unsure if it was cancelled by the patient or by the clinic. Asked them to call the triage line and leave a message as to what happened and that I will try to get them in soon. She is going to a TCU and that may be the reason it was cancelled.    From Dr. Del Valle note on 6/24:  neurocognitive disorder: hx of cognitive decline, brain atrophy, parkinsonian gait, impaired upgaze, orthostatic hypotension/dysautonomia and incontinence. 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 Patient needs f/u with movement disorder specialist, was recommended at last admission and has not happened yet. Please ensure f/u after eventual DC.     Awaiting response from provider about scheduling.

## 2017-06-30 NOTE — TELEPHONE ENCOUNTER
Forms received from: [facility]   Phone number listed: [phone]   Fax listed: [phone]  Date received: 06/30/2017  Form description: SN-INR/Coumadin  Once forms are completed, please return to [facility] via fax.  Is patient requesting to be contacted when forms are completed: NA    Form placed: In provider's basket  Romina Pierce

## 2017-07-03 NOTE — TELEPHONE ENCOUNTER
----- Message from Ani Brannon RN sent at 6/30/2017 12:26 PM CDT -----  Regarding: RE: spouse returnned your call   Yes that is one reason but also because Dr. Garcia feels it is a complex patient that needs sorting out - that the movement disorder may be the least of her troubles.    Ani  ----- Message -----     From: Jie Durbin RN     Sent: 6/30/2017   9:09 AM       To: Ani Brannon RN  Subject: RE: spouse returnned your call                   Hegarcia Law,     I'm just a little confused.. Is she coming to general neuro because she can't get in with [facility] until Oct?    Thanks,  Chioma  ----- Message -----     From: Ani Brannon RN     Sent: 6/30/2017   8:45 AM       To: Jie Durbin RN, #  Subject: FW: spouse returnned your call                   Please schedule with General Neurology, not Kim. Patient needs a full work up as is extremely complicated. Dr. Garcia told me this.    Thank you,  Ani  ----- Message -----     From: Елена Aparicio LPN     Sent: 6/29/2017   4:12 PM       To: Ani Brannon RN  Subject: spouse returnned your call                       Caller name: spouse Left a voice mail     Treating provider/specialty:    Nurse:    Best time to return call:    Message left?     Description of issue/question:  clinic called to cancel rtecent appointment and scheduled it for 10/6 which is pretty late for patient

## 2017-07-17 NOTE — TELEPHONE ENCOUNTER
Reason for Call: Request for an order or referral:    Order or referral being requested: Home Care    Date needed: as soon as possible    Has the patient been seen by the PCP for this problem? YES    Additional comments: Ok to delay start of care to tomorrow because her INR is due tomorrow as well. Spouse is ok with this and said patient is doing well. Please call back to provide verbal order    Phone number Patient can be reached at:  Ani: [phone]    Best Time:  Anytime    Can we leave a detailed message on this number?  YES    Call taken on 7/17/2017 at 1:49 PM by Tamara Hoskins

## 2017-07-17 NOTE — TELEPHONE ENCOUNTER
Please see message below, okay to delay start of care to tomorrow?    Kamla Fofana RN  [facility]

## 2017-07-18 NOTE — PROGRESS NOTES
ANTICOAGULATION FOLLOW-UP CLINIC VISIT    Patient Name:  Helen Younger  Date:  7/18/2017  Contact Type:  Telephone/ Edwardo Means [phone]    SUBJECTIVE:  They are going to draw ONC labs 7/25/17.       Patient Findings     Positives No Problem Findings           OBJECTIVE    INR   Date Value Ref Range Status   07/18/2017 2.4  Final       ASSESSMENT / PLAN  INR assessment THER    Recheck INR In: 1 WEEK    INR Location Clinic      Anticoagulation Summary as of 7/18/2017     INR goal 2.0-3.0   Today's INR 2.4   Maintenance plan 2 mg (1 mg x 2) every day   Full instructions 2 mg every day   Weekly total 14 mg   Plan last modified Soumya Garza RN (6/6/2017)   Next INR check 7/25/2017   Target end date     Indications   Long-term (current) use of anticoagulants [Z79.01] [Z79.01]  Cerebral infarction (H) [I63.9]         Anticoagulation Episode Summary     INR check location     Preferred lab     Send INR reminders to [facility]    Comments       Anticoagulation Care Providers     Provider Role Specialty Phone number    Arin Shahid MD  Internal Medicine [phone]            See the Encounter Report to view Anticoagulation Flowsheet and Dosing Calendar (Go to Encounters tab in chart review, and find the Anticoagulation Therapy Visit)    Dosage adjustment made based on physician directed care plan.    Kamla Fofana RN

## 2017-07-18 NOTE — TELEPHONE ENCOUNTER
I called Edwardo Means at number provided.   No answer, left detailed message as advised on voicemail identified as Edwardo Means, RN admission clinician's voicemail.    Ani Burnett RN  [facility]

## 2017-07-18 NOTE — TELEPHONE ENCOUNTER
Reason for Call: Request for an order or referral:    Order or referral being requested: Edwardo with [facility] calling requesting orders for HHA for 2 times a week.     Date needed: as soon as possible    Has the patient been seen by the PCP for this problem? Not Applicable    Additional comments:     Phone number Patient can be reached at:  Other phone number:  [phone]    Best Time:  any    Can we leave a detailed message on this number?  YES    Call taken on 7/18/2017 at 12:50 PM by Romina Pierce

## 2017-07-18 NOTE — MR AVS SNAPSHOT
Helen LONDON Younger   7/18/2017   Anticoagulation Therapy Visit    Description:  77 year old female   Provider:  Arin Shahid MD   Department:  [facility] Fp/Im/Peds           INR as of 7/18/2017     Today's INR 2.4      Anticoagulation Summary as of 7/18/2017     INR goal 2.0-3.0   Today's INR 2.4   Full instructions 2 mg every day   Next INR check 7/25/2017    Indications   Long-term (current) use of anticoagulants [Z79.01] [Z79.01]  Cerebral infarction (H) [I63.9]         Contact Numbers     [facility]  Please call [phone] or [phone]  to cancel and/or reschedule your appointment.   Please call [phone] with any problems or questions regarding your therapy          July 2017 Details    Sun Mon Tue Wed Thu Fri Sat           1                 2               3               4               5               6               7               8                 9               10               11               12               13               14               15                 16               17               18      2 mg   See details      19      2 mg         20      2 mg         21      2 mg         22      2 mg           23      2 mg         24      2 mg         25            26               27               28               29                 30               31                     Date Details   07/18 This INR check       Date of next INR:  7/25/2017         How to take your warfarin dose     To take:  2 mg Take 2 of the 1 mg tablets.

## 2017-07-18 NOTE — TELEPHONE ENCOUNTER
Patient currently receiving home care.    Approved home care orders as requested per [facility] standing orders for Home Care, Assisted Living or Nursing Home Evaluations and Treatments, Mammogram (Reflex diagnostic), FIT test, Colonoscopy.   I called and spoke to Felicia with [facility] and advised her of order approved per above.    Ani Burnett RN  [facility]

## 2017-07-18 NOTE — PROGRESS NOTES
Clinic Care Coordination Contact  Care Coordination Communication    Referral Source: IP/TCU Report    Clinical Data: Patient was hospitalized at [facility] from 6/23 to 6/26 with diagnosis of dizziness and uncontrolled hypertension. Was at [facility] from 6/26 to 7/15    Home Care Contact:              Home Care Agency: [facility]               Contact name () and phone number: Desmond Henderson RN              Care Coordination contacted home care: Yes              Anticipated start of care date: 7/14/17    Patient Contact:               Introduced self and role of care coordination.               Discharge instructions were reviewed with patient/caregiver.               Do you have any questions about your medications? No              Follow up appointment is scheduled for Oncology.              Provided 24 Hour Nurse Line and/or 24 Hour Appointment Scheduling: Yes              Home care has contacted patient: Yes              Patient questions/concerns: No    Plan: RN/SW Care Coordinator will await notification from home care staff informing RN/SW Care Coordinator of patients discharge plans/needs. RN/SW Care Coordinator will review chart and outreach to home care every 4 weeks and as needed.      BHAKTI Cardenas  Care Navigator  [facility]  [phone]

## 2017-07-18 NOTE — TELEPHONE ENCOUNTER
Reason for Call: Request for an order or referral:    Order or referral being requested:  Home Care: SN 3 x wk x 1 wk, 2 x wk x 3 wks, 1 x wk x 6 wks, 3 PRN, OT and PT eval and treat    Date needed: as soon as possible    Has the patient been seen by the PCP for this problem? YES    Additional comments: Edwardo Means RN at [facility]    Phone number Patient can be reached at:  Other phone number: Edwardo Means RN at [facility], [phone]    Best Time:  any    Can we leave a detailed message on this number?  YES    Call taken on 7/18/2017 at 10:12 AM by Renay Chapin

## 2017-07-18 NOTE — TELEPHONE ENCOUNTER
Reason for Call:  Other call back    Detailed comments: Edwardo Means calling back, would like order for INR draw at home.     Phone Number Patient can be reached at: Other phone number:  Edwardo Means, RN at [facility], [phone]    Best Time: any    Can we leave a detailed message on this number? YES    Call taken on 7/18/2017 at 11:19 AM by Renay Chapin

## 2017-07-21 NOTE — TELEPHONE ENCOUNTER
Oral Chemotherapy Monitoring Program    Primary Oncologist: Dr. Landis  Primary Oncology Clinic: [facility]  Cancer Diagnosis: Breast      Therapy History:  confirmed Ibrance 100mg once daily for 21 days then 7 days off, start 7/30/2017  2/3/16- Start date   3/30/17 to 4/18/17- Ibrance on hold due to persistent weakness and history of neutropenia  4/19/17- Ibrance cycle restarted      Drug Interaction Assessment: No new drug interactions.        Lab Monitoring Plan                                         Monitoring Plan:      CBC, CMP   C2D1+   Call, CBC, CMP   C3D1+   Call, CBC, CMP   C4D1+   Call, CBC, CMP   C5D1+   Call, CBC, CMP   C6D1+   Call, CBC, CMP        C2D8+      C3D8+      C4D8+      C5D8+      C6D8+        CBC   C2D15+   CBC   C3D15+      C4D15+      C5D15+      C6D15+           C2D22+      C3D22+      C4D22+      C5D22+      C6D22+              Subjective/Objective:  Helen Younger is a 77 year old female contacted by phone for a follow-up visit for oral chemotherapy. Spoke with German () who stated she returned home from the transitional care facility 1 week ago (7/15). Her new medications are potassium and fludrocortisone 0.1mg once daily, no new drug interactions. He reports she is doing well, no dizziness or falls. Her blood pressures are about 130's/70's mmHg. She has visits from physical therapy and home health nurse. Denies side effects from Ibrance. He will order refill after 7/25 clinic appointment. No questions or concerns today.      ORAL CHEMOTHERAPY 10/6/2016 2/20/2017 3/24/2017 4/18/2017 5/10/2017 6/27/2017 7/21/2017   Drug Name Ibrance (Palbociclib) Ibrance (Palbociclib) Ibrance (Palbociclib) Ibrance (Palbociclib) Ibrance (Palbociclib) Ibrance (Palbociclib) Ibrance (Palbociclib)   Current Dosage 100mg 100mg 100mg 100mg 100mg 100mg 100mg   Current Schedule Daily Daily Daily Daily Daily Daily Daily   Cycle Details 3 weeks on 1 week off 3 weeks on 1 week off 3 weeks on 1 week off 3  weeks on 1 week off 3 weeks on 1 week off 3 weeks on 1 week off 3 weeks on 1 week off   Start Date of Last Cycle 9/29/2016 2/2/2017 3/2/2017 3/2/2017 4/19/2017 6/4/2017 7/2/2017   Planned next cycle start date - 3/2/2017 3/30/2017 4/19/2017 5/24/2017 7/2/2017 7/30/2017   Doses missed in last 2 weeks 0 0 0 0 0 0 0   Adherence Assessment - Adherent Adherent Non-adherent Non-adherent Adherent Adherent   Reason for Non-adherence - - - Drug on hold Drug on hold - -   Adherence Intervention Recommended - - - None None - -   Adverse Effects Oral mucositis;Fatigue Fatigue Fatigue;Other (see note for details) No AE identified during assessment Fatigue No AE identified during assessment No AE identified during assessment   Oral Mucositis - - - - - - -   Fatigue - Grade 1 Grade 1 - Grade 2 - -   Pharmacist Intervention(fatigue) - Yes Yes - No - -   Intervention(s) - Patient education Patient education - - - -   Other (see note for details) - - Low pulse - - - -   Pharmacist intervention? - - No - - - -   Home BPs not needed not needed all BPs<140/90 all BPs<140/90 not needed not needed all BPs<140/90   Any new drug interactions? - No No No No No No   Is the dose as ordered appropriate for the patient? - Yes Yes Yes Yes Yes Yes   Is the patient currently in pain? - No No No No No No   Has the patient been assessed within the past 6 months for depression? - Yes - Yes Yes Yes Yes   Has the patient missed any days of school, work, or other routine activity? - No No No Yes Yes No   Days missed in the past month: - - - - More than 5 days More than 5 days -       Last PHQ-2 Score on record:   PHQ-2 ( 1999 [employer]) 2/27/2017 12/26/2016   Q1: Little interest or pleasure in doing things 0 0   Q2: Feeling down, depressed or hopeless 0 0   PHQ-2 Score 0 0       Patient does not report depression symptoms.      Vitals:  BP:   BP Readings from Last 1 Encounters:   06/26/17 (!) 166/95     Wt Readings from Last 1 Encounters:   06/23/17 81.2 kg  "(179 lb)     Estimated body surface area is 1.88 meters squared as calculated from the following:    Height as of 6/23/17: 1.575 m (5' 2\").    Weight as of 6/23/17: 81.2 kg (179 lb).    Labs:  Lab Results   Component Value Date     06/24/2017      Lab Results   Component Value Date    POTASSIUM 3.5 06/24/2017     Lab Results   Component Value Date    SHERYL 8.6 06/24/2017     Lab Results   Component Value Date    ALBUMIN 3.4 06/24/2017     Lab Results   Component Value Date    MAG 1.9 06/24/2017     Lab Results   Component Value Date    PHOS 2.2 06/24/2017     Lab Results   Component Value Date    BUN 18 06/24/2017     Lab Results   Component Value Date    CR 0.88 06/24/2017       Lab Results   Component Value Date    AST 12 06/24/2017     Lab Results   Component Value Date    ALT 15 06/24/2017     Lab Results   Component Value Date    BILITOTAL 1.5 06/24/2017       Lab Results   Component Value Date    WBC 2.3 06/24/2017     Lab Results   Component Value Date    HGB 9.8 06/24/2017     Lab Results   Component Value Date    PLT 99 06/24/2017     Lab Results   Component Value Date    ANEU 1.2 06/24/2017         Assessment:  Helen returned home from the transitional care unit about 1 week ago. She continues to tolerate Ibrance with minimal side effects.     Plan:  Continue Ibrance, cycle starts 7/30/17.       Follow-Up:  Clinic appiontment and labs 7/25/17.   Dr. Landis appointment and labs 8/28/17.     Refill Due:  Rx is profiled at site 28, prefer to order following 7/25 appointment.            Thank you for the opportunity to participate in this patient's care,      Rio Freeman, PharmD  Therapy Management Oncology Pharmacist  [facility]   Phone: [phone]    "

## 2017-07-24 NOTE — TELEPHONE ENCOUNTER
Reason for Call: Request for an order or referral:    Order or referral being requested: PT order 2x a week for 2 weeks    Date needed: as soon as possible    Has the patient been seen by the PCP for this problem? YES    Additional comments:     Phone number Patient can be reached at:  Other phone number:  [phone]     Best Time:  anytime    Can we leave a detailed message on this number?  YES    Call taken on 7/24/2017 at 3:15 PM by Denise Toro

## 2017-07-24 NOTE — TELEPHONE ENCOUNTER
PT ordered as requested per [facility] standing orders for Home Care, Assisted Living or Nursing Home Evaluations and Treatments, Mammogram (Reflex diagnostic), FIT test, Colonoscopy.  Called and spoke with Evonne and relayed VO.    Kamla Fofana RN  [facility]

## 2017-07-25 NOTE — NURSING NOTE
"Oncology Rooming Note    July 25, 2017 2:34 PM   Helen Younger is a 77 year old female who presents for:    Chief Complaint   Patient presents with     Blood Draw     Labs drawn via Left Arm VPT by Vascular Access RN under US.      Oncology Clinic Visit     Return: Breast Ca     Initial Vitals: /70  Pulse 60  Temp 99.3  F (37.4  C) (Oral)  Resp 16  Ht 1.575 m (5' 2.01\")  Wt 80.3 kg (177 lb 0.5 oz)  SpO2 97%  BMI 32.37 kg/m2 Estimated body mass index is 32.37 kg/(m^2) as calculated from the following:    Height as of this encounter: 1.575 m (5' 2.01\").    Weight as of this encounter: 80.3 kg (177 lb 0.5 oz). Body surface area is 1.87 meters squared.  No Pain (0) Comment: Data Unavailable   No LMP recorded. Patient is postmenopausal.  Allergies reviewed: Yes  Medications reviewed: Yes    Medications: Medication refills not needed today.  Pharmacy name entered into [facility]:    [facility]  [facility] [address] [facility] [employer] - [place], [place] - [address] [facility] - [place], [place] - [address] [facility] - [place], [place] - [address] [place] [address]    Clinical concerns: no new concerns     6 minutes for nursing intake (face to face time)     Mary Duval CMA                "

## 2017-07-25 NOTE — PROGRESS NOTES
HEMATOLOGY/ONCOLOGY PROGRESS NOTE  Jul 25, 2017    REASON FOR VISIT: follow-up of metastatic breast cancer    DIAGNOSIS:   The patient is a 76-year-old woman who, in March of 2006, was diagnosed with a left-sided breast cancer. She presented with pain in the left breast, and a diagnostic mammogram demonstrated abnormalities in the left breast. The region of abnormality was biopsied, and she was found to have an infiltrating ductal carcinoma. She subsequently went on to undergo a left-sided lumpectomy and sentinel lymph node biopsy at the [facility] by Dr. Salas Cook in April of 2006. The patient had a 2.5 x 2.1 cm tumor excised. It was a grade 2 infiltrating ductal carcinoma. Her sentinel lymph node biopsy was negative on frozen section, and final pathology did demonstrate micrometastasis measuring 0.2 cm. The tumor itself was ER-positive, AK-positive, and HER2-negative. The patient did have a positive margin for which she underwent a reexcision in June of 2006. The subsequent pathology was negative.     The patient was then seen by Dr. Jong Dacosta, and she was encouraged to receive adjuvant chemotherapy. She received three cycles of FEC every three weeks followed by Taxotere single-agent administered every three weeks for three cycles. She completed chemotherapy in mid to late October of 2006. Her only complication was neutropenic fever following her last dose, for which she was briefly hospitalized. She otherwise had no issues with residual neuropathies or any other known complications from her chemo.     The patient was started on adjuvant Arimidex on November 6, 2006. She also received whole breast radiotherapy with boost to the left breast and tumor bed from November 15, 2006, through January 5, 2007. This was performed by Dr. Nicholas at [facility]. It is unknown if she received kevon radiation. The patient' portacath was removed in July of 2007. She completed five years of arimidex and  remained off therapy.     She represented in December 2015 with shortness of breath and pleural effusion, requiring hospitalization. She had three thoracenteses while inpatient, with cytology revealing for a metastatic breast cancer, ER/TX-positive, HER-2 negative. On her workup, she also has disease in her bones after CT scan of the chest, abdomen and pelvis. She met with Dr. Landis on 1/11/2016 and was started on endocrine therapy with AI. She started on Ibrance on 2/3/2016. She was dose reduced after cycle 1 to 100 mg for cytopenias.    INTERVAL HISTORY:   Helen presents today with her  for follow-up. She had a hospitalization for falls, high blood pressure, and orthostasis since our last visit. She spent a few weeks in a rehab facility and has been home for about a week. Her  reports its going OK and they feel adequately supported at home. They have a home RN coming twice weekly. She had PT yesterday at home. She currently feels well. No lightheadedness, dizziness, or falls since being home. Ambulating at home with use of walker, feels this is going well. She denies any fevers, chills, mouth sores, nausea, vomiting, pain anywhere, abdominal pain, diarrhea, constipation, swelling, bleeding.    Otherwise, 10 point ROS negative     Current Outpatient Prescriptions   Medication Sig Dispense Refill     palbociclib (IBRANCE) 100 MG capsule CHEMO Take 1 capsule (100 mg) by mouth daily with food Take for 21 days, then 7 days off. Avoid grapefruit. Do not open/crush/chew capsule. 21 capsule 2     warfarin (JANTOVEN) 1 MG tablet Take 2 tablets (2 mg) by mouth daily Take 1 tablet (1mg) by mouth on Fridays. Take 2 tablets (2 mg) by mouth all other days of the week. 20 tablet      simvastatin (ZOCOR) 20 MG tablet Take 1 tablet (20 mg) by mouth At Bedtime 90 tablet 1     fludrocortisone (FLORINEF) 0.1 MG tablet Take 1 tablet (0.1 mg) by mouth daily 20 tablet 0     bisacodyl (DULCOLAX) 5 MG EC tablet Take 1 tablet  (5 mg) by mouth daily as needed for constipation 30 tablet      magnesium hydroxide (MILK OF MAGNESIA) 400 MG/5ML suspension Take 30-60 mLs by mouth daily as needed for constipation or heartburn 360 mL 0     sennosides (SENOKOT) 8.6 MG tablet Take 1 tablet by mouth 2 times daily as needed for constipation May increase to 2 tabs twice daily if needed 120 each 0     letrozole (FEMARA) 2.5 MG tablet Take 1 tablet (2.5 mg) by mouth daily 90 tablet 3     order for DME Equipment being ordered: portable commode 1 Units 0     order for DME Equipment being ordered:  Incontinence pads   Patient uses these tid 100 each 3     order for DME Equipment being ordered: wheeled walker 1 each 0     Multiple Vitamins-Minerals (OCUVITE ADULT FORMULA PO) Take 1 tablet by mouth daily.       VIACTIV OR Take 1 chew tab by mouth 2 times daily. One in the morning and one at bedtime.             Allergies   Allergen Reactions     Diuretic      Don't remember side effect     Zyrtec [Cetirizine Hcl]      Elevated blood pressure       PHYSICAL EXAMINATION  There were no vitals taken for this visit.   Wt Readings from Last 4 Encounters:   06/23/17 81.2 kg (179 lb)   06/22/17 81.2 kg (179 lb)   05/22/17 82.6 kg (182 lb 1.6 oz)   05/04/17 81.7 kg (180 lb 2 oz)     Constitutional: Alert, oriented female in no visible distress. +Expressive aphasia. Examined in wheelchair today.  Eyes: PERRL. Anicteric sclerae.  ENT/Mouth: OM moist and pink without thrush.  CV: RRR, no murmurs appreciated.  Resp: Decreased breath sounds on the left base, stable. Breathing comfortably without accessory muscle usage.  Abdomen: Soft, non-tender, non-distended.  Extremities: No lower extremity edema appreciated.   Skin: Warm, dry.   Lymph: No cervical or supraclavicular lymphadenopathy appreciated.   Neuro: CN II-XII grossly intact.    LABS:  Results for RYLEE PURCELL (MRN [id number]) as of 7/25/2017 15:12   Ref. Range 7/25/2017 14:24   Sodium Latest Ref Range: 133 - 144  mmol/L 140   Potassium Latest Ref Range: 3.4 - 5.3 mmol/L 3.6   Chloride Latest Ref Range: 94 - 109 mmol/L 105   Carbon Dioxide Latest Ref Range: 20 - 32 mmol/L 27   Urea Nitrogen Latest Ref Range: 7 - 30 mg/dL 17   Creatinine Latest Ref Range: 0.52 - 1.04 mg/dL 1.21 (H)   GFR Estimate Latest Ref Range: >60 mL/min/1.7m2 43 (L)   GFR Estimate If Black Latest Ref Range: >60 mL/min/1.7m2 52 (L)   Calcium Latest Ref Range: 8.5 - 10.1 mg/dL 9.2   Anion Gap Latest Ref Range: 3 - 14 mmol/L 8   Albumin Latest Ref Range: 3.4 - 5.0 g/dL 3.4   Protein Total Latest Ref Range: 6.8 - 8.8 g/dL 6.8   Bilirubin Total Latest Ref Range: 0.2 - 1.3 mg/dL 0.5   Alkaline Phosphatase Latest Ref Range: 40 - 150 U/L 43   ALT Latest Ref Range: 0 - 50 U/L 14   AST Latest Ref Range: 0 - 45 U/L 13   Glucose Latest Ref Range: 70 - 99 mg/dL 182 (H)   WBC Latest Ref Range: 4.0 - 11.0 10e9/L 2.0 (L)   Hemoglobin Latest Ref Range: 11.7 - 15.7 g/dL 9.6 (L)   Hematocrit Latest Ref Range: 35.0 - 47.0 % 29.1 (L)   Platelet Count Latest Ref Range: 150 - 450 10e9/L 195       IMPRESSION/PLAN:  Helen Younger is a 75 year old female with history of ER/[place]-positive, HER-2 negative breast cancer treated with lumpectomy, adjuvant chemotherapy with Taxotere, whole breast radiotherapy, and 5 years of adjuvant Arimidex, completed in 2012. She represented in December 2015 with shortness of breath and pleural effusion, found to have developed metastatic disease involving the bones and pleural cavity.    Metastatic breast cancer: Currently on Letrozole and palbociclib with overall stable disease on imaging 5/2017. She tolerates this regimen well. She will continue with scans every 4-5 months as long as her tumor markers remain stable, and she doesn't had any signs of progressive disease.    Bone mets: Previously on monthly Xgeva, switched to every 3 months due to large co-pay. Helen has been avoiding getting it; will hold off for now in light of the recent issues with  falls and hospitalizations.     Pleural effusions:  This has been stable on subsequent imaging, asymptomatic. Exam is stable today.    CKD/FEN: Encouraged adequate hydration with about 64 oz fluids daily. Creat, lytes stable. She has been doing better with eating and drinking.    Deconditioning/recurrent falls/orthostasis: Ongoing issue. Most recently admitted 6/23-6/23 for two mechanical falls, found to again have orthstatic hypotension in setting of uncontrolled hypertension. Cardiac work-up negative. Neuro was consulted, recommended keeping HOB > 30 degrees at night,increase Florinef to 0.1 mg daily. She was restarted on Norvasc. No symptoms of orthostasis or falls in the last week since being home from rehab.  - Continue PT  - Continue with home RN twice weekly  - Push fluids    Jess Avalos PA-C  Hematology, Oncology, and Transplant  [facility]  [address] [place] [id number] [phone]

## 2017-07-25 NOTE — NURSING NOTE
Chief Complaint   Patient presents with     Blood Draw     Labs drawn via Left Arm VPT by Vascular Access RN under US.      Amada Wade RN

## 2017-07-25 NOTE — MR AVS SNAPSHOT
After Visit Summary   7/25/2017    Helen Younger    MRN: [id number]           Patient Information     Date Of Birth          1940        Visit Information        Provider Department      7/25/2017 2:30 PM Jess Avalos PA-C [facility]        Today's Diagnoses     Long-term (current) use of anticoagulants [Z79.01]        Malignant neoplasm of female breast (H)           Follow-ups after your visit        Your next 10 appointments already scheduled     Aug 09, 2017  2:15 PM CDT   (Arrive by 2:00 PM)   New Patient Visit with Rosey Baker MD   [facility] ([facility])    [address] [place] [id number] [phone]            Aug 28, 2017  1:15 PM CDT   [facility] Draw with  [facility]   [facility] Lab Draw ([facility])    [address] [place] [id number] [phone]            Aug 28, 2017  2:00 PM CDT   (Arrive by 1:45 PM)   Return Visit with Keisha Landis MD   [facility] ([facility])    [address] [place] [id number] [phone]            Oct 06, 2017  9:30 AM CDT   (Arrive by 9:15 AM)   New Movement Disorder with Jenaro Warren MD   [facility] ([facility])    [address] [place] [id number] [phone]              Who to contact     If you have questions or need follow up information about today's clinic visit or your schedule please contact [facility] directly at [phone].  Normal or non-critical lab and imaging results will be communicated to you by MyChart, letter or phone within 4 business days after the clinic has received the results. If you do not hear from us within 7 days, please contact the clinic through MyChart or phone. If you have a critical or  "abnormal lab result, we will notify you by phone as soon as possible.  Submit refill requests through [facility] or call your pharmacy and they will forward the refill request to us. Please allow 3 business days for your refill to be completed.          Additional Information About Your Visit        [facility] Information     [facility] gives you secure access to your electronic health record. If you see a primary care provider, you can also send messages to your care team and make appointments. If you have questions, please call your primary care clinic.  If you do not have a primary care provider, please call [phone] and they will assist you.        Care EveryWhere ID     This is your Care EveryWhere ID. This could be used by other organizations to access your [place] medical records  [id number]        Your Vitals Were     Pulse Temperature Respirations Height Pulse Oximetry BMI (Body Mass Index)    60 99.3  F (37.4  C) (Oral) 16 1.575 m (5' 2.01\") 97% 32.37 kg/m2       Blood Pressure from Last 3 Encounters:   07/25/17 104/70   06/26/17 (!) 166/95   06/22/17 100/69    Weight from Last 3 Encounters:   07/25/17 80.3 kg (177 lb 0.5 oz)   06/23/17 81.2 kg (179 lb)   06/22/17 81.2 kg (179 lb)              We Performed the Following     Ca27.29  breast tumor marker     CBC with platelets differential     CEA     Comprehensive metabolic panel     INR        Primary Care Provider Office Phone # Fax #    Arin Shahid [phone]       [facility] [address] [facility] [address]        Equal Access to Services     [facility] : Hadii aad chloe hadasho Soomaali, waaxda luqadaha, qaybta kaalmada alexandre, cain de la paz . So [facility] [phone].    ATENCIÓN: Si habla español, tiene a sanchez disposición servicios gratuitos de asistencia lingüística. Llame al [phone].    We comply with applicable federal civil rights laws and [place] laws. We do not " discriminate on the basis of race, color, national origin, age, disability sex, sexual orientation or gender identity.            Thank you!     Thank you for choosing [facility]  for your care. Our goal is always to provide you with excellent care. Hearing back from our patients is one way we can continue to improve our services. Please take a few minutes to complete the written survey that you may receive in the mail after your visit with us. Thank you!             Your Updated Medication List - Protect others around you: Learn how to safely use, store and throw away your medicines at www.disposemymeds.org.          This list is accurate as of: 7/25/17  3:18 PM.  Always use your most recent med list.                   Brand Name Dispense Instructions for use Diagnosis    amLODIPine 5 MG tablet    NORVASC     Take 5 mg by mouth daily        bisacodyl 5 MG EC tablet    DULCOLAX    30 tablet    Take 1 tablet (5 mg) by mouth daily as needed for constipation    Constipation, unspecified constipation type       fludrocortisone 0.1 MG tablet    FLORINEF    20 tablet    Take 1 tablet (0.1 mg) by mouth daily    Orthostatic hypotension       letrozole 2.5 MG tablet    FEMARA    90 tablet    Take 1 tablet (2.5 mg) by mouth daily    Malignant neoplasm of female breast, unspecified estrogen receptor status, unspecified laterality, unspecified site of breast (H)       magnesium hydroxide 400 MG/5ML suspension    MILK OF MAGNESIA    360 mL    Take 30-60 mLs by mouth daily as needed for constipation or heartburn    Constipation, unspecified constipation type       OCUVITE ADULT FORMULA PO      Take 1 tablet by mouth daily.        * order for DME     1 each    Equipment being ordered: wheeled walker    Malignant neoplasm of female breast, unspecified laterality, unspecified site of breast, Pleural effusion       * order for DME     100 each    Equipment being ordered:  Incontinence pads   Patient uses these tid     Mixed incontinence       * order for DME     1 Units    Equipment being ordered: portable commode    Physical deconditioning       palbociclib 100 MG capsule CHEMO    IBRANCE    21 capsule    Take 1 capsule (100 mg) by mouth daily with food Take for 21 days, then 7 days off. Avoid grapefruit. Do not open/crush/chew capsule.    Malignant neoplasm of female breast (H), Pleural effusion       potassium chloride 10 MEQ tablet   Generic drug:  potassium chloride      TAKE 1 TABLET BY MOUTH TWICE A DAY        sennosides 8.6 MG tablet    SENOKOT    120 each    Take 1 tablet by mouth 2 times daily as needed for constipation May increase to 2 tabs twice daily if needed    Constipation, unspecified constipation type       simvastatin 20 MG tablet    ZOCOR    90 tablet    Take 1 tablet (20 mg) by mouth At Bedtime    Hyperlipidemia LDL goal <100       VIACTIV PO      Take 1 chew tab by mouth 2 times daily. One in the morning and one at bedtime.    Chest pain       warfarin 1 MG tablet    JANTOVEN    20 tablet    Take 2 tablets (2 mg) by mouth daily Take 1 tablet (1mg) by mouth on Fridays. Take 2 tablets (2 mg) by mouth all other days of the week.    History of stroke       * Notice:  This list has 3 medication(s) that are the same as other medications prescribed for you. Read the directions carefully, and ask your doctor or other care provider to review them with you.

## 2017-07-27 NOTE — MR AVS SNAPSHOT
Helen Machadoirina   7/27/2017   Anticoagulation Therapy Visit    Description:  77 year old female   Provider:  Arin Shahid MD   Department:  [facility]           INR as of 7/27/2017     Today's INR 2.30 (7/25/2017)      Anticoagulation Summary as of 7/27/2017     INR goal 2.0-3.0   Today's INR 2.30 (7/25/2017)   Full instructions 2 mg every day   Next INR check 8/1/2017    Indications   Long-term (current) use of anticoagulants [Z79.01] [Z79.01]  Cerebral infarction (H) [I63.9]         Contact Numbers     [facility]  Please call [phone] or [phone]  to cancel and/or reschedule your appointment.   Please call [phone] with any problems or questions regarding your therapy          July 2017 Details    Sun Mon Tue Wed Thu Fri Sat           1                 2               3               4               5               6               7               8                 9               10               11               12               13               14               15                 16               17               18               19               20               21               22                 23               24               25               26               27      2 mg   See details      28      2 mg         29      2 mg           30      2 mg         31      2 mg               Date Details   07/27 This INR check               How to take your warfarin dose     To take:  2 mg Take 2 of the 1 mg tablets.           August 2017 Details    Sun Mon Tue Wed Thu Fri Sat       1            2               3               4               5                 6               7               8               9               10               11               12                 13               14               15               16               17               18               19                 20               21               22               23               24               25                26                 27               28               29               30               31                  Date Details   No additional details    Date of next INR:  8/1/2017         How to take your warfarin dose     To take:  2 mg Take 2 of the 1 mg tablets.

## 2017-07-27 NOTE — PROGRESS NOTES
ANTICOAGULATION FOLLOW-UP CLINIC VISIT    Patient Name:  Helen Younger  Date:  7/27/2017  Contact Type:  Telephone/ called patient /  is follow up to INR done at [facility] visit 7-25-17.  prior to that home care has been taking INR's and home care will continue twice weekly for a few more weeks.    SUBJECTIVE:     Patient Findings     Positives No Problem Findings           OBJECTIVE    INR   Date Value Ref Range Status   07/25/2017 2.30 (H) 0.86 - 1.14 Final       ASSESSMENT / PLAN  INR assessment THER    Recheck INR In: 1 WEEK    INR Location Homecare INR      Anticoagulation Summary as of 7/27/2017     INR goal 2.0-3.0   Today's INR 2.30 (7/25/2017)   Maintenance plan 2 mg (1 mg x 2) every day   Full instructions 2 mg every day   Weekly total 14 mg   No change documented Soumya Garza RN   Plan last modified Soumya Garza RN (6/6/2017)   Next INR check 8/1/2017   Target end date     Indications   Long-term (current) use of anticoagulants [Z79.01] [Z79.01]  Cerebral infarction (H) [I63.9]         Anticoagulation Episode Summary     INR check location     Preferred lab     Send INR reminders to  [facility] CL[facility]IC    Comments       Anticoagulation Care Providers     Provider Role Specialty Phone number    Arin Shahid MD  Internal Medicine [phone]            See the Encounter Report to view Anticoagulation Flowsheet and Dosing Calendar (Go to Encounters tab in chart review, and find the Anticoagulation Therapy Visit)    Dosage adjustment made based on physician directed care plan.    Soumya Garza RN

## 2017-07-31 NOTE — TELEPHONE ENCOUNTER
Forms received from: [facility]   Phone number listed: [phone]   Fax listed: [phone]  Date received: 07/31/2017  Form description: PT 2 Week 2  Once forms are completed, please return to [facility] via fax.  Is patient requesting to be contacted when forms are completed: NA    Form placed: In provider's basket  Romina Pierce

## 2017-08-02 NOTE — TELEPHONE ENCOUNTER
Routing to PCP. Please refill if appropriate or let us know if we need to schedule appointment.       Thank you!      Alison Potts RN

## 2017-08-02 NOTE — TELEPHONE ENCOUNTER
Reason for Call:  Medication or medication refill:    Do you use a [facility]?  Name of the pharmacy and phone number for the current request:  [id number] [place] [facility], [place] - [address]    Name of the medication requested: amLODIPine (NORVASC) 5 MG tablet  //  POTASSIUM CHLORIDE 10 MEQ tablet  //  fludrocortisone (FLORINEF) 0.1 MG tablet    Other request: Patient's spouse called and stated the medications above were previously prescribed by a doctor at the [facility]. Patient is completely out of these medications and needs Dr. Shahid to refill since she is home now. Patient is requesting a 90 day supply.    Can we leave a detailed message on this number? YES    Phone number patient can be reached at: Home number on file [phone] (home)    Best Time: Anytime    Call taken on 8/2/2017 at 1:06 PM by Tamara Hoskins

## 2017-08-04 NOTE — PROGRESS NOTES
ANTICOAGULATION FOLLOW-UP CLINIC VISIT    Patient Name:  Helen Younger  Date:  8/4/2017  Contact Type:  Telephone/ Auyb from home care calling INR results. reports no changes and no problems.    SUBJECTIVE:     Patient Findings     Positives No Problem Findings           OBJECTIVE    INR   Date Value Ref Range Status   08/04/2017 2.5  Final       ASSESSMENT / PLAN  INR assessment THER    Recheck INR In: 2 WEEKS    INR Location Home[facility] INR      Anticoagulation Summary as of 8/4/2017     INR goal 2.0-3.0   Today's INR 2.5   Maintenance plan 2 mg (1 mg x 2) every day   Full instructions 2 mg every day   Weekly total 14 mg   No change documented Soumya Garza, RN   Plan last modified Soumya Garza RN (6/6/2017)   Next INR check 8/18/2017   Target end date     Indications   Long-term (current) use of anticoagulants [Z79.01] [Z79.01]  Cerebral infarction (H) [I63.9]         Anticoagulation Episode Summary     INR check location     Preferred lab     Send INR reminders to [facility]    Comments       Anticoagulation Care Providers     Provider Role Specialty Phone number    Arin Shahid MD  Internal Medicine [phone]            See the Encounter Report to view Anticoagulation Flowsheet and Dosing Calendar (Go to Encounters tab in chart review, and find the Anticoagulation Therapy Visit)    Dosage adjustment made based on physician directed care plan.    Soumya Garza RN

## 2017-08-04 NOTE — MR AVS SNAPSHOT
Helen LONDON Younger   8/4/2017   Anticoagulation Therapy Visit    Description:  77 year old female   Provider:  Arin Shahid MD   Department:  [facility] Nurse           INR as of 8/4/2017     Today's INR 2.5      Anticoagulation Summary as of 8/4/2017     INR goal 2.0-3.0   Today's INR 2.5   Full instructions 2 mg every day   Next INR check 8/18/2017    Indications   Long-term (current) use of anticoagulants [Z79.01] [Z79.01]  Cerebral infarction (H) [I63.9]         Contact Numbers     [facility]  Please call [phone] or [phone]  to cancel and/or reschedule your appointment.   Please call [phone] with any problems or questions regarding your therapy          August 2017 Details    Sun Mon Tue Wed Thu Fri Sat       1               2               3               4      2 mg   See details      5      2 mg           6      2 mg         7      2 mg         8      2 mg         9      2 mg         10      2 mg         11      2 mg         12      2 mg           13      2 mg         14      2 mg         15      2 mg         16      2 mg         17      2 mg         18            19                 20               21               22               23               24               25               26                 27               28               29               30               31                  Date Details   08/04 This INR check       Date of next INR:  8/18/2017         How to take your warfarin dose     To take:  2 mg Take 2 of the 1 mg tablets.
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
## 2017-08-09 NOTE — PATIENT INSTRUCTIONS
Schedule EEG monitoring.    Schedule combined tilt-table testing with EEG monitoring.    [place] autonomic [place] [facility] You will be contacted by the EMG lab to schedule this, hopefully Thursday or Friday of this week. Please call the clinic at the  if you have not heard anything.    Return to clinic after these tests are completed.
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
## 2017-08-09 NOTE — TELEPHONE ENCOUNTER
Forms received from: [facility]   Phone number listed: [phone]   Fax listed: [phone]  Date received: 08/09/2017  Form description: SN-INR/Coumadin  Once forms are completed, please return to [facility] via fax.  Is patient requesting to be contacted when forms are completed: NA    Form placed: In provider's basket  Romina Pierce

## 2017-08-09 NOTE — NURSING NOTE
Chief Complaint   Patient presents with     Consult     [facility]- HAVING SPELLS AND MEMORY PROBLEMS     Eligio Mosher, CMA

## 2017-08-09 NOTE — MR AVS SNAPSHOT
After Visit Summary   8/9/2017    Helen Younger    MRN: [id number]           Patient Information     Date Of Birth          1940        Visit Information        Provider Department      8/9/2017 2:15 PM Rosey Baker MD [facility]        Today's Diagnoses     Dysautonomia    -  1      Care Instructions    Schedule EEG monitoring.    Schedule combined tilt-table testing with EEG monitoring.    [place] [facility] [place] will be contacted by the EMG lab to schedule this, hopefully Thursday or Friday of this week. Please call the clinic at the  if you have not heard anything.    Return to clinic after these tests are completed.          Follow-ups after your visit        Follow-up notes from your care team     Return in about 4 weeks (around 9/6/2017).      Your next 10 appointments already scheduled     Aug 28, 2017  1:15 PM CDT   [facility] Draw with  [facility]   [facility] ([facility])    [address] [place] [address]   [phone]            Aug 28, 2017  2:00 PM CDT   (Arrive by 1:45 PM)   Return Visit with Keisha Landis MD   [facility] ([facility])    [address] [place] [address]   [phone]            Sep 20, 2017  3:30 PM CDT   (Arrive by 3:15 PM)   Return Visit with Rosey Baker MD   [facility] ([facility])    [address] [place] [address]   [phone]            Oct 06, 2017  9:30 AM CDT   (Arrive by 9:15 AM)   New Movement Disorder with Jenaro Warren MD   [facility] ([facility])    [address] [place] [address]   [phone]              Future tests that were ordered for you today     Open Future Orders        Priority Expected Expires Ordered    EP  "ICD/Pacemaker/Loop Recorder Routine  8/9/2018 8/9/2017    EEG EXTENDED MONITORING > 1HR Routine  9/6/2017 8/9/2017            Who to contact     Please call your clinic at [phone] to:    Ask questions about your health    Make or cancel appointments    Discuss your medicines    Learn about your test results    Speak to your doctor   If you have compliments or concerns about an experience at your clinic, or if you wish to file a complaint, please contact [facility] Patient Relations at [phone] or email us at [email or web address]         Additional Information About Your Visit        Lanyrd Information     Lanyrd gives you secure access to your electronic health record. If you see a primary care provider, you can also send messages to your care team and make appointments. If you have questions, please call your primary care clinic.  If you do not have a primary care provider, please call [phone] and they will assist you.      Lanyrd is an electronic gateway that provides easy, online access to your medical records. With Lanyrd, you can request a clinic appointment, read your test results, renew a prescription or communicate with your care team.     To access your existing account, please contact your [facility] or call [phone] for assistance.        Care EveryWhere ID     This is your Care EveryWhere ID. This could be used by other organizations to access your [place] medical records  [id number]        Your Vitals Were     Pulse Temperature Respirations Height Pulse Oximetry Breastfeeding?    56 98.4  F (36.9  C) 24 1.575 m (5' 2\") 98% No    BMI (Body Mass Index)                   32.56 kg/m2            Blood Pressure from Last 3 Encounters:   08/09/17 110/69   07/25/17 104/70   06/26/17 (!) 166/95    Weight from Last 3 Encounters:   08/09/17 80.7 kg (178 lb)   07/25/17 80.3 kg (177 lb 0.5 oz)   06/23/17 81.2 kg (179 lb)    "           We Performed the Following     EEG AMBULATORY EA 24 HR        Primary Care Provider Office Phone # Fax #    Arin Shahid [phone]       [address] [facility] [address]        Equal Access to Services     YOMI LIAO : Alison monique corona katieo Sosteve, waaxda luqadaha, qaybta kaalmada adepete, cain horn laHeavenlyrodney chapa. So [facility] [phone].    ATENCIÓN: Si habla español, tiene a sanchez disposición servicios gratuitos de asistencia lingüística. Llame al [phone].    We comply with applicable federal civil rights laws and [place] laws. We do not discriminate on the basis of race, color, national origin, age, disability sex, sexual orientation or gender identity.            Thank you!     Thank you for choosing [facility]  for your care. Our goal is always to provide you with excellent care. Hearing back from our patients is one way we can continue to improve our services. Please take a few minutes to complete the written survey that you may receive in the mail after your visit with us. Thank you!             Your Updated Medication List - Protect others around you: Learn how to safely use, store and throw away your medicines at www.disposemymeds.org.          This list is accurate as of: 8/9/17  4:44 PM.  Always use your most recent med list.                   Brand Name Dispense Instructions for use Diagnosis    amLODIPine 5 MG tablet    NORVASC    90 tablet    Take 1 tablet (5 mg) by mouth daily    Hypertension goal BP (blood pressure) < 140/90       bisacodyl 5 MG EC tablet    DULCOLAX    30 tablet    Take 1 tablet (5 mg) by mouth daily as needed for constipation    Constipation, unspecified constipation type       letrozole 2.5 MG tablet    FEMARA    90 tablet    Take 1 tablet (2.5 mg) by mouth daily    Malignant neoplasm of female breast, unspecified estrogen receptor status, unspecified laterality, unspecified site of breast (H)        magnesium hydroxide 400 MG/5ML suspension    MILK OF MAGNESIA    360 mL    Take 30-60 mLs by mouth daily as needed for constipation or heartburn    Constipation, unspecified constipation type       OCUVITE ADULT FORMULA PO      Take 1 tablet by mouth daily.        * order for DME     1 each    Equipment being ordered: wheeled walker    Malignant neoplasm of female breast, unspecified laterality, unspecified site of breast, Pleural effusion       * order for DME     100 each    Equipment being ordered:  Incontinence pads   Patient uses these tid    Mixed incontinence       * order for DME     1 Units    Equipment being ordered: portable commode    Physical deconditioning       palbociclib 100 MG capsule CHEMO    IBRANCE    21 capsule    Take 1 capsule (100 mg) by mouth daily with food Take for 21 days, then 7 days off. Avoid grapefruit. Do not open/crush/chew capsule.    Malignant neoplasm of female breast (H), Pleural effusion       potassium chloride 10 MEQ tablet   Generic drug:  potassium chloride     90 tablet    TAKE 1 TABLET BY MOUTH TWICE A DAY    Hypertension goal BP (blood pressure) < 140/90       sennosides 8.6 MG tablet    SENOKOT    120 each    Take 1 tablet by mouth 2 times daily as needed for constipation May increase to 2 tabs twice daily if needed    Constipation, unspecified constipation type       simvastatin 20 MG tablet    ZOCOR    90 tablet    Take 1 tablet (20 mg) by mouth At Bedtime    Hyperlipidemia LDL goal <100       VIACTIV PO      Take 1 chew tab by mouth 2 times daily. One in the morning and one at bedtime.    Chest pain       warfarin 1 MG tablet    JANTOVEN    20 tablet    Take 2 tablets (2 mg) by mouth daily Take 1 tablet (1mg) by mouth on Fridays. Take 2 tablets (2 mg) by mouth all other days of the week.    History of stroke       * Notice:  This list has 3 medication(s) that are the same as other medications prescribed for you. Read the directions carefully, and ask your doctor or  other care provider to review them with you.

## 2017-08-17 NOTE — MR AVS SNAPSHOT
Helen CALLAHAN Aren   8/17/2017   Anticoagulation Therapy Visit    Description:  77 year old female   Provider:  Arin Shahid MD   Department:  [facility]           INR as of 8/17/2017     Today's INR 2.6      Anticoagulation Summary as of 8/17/2017     INR goal 2.0-3.0   Today's INR 2.6   Full instructions 2 mg every day   Next INR check 9/1/2017    Indications   Long-term (current) use of anticoagulants [Z79.01] [Z79.01]  Cerebral infarction (H) [I63.9]         Contact Numbers     [facility]  Please call [phone] or [phone]  to cancel and/or reschedule your appointment.   Please call [phone] with any problems or questions regarding your therapy          August 2017 Details    Sun Mon Tue Wed Thu Fri Sat       1               2               3               4               5                 6               7               8               9               10               11               12                 13               14               15               16               17      2 mg   See details      18      2 mg         19      2 mg           20      2 mg         21      2 mg         22      2 mg         23      2 mg         24      2 mg         25      2 mg         26      2 mg           27      2 mg         28      2 mg         29      2 mg         30      2 mg         31      2 mg            Date Details   08/17 This INR check               How to take your warfarin dose     To take:  2 mg Take 2 of the 1 mg tablets.           September 2017 Details    Sun Mon Tue Wed Thu Fri Sat          1            2                 3               4               5               6               7               8               9                 10               11               12               13               14               15               16                 17               18               19               20               21               22               23                 24                25               26               27               28               29               30                Date Details   No additional details    Date of next INR:  9/1/2017         How to take your warfarin dose     To take:  2 mg Take 2 of the 1 mg tablets.

## 2017-08-22 NOTE — TELEPHONE ENCOUNTER
Forms received from: [facility]   Phone number listed: [phone]   Fax listed: [phone]  Date received: 08/22/2017  Form description: INR/Coumadin  Once forms are completed, please return to [facility] via fax.  Is patient requesting to be contacted when forms are completed: NA    Form placed: In provider's basket  Romina Pierce

## 2017-08-22 NOTE — TELEPHONE ENCOUNTER
Reason for Call: Request for an order or referral:    Order or referral being requested:  1  time    Date needed: as soon as possible    Has the patient been seen by the PCP for this problem? YES    Additional comments: please call with order    Phone number Patient can be reached at:  Other phone number:  [phone]*    Best Time:  anytime    Can we leave a detailed message on this number?  YES    Call taken on 8/22/2017 at 2:57 PM by Ruben Miller

## 2017-08-22 NOTE — TELEPHONE ENCOUNTER
SW ordered as requested per [place] [facility] standing orders for Home Care, Assisted Living or Nursing Home Evaluations and Treatments, Mammogram (Reflex diagnostic), FIT test, Colonoscopy.  Called and informed Zippy of VO.      Kamla Fofana RN  [facility]

## 2017-08-23 NOTE — TELEPHONE ENCOUNTER
Oral Chemotherapy Monitoring Program   Placed call to patient in follow up of Ibrance (palbociclib) therapy.   Left message requesting call back.   No drug names were mentioned.  Left message 8/21 and 8/23. Next appointment 8/28.     Rio Freeman, PharmD  Therapy Management Oncology Pharmacist  [facility]   Phone: [phone]

## 2017-08-24 NOTE — TELEPHONE ENCOUNTER
Oral Chemotherapy Monitoring Program    Primary Oncologist: Dr. Landis  Primary Oncology Clinic: [facility]  Cancer Diagnosis: Breast      Therapy History:  confirmed Ibrance 100mg once daily for 21 days then 7 days off, start 8/27/2017  2/3/16- Start date   3/30/17 to 4/18/17- Ibrance on hold due to persistent weakness and history of neutropenia  4/19/17- Ibrance cycle restarted      Drug Interaction Assessment: No new drug interactions.        Lab Monitoring Plan                                         Monitoring Plan:      CBC, CMP   C2D1+   Call, CBC, CMP   C3D1+   Call, CBC, CMP   C4D1+   Call, CBC, CMP   C5D1+   Call, CBC, CMP   C6D1+   Call, CBC, CMP        C2D8+      C3D8+      C4D8+      C5D8+      C6D8+        CBC   C2D15+   CBC   C3D15+      C4D15+      C5D15+      C6D15+           C2D22+      C3D22+      C4D22+      C5D22+      C6D22+                Subjective/Objective:  Helen Younger is a 77 year old female contacted by phone for a follow-up visit for oral chemotherapy. Spoke with German () who stated she is doing well over the past 4 weeks, no dizziness or falls. No medication changes, missed doses, or new side effects. She has visits home health nurse. No questions or concerns today. Deliver 8/25 for cycle that starts 8/27.       ORAL CHEMOTHERAPY 2/20/2017 3/24/2017 4/18/2017 5/10/2017 6/27/2017 7/21/2017 8/24/2017   Drug Name Ibrance (Palbociclib) Ibrance (Palbociclib) Ibrance (Palbociclib) Ibrance (Palbociclib) Ibrance (Palbociclib) Ibrance (Palbociclib) Ibrance (Palbociclib)   Current Dosage 100mg 100mg 100mg 100mg 100mg 100mg 100mg   Current Schedule Daily Daily Daily Daily Daily Daily Daily   Cycle Details 3 weeks on 1 week off 3 weeks on 1 week off 3 weeks on 1 week off 3 weeks on 1 week off 3 weeks on 1 week off 3 weeks on 1 week off 3 weeks on 1 week off   Start Date of Last Cycle 2/2/2017 3/2/2017 3/2/2017 4/19/2017 6/4/2017 7/2/2017 7/30/2017   Planned next cycle start date  "3/2/2017 3/30/2017 4/19/2017 5/24/2017 7/2/2017 7/30/2017 8/27/2017   Doses missed in last 2 weeks 0 0 0 0 0 0 0   Adherence Assessment Adherent Adherent Non-adherent Non-adherent Adherent Adherent Adherent   Reason for Non-adherence - - Drug on hold Drug on hold - - -   Adherence Intervention Recommended - - None None - - -   Adverse Effects Fatigue Fatigue;Other (see note for details) No AE identified during assessment Fatigue No AE identified during assessment No AE identified during assessment No AE identified during assessment   Oral Mucositis - - - - - - -   Fatigue Grade 1 Grade 1 - Grade 2 - - -   Pharmacist Intervention(fatigue) Yes Yes - No - - -   Intervention(s) Patient education Patient education - - - - -   Other (see note for details) - Low pulse - - - - -   Pharmacist intervention? - No - - - - -   Home BPs not needed all BPs<140/90 all BPs<140/90 not needed not needed all BPs<140/90 not needed   Any new drug interactions? No No No No No No No   Is the dose as ordered appropriate for the patient? Yes Yes Yes Yes Yes Yes Yes   Is the patient currently in pain? No No No No No No No   Has the patient been assessed within the past 6 months for depression? Yes - Yes Yes Yes Yes Yes   Has the patient missed any days of school, work, or other routine activity? No No No Yes Yes No No   Days missed in the past month: - - - More than 5 days More than 5 days - -       Last PHQ-2 Score on record:   PHQ-2 ( [facility]) 2/27/2017 12/26/2016   Q1: Little interest or pleasure in doing things 0 0   Q2: Feeling down, depressed or hopeless 0 0   PHQ-2 Score 0 0       Patient does not report depression symptoms.      Vitals:  BP:   BP Readings from Last 1 Encounters:   08/09/17 110/69     Wt Readings from Last 1 Encounters:   08/09/17 80.7 kg (178 lb)     Estimated body surface area is 1.88 meters squared as calculated from the following:    Height as of 8/9/17: 1.575 m (5' 2\").    Weight as of 8/9/17: 80.7 kg (178 " bobby).    Labs:  Lab Results   Component Value Date     07/25/2017      Lab Results   Component Value Date    POTASSIUM 3.6 07/25/2017     Lab Results   Component Value Date    SHERYL 9.2 07/25/2017     Lab Results   Component Value Date    ALBUMIN 3.4 07/25/2017     Lab Results   Component Value Date    MAG 1.9 06/24/2017     Lab Results   Component Value Date    PHOS 2.2 06/24/2017     Lab Results   Component Value Date    BUN 17 07/25/2017     Lab Results   Component Value Date    CR 1.21 07/25/2017       Lab Results   Component Value Date    AST 13 07/25/2017     Lab Results   Component Value Date    ALT 14 07/25/2017     Lab Results   Component Value Date    BILITOTAL 0.5 07/25/2017       Lab Results   Component Value Date    WBC 2.0 07/25/2017     Lab Results   Component Value Date    HGB 9.6 07/25/2017     Lab Results   Component Value Date     07/25/2017     Lab Results   Component Value Date    ANEU 1.2 07/25/2017           Assessment:  She continues to tolerate Ibrance with minimal side effects.      Plan:  Continue Ibrance, cycle starts 8/27/2017. Ibrance will be delivered 8/25/2017.        Follow-Up:  Dr. Landis appointment and labs 8/28/2017.      Refill Due:  Next refill released 10/19/2017          Thank you for the opportunity to participate in this patient's care,      Rio Freeman, PharmD  Therapy Management Oncology Pharmacist  [facility]   Phone: [phone]

## 2017-08-25 NOTE — TELEPHONE ENCOUNTER
Forms received from: [facility]   Phone number listed: [phone]   Fax listed: [phone]  Date received: 08/25/2017  Form description: HHC and Plan of Care  Once forms are completed, please return to [facility] via fax.  Is patient requesting to be contacted when forms are completed: NA    Form placed: In provider's nurse basket  Romina Pierce

## 2017-08-25 NOTE — TELEPHONE ENCOUNTER
Meds reviewed.    Form placed in PCP's bin and encounter routed.    Kamla Fofnaa RN  [facility]

## 2017-08-28 NOTE — NURSING NOTE
"Oncology Rooming Note    August 28, 2017 1:56 PM   Helen Younger is a 77 year old female who presents for:    Chief Complaint   Patient presents with     Blood Draw     please take manual blood pressure unable to get accurate one in lab-lt blue vial sent with labs for INR please order per  request     Oncology Clinic Visit     Breast Ca- F/U     Initial Vitals: /60  Pulse 68  Temp 98.8  F (37.1  C)  Resp 18  Ht 1.575 m (5' 2.01\")  Wt 77.8 kg (171 lb 9.6 oz)  SpO2 97%  BMI 31.38 kg/m2 Estimated body mass index is 31.38 kg/(m^2) as calculated from the following:    Height as of this encounter: 1.575 m (5' 2.01\").    Weight as of this encounter: 77.8 kg (171 lb 9.6 oz). Body surface area is 1.84 meters squared.  No Pain (0) Comment: Data Unavailable   No LMP recorded. Patient is postmenopausal.  Allergies reviewed: Yes  Medications reviewed: Yes    Medications: Medication refills not needed today.  Pharmacy name entered into [employer]:    [facility] [address] [facility] [employer] - [place], [place] - [facility] [address]  [facility] - [place], [place] - [address]    Clinical concerns: no clinical concerns  provider was notified.    7 minutes for nursing intake (face to face time)     Roma Bridges CMA              "

## 2017-08-28 NOTE — TELEPHONE ENCOUNTER
Forms received from: [facility]    Phone number listed: [phone]   Fax listed: [phone]  Date received: 08/28/2017  Form description: SW 1 Day 1  Once forms are completed, please return to [facility]  via fax.  Is patient requesting to be contacted when forms are completed: NA    Form placed: In provider's basket  Romina Pierce

## 2017-08-28 NOTE — PROGRESS NOTES
HEMATOLOGY/ONCOLOGY PROGRESS NOTE  Aug 28, 2017    REASON FOR VISIT: follow-up of metastatic breast cancer    DIAGNOSIS:   Helen Younger is a 77 year old woman dx in March of 2006 with a left-sided breast cancer. She presented with pain in the left breast, and a diagnostic mammogram demonstrated abnormalities in the left breast. The region of abnormality was biopsied, and she was found to have an infiltrating ductal carcinoma. She subsequently went on to undergo a left-sided lumpectomy and sentinel lymph node biopsy at the [facility] by Dr. Salas Cook in April of 2006. The patient had a 2.5 x 2.1 cm tumor excised. It was a grade 2 infiltrating ductal carcinoma. Her sentinel lymph node biopsy was negative on frozen section, and final pathology did demonstrate micrometastasis measuring 0.2 cm. The tumor itself was ER-positive, PA-positive, and HER2-negative. The patient did have a positive margin for which she underwent a reexcision in June of 2006. The subsequent pathology was negative.   The patient was then seen by Dr. Jong Dacosta, and she was encouraged to receive adjuvant chemotherapy. She received three cycles of FEC every three weeks followed by Taxotere single-agent administered every three weeks for three cycles. She completed chemotherapy in mid to late October of 2006. Her only complication was neutropenic fever following her last dose, for which she was briefly hospitalized. She otherwise had no issues with residual neuropathies or any other known complications from her chemo.   She started on adjuvant Arimidex on November 6, 2006. She also received whole breast radiotherapy with boost to the left breast and tumor bed from November 15, 2006, through January 5, 2007. This was performed by Dr. Nicholas at [facility]. It is unknown if she received kevon radiation. She completed five years of arimidex and remained off therapy.   She presented in December 2015 with shortness of breath and  pleural effusion, requiring hospitalization. She had three thoracenteses while inpatient, with cytology positive for metastatic breast cancer, ER/CT-positive, HER-2 negative. On her workup, she also has disease in her bones. She met with Dr. Landis on 1/11/2016 and was started on endocrine therapy with AI. She started on Ibrance on 2/3/2016. She was dose reduced after cycle 1 to 100 mg for cytopenias.    INTERVAL HISTORY:   Helen presents today with her  for follow-up.   Overall she is doing very well.  She is tolerating her femara and ibrance without difficulty.  She has had issues with falls, and intermittent dizziness.  This is better than previously but continues.  She is scheduled for a tilt table test in 2 weeks.      No f/c.  No pain.  No chest pain or shortness of breath.  No n/v/d/c.      Her appetite is diminished somewhat and her  does not understand this.  As a result of her diminished appetite, she has lost about five pounds.    Otherwise, 10 point ROS negative     Meds: reviewed     Allergies   Allergen Reactions     Diuretic      Don't remember side effect     Zyrtec [Cetirizine Hcl]      Elevated blood pressure       PHYSICAL EXAMINATION  There were no vitals taken for this visit.   Wt Readings from Last 4 Encounters:   08/09/17 80.7 kg (178 lb)   07/25/17 80.3 kg (177 lb 0.5 oz)   06/23/17 81.2 kg (179 lb)   06/22/17 81.2 kg (179 lb)     Constitutional: Alert, oriented female in no visible distress. +Expressive aphasia. Examined in wheelchair today.  Eyes: PERRL. Anicteric sclerae.  ENT/Mouth: OM moist and pink without thrush.  CV: RRR, no murmurs appreciated.  Resp: lungs clear bilaterally with no diminished breath sounds  Abdomen: Soft, non-tender, non-distended.  Extremities: No lower extremity edema appreciated.   Skin: Warm, dry.   Lymph: No cervical or supraclavicular lymphadenopathy appreciated.   Neuro: CN II-XII grossly intact.    LABS:  Lab Results   Component Value Date     WBC 2.8 08/28/2017     Lab Results   Component Value Date    RBC 2.81 08/28/2017     Lab Results   Component Value Date    HGB 10.4 08/28/2017     Lab Results   Component Value Date    HCT 31.3 08/28/2017     No components found for: MCT  Lab Results   Component Value Date     08/28/2017     Lab Results   Component Value Date    MCH 37.0 08/28/2017     Lab Results   Component Value Date    MCHC 33.2 08/28/2017     Lab Results   Component Value Date    RDW 15.8 08/28/2017     Lab Results   Component Value Date     08/28/2017       Recent Labs   Lab Test  08/28/17   1340  07/25/17   1424   NA  139  140   POTASSIUM  3.7  3.6   CHLORIDE  104  105   CO2  29  27   ANIONGAP  6  8   GLC  180*  182*   BUN  21  17   CR  1.21*  1.21*   SHERYL  9.4  9.2     Liver Function Studies -   Recent Labs   Lab Test  08/28/17   1340   PROTTOTAL  7.1   ALBUMIN  3.5   BILITOTAL  0.6   ALKPHOS  36*   AST  10   ALT  14         IMPRESSION/PLAN:    Metastatic ER/DE positive, Her2 negative breast cancer: Currently on Letrozole and palbociclib with overall stable disease on imaging 5/2017. She tolerates this regimen well. She will continue with scans every 4-5 months as long as her tumor markers remain stable, and she doesn't had any signs of progressive disease.  We discussed having her get imaging in 2-3 months.      Bone mets: Previously on monthly Xgeva, switched to every 3 months due to large co-pay. Helen has been avoiding getting it; will hold off for now in light of the recent issues with falls and hospitalizations.     Pleural effusions:  This appears to have resolved by clinical examination today.    Syncopal episodes - she is scheduled for tilt table testing next week.      Keisha Landis MD

## 2017-08-28 NOTE — MR AVS SNAPSHOT
After Visit Summary   8/28/2017    Helen Younger    MRN: [id number]           Patient Information     Date Of Birth          1940        Visit Information        Provider Department      8/28/2017 2:00 PM Keisha Landis MD [facility]        Today's Diagnoses     Malignant neoplasm of female breast (H)           Follow-ups after your visit        Your next 10 appointments already scheduled     Aug 29, 2017 12:00 PM CDT   (Arrive by 11:45 AM)   In Lab Video Visit with  EEG TECH 2   [facility] ([facility])    [address] [place] [id number] [phone]            Sep 08, 2017 10:55 AM CDT   Other Visit with [facility] EEG TECH 2   [facility] EEG ([facility])    [address] [address]   [phone]           [place]: Your appointment is scheduled at [facility]. [address] [place] [id number]            Sep 08, 2017 11:00 AM CDT   Procedure 4.5 hour with U2A ROOM 11   Unit [address]A [facility] ([facility])    [address] [address]               Sep 08, 2017  1:00 PM CDT   H Ep Study Tilt Table with UUHCVR2   [facility] [place]  Heart Cath Lab ([facility])    [address] [address]   [phone]            Sep 20, 2017  3:30 PM CDT   (Arrive by 3:15 PM)   Return Visit with Rosey Baker MD   [facility] Neurology ([facility])    [address] [place] [id number] [phone]            Oct 06, 2017  9:30 AM CDT   (Arrive by 9:15 AM)   New Movement Disorder with Jenaro Warren MD   [facility] ([facility])    [address] [place] [id number] [phone]              Future tests that  "were ordered for you today     Open Future Orders        Priority Expected Expires Ordered    CT Chest/Abdomen/Pelvis w Contrast Routine  3/26/2018 8/28/2017            Who to contact     If you have questions or need follow up information about today's clinic visit or your schedule please contact [facility] directly at [phone].  Normal or non-critical lab and imaging results will be communicated to you by [employer], letter or phone within 4 business days after the clinic has received the results. If you do not hear from us within 7 days, please contact the clinic through [employer] or phone. If you have a critical or abnormal lab result, we will notify you by phone as soon as possible.  Submit refill requests through [employer] or call your pharmacy and they will forward the refill request to us. Please allow 3 business days for your refill to be completed.          Additional Information About Your Visit        N[employer] Information     [employer] gives you secure access to your electronic health record. If you see a primary care provider, you can also send messages to your care team and make appointments. If you have questions, please call your primary care clinic.  If you do not have a primary care provider, please call [phone] and they will assist you.        Care EveryWhere ID     This is your Care EveryWhere ID. This could be used by other organizations to access your [facility] medical records  [id number]        Your Vitals Were     Pulse Temperature Respirations Height Pulse Oximetry BMI (Body Mass Index)    68 98.8  F (37.1  C) 18 1.575 m (5' 2.01\") 97% 31.38 kg/m2       Blood Pressure from Last 3 Encounters:   08/28/17 102/60   08/09/17 110/69   07/25/17 104/70    Weight from Last 3 Encounters:   08/28/17 77.8 kg (171 lb 9.6 oz)   08/09/17 80.7 kg (178 lb)   07/25/17 80.3 kg (177 lb 0.5 oz)              We Performed the Following     Ca27.29  breast tumor marker     CBC with platelets " differential     CEA     Comprehensive metabolic panel        Primary Care Provider Office Phone # Fax #    Arin Shahid [phone]       [address] [facility] [address]        Equal Access to Services     YOMI LIAO : Hadii aad ku hadpablo Carrion, waaxda luqadaha, qaybta kaalmada adepete, cain horn brain chapa. So [facility] [phone].    ATENCIÓN: Si habla español, tiene a sanchez disposición servicios gratuitos de asistencia lingüística. Llame al [phone].    We comply with applicable federal civil rights laws and [place] laws. We do not discriminate on the basis of race, color, national origin, age, disability sex, sexual orientation or gender identity.            Thank you!     Thank you for choosing [facility]  for your care. Our goal is always to provide you with excellent care. Hearing back from our patients is one way we can continue to improve our services. Please take a few minutes to complete the written survey that you may receive in the mail after your visit with us. Thank you!             Your Updated Medication List - Protect others around you: Learn how to safely use, store and throw away your medicines at www.disposemymeds.org.          This list is accurate as of: 8/28/17  3:31 PM.  Always use your most recent med list.                   Brand Name Dispense Instructions for use Diagnosis    amLODIPine 5 MG tablet    NORVASC    90 tablet    Take 1 tablet (5 mg) by mouth daily    Hypertension goal BP (blood pressure) < 140/90       bisacodyl 5 MG EC tablet    DULCOLAX    30 tablet    Take 1 tablet (5 mg) by mouth daily as needed for constipation    Constipation, unspecified constipation type       letrozole 2.5 MG tablet    FEMARA    90 tablet    Take 1 tablet (2.5 mg) by mouth daily    Malignant neoplasm of female breast, unspecified estrogen receptor status, unspecified laterality, unspecified site of breast (H)        magnesium hydroxide 400 MG/5ML suspension    MILK OF MAGNESIA    360 mL    Take 30-60 mLs by mouth daily as needed for constipation or heartburn    Constipation, unspecified constipation type       OCUVITE ADULT FORMULA PO      Take 1 tablet by mouth daily.        * order for DME     1 each    Equipment being ordered: wheeled walker    Malignant neoplasm of female breast, unspecified laterality, unspecified site of breast, Pleural effusion       * order for DME     100 each    Equipment being ordered:  Incontinence pads   Patient uses these tid    Mixed incontinence       * order for DME     1 Units    Equipment being ordered: portable commode    Physical deconditioning       palbociclib 100 MG capsule CHEMO    IBRANCE    21 capsule    Take 1 capsule (100 mg) by mouth daily with food Take for 21 days, then 7 days off. Avoid grapefruit. Do not open/crush/chew capsule.    Malignant neoplasm of female breast (H), Pleural effusion       potassium chloride 10 MEQ tablet   Generic drug:  potassium chloride     90 tablet    TAKE 1 TABLET BY MOUTH TWICE A DAY    Hypertension goal BP (blood pressure) < 140/90       sennosides 8.6 MG tablet    SENOKOT    120 each    Take 1 tablet by mouth 2 times daily as needed for constipation May increase to 2 tabs twice daily if needed    Constipation, unspecified constipation type       simvastatin 20 MG tablet    ZOCOR    90 tablet    Take 1 tablet (20 mg) by mouth At Bedtime    Hyperlipidemia LDL goal <100       VIACTIV PO      Take 1 chew tab by mouth 2 times daily. One in the morning and one at bedtime.    Chest pain       warfarin 1 MG tablet    JANTOVEN    20 tablet    Take 2 tablets (2 mg) by mouth daily Take 1 tablet (1mg) by mouth on Fridays. Take 2 tablets (2 mg) by mouth all other days of the week.    History of stroke       * Notice:  This list has 3 medication(s) that are the same as other medications prescribed for you. Read the directions carefully, and ask your doctor  or other care provider to review them with you.

## 2017-08-28 NOTE — LETTER
8/28/2017       RE: Helen Younger  [address]  [facility] [address]     Dear Colleague,    Thank you for referring your patient, Helen Younger, to the [facility]. Please see a copy of my visit note below.    HEMATOLOGY/ONCOLOGY PROGRESS NOTE  Aug 28, 2017    REASON FOR VISIT: follow-up of metastatic breast cancer    DIAGNOSIS:   Helen Younger is a 77 year old woman dx in March of 2006 with a left-sided breast cancer. She presented with pain in the left breast, and a diagnostic mammogram demonstrated abnormalities in the left breast. The region of abnormality was biopsied, and she was found to have an infiltrating ductal carcinoma. She subsequently went on to undergo a left-sided lumpectomy and sentinel lymph node biopsy at the [facility] by Dr. Salas Cook in April of 2006. The patient had a 2.5 x 2.1 cm tumor excised. It was a grade 2 infiltrating ductal carcinoma. Her sentinel lymph node biopsy was negative on frozen section, and final pathology did demonstrate micrometastasis measuring 0.2 cm. The tumor itself was ER-positive, MI-positive, and HER2-negative. The patient did have a positive margin for which she underwent a reexcision in June of 2006. The subsequent pathology was negative.   The patient was then seen by Dr. Jong Dacosta, and she was encouraged to receive adjuvant chemotherapy. She received three cycles of FEC every three weeks followed by Taxotere single-agent administered every three weeks for three cycles. She completed chemotherapy in mid to late October of 2006. Her only complication was neutropenic fever following her last dose, for which she was briefly hospitalized. She otherwise had no issues with residual neuropathies or any other known complications from her chemo.   She started on adjuvant Arimidex on November 6, 2006. She also received whole breast radiotherapy with boost to the left breast and tumor bed from November 15, 2006, through  January 5, 2007. This was performed by Dr. Nicholas at [facility]. It is unknown if she received kevon radiation. She completed five years of arimidex and remained off therapy.   She presented in December 2015 with shortness of breath and pleural effusion, requiring hospitalization. She had three thoracenteses while inpatient, with cytology positive for metastatic breast cancer, ER/ND-positive, HER-2 negative. On her workup, she also has disease in her bones. She met with Dr. Landis on 1/11/2016 and was started on endocrine therapy with AI. She started on Ibrance on 2/3/2016. She was dose reduced after cycle 1 to 100 mg for cytopenias.    INTERVAL HISTORY:   Helen presents today with her  for follow-up.   Overall she is doing very well.  She is tolerating her femara and ibrance without difficulty.  She has had issues with falls, and intermittent dizziness.  This is better than previously but continues.  She is scheduled for a tilt table test in 2 weeks.      No f/c.  No pain.  No chest pain or shortness of breath.  No n/v/d/c.      Her appetite is diminished somewhat and her  does not understand this.  As a result of her diminished appetite, she has lost about five pounds.    Otherwise, 10 point ROS negative     Meds: reviewed     Allergies   Allergen Reactions     Diuretic      Don't remember side effect     Zyrtec [Cetirizine Hcl]      Elevated blood pressure       PHYSICAL EXAMINATION  There were no vitals taken for this visit.   Wt Readings from Last 4 Encounters:   08/09/17 80.7 kg (178 lb)   07/25/17 80.3 kg (177 lb 0.5 oz)   06/23/17 81.2 kg (179 lb)   06/22/17 81.2 kg (179 lb)     Constitutional: Alert, oriented female in no visible distress. +Expressive aphasia. Examined in wheelchair today.  Eyes: PERRL. Anicteric sclerae.  ENT/Mouth: OM moist and pink without thrush.  CV: RRR, no murmurs appreciated.  Resp: lungs clear bilaterally with no diminished breath sounds  Abdomen: Soft, non-tender,  non-distended.  Extremities: No lower extremity edema appreciated.   Skin: Warm, dry.   Lymph: No cervical or supraclavicular lymphadenopathy appreciated.   Neuro: CN II-XII grossly intact.    LABS:  Lab Results   Component Value Date    WBC 2.8 08/28/2017     Lab Results   Component Value Date    RBC 2.81 08/28/2017     Lab Results   Component Value Date    HGB 10.4 08/28/2017     Lab Results   Component Value Date    HCT 31.3 08/28/2017     No components found for: MCT  Lab Results   Component Value Date     08/28/2017     Lab Results   Component Value Date    MCH 37.0 08/28/2017     Lab Results   Component Value Date    MCHC 33.2 08/28/2017     Lab Results   Component Value Date    RDW 15.8 08/28/2017     Lab Results   Component Value Date     08/28/2017       Recent Labs   Lab Test  08/28/17   1340  07/25/17   1424   NA  139  140   POTASSIUM  3.7  3.6   CHLORIDE  104  105   CO2  29  27   ANIONGAP  6  8   GLC  180*  182*   BUN  21  17   CR  1.21*  1.21*   SHERYL  9.4  9.2     Liver Function Studies -   Recent Labs   Lab Test  08/28/17   1340   PROTTOTAL  7.1   ALBUMIN  3.5   BILITOTAL  0.6   ALKPHOS  36*   AST  10   ALT  14         IMPRESSION/PLAN:    Metastatic ER/AR positive, Her2 negative breast cancer: Currently on Letrozole and palbociclib with overall stable disease on imaging 5/2017. She tolerates this regimen well. She will continue with scans every 4-5 months as long as her tumor markers remain stable, and she doesn't had any signs of progressive disease.  We discussed having her get imaging in 2-3 months.      Bone mets: Previously on monthly Xgeva, switched to every 3 months due to large co-pay. Helen has been avoiding getting it; will hold off for now in light of the recent issues with falls and hospitalizations.     Pleural effusions:  This appears to have resolved by clinical examination today.    Syncopal episodes - she is scheduled for tilt table testing next week.      Keisha Landis,  MD

## 2017-08-28 NOTE — NURSING NOTE
Chief Complaint   Patient presents with     Blood Draw     please take manual blood pressure unable to get accurate one in lab-lt blue vial sent with labs for INR please order per  request     Oncology Clinic Visit     Breast Ca- F/U     Eva Mohamud   brought pt  label and wristband.

## 2017-08-29 NOTE — MR AVS SNAPSHOT
After Visit Summary   8/29/2017    Helen Younger    MRN: [id number]           Patient Information     Date Of Birth          1940        Visit Information        Provider Department      8/29/2017 12:00 PM [facility] EEG TECH 2 EEG [facility] OUTPATIENT        Today's Diagnoses     Dysautonomia           Follow-ups after your visit        Your next 10 appointments already scheduled     Sep 08, 2017 10:55 AM CDT   Other Visit with [facility] EEG TECH 2   [facility] EEG ([facility])    [address] [address]   [phone]           [place]: Your appointment is scheduled at [facility]. [address] [place] [address]            Sep 08, 2017 11:00 AM CDT   Procedure 4.5 hour with U2A ROOM 11   Unit 2A [facility] ([facility])    [address] [place] [id number]               Sep 08, 2017  1:00 PM CDT   H Ep Study Tilt Table with UUHCVR2   [facility] ([facility])    [address] [place] [address]   [phone]            Sep 20, 2017  3:30 PM CDT   (Arrive by 3:15 PM)   Return Visit with Rosey Baker MD   [facility] Neurology ([facility])    [address] [place] [id number] [phone]            Oct 06, 2017  9:30 AM CDT   (Arrive by 9:15 AM)   New Movement Disorder with Jenaro Warren MD   [facility] Neurology ([facility])    [address] [place] [address] [phone]            Oct 06, 2017 12:15 PM CDT   Masonic Lab Draw with  MASONIC LAB DRAW   [facility] Masonic Lab Draw ([facility])    [address] [place] [address] [phone]            Oct 06, 2017 12:50 PM CDT   (Arrive by 12:35 PM)   Return Visit with Jess  Ella Avalos PA-C   [facility] ([facility])    [address]  2nd Floor  [facility] [address] [phone]              Who to contact     Please call your clinic at [phone] to:    Ask questions about your health    Make or cancel appointments    Discuss your medicines    Learn about your test results    Speak to your doctor   If you have compliments or concerns about an experience at your clinic, or if you wish to file a complaint, please contact [facility] Patient Relations at [phone] or email us at [email or web address]         Additional Information About Your Visit        [employer] Information     [employer] gives you secure access to your electronic health record. If you see a primary care provider, you can also send messages to your care team and make appointments. If you have questions, please call your primary care clinic.  If you do not have a primary care provider, please call [phone] and they will assist you.      [employer] is an electronic gateway that provides easy, online access to your medical records. With [employer], you can request a clinic appointment, read your test results, renew a prescription or communicate with your care team.     To access your existing account, please contact your [facility] or call [phone] for assistance.        Care EveryWhere ID     This is your Care EveryWhere ID. This could be used by other organizations to access your [facility] medical records  [id number]         Blood Pressure from Last 3 Encounters:   No data found for BP    Weight from Last 3 Encounters:   No data found for Wt              Today, you had the following     No orders found for display       Primary Care Provider Office Phone # Fax #    Arin Shahid [phone]       [address] [facility] [address]        Equal Access  to Services     YOMI LIAO : Alison Carrion, walena montana, qabettekhadijah harrisonlisacain patiño. So [facility] [phone].    ATENCIÓN: Si habla malcolm, tiene a sanchez disposición servicios gratuitos de asistencia lingüística. Llame al [phone].    We comply with applicable federal civil rights laws and [place] laws. We do not discriminate on the basis of race, color, national origin, age, disability sex, sexual orientation or gender identity.            Thank you!     Thank you for choosing EEG [facility] OUTPATIENT  for your care. Our goal is always to provide you with excellent care. Hearing back from our patients is one way we can continue to improve our services. Please take a few minutes to complete the written survey that you may receive in the mail after your visit with us. Thank you!             Your Updated Medication List - Protect others around you: Learn how to safely use, store and throw away your medicines at www.disposemymeds.org.          This list is accurate as of: 8/29/17 11:59 PM.  Always use your most recent med list.                   Brand Name Dispense Instructions for use Diagnosis    amLODIPine 5 MG tablet    NORVASC    90 tablet    Take 1 tablet (5 mg) by mouth daily    Hypertension goal BP (blood pressure) < 140/90       bisacodyl 5 MG EC tablet    DULCOLAX    30 tablet    Take 1 tablet (5 mg) by mouth daily as needed for constipation    Constipation, unspecified constipation type       letrozole 2.5 MG tablet    FEMARA    90 tablet    Take 1 tablet (2.5 mg) by mouth daily    Malignant neoplasm of female breast, unspecified estrogen receptor status, unspecified laterality, unspecified site of breast (H)       magnesium hydroxide 400 MG/5ML suspension    MILK OF MAGNESIA    360 mL    Take 30-60 mLs by mouth daily as needed for constipation or heartburn    Constipation, unspecified constipation type       OCUVITE ADULT FORMULA PO      Take 1  tablet by mouth daily.        * order for DME     1 each    Equipment being ordered: wheeled walker    Malignant neoplasm of female breast, unspecified laterality, unspecified site of breast, Pleural effusion       * order for DME     100 each    Equipment being ordered:  Incontinence pads   Patient uses these tid    Mixed incontinence       * order for DME     1 Units    Equipment being ordered: portable commode    Physical deconditioning       palbociclib 100 MG capsule CHEMO    IBRANCE    21 capsule    Take 1 capsule (100 mg) by mouth daily with food Take for 21 days, then 7 days off. Avoid grapefruit. Do not open/crush/chew capsule.    Malignant neoplasm of female breast (H), Pleural effusion       potassium chloride 10 MEQ tablet   Generic drug:  potassium chloride     90 tablet    TAKE 1 TABLET BY MOUTH TWICE A DAY    Hypertension goal BP (blood pressure) < 140/90       sennosides 8.6 MG tablet    SENOKOT    120 each    Take 1 tablet by mouth 2 times daily as needed for constipation May increase to 2 tabs twice daily if needed    Constipation, unspecified constipation type       simvastatin 20 MG tablet    ZOCOR    90 tablet    Take 1 tablet (20 mg) by mouth At Bedtime    Hyperlipidemia LDL goal <100       VIACTIV PO      Take 1 chew tab by mouth 2 times daily. One in the morning and one at bedtime.    Chest pain       warfarin 1 MG tablet    JANTOVEN    20 tablet    Take 2 tablets (2 mg) by mouth daily Take 1 tablet (1mg) by mouth on Fridays. Take 2 tablets (2 mg) by mouth all other days of the week.    History of stroke       * Notice:  This list has 3 medication(s) that are the same as other medications prescribed for you. Read the directions carefully, and ask your doctor or other care provider to review them with you.

## 2017-09-01 NOTE — MR AVS SNAPSHOT
Helen LONDON Younger   9/1/2017   Anticoagulation Therapy Visit    Description:  77 year old female   Provider:  Arin Shahid MD   Department:  C[facility] Nurse           INR as of 9/1/2017     Today's INR 2.0      Anticoagulation Summary as of 9/1/2017     INR goal 2.0-3.0   Today's INR 2.0   Full instructions 2 mg every day   Next INR check 9/15/2017    Indications   Long-term (current) use of anticoagulants [Z79.01] [Z79.01]  Cerebral infarction (H) [I63.9]         Contact Numbers     [facility]  Please call [phone] or [phone]  to cancel and/or reschedule your appointment.   Please call [phone] with any problems or questions regarding your therapy          September 2017 Details    Sun Mon Tue Wed Thu Fri Sat          1      2 mg   See details      2      2 mg           3      2 mg         4      2 mg         5      2 mg         6      2 mg         7      2 mg         8      2 mg         9      2 mg           10      2 mg         11      2 mg         12      2 mg         13      2 mg         14      2 mg         15            16                 17               18               19               20               21               22               23                 24               25               26               27               28               29               30                Date Details   09/01 This INR check       Date of next INR:  9/15/2017         How to take your warfarin dose     To take:  2 mg Take 2 of the 1 mg tablets.

## 2017-09-01 NOTE — PROCEDURES
EEG #      DATE OF RECORDIN2017      DURATION OF RECORDING:  3 hours and 0 minutes        CLINICAL SUMMARY:  This patient is a 77-year-old female with history of left MCA stroke, who presented with seizure-like activities.  EEG was requested for further evaluation.      CURRENT MEDICATIONS:  Ibrance, warfarin, Zocor, Dulcolax, Senokot and vitamins.      TECHNICAL SUMMARY: This continuous video- EEG monitoring procedure was performed with 23 scalp electrodes in 10-20 electrode system placements, and additional scalp, precordial and other surface electrodes used for electrical referencing and artifact detection.  Video monitoring was utilized and periodically reviewed by EEG technologists and the physician for electroclinical correlations.    BACKGROUND ACTIVITIES:  The background activities of this EEG consisted of mild to moderate generalized slowing with mostly theta activities during waking.  The background is asymmetric with focal slowing over the left hemisphere with mixed theta and delta activities.  Hyperventilation was not performed.  Photic stimulation produced no driving responses.      Sleep stages were recorded with poorly formed sleep architectures.      No interictal epileptiform activities were observed.      ICTAL ACTIVITIES:  No seizures were observed.      IMPRESSION:  This is an abnormal EEG due to the presence of:   1.  Mild to moderate generalized slowing of the background activities.   2.  Asymmetry of the background activities with focal slowing over the left hemisphere.      These findings are consistent with the patient's history of stroke.  No seizures were observed.         LUCY KOCH MD             D: 2017 18:17   T: 2017 08:40   MT: al      Name:     RYLEE PURCELL   MRN:      [id number]        Account:        [id number]   :      1940           Procedure Date: 2017      Document: [id number]

## 2017-09-01 NOTE — PROGRESS NOTES
ANTICOAGULATION FOLLOW-UP CLINIC VISIT    Patient Name:  Helen Younger  Date:  9/1/2017  Contact Type:  Telephone/ [facility] LPN calling with fingerstick INR results.  [phone].    SUBJECTIVE: No changes, symptoms, or problems reported.     Patient Findings     Positives No Problem Findings           OBJECTIVE    INR   Date Value Ref Range Status   09/01/2017 2.0  Final       ASSESSMENT / PLAN  INR assessment THER    Recheck INR In: 2 WEEKS    INR Location [facility]      Anticoagulation Summary as of 9/1/2017     INR goal 2.0-3.0   Today's INR 2.0   Maintenance plan 2 mg (1 mg x 2) every day   Full instructions 2 mg every day   Weekly total 14 mg   No change documented Soumya Garza, RN   Plan last modified Soumya Garza RN (6/6/2017)   Next INR check 9/15/2017   Target end date Indefinite    Indications   Long-term (current) use of anticoagulants [Z79.01] [Z79.01]  Cerebral infarction (H) [I63.9]         Anticoagulation Episode Summary     INR check location     Preferred lab     Send INR reminders to [facility]    Comments       Anticoagulation Care Providers     Provider Role Specialty Phone number    Arin Shahid MD  Internal Medicine [phone]            See the Encounter Report to view Anticoagulation Flowsheet and Dosing Calendar (Go to Encounters tab in chart review, and find the Anticoagulation Therapy Visit)    Dosage adjustment made based on physician directed care plan.    Soumya Garza RN

## 2017-09-01 NOTE — PROGRESS NOTES
Clinic Care Coordination Contact    Situation: Patient chart reviewed by care coordinator.    Background: Patient is active with [facility]    Assessment: Per [facility], patient is still active until 9/15    Plan/Recommendations: This writer will check [facility] on 9/15 to see if patient was discharged    BHAKTI Cardenas  Care Navigator  [facility]  [phone]

## 2017-09-07 NOTE — TELEPHONE ENCOUNTER
Forms received from: [facility]   Phone number listed: [phone]   Fax listed: [phone]  Date received: 09/07/2017  Form description: SN-INR/Coumadin  Once forms are completed, please return to [facility] via fax.  Is patient requesting to be contacted when forms are completed: NA    Form placed: In provider's basket  Romina Pierce

## 2017-09-08 NOTE — MR AVS SNAPSHOT
After Visit Summary   9/8/2017    Helen Younger    MRN: [id number]           Patient Information     Date Of Birth          1940        Visit Information        Provider Department      9/8/2017 10:55 AM [facility] EEG TECH 2 [facility] EEG        Today's Diagnoses     Dizziness    -  1       Follow-ups after your visit        Your next 10 appointments already scheduled     Sep 20, 2017  3:30 PM CDT   (Arrive by 3:15 PM)   Return Visit with Rosey Baker MD   [facility] Neurology ([facility])    [address] [place] [address] [phone]            Oct 06, 2017  9:30 AM CDT   (Arrive by 9:15 AM)   New Movement Disorder with Jenaro Warren MD   [facility] ([facility])    [address] [place] [address] [phone]            Oct 06, 2017 12:15 PM CDT   Masonic Lab Draw with  MASONIC LAB DRAW   [facility])    [address] [place] [id number] [phone]            Oct 06, 2017 12:50 PM CDT   (Arrive by 12:35 PM)   Return Visit with Jess Avalos PA-C   [facility] ([facility])    [address] [place] [id number] [phone]              Who to contact     Please call your clinic at [phone] to:    Ask questions about your health    Make or cancel appointments    Discuss your medicines    Learn about your test results    Speak to your doctor   If you have compliments or concerns about an experience at your clinic, or if you wish to file a complaint, please contact [facility] Patient Relations at [phone] or email us at [email or web address]         Additional Information About Your Visit        MyChart Information     MyChart gives you secure access to your electronic health  record. If you see a primary care provider, you can also send messages to your care team and make appointments. If you have questions, please call your primary care clinic.  If you do not have a primary care provider, please call [phone] and they will assist you.      [employer] is an electronic gateway that provides easy, online access to your medical records. With [employer], you can request a clinic appointment, read your test results, renew a prescription or communicate with your care team.     To access your existing account, please contact your [facility] or call [phone] for assistance.        Care EveryWhere ID     This is your Care EveryWhere ID. This could be used by other organizations to access your [place] medical records  [id number]         Blood Pressure from Last 3 Encounters:   09/08/17 119/85   08/28/17 102/60   08/09/17 110/69    Weight from Last 3 Encounters:   08/28/17 77.8 kg (171 lb 9.6 oz)   08/09/17 80.7 kg (178 lb)   07/25/17 80.3 kg (177 lb 0.5 oz)              Today, you had the following     No orders found for display         Today's Medication Changes      Notice     This visit is during an admission. Changes to the med list made in this visit will be reflected in the After Visit Summary of the admission.             Primary Care Provider Office Phone # Fax #    Arin Shahid [phone]       [address] [facility] [address]        Equal Access to Services     YOMI LIAO AH: Hadii monique gao Sosteve, waaxda luqadaha, qaybta kaalmada chrsiyakang, cain chapa. So [facility] [phone].    ATENCIÓN: Si habla español, tiene a sanchez disposición servicios gratuitos de asistencia lingüística. Llame al [phone].    We comply with applicable federal civil rights laws and [place] laws. We do not discriminate on the basis of race, color, national origin, age, disability sex, sexual  orientation or gender identity.            Thank you!     Thank you for choosing [facility]  for your care. Our goal is always to provide you with excellent care. Hearing back from our patients is one way we can continue to improve our services. Please take a few minutes to complete the written survey that you may receive in the mail after your visit with us. Thank you!             Your Updated Medication List - Protect others around you: Learn how to safely use, store and throw away your medicines at www.disposemymeds.org.      Notice     This visit is during an admission. Changes to the med list made in this visit will be reflected in the After Visit Summary of the admission.

## 2017-09-08 NOTE — IP AVS SNAPSHOT
MRN:[id number]                      After Visit Summary   9/8/2017    Helen Younger    MRN: [id number]           Visit Information        Department      9/8/2017 10:21 AM [address] [facility]          Review of your medicines      START taking        Dose / Directions    carvedilol 3.125 MG tablet   Commonly known as:  COREG   Used for:  Orthostatic hypotension        Dose:  3.125 mg   Take 1 tablet (3.125 mg) by mouth daily   Quantity:  30 tablet   Refills:  4       midodrine 5 MG tablet   Commonly known as:  PROAMATINE   Used for:  Orthostatic hypotension        Dose:  5 mg   Take 1 tablet (5 mg) by mouth 2 times daily   Quantity:  60 tablet   Refills:  3         CONTINUE these medicines which have NOT CHANGED        Dose / Directions    bisacodyl 5 MG EC tablet   Commonly known as:  DULCOLAX   Indication:  Constipation   Used for:  Constipation, unspecified constipation type        Dose:  5 mg   Take 1 tablet (5 mg) by mouth daily as needed for constipation   Quantity:  30 tablet   Refills:  0       letrozole 2.5 MG tablet   Commonly known as:  FEMARA   Used for:  Malignant neoplasm of female breast, unspecified estrogen receptor status, unspecified laterality, unspecified site of breast (H)        Dose:  2.5 mg   Take 1 tablet (2.5 mg) by mouth daily   Quantity:  90 tablet   Refills:  3       magnesium hydroxide 400 MG/5ML suspension   Commonly known as:  MILK OF MAGNESIA   Used for:  Constipation, unspecified constipation type        Dose:  30-60 mL   Take 30-60 mLs by mouth daily as needed for constipation or heartburn   Quantity:  360 mL   Refills:  0       OCUVITE ADULT FORMULA PO        Dose:  1 tablet   Take 1 tablet by mouth daily.   Refills:  0       * order for DME   Used for:  Malignant neoplasm of female breast, unspecified laterality, unspecified site of breast, Pleural effusion        Equipment being ordered: wheeled walker   Quantity:  1 each   Refills:  0       * order for DME    Used for:  Mixed incontinence        Equipment being ordered:  Incontinence pads   Patient uses these tid   Quantity:  100 each   Refills:  3       * order for DME   Used for:  Physical deconditioning        Equipment being ordered: portable commode   Quantity:  1 Units   Refills:  0       palbociclib 100 MG capsule CHEMO   Commonly known as:  IBRANCE   Used for:  Malignant neoplasm of female breast (H), Pleural effusion        Dose:  100 mg   Take 1 capsule (100 mg) by mouth daily with food Take for 21 days, then 7 days off. Avoid grapefruit. Do not open/crush/chew capsule.   Quantity:  21 capsule   Refills:  2       potassium chloride 10 MEQ tablet   Used for:  Hypertension goal BP (blood pressure) < 140/90   Generic drug:  potassium chloride        TAKE 1 TABLET BY MOUTH TWICE A DAY   Quantity:  90 tablet   Refills:  3       sennosides 8.6 MG tablet   Commonly known as:  SENOKOT   Used for:  Constipation, unspecified constipation type        Dose:  1 tablet   Take 1 tablet by mouth 2 times daily as needed for constipation May increase to 2 tabs twice daily if needed   Quantity:  120 each   Refills:  0       simvastatin 20 MG tablet   Commonly known as:  ZOCOR   Used for:  Hyperlipidemia LDL goal <100        Dose:  20 mg   Take 1 tablet (20 mg) by mouth At Bedtime   Quantity:  90 tablet   Refills:  1       VIACTIV PO   Used for:  Chest pain        Dose:  1 chew tab   Take 1 chew tab by mouth 2 times daily. One in the morning and one at bedtime.   Refills:  0       warfarin 1 MG tablet   Commonly known as:  JANTOVEN   Indication:  Blood Clot in the Brain   Used for:  History of stroke        Dose:  2 mg   Take 2 tablets (2 mg) by mouth daily Take 1 tablet (1mg) by mouth on Fridays. Take 2 tablets (2 mg) by mouth all other days of the week.   Quantity:  20 tablet   Refills:  0       * Notice:  This list has 3 medication(s) that are the same as other medications prescribed for you. Read the directions carefully,  and ask your doctor or other care provider to review them with you.      STOP taking     amLODIPine 5 MG tablet   Commonly known as:  NORVASC                Where to get your medicines      Some of these will need a paper prescription and others can be bought over the counter. Ask your nurse if you have questions.     Bring a paper prescription for each of these medications     carvedilol 3.125 MG tablet    midodrine 5 MG tablet               Prescriptions were sent or printed at these locations (2 Prescriptions)                   Other Prescriptions                Printed at Department/Unit printer (2 of 2)         carvedilol (COREG) 3.125 MG tablet               midodrine (PROAMATINE) 5 MG tablet                 Protect others around you: Learn how to safely use, store and throw away your medicines at www.disposemymeds.org.         Follow-ups after your visit        Your next 10 appointments already scheduled     Sep 20, 2017  3:30 PM CDT   (Arrive by 3:15 PM)   Return Visit with Rosey Baker MD   [facility] ([facility])    [address] [place] [address] [id number] [phone]            Oct 06, 2017  9:30 AM CDT   (Arrive by 9:15 AM)   New Movement Disorder with Jenaro Warren MD   [facility] ([facility])    [address] [place] [id number] [phone]            Oct 06, 2017 12:15 PM CDT   [facility] with  [facility]   [facility] ([facility])    [address] [place] [id number] [phone]            Oct 06, 2017 12:50 PM CDT   (Arrive by 12:35 PM)   Return Visit with Jess Avalos PA-C   [facility] ([facility])    [address] [place] [id number] [phone]               Care Instructions        Further instructions from  your care team       Tilt Table Study    Physician: Dr. Tay      AFTER YOU GO HOME:   DO    Relax and take it easy for 24 hours.    Drink plenty of fluids, unless otherwise instructed by the physician.    Resume your regular diet, unless otherwise instructed by the physician.    Continue you medications, unless otherwise instructed by the physician.      CALL YOUR PRIMARY PHYSICIAN, OR THE CARDIOLOGY FELLOW IF: Any Questions    Telephone: [phone]  Ask for the Cardiology Fellow on-call.  Someone is on-call 24hrs/day.    FOLLOW-UP APPOINTMENTS: Follow up in 3 months with Dr. Tay - Cardiology Clinic will call and arrange appt time.    Discontinue amlodipine  Start Coreg and midodrine per order - Rx given to patient           Statement of Approval     Ordered          09/08/17 8797  I have reviewed and agree with all the recommendations and orders detailed in this document.  EFFECTIVE NOW     Approved and electronically signed by:  Justyn Alonso MD              Additional Information About Your Visit        [employer] Information     [employer] gives you secure access to your electronic health record. If you see a primary care provider, you can also send messages to your care team and make appointments. If you have questions, please call your primary care clinic.  If you do not have a primary care provider, please call [phone] and they will assist you.        Care EveryWhere ID     This is your Care EveryWhere ID. This could be used by other organizations to access your [place] medical records  [id number]        Your Vitals Were     Blood Pressure Pulse Temperature Respirations Pulse Oximetry       119/85 (BP Location: Right arm) 63 98.1  F (36.7  C) (Oral) 18 95%        Primary Care Provider Office Phone # Fax #    Arin Shahid [phone]      Equal Access to Services     YOMI LIAO AH: valeria Rees qaybta kaalmada adeegyada, waxay  delbert ruthbrian sommers'aan ah. So [facility] [phone].    ATENCIÓN: Si marlys fowler, tiene a sanchez disposición servicios gratuitos de asistencia lingüística. Reema al [phone].    We comply with applicable federal civil rights laws and [place] laws. We do not discriminate on the basis of race, color, national origin, age, disability sex, sexual orientation or gender identity.            Thank you!     Thank you for choosing [facility] for your care. Our goal is always to provide you with excellent care. Hearing back from our patients is one way we can continue to improve our services. Please take a few minutes to complete the written survey that you may receive in the mail after you visit with us. Thank you!             Medication List: This is a list of all your medications and when to take them. Check marks below indicate your daily home schedule. Keep this list as a reference.      Medications           Morning Afternoon Evening Bedtime As Needed    bisacodyl 5 MG EC tablet   Commonly known as:  DULCOLAX   Take 1 tablet (5 mg) by mouth daily as needed for constipation                                carvedilol 3.125 MG tablet   Commonly known as:  COREG   Take 1 tablet (3.125 mg) by mouth daily                                letrozole 2.5 MG tablet   Commonly known as:  FEMARA   Take 1 tablet (2.5 mg) by mouth daily                                magnesium hydroxide 400 MG/5ML suspension   Commonly known as:  MILK OF MAGNESIA   Take 30-60 mLs by mouth daily as needed for constipation or heartburn                                midodrine 5 MG tablet   Commonly known as:  PROAMATINE   Take 1 tablet (5 mg) by mouth 2 times daily                                OCUVITE ADULT FORMULA PO   Take 1 tablet by mouth daily.                                * order for DME   Equipment being ordered: wheeled walker                                * order for DME   Equipment being ordered:  Incontinence pads   Patient uses  these tid                                * order for DME   Equipment being ordered: portable commode                                palbociclib 100 MG capsule CHEMO   Commonly known as:  IBRANCE   Take 1 capsule (100 mg) by mouth daily with food Take for 21 days, then 7 days off. Avoid grapefruit. Do not open/crush/chew capsule.                                potassium chloride 10 MEQ tablet   TAKE 1 TABLET BY MOUTH TWICE A DAY   Generic drug:  potassium chloride                                sennosides 8.6 MG tablet   Commonly known as:  SENOKOT   Take 1 tablet by mouth 2 times daily as needed for constipation May increase to 2 tabs twice daily if needed                                simvastatin 20 MG tablet   Commonly known as:  ZOCOR   Take 1 tablet (20 mg) by mouth At Bedtime                                VIACTIV PO   Take 1 chew tab by mouth 2 times daily. One in the morning and one at bedtime.                                warfarin 1 MG tablet   Commonly known as:  JANTOVEN   Take 2 tablets (2 mg) by mouth daily Take 1 tablet (1mg) by mouth on Fridays. Take 2 tablets (2 mg) by mouth all other days of the week.                                * Notice:  This list has 3 medication(s) that are the same as other medications prescribed for you. Read the directions carefully, and ask your doctor or other care provider to review them with you.

## 2017-09-08 NOTE — PROCEDURES
EEG #:         DATE OF RECORDIN2017.      DURATION OF RECORDIN minutes.      This is a video-EEG recording during a tilt table test.      CLINICAL HISTORY:  Helen Purcell is a 77-year-old woman with a history of left MCA stroke and residual right upper extremity weakness and expressive aphasia, presented with unsteady gait and falls.  EEG was requested to accompany a tilt table test.      CURRENT MEDICATIONS:  Norvasc, Ibrance, Zocor, Femara.       TECHNICAL SUMMARY: This continuous video- EEG monitoring procedure was performed with 23 scalp electrodes in 10-20 electrode system placements, and additional scalp, precordial and other surface electrodes used for electrical referencing and artifact detection.  Video monitoring was utilized and periodically reviewed by EEG technologists and the physician for electroclinical correlations.     BACKGROUND ACTIVITIES:  The background activities of this EEG were symmetric and consisted of poorly formed 8.5 Hz posterior dominant rhythm which attenuates with eye opening.  No clear focal slowing was observed during this recording.  Hyperventilation and photic stimulation were not performed.  Sleep stages were not recorded during this recording.      No interictal epileptiform activities were observed.      No clinical or electrographic seizures were observed during this recording.      EEG had no significant changes during the position change of the table.  Patient did feel dizzy and she felt falling with no clear EEG changes.      IMPRESSION:  This is a normal EEG during tilt table test.  No significant changes were observed during the positional change.         LUCY KOCH MD             D: 2017 17:15   T: 2017 18:25   MT: KEVON      Name:     HELEN PURCELL   MRN:      [id number]        Account:        [id number]   :      1940           Procedure Date: 2017      Document: [id number]

## 2017-09-08 NOTE — IP AVS SNAPSHOT
[address] [id number]                                       After Visit Summary   9/8/2017    Helen Younger    MRN: [id number]           After Visit Summary Signature Page     I have received my discharge instructions, and my questions have been answered. I have discussed any challenges I see with this plan with the nurse or doctor.    ..........................................................................................................................................  Patient/Patient Representative Signature      ..........................................................................................................................................  Patient Representative Print Name and Relationship to Patient    ..................................................               ................................................  Date                                            Time    ..........................................................................................................................................  Reviewed by Signature/Title    ...................................................              ..............................................  Date                                                            Time

## 2017-09-08 NOTE — DISCHARGE INSTRUCTIONS
Tilt Table Study    Physician: Dr. Tay      AFTER YOU GO HOME:   DO    Relax and take it easy for 24 hours.    Drink plenty of fluids, unless otherwise instructed by the physician.    Resume your regular diet, unless otherwise instructed by the physician.    Continue you medications, unless otherwise instructed by the physician.      CALL YOUR PRIMARY PHYSICIAN, OR THE CARDIOLOGY FELLOW IF: Any Questions    Telephone: [phone]  Ask for the Cardiology Fellow on-call.  Someone is on-call 24hrs/day.    FOLLOW-UP APPOINTMENTS: Follow up in 3 months with Dr. Tay - Cardiology Clinic will call and arrange appt time.    Discontinue amlodipine  Start Coreg and midodrine per order - Rx given to patient

## 2017-09-08 NOTE — PROGRESS NOTES
1455 Pt arrived on 2a post tilt table study. 's/80's OVSS Ra. No pain. 1505 Pt eating and drinking. Pt's  at BS. 1530 Pt deo po intake. Dc instructions reviewed with pt, copy given to pt. Rx sent to dc pharmacy. PIV dc'd. 1535 Pt dc'd home acc by .

## 2017-09-08 NOTE — OP NOTE
EP PROCEDURE NOTE    *Procedures:*    1. TILT table test.  2. Autonomic function test.  3. EEG monitoring     *Cardiologist:*  Rosalio Tay    *EP Fellow:*  Dr. Alonso    *Referring MD: *  Dr Santos, neurology    *Pre-operative Diagnosis:*  Syncope.    *Complications:*  None.     *Medications:*  None.     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 Her  describes the spells of decreased responsiveness as 30-45 seconds in which her eyes close somewhat and her whole body becomes tremulous but no overt convulsions. He has been by her side during all of these episodes that he is aware of and has caught her and either held her up or lowered her to a chair so she has not suffered a fall related to a spell.      The patient is here for autonomic testing including tilt testing.      PROCEDURE    The risks and benefits of the procedure were explained to the patient in   full. Written informed consent was obtained. The patient was brought to the   EP lab in a fasting and hemodynamically stable condition. Physical   examination of the patient did not reveal any carotid bruits, and review of   the chart did not reveal the presence of stroke or TIA within the past   three months.     The following maneuvers were done sequentially:    1- Sitting for 5 minutes:   End of 5 min:  HR 79 , /64    2- Standing for 10 minutes:   30 sec: HR 93 , BP 65/23  1 min: HR 91 , BP 42/29. Sit the patient down. Denies dizziness.    2- TILT at 70 degrees for 20 minutes:  Supine HR 66, /89  30 secs: HR 84, BP 92/57  1 min: HR 88 , BP 80/44  2 min: HR 93 , BP 70/40, asymptomatic  3  min: HR 95 , BP 64/43, asymptomatic  4 min: HR 95 , BP 71/41  5 min: HR 98 , BP 70/49  7 min: HR 98 , BP 65/41, she feels like she was falling to the right side. No dizziness  8 min: HR 70 , BP 55/37. No symptoms.     Laying back down. She feels better lying down    DISCUSSION:  1) Severe orthostatic hypotension.  2) Chronotropic incompetence  3) Supine hypertension    Patient stopped taking amlodipine for about 1 week. Will discontinue amlodipine. She usually wakes up at 11am and go to bed at 11pm. Will prescribe midodrine 5mg PO when she wakes up at 11am and 5 pm. Prescribe coreg 3.125mg PO at 10pm. Instruct patient and her  to be careful when getting up at night for the restroom. FU Dr. Tay in 3 months.    Informed Dr. Santos.     Dr Tay was present throughout the entire procedure.     Justyn Alonso  EP fellow      I appreciated the opportunity to see and assess Mrs Younger and direct the procedure described above with EP Fellow Dr Alonso.     I agree with the above note which summarizes my findings and current recommendations.After the procedure we discussed results with patient/spouse and Dr Santos.     Please do not hesitate to contact me if you have any questions or concerns.    [facility]osalio Tay MD [facility]   Pager [phone]  Office [phone]

## 2017-09-11 NOTE — TELEPHONE ENCOUNTER
Patient also has low blood pressure today.  76/62.    Rosemarie GORMAN  Patient Representative  [place]

## 2017-09-11 NOTE — TELEPHONE ENCOUNTER
Called Desmond, BP was 96/72 and he states patient is not having any symptoms related to hypotension.  He did encourage her to drink more fluids and stay hydrated.  She does have yellowish discharge coming from her right eye and perhaps needs eyedrops to help her get over this.      Please call Jeremy if/when sending something to pharmacy.    Kamla Fofana RN  [facility]

## 2017-09-11 NOTE — TELEPHONE ENCOUNTER
Reason for call:  Patient reporting a symptom    Symptom or request: Possbile eye infection with discharge    Duration (how long have symptoms been present): couple of days    Have you been treated for this before? No    Additional comments: [facility] health nurse calling in to say that he thinks the patient has an eye infection in her right eye.  There is discharge from it.  He told her to wipe it with a warm cloth and try not to touch the other eye.  He states she probably needs an antibiotic and that her  can probably pick one up for her.  Please call Ebenezer with any questions.    Phone Number patient can be reached at:  Other phone number:  [phone]*    Best Time:  anytime    Can we leave a detailed message on this number:  YES    Call taken on 9/11/2017 at 1:09 PM by Rosemarie Hernandez

## 2017-09-11 NOTE — TELEPHONE ENCOUNTER
Called and spoke with Jeremy, advised of message as below.  RN advised to contact clinic if condition changes or persists.  Jeremy verbalized understanding.    Kamla Fofana RN  [facility]

## 2017-09-15 NOTE — PROGRESS NOTES
"Returned call to patient's .  He states that his wife is having more frequent \"spells\" and the spells are associated with tremors and loss of consciousness.  He describes the symptoms as progressive and is requesting recommendations from Dr. Tay.  Patient has been taking midodrine 5 mg twice daily, and is not improving symptoms.  Recommended patient report to ER or call 911 if symptoms have worsened.  He states that the patient has been to the ER twice and a rehab facility and it was all useless.  Informed patient that I am unable to provide further recommendations at this time beyond ER evaluation.  He understands and is unsure if he will bring patient to ER at this time.   "

## 2017-09-15 NOTE — PROGRESS NOTES
ANTICOAGULATION FOLLOW-UP CLINIC VISIT    Patient Name:  Helen Younger  Date:  9/15/2017  Contact Type:  Telephone/ kayode RN with homecare calling to report fingerstick INR.  also notes coreg medicine is new.  patient continues with chemo, but reportedly she is declining fast.    SUBJECTIVE:        OBJECTIVE    INR   Date Value Ref Range Status   09/15/2017 2.0  Final       ASSESSMENT / PLAN  INR assessment THER    Recheck INR In: 2 WEEKS    INR Location [facility] INR      Anticoagulation Summary as of 9/15/2017     INR goal 2.0-3.0   Today's INR 2.0   Maintenance plan 2 mg (1 mg x 2) every day   Full instructions 2 mg every day   Weekly total 14 mg   No change documented Soumya Garza RN   Plan last modified Soumya Garza RN (6/6/2017)   Next INR check 9/29/2017   Target end date Indefinite    Indications   Long-term (current) use of anticoagulants [Z79.01] [Z79.01]  Cerebral infarction (H) [I63.9]         Anticoagulation Episode Summary     INR check location     Preferred lab     Send INR reminders to [facility]    Comments       Anticoagulation Care Providers     Provider Role Specialty Phone number    Arin Shahid MD  Internal Medicine [phone]            See the Encounter Report to view Anticoagulation Flowsheet and Dosing Calendar (Go to Encounters tab in chart review, and find the Anticoagulation Therapy Visit)    Dosage adjustment made based on physician directed care plan.    FLORENCIO Ennis from home care also asks ok for skilled nurse visit 1 x week for 9 weeks and HHA 2 x week for 8 weeks.  Ok to this per [facility] Standing Orders for Home Care.    Soumya Garza RN

## 2017-09-15 NOTE — MR AVS SNAPSHOT
Helen LONDON Younger   9/15/2017   Anticoagulation Therapy Visit    Description:  77 year old female   Provider:  Arin Shahid MD   Department:  [facility]           INR as of 9/15/2017     Today's INR 2.0      Anticoagulation Summary as of 9/15/2017     INR goal 2.0-3.0   Today's INR 2.0   Full instructions 2 mg every day   Next INR check 9/29/2017    Indications   Long-term (current) use of anticoagulants [Z79.01] [Z79.01]  Cerebral infarction (H) [I63.9]         Contact Numbers     [facility]  Please call [phone] or [phone]  to cancel and/or reschedule your appointment.   Please call [phone] with any problems or questions regarding your therapy          September 2017 Details    Sun Mon Tue Wed Thu Fri Sat          1               2                 3               4               5               6               7               8               9                 10               11               12               13               14               15      2 mg   See details      16      2 mg           17      2 mg         18      2 mg         19      2 mg         20      2 mg         21      2 mg         22      2 mg         23      2 mg           24      2 mg         25      2 mg         26      2 mg         27      2 mg         28      2 mg         29            30                Date Details   09/15 This INR check       Date of next INR:  9/29/2017         How to take your warfarin dose     To take:  2 mg Take 2 of the 1 mg tablets.

## 2017-09-15 NOTE — TELEPHONE ENCOUNTER
Patient's  reporting patient had tilt table study on 9/8/17.  Since then she has had a decreased ability to move around and her spells are lasting a lot longer.  Please call to discuss.

## 2017-09-20 NOTE — PATIENT INSTRUCTIONS
Increase midodrine to 10 mg twice daily, during the daytime.    Start taking 1 gram salt tabs each morning.    Increase the amount of fluid you are taking in each day.    See your Cardiologist in the next few weeks and discuss if carvedilol (coreg) is necessary in light of your symptoms of low blood pressure.    Schedule an appointment to be fitted with compressive garments in the lymphedema clinic.    Return to Neurology clinic in 2 months to evaluate if these things are helping.    Please call or contact us on [employer] by [employer] sooner if there are problems.

## 2017-09-20 NOTE — MR AVS SNAPSHOT
After Visit Summary   9/20/2017    Helen Younger    MRN: [id number]           Patient Information     Date Of Birth          1940        Visit Information        Provider Department      9/20/2017 3:30 PM Rosey Baker MD [facility] Neurology        Today's Diagnoses     Dysautonomia    -  1    Orthostatic hypotension          Care Instructions    Increase midodrine to 10 mg twice daily, during the daytime.    Start taking 1 gram salt tabs each morning.    Increase the amount of fluid you are taking in each day.    See your Cardiologist in the next few weeks and discuss if carvedilol (coreg) is necessary in light of your symptoms of low blood pressure.    Schedule an appointment to be fitted with compressive garments in the lymphedema clinic.    Return to Neurology clinic in 2 months to evaluate if these things are helping.    Please call or contact us on [employer] sooner if there are problems.          Follow-ups after your visit        Additional Services     LYMPHEDEMA THERAPY REFERRAL       *This therapy referral will be filtered to a centralized scheduling office at [facility] and the patient will receive a call to schedule an appointment at a [facility] location most convenient for them. *   If you have not heard from the scheduling office within 2 business days, please call [phone] for all locations, with the exception of [place], please call [phone].     Treatment: PT or OT Evaluation & Treatment  Special Instructions: Please fit for compressive garments for orthostatic hypotension: stockings, full leg, abdominal binder  PT/OT Treatment Diagnosis: Orthostatic hypotension    Please be aware that coverage of these services is subject to the terms and limitations of your health insurance plan.  Call member services at your health plan with any benefit or coverage questions.      **Note to Provider:  If you are referring outside of [facility] for the therapy  "appointment, please list the name of the location in the \"special instructions\" above, print the referral and give to the patient to schedule the appointment.                  Follow-up notes from your care team     Return in about 2 months (around 11/20/2017).      Your next 10 appointments already scheduled     Oct 06, 2017  9:30 AM CDT   (Arrive by 9:15 AM)   New Movement Disorder with Jenaro Warren MD   [facility] Neurology ([facility])    [address] [place] [address]   [phone]            Oct 06, 2017 12:15 PM CDT   [facility] Lab Draw with  [facility] DRAW   [facility] Lab Draw ([facility])    [address] [place] [address]   [phone]            Oct 06, 2017 12:50 PM CDT   (Arrive by 12:35 PM)   Return Visit with Jess Avalos PA-C   [facility] ([facility])    [address] [place] [address]   [phone]            Nov 15, 2017  3:30 PM CST   (Arrive by 3:15 PM)   Return Visit with Rosey Baker MD   [facility] Neurology ([facility])    [address] [place] [address]   [phone]            Dec 01, 2017 12:30 PM CST   Lab with  LAB   [facility] Lab ([facility])    [address] [place] [id number]   [phone]            Dec 01, 2017  1:00 PM CST   (Arrive by 12:45 PM)   CT CHEST/ABDOMEN/PELVIS W CONTRAST with UCCT1   [facility] Imaging [place] [facility] ([facility])    [address] [place] [address]   [phone]           Please bring any scans or X-rays taken at other hospitals, if similar tests were done. Also bring a list of your medicines, including vitamins, minerals and over-the-counter drugs. It is safest to leave personal items at home.  Be " sure to tell your doctor:   If you have any allergies.   If there s any chance you are pregnant.   If you are breastfeeding.   If you have any special needs.  You may have contrast for this exam. To prepare:   Do not eat or drink for 2 hours before your exam. If you need to take medicine, you may take it with small sips of water. (We may ask you to take liquid medicine as well.)   The day before your exam, drink extra fluids at least six 8-ounce glasses (unless your doctor tells you to restrict your fluids).  Patients over 70 or patients with diabetes or kidney problems:   If you haven t had a blood test (creatinine test) within the last 30 days, go to your clinic or Diagnostic Imaging Department for this test.  If you have diabetes:   If your kidney function is normal, continue taking your metformin (Avandamet, Glucophage, Glucovance, Metaglip) on the day of your exam.   If your kidney function is abnormal, wait 48 hours before restarting this medicine.  You will have oral contrast for this exam:   You will drink the contrast at home. Get this from your clinic or Diagnostic Imaging Department. Please follow the directions given.  Please wear loose clothing, such as a sweat suit or jogging clothes. Avoid snaps, zippers and other metal. We may ask you to undress and put on a hospital gown.  If you have any questions, please call the Imaging Department where you will have your exam.            Dec 04, 2017  2:00 PM CST   (Arrive by 1:45 PM)   Return Visit with Keisha Landis MD   [facility] ([facility])    [address] [place] [address]  2nd [facility] [address] [phone]            Dec 28, 2017  4:00 PM CST   (Arrive by 3:45 PM)   NEW ARRHYTHMIA with Rosalio Tay MD   [facility])    [address] [place] [address]  3rd [facility] [address] [phone]              Who to contact     Please call your clinic  "at [phone] to:    Ask questions about your health    Make or cancel appointments    Discuss your medicines    Learn about your test results    Speak to your doctor   If you have compliments or concerns about an experience at your clinic, or if you wish to file a complaint, please contact [facility] Patient Relations at [phone] or email us at [email or web address]         Additional Information About Your Visit        [employer] Information     [facility] gives you secure access to your electronic health record. If you see a primary care provider, you can also send messages to your care team and make appointments. If you have questions, please call your primary care clinic.  If you do not have a primary care provider, please call [phone] and they will assist you.      [facility] is an electronic gateway that provides easy, online access to your medical records. With [facility], you can request a clinic appointment, read your test results, renew a prescription or communicate with your care team.     To access your existing account, please contact your [facility] Clinic or call [phone] for assistance.        Care EveryWhere ID     This is your Care EveryWhere ID. This could be used by other organizations to access your [facility] medical records  [id number]        Your Vitals Were     Pulse Height                60 1.575 m (5' 2\")           Blood Pressure from Last 3 Encounters:   09/20/17 94/67   09/08/17 (P) 173/87   08/28/17 102/60    Weight from Last 3 Encounters:   08/28/17 77.8 kg (171 lb 9.6 oz)   08/09/17 80.7 kg (178 lb)   07/25/17 80.3 kg (177 lb 0.5 oz)              We Performed the Following     LYMPHEDEMA THERAPY REFERRAL          Today's Medication Changes          These changes are accurate as of: 9/20/17  5:05 PM.  If you have any questions, ask your nurse or doctor.               Start taking these medicines.        Dose/Directions    " midodrine 5 MG tablet   Commonly known as:  PROAMATINE   Used for:  Orthostatic hypotension   Started by:  Rosey Baker MD        Dose:  10 mg   Take 2 tablets (10 mg) by mouth 2 times daily   Quantity:  120 tablet   Refills:  3       sodium chloride 1 GM tablet   Used for:  Dysautonomia   Started by:  Rosey Baker MD        Dose:  1 g   Take 1 tablet (1 g) by mouth daily   Quantity:  30 tablet   Refills:  3            Where to get your medicines      These medications were sent to [place] [address] [place] [facility] [place] - [address]  [address] [facility] [address]     Phone:  [phone]     midodrine 5 MG tablet    sodium chloride 1 GM tablet                Primary Care Provider Office Phone # Fax #    Arin Shahid [phone]       [address]  [facility] [address]        Equal Access to Services     [facility]: Hadii aad ku hadasho Soomaali, waaxda luqadaha, qaybta kaalmada adeegyada, waxay aalinain haydwaynen chris de la paz . So [facility] [phone].    ATENCIÓN: Si habla español, tiene a sanchez disposición servicios gratuitos de asistencia lingüística. [facility] [phone].    We comply with applicable federal civil rights laws and [place] laws. We do not discriminate on the basis of race, color, national origin, age, disability sex, sexual orientation or gender identity.            Thank you!     Thank you for choosing [facility] NEUROLOGY  for your care. Our goal is always to provide you with excellent care. Hearing back from our patients is one way we can continue to improve our services. Please take a few minutes to complete the written survey that you may receive in the mail after your visit with us. Thank you!             Your Updated Medication List - Protect others around you: Learn how to safely use, store and throw away your medicines at www.disposemymeds.org.          This list is accurate as of: 9/20/17  5:05 PM.  Always use your most recent  med list.                   Brand Name Dispense Instructions for use Diagnosis    bisacodyl 5 MG EC tablet    DULCOLAX    30 tablet    Take 1 tablet (5 mg) by mouth daily as needed for constipation    Constipation, unspecified constipation type       carvedilol 3.125 MG tablet    COREG    30 tablet    Take 1 tablet (3.125 mg) by mouth daily    Orthostatic hypotension       letrozole 2.5 MG tablet    FEMARA    90 tablet    Take 1 tablet (2.5 mg) by mouth daily    Malignant neoplasm of female breast, unspecified estrogen receptor status, unspecified laterality, unspecified site of breast (H)       magnesium hydroxide 400 MG/5ML suspension    MILK OF MAGNESIA    360 mL    Take 30-60 mLs by mouth daily as needed for constipation or heartburn    Constipation, unspecified constipation type       midodrine 5 MG tablet    PROAMATINE    120 tablet    Take 2 tablets (10 mg) by mouth 2 times daily    Orthostatic hypotension       OCUVITE ADULT FORMULA PO      Take 1 tablet by mouth daily.        * order for DME     1 each    Equipment being ordered: wheeled walker    Malignant neoplasm of female breast, unspecified laterality, unspecified site of breast, Pleural effusion       * order for DME     100 each    Equipment being ordered:  Incontinence pads   Patient uses these tid    Mixed incontinence       * order for DME     1 Units    Equipment being ordered: portable commode    Physical deconditioning       palbociclib 100 MG capsule CHEMO    IBRANCE    21 capsule    Take 1 capsule (100 mg) by mouth daily with food Take for 21 days, then 7 days off. Avoid grapefruit. Do not open/crush/chew capsule.    Malignant neoplasm of female breast (H), Pleural effusion       potassium chloride 10 MEQ tablet   Generic drug:  potassium chloride     90 tablet    TAKE 1 TABLET BY MOUTH TWICE A DAY    Hypertension goal BP (blood pressure) < 140/90       sennosides 8.6 MG tablet    SENOKOT    120 each    Take 1 tablet by mouth 2 times daily as  needed for constipation May increase to 2 tabs twice daily if needed    Constipation, unspecified constipation type       simvastatin 20 MG tablet    ZOCOR    90 tablet    Take 1 tablet (20 mg) by mouth At Bedtime    Hyperlipidemia LDL goal <100       sodium chloride 1 GM tablet     30 tablet    Take 1 tablet (1 g) by mouth daily    Dysautonomia       VIACTIV PO      Take 1 chew tab by mouth 2 times daily. One in the morning and one at bedtime.    Chest pain       warfarin 1 MG tablet    JANTOVEN    20 tablet    Take 2 tablets (2 mg) by mouth daily Take 1 tablet (1mg) by mouth on Fridays. Take 2 tablets (2 mg) by mouth all other days of the week.    History of stroke       * Notice:  This list has 3 medication(s) that are the same as other medications prescribed for you. Read the directions carefully, and ask your doctor or other care provider to review them with you.
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
## 2017-09-20 NOTE — TELEPHONE ENCOUNTER
Forms received from: [facility]   Phone number listed: [phone]   Fax listed: [phone]  Date received: 09/20/2017  Form description: HHC and Plan of Care  Once forms are completed, please return to [facility] via fax.  Is patient requesting to be contacted when forms are completed: NA    Form placed: IN provider's nurse basket  Romina Pierce
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
## 2017-09-20 NOTE — TELEPHONE ENCOUNTER
Meds reviewed.    Form placed in PCP's bin and encounter routed.    Kamla Fofana RN  [facility]

## 2017-09-22 NOTE — TELEPHONE ENCOUNTER
Oral Chemotherapy Monitoring Program    Oral Chemotherapy Monitoring Program     Primary Oncologist: Dr. Landis  Primary Oncology Clinic: [facility]  Cancer Diagnosis: Breast      Therapy History:  confirmed Ibrance 100mg once daily for 21 days then 7 days off, start 9/24/2017  2/3/16- Start date   3/30/17 to 4/18/17- Ibrance on hold due to persistent weakness and history of neutropenia  4/19/17- Ibrance cycle restarted      Drug Interaction Assessment: No new drug interactions.        Lab Monitoring Plan                                         Monitoring Plan:      CBC, CMP   C2D1+   Call, CBC, CMP   C3D1+   Call, CBC, CMP   C4D1+   Call, CBC, CMP   C5D1+   Call, CBC, CMP   C6D1+   Call, CBC, CMP        C2D8+      C3D8+      C4D8+      C5D8+      C6D8+        CBC   C2D15+   CBC   C3D15+      C4D15+      C5D15+      C6D15+           C2D22+      C3D22+      C4D22+      C5D22+      C6D22+              Subjective/Objective:  Helen Younger is a 77 year old female contacted by phone for a follow-up visit for oral chemotherapy.  I left a message for Helen's  German. He ordered through the technician and left a voice mail. He stated she has continued mobility problems not related to Ibrance therapy and set up delivery for her next cycle to start 9/24. He did not request a call back this month.    ORAL CHEMOTHERAPY 3/24/2017 4/18/2017 5/10/2017 6/27/2017 7/21/2017 8/24/2017 9/22/2017   Drug Name Ibrance (Palbociclib) Ibrance (Palbociclib) Ibrance (Palbociclib) Ibrance (Palbociclib) Ibrance (Palbociclib) Ibrance (Palbociclib) Ibrance (Palbociclib)   Current Dosage 100mg 100mg 100mg 100mg 100mg 100mg 100mg   Current Schedule Daily Daily Daily Daily Daily Daily Daily   Cycle Details 3 weeks on 1 week off 3 weeks on 1 week off 3 weeks on 1 week off 3 weeks on 1 week off 3 weeks on 1 week off 3 weeks on 1 week off 3 weeks on 1 week off   Start Date of Last Cycle 3/2/2017 3/2/2017 4/19/2017 6/4/2017 7/2/2017 7/30/2017  "8/27/2017   Planned next cycle start date 3/30/2017 4/19/2017 5/24/2017 7/2/2017 7/30/2017 8/27/2017 9/24/2017   Doses missed in last 2 weeks 0 0 0 0 0 0 -   Adherence Assessment Adherent Non-adherent Non-adherent Adherent Adherent Adherent -   Reason for Non-adherence - Drug on hold Drug on hold - - - -   Adherence Intervention Recommended - None None - - - -   Adverse Effects Fatigue;Other (see note for details) No AE identified during assessment Fatigue No AE identified during assessment No AE identified during assessment No AE identified during assessment -   Oral Mucositis - - - - - - -   Fatigue Grade 1 - Grade 2 - - - -   Pharmacist Intervention(fatigue) Yes - No - - - -   Intervention(s) Patient education - - - - - -   Other (see note for details) Low pulse - - - - - -   Pharmacist intervention? No - - - - - -   Home BPs all BPs<140/90 all BPs<140/90 not needed not needed all BPs<140/90 not needed -   Any new drug interactions? No No No No No No -   Is the dose as ordered appropriate for the patient? Yes Yes Yes Yes Yes Yes Yes   Is the patient currently in pain? No No No No No No -   Has the patient been assessed within the past 6 months for depression? - Yes Yes Yes Yes Yes Yes   Has the patient missed any days of school, work, or other routine activity? No No Yes Yes No No -   Days missed in the past month: - - More than 5 days More than 5 days - - -       Last PHQ-2 Score on record:   PHQ-2 ( [facility]) 2/27/2017 12/26/2016   Q1: Little interest or pleasure in doing things 0 0   Q2: Feeling down, depressed or hopeless 0 0   PHQ-2 Score 0 0       Vitals:  BP:   BP Readings from Last 1 Encounters:   09/20/17 94/67     Wt Readings from Last 1 Encounters:   08/28/17 77.8 kg (171 lb 9.6 oz)     Estimated body surface area is 1.84 meters squared as calculated from the following:    Height as of 8/28/17: 1.575 m (5' 2.01\").    Weight as of 8/28/17: 77.8 kg (171 lb 9.6 oz).    Labs:  Lab Results   Component " Value Date     08/28/2017      Lab Results   Component Value Date    POTASSIUM 3.7 08/28/2017     Lab Results   Component Value Date    SHERYL 9.4 08/28/2017     Lab Results   Component Value Date    ALBUMIN 3.5 08/28/2017     Lab Results   Component Value Date    MAG 1.9 06/24/2017     Lab Results   Component Value Date    PHOS 2.2 06/24/2017     Lab Results   Component Value Date    BUN 21 08/28/2017     Lab Results   Component Value Date    CR 1.21 08/28/2017       Lab Results   Component Value Date    AST 10 08/28/2017     Lab Results   Component Value Date    ALT 14 08/28/2017     Lab Results   Component Value Date    BILITOTAL 0.6 08/28/2017       Lab Results   Component Value Date    WBC 2.8 08/28/2017     Lab Results   Component Value Date    HGB 10.4 08/28/2017     Lab Results   Component Value Date     08/28/2017     Lab Results   Component Value Date    ANEU 1.5 08/28/2017         Assessment:  She continues to tolerate Ibrance with minimal side effects.      Plan:  Continue Ibrance, cycle starts 9/24/2017. Ibrance will be delivered 9/22/2017.        Follow-Up:  Clinic appointment and labs 10/6/2017.      Refill Due:  Next refill released 10/19/2017.          Thank you for the opportunity to participate in this patient's care,      Rio Freeman, PharmD  Therapy Management Oncology Pharmacist  [facility]   Phone: [phone]

## 2017-09-25 NOTE — TELEPHONE ENCOUNTER
OT/ADLs ordered as requested per [facility] standing orders for Home Care, Assisted Living or Nursing Home Evaluations and Treatments, Mammogram (Reflex diagnostic), FIT test, Colonoscopy.       She also needs a VO for a DME for W/C.  They will be calling [employer] to do seating assessment with her, she needs more just a standard W/C.    Routed to PCP to advise on VO for DME.    Kamla Fofana RN  [facility]

## 2017-09-25 NOTE — TELEPHONE ENCOUNTER
Reason for Call: Request for an order or referral:    Order or referral being requested: OT 1 time a week for a week and than ADL and MDE 5 times a month    Date needed: as soon as possible    Has the patient been seen by the PCP for this problem?     Additional comments:     Phone number Patient can be reached at:  [facility] care: [phone]    Best Time:      Can we leave a detailed message on this number?  YES    Call taken on 9/25/2017 at 8:34 AM by Abbie Mcdonald

## 2017-09-25 NOTE — TELEPHONE ENCOUNTER
Called and spoke with Nila, advised of VO.    Kamla Fofana RN  [facility]

## 2017-09-26 NOTE — PROGRESS NOTES
Clinic Care Coordination Contact    Situation: Patient chart reviewed by care coordinator.    Background: Patient is active with [facility]    Assessment: Per [facility], patient is active with [facility].     Plan/Recommendations: This writer will check [facility] again in 1-2 weeks.    BHAKTI Cardenas  Care Navigator  [facility]  [phone]

## 2017-09-27 NOTE — TELEPHONE ENCOUNTER
1.  Date/reason for appt:  10/06/17    ED Eval    2.  Referring provider:  Gus Baker, Internal    3.  Call to patient (Yes / No - short description):  No referred  Records reviewed.  All records are in [facility] and imaging is in PACS.

## 2017-09-27 NOTE — TELEPHONE ENCOUNTER
See note I sent Rony/Helen.     If hospice eval desired, ask colleague to enter as I will not be able to .   The dx is metastatic breast cancer.     Thank you

## 2017-09-27 NOTE — TELEPHONE ENCOUNTER
Await response from Rony, see provider Madelin response to family.  Ani Burnett RN  [facility]

## 2017-09-27 NOTE — TELEPHONE ENCOUNTER
Routing to PCP to review and advise.  See my chart message  Lynette Alvarado RN  [facility]

## 2017-09-27 NOTE — TELEPHONE ENCOUNTER
Routed to [facility] to advise, appears [facility] is visiting; perhaps needs hospice evaluation?  Having trouble sorting visits in chart review and cannot tell when patient was last in to see [facility].    Ani Burnett RN  [facility]

## 2017-09-28 NOTE — PROGRESS NOTES
Dr. Baker returned my page. She agrees with recommendation to bring pt to [facility] for further evaluation. Called and left voicemail message for pt's  confirming what we had already talked about (bringing pt in).

## 2017-09-28 NOTE — IP AVS SNAPSHOT
[address] [address]    Phone:  [phone]                                       After Visit Summary   9/28/2017    Helen Younger    MRN: [id number]           After Visit Summary Signature Page     I have received my discharge instructions, and my questions have been answered. I have discussed any challenges I see with this plan with the nurse or doctor.    ..........................................................................................................................................  Patient/Patient Representative Signature      ..........................................................................................................................................  Patient Representative Print Name and Relationship to Patient    ..................................................               ................................................  Date                                            Time    ..........................................................................................................................................  Reviewed by Signature/Title    ...................................................              ..............................................  Date                                                            Time

## 2017-09-28 NOTE — ED NOTES
Pt. BIBA from home after  called about increasing weakness and near syncope/syncopal episodes since 9/24. Pt. Has no complaints, denies cp, SOB, dizziness, weakness, nausea, bowel/bladder changes. AVSS on RA. Pt. A & O x 4, aphasic from previous stroke.

## 2017-09-28 NOTE — IP AVS SNAPSHOT
MRN:[id number]                      After Visit Summary   9/28/2017    Helen Younger    MRN: [id number]           Thank you!     Thank you for choosing [facility] for your care. Our goal is always to provide you with excellent care. Hearing back from our patients is one way we can continue to improve our services. Please take a few minutes to complete the written survey that you may receive in the mail after you visit with us. Thank you!        Patient Information     Date Of Birth          1940        Designated Caregiver       Most Recent Value    Caregiver    Will someone help with your care after discharge? no      About your hospital stay     You were admitted on:  September 29, 2017 You last received care in the:  [address] [facility] [place]    You were discharged on:  October 7, 2017        Reason for your hospital stay       Orthostatic hypotension                  Who to Call     For medical emergencies, please call 911.  For non-urgent questions about your medical care, please call your primary care provider or clinic, [phone]          Attending Provider     Provider Specialty    Charanjit Woody MD Emergency Medicine    Rio Louis MD Psychiatry & Neurology - Neurology    Weed, Dmitry Sarmiento MD Neurology       Primary Care Provider Office Phone # Fax #    Arin Shahid [phone]       When to contact your care team       Contact hospice care team with any questions or concerns.                  After Care Instructions     Activity       Your activity upon discharge: activity as tolerated            Diet       Follow this diet upon discharge: Orders Placed This Encounter      Combination Diet Regular Diet Adult            Discharge Instructions       1) Follow up with hospice care team today  2) Follow up with PCP as needed                  Follow-up Appointments     Adult [facility] Follow-up and recommended labs and tests       Follow  up with hospice today.    Follow up with PCP as needed    Appointments on [place] and/or [facility] (with [facility] or [facility] provider or service). Call [phone] if you haven't heard regarding these appointments within 7 days of discharge.                  Your next 10 appointments already scheduled     Nov 15, 2017  3:30 PM CST   (Arrive by 3:15 PM)   Return Visit with Rosey Baker MD   [facility] Neurology ([facility])    [address] [place] [address]  3rd Floor  [facility] [id number] [phone]            Dec 01, 2017 12:30 PM CST   Lab with  LAB   [facility] Lab ([facility])    [address] [place] [address] [id number] [phone]            Dec 01, 2017  1:00 PM CST   (Arrive by 12:45 PM)   CT CHEST/ABDOMEN/PELVIS W CONTRAST with UC[facility]   [facility] ([facility])    [address] [place] [id number] [phone]           Please bring any scans or X-rays taken at other hospitals, if similar tests were done. Also bring a list of your medicines, including vitamins, minerals and over-the-counter drugs. It is safest to leave personal items at home.  Be sure to tell your doctor:   If you have any allergies.   If there s any chance you are pregnant.   If you are breastfeeding.   If you have any special needs.  You may have contrast for this exam. To prepare:   Do not eat or drink for 2 hours before your exam. If you need to take medicine, you may take it with small sips of water. (We may ask you to take liquid medicine as well.)   The day before your exam, drink extra fluids at least six 8-ounce glasses (unless your doctor tells you to restrict your fluids).  Patients over 70 or patients with diabetes or kidney problems:   If you haven t had a blood test (creatinine test) within the last 30 days, go to your clinic or Diagnostic Imaging Department for this test.  If you have  diabetes:   If your kidney function is normal, continue taking your metformin (Avandamet, Glucophage, Glucovance, Metaglip) on the day of your exam.   If your kidney function is abnormal, wait 48 hours before restarting this medicine.  You will have oral contrast for this exam:   You will drink the contrast at home. Get this from your clinic or Diagnostic Imaging Department. Please follow the directions given.  Please wear loose clothing, such as a sweat suit or jogging clothes. Avoid snaps, zippers and other metal. We may ask you to undress and put on a hospital gown.  If you have any questions, please call the Imaging Department where you will have your exam.            Dec 04, 2017  2:00 PM CST   (Arrive by 1:45 PM)   Return Visit with Keisha Landis MD   [facility] ([facility])    [address] [place] [address]  2nd [facility] [id number] [phone]            Dec 28, 2017  4:00 PM CST   (Arrive by 3:45 PM)   NEW ARRHYTHMIA with Rosalio Tay MD   [facility] ([facility])    [address] [place] [address]  3rd [facility] [address] [phone]              Additional Services     MED THERAPY MANAGE REFERRAL       Patient has diagnosis of Diabetes, Heart Failure, COPD, or AMI and is on more than 5 medications                  Pending Results     Date and Time Order Name Status Description    9/28/2017 1707 EKG 12 lead Preliminary             Statement of Approval     Ordered          10/07/17 1041  I have reviewed and agree with all the recommendations and orders detailed in this document.  EFFECTIVE NOW     Approved and electronically signed by:  Silvino Macedo MD             Admission Information     Date & Time Provider Department Dept. Phone    9/28/2017 Dmitry Melara MD [address] [facility] [place] [phone]      Your Vitals Were     Blood Pressure Pulse Temperature Respirations Height Weight  "   132/73 (BP Location: Left arm) 57 97  F (36.1  C) (Oral) 16 1.575 m (5' 2\") 81 kg (178 lb 9.2 oz)    Pulse Oximetry BMI (Body Mass Index)                96% 32.66 kg/m2          MyChart Information     [employer] gives you secure access to your electronic health record. If you see a primary care provider, you can also send messages to your care team and make appointments. If you have questions, please call your primary care clinic.  If you do not have a primary care provider, please call [phone] and they will assist you.        Care EveryWhere ID     This is your Care EveryWhere ID. This could be used by other organizations to access your [facility] medical records  [id number]        Equal Access to Services     YOMI LIAO : Alison Carrion, valeria [place]ana, freddie schroeder, cain chapa. So [facility] [phone].    ATENCIÓN: Si habla español, tiene a sanchez disposición servicios gratuitos de asistencia lingüística. Llame al [phone].    We comply with applicable federal civil rights laws and [place] laws. We do not discriminate on the basis of race, color, national origin, age, disability, sex, sexual orientation, or gender identity.               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, estrogen receptor negative, unspecified laterality (H)        Dose:  650 mg   Place 1 suppository (650 mg) rectally every 4 hours as needed for fever   Quantity:  60 suppository   Refills:  1       atropine 1 % ophthalmic solution   Used for:  Malignant neoplasm of breast in female, estrogen receptor positive, unspecified laterality, unspecified site of breast (H)        Dose:  2-4 drop   Take 2-4 drops by mouth, place under tongue or place inside cheek every 2 hours as needed for secretions   Quantity:  1 " Bottle   Refills:  1       bisacodyl 10 MG Suppository   Commonly known as:  DULCOLAX   Used for:  Malignant neoplasm of breast in female, estrogen receptor positive, unspecified laterality, unspecified site of breast (H)   Replaces:  bisacodyl 5 MG EC tablet        Dose:  10 mg   Place 1 suppository (10 mg) rectally 2 times daily as needed for constipation   Quantity:  30 suppository   Refills:  1       haloperidol 0.5 MG tablet   Commonly known as:  HALDOL   Used for:  Malignant neoplasm of breast in female, estrogen receptor positive, unspecified laterality, unspecified site of breast (H)        Dose:  0.5-1 mg   Take 1-2 tablets (0.5-1 mg) by mouth every 6 hours as needed for agitation   Quantity:  60 tablet   Refills:  0       LORazepam 0.5 MG tablet   Commonly known as:  ATIVAN   Used for:  SOB (shortness of breath)        Dose:  0.25-0.5 mg   Take 0.5-1 tablets (0.25-0.5 mg) by mouth every 4 hours as needed for anxiety   Quantity:  60 tablet   Refills:  0       morphine 10 MG/5ML solution   Used for:  Malignant neoplasm of breast in female, estrogen receptor positive, unspecified laterality, unspecified site of breast (H)        Dose:  2.5-5 mg   Take 1.25-2.5 mLs (2.5-5 mg) by mouth every 2 hours as needed for moderate to severe pain   Quantity:  15 mL   Refills:  0       pyridostigmine 60 MG tablet   Commonly known as:  MESTINON   Used for:  Dysautonomia orthostatic hypotension syndrome (H)        Dose:  120 mg   Take 2 tablets (120 mg) by mouth 3 times daily   Quantity:  90 tablet   Refills:  1       senna-docusate 8.6-50 MG per tablet   Commonly known as:  SENOKOT-S;PERICOLACE   Used for:  Malignant neoplasm of breast in female, estrogen receptor positive, unspecified laterality, unspecified site of breast (H)        Dose:  1-2 tablet   Take 1-2 tablets by mouth 2 times daily as needed (constipation )   Quantity:  100 tablet   Refills:  1         CONTINUE these medicines which may have CHANGED, or have  new prescriptions. If we are uncertain of the size of tablets/capsules you have at home, strength may be listed as something that might have changed.        Dose / Directions    midodrine 5 MG tablet   Commonly known as:  PROAMATINE   This may have changed:    - how much to take  - when to take this   Used for:  Dysautonomia orthostatic hypotension syndrome (H)        Dose:  5 mg   Take 1 tablet (5 mg) by mouth 3 times daily (with meals)   Quantity:  90 tablet   Refills:  0         CONTINUE these medicines which have NOT CHANGED        Dose / Directions    KLOR-CON 10 MEQ tablet   Used for:  Hypertension goal BP (blood pressure) < 140/90   Generic drug:  potassium chloride        TAKE 1 TABLET BY MOUTH TWICE A DAY   Quantity:  90 tablet   Refills:  3       letrozole 2.5 MG tablet   Commonly known as:  FEMARA   Used for:  Malignant neoplasm of female breast, unspecified estrogen receptor status, unspecified laterality, unspecified site of breast (H)        Dose:  2.5 mg   Take 1 tablet (2.5 mg) by mouth daily   Quantity:  90 tablet   Refills:  3       * order for DME   Used for:  Malignant neoplasm of female breast, unspecified laterality, unspecified site of breast, Pleural effusion        Equipment being ordered: wheeled walker   Quantity:  1 each   Refills:  0       * order for DME   Used for:  Mixed incontinence        Equipment being ordered:  Incontinence pads   Patient uses these tid   Quantity:  100 each   Refills:  3       * order for DME   Used for:  Physical deconditioning        Equipment being ordered: portable commode   Quantity:  1 Units   Refills:  0       palbociclib 100 MG capsule CHEMO   Commonly known as:  IBRANCE   Used for:  Malignant neoplasm of female breast (H), Pleural effusion        Dose:  100 mg   Take 1 capsule (100 mg) by mouth daily with food Take for 21 days, then 7 days off. Avoid grapefruit. Do not open/crush/chew capsule.   Quantity:  21 capsule   Refills:  2       sodium chloride  1 GM tablet   Used for:  Dysautonomia        Dose:  1 g   Take 1 tablet (1 g) by mouth daily   Quantity:  30 tablet   Refills:  3       VIACTIV PO   Used for:  Chest pain        Dose:  1 chew tab   Take 1 chew tab by mouth 2 times daily. One in the morning and one at bedtime.   Refills:  0       warfarin 1 MG tablet   Commonly known as:  JANTOVEN   Indication:  Blood Clot in the Brain   Used for:  History of stroke        Dose:  2 mg   Take 2 tablets (2 mg) by mouth daily Take 1 tablet (1mg) by mouth on Fridays. Take 2 tablets (2 mg) by mouth all other days of the week.   Quantity:  20 tablet   Refills:  0       * Notice:  This list has 3 medication(s) that are the same as other medications prescribed for you. Read the directions carefully, and ask your doctor or other care provider to review them with you.      STOP taking     bisacodyl 5 MG EC tablet   Commonly known as:  DULCOLAX   Replaced by:  bisacodyl 10 MG Suppository           carvedilol 3.125 MG tablet   Commonly known as:  COREG           magnesium hydroxide 400 MG/5ML suspension   Commonly known as:  MILK OF MAGNESIA           OCUVITE ADULT FORMULA PO           sennosides 8.6 MG tablet   Commonly known as:  SENOKOT           simvastatin 20 MG tablet   Commonly known as:  ZOCOR                Where to get your medicines      These medications were sent to [place] [id number] [place] [facility] [place] [address] [address] [facility] [address] [place] [address]     Phone:  [phone]     acetaminophen 650 MG Suppository    atropine 1 % ophthalmic solution    bisacodyl 10 MG Suppository    haloperidol 0.5 MG tablet    midodrine 5 MG tablet    pyridostigmine 60 MG tablet    senna-docusate 8.6-50 MG per tablet         Some of these will need a paper prescription and others can be bought over the counter. Ask your nurse if you have questions.     Bring a paper prescription for each of these medications     LORazepam 0.5 MG tablet    morphine 10 MG/5ML solution                 Protect others around you: Learn how to safely use, store and throw away your medicines at www.disposemymeds.org.             Medication List: This is a list of all your medications and when to take them. Check marks below indicate your daily home schedule. Keep this list as a reference.      Medications           Morning Afternoon Evening Bedtime As Needed    acetaminophen 650 MG Suppository   Commonly known as:  TYLENOL   Place 1 suppository (650 mg) rectally every 4 hours as needed for fever                                atropine 1 % ophthalmic solution   Take 2-4 drops by mouth, place under tongue or place inside cheek every 2 hours as needed for secretions                                bisacodyl 10 MG Suppository   Commonly known as:  DULCOLAX   Place 1 suppository (10 mg) rectally 2 times daily as needed for constipation                                haloperidol 0.5 MG tablet   Commonly known as:  HALDOL   Take 1-2 tablets (0.5-1 mg) by mouth every 6 hours as needed for agitation                                KLOR-CON 10 MEQ tablet   TAKE 1 TABLET BY MOUTH TWICE A DAY   Generic drug:  potassium chloride                                letrozole 2.5 MG tablet   Commonly known as:  FEMARA   Take 1 tablet (2.5 mg) by mouth daily   Last time this was given:  2.5 mg on 10/7/2017  9:11 AM                                LORazepam 0.5 MG tablet   Commonly known as:  ATIVAN   Take 0.5-1 tablets (0.25-0.5 mg) by mouth every 4 hours as needed for anxiety                                midodrine 5 MG tablet   Commonly known as:  PROAMATINE   Take 1 tablet (5 mg) by mouth 3 times daily (with meals)   Last time this was given:  5 mg on 10/7/2017  9:11 AM                                morphine 10 MG/5ML solution   Take 1.25-2.5 mLs (2.5-5 mg) by mouth every 2 hours as needed for moderate to severe pain                                * order for DME   Equipment being ordered: wheeled walker                                 * order for DME   Equipment being ordered:  Incontinence pads   Patient uses these tid                                * order for DME   Equipment being ordered: portable commode                                palbociclib 100 MG capsule CHEMO   Commonly known as:  IBRANCE   Take 1 capsule (100 mg) by mouth daily with food Take for 21 days, then 7 days off. Avoid grapefruit. Do not open/crush/chew capsule.   Last time this was given:  100 mg on 10/7/2017  9:12 AM                                pyridostigmine 60 MG tablet   Commonly known as:  MESTINON   Take 2 tablets (120 mg) by mouth 3 times daily   Last time this was given:  120 mg on 10/7/2017  9:11 AM                                senna-docusate 8.6-50 MG per tablet   Commonly known as:  SENOKOT-S;PERICOLACE   Take 1-2 tablets by mouth 2 times daily as needed (constipation )                                sodium chloride 1 GM tablet   Take 1 tablet (1 g) by mouth daily   Last time this was given:  1 g on 10/7/2017  9:11 AM                                VIACTIV PO   Take 1 chew tab by mouth 2 times daily. One in the morning and one at bedtime.                                warfarin 1 MG tablet   Commonly known as:  JANTOVEN   Take 2 tablets (2 mg) by mouth daily Take 1 tablet (1mg) by mouth on Fridays. Take 2 tablets (2 mg) by mouth all other days of the week.   Last time this was given:  2 mg on 10/6/2017  6:05 PM                                * Notice:  This list has 3 medication(s) that are the same as other medications prescribed for you. Read the directions carefully, and ask your doctor or other care provider to review them with you.

## 2017-09-28 NOTE — ED PROVIDER NOTES
History     Chief Complaint   Patient presents with     Generalized Weakness     The history is provided by the spouse and the patient.     Helen Younger is a 77 year old female with PMH of left MCA, autonomic dysfunction and orthostatic hypotension, ER/NM-positive, HER-2 negative metastatic breast cancer, CKD, CVA, HTN who presents for further evaluation for recurrent falls.    Patient is an poor historian, history via patient's .  Patient was reportedly doing fairly well and able to get around the house and around town with assistance until about 9/20/17 after which patient has had fall or near fall almost every time she stands making her basically bed ridden. Per patient's  she has had several unwitnessed falls in the last several days and had a fall last night where she hit her bottom and the back of her head. Patient is on warfarin. Patient with known history of autonomic dysfunction and orthostatic hypotension who was last seen in Neurology clinic on 9/20/17 after which her midodrine was increased to 10 mg BID and she was prescribed salt tabs. Patient underwent autonomic testing at [facility] [place], [place] that found her to have moderate to severe pan-autonomic failure with positive tilt table testing.     Per  and patient, deny fevers/chills, chest pain/shortness of breath, nausea/vomiting, diarrhea/constipation/poor appetite, dysuria (patient incontinent of urine at baseline), hematuria or blood in the stool.     I have reviewed the Medications, Allergies, Past Medical and Surgical History, and Social History in the Epic system.    Review of Systems   Constitutional: Negative for fever.   HENT: Negative for congestion.    Eyes: Negative for redness.   Respiratory: Negative for cough and shortness of breath.    Cardiovascular: Negative for chest pain and palpitations.   Gastrointestinal: Negative for abdominal pain, constipation, diarrhea, nausea and vomiting.   Genitourinary: Negative for difficulty  urinating and hematuria.        Incontinence     Musculoskeletal: Negative for arthralgias, back pain and neck stiffness.   Skin: Negative for color change.   Neurological: Positive for dizziness and syncope. Negative for headaches.        Past Medical History:   Diagnosis Date     CVA (cerebral infarction) 03/01/00    slurred speech, right facial droop partial hemiplagia     Diabetes mellitus      Fibromuscular dysplasia of renal artery (H)      Malignant neoplasm of breast (female), unspecified site 01/03    Breast cancer     MVR (mitral valve repair)      Unspecified essential hypertension        Past Surgical History:   Procedure Laterality Date     BREAST LUMPECTOMY, RT/LT  04/06    Breast Lumpectomy RT/LT     BREAST LUMPECTOMY, RT/LT  06/06    redo for margins with radiation and chemo     C NONSPECIFIC PROCEDURE  1998    left knee surg torn ligament       Family History   Problem Relation Age of Onset     Hypertension Mother      Cardiovascular Mother      Prostate Cancer Father      Hypertension Father      Breast Cancer Maternal Grandmother      Cardiovascular Paternal Grandfather      Hypertension Brother      Depression Daughter      Hypertension Daughter      Psychotic Disorder Daughter      bipolar       Social History   Substance Use Topics     Smoking status: Never Smoker     Smokeless tobacco: Never Used     Alcohol use No     Current Facility-Administered Medications   Medication     cefTRIAXone (ROCEPHIN) 1 g vial to attach to  mL bag for ADULTS or NS 50 mL bag for PEDS     sodium chloride (PF) 0.9% PF flush 3 mL     sodium chloride (PF) 0.9% PF flush 3 mL     Current Outpatient Prescriptions   Medication     sodium chloride 1 GM tablet     midodrine (PROAMATINE) 5 MG tablet     carvedilol (COREG) 3.125 MG tablet     POTASSIUM CHLORIDE 10 MEQ tablet     palbociclib (IBRANCE) 100 MG capsule CHEMO     warfarin (JANTOVEN) 1 MG tablet     simvastatin (ZOCOR) 20 MG tablet     bisacodyl (DULCOLAX) 5  "MG EC tablet     magnesium hydroxide (MILK OF MAGNESIA) 400 MG/5ML suspension     sennosides (SENOKOT) 8.6 MG tablet     letrozole (FEMARA) 2.5 MG tablet     order for DME     order for DME     order for DME     Multiple Vitamins-Minerals (OCUVITE ADULT FORMULA PO)     VIACTIV OR     Physical Exam   BP: (!) 135/99  Heart Rate: 74  Temp: 98.1  F (36.7  C)  Resp: 16  Height: 157.5 cm (5' 2\")  Weight: 81 kg (178 lb 9.2 oz)  SpO2: 97 %  Physical Exam   Constitutional: No distress.   HENT:   Head: Atraumatic.   Eyes: EOM are normal. Pupils are equal, round, and reactive to light.   Neck: Neck supple.   Cardiovascular: Normal heart sounds.    Pulmonary/Chest: Breath sounds normal.   Abdominal: Soft. There is no tenderness.   Musculoskeletal: She exhibits no edema or tenderness.   Neurological: She is alert.   S/p CVA   Skin: Skin is warm.   Psychiatric: She has a normal mood and affect.       ED Course     ED Course   Comment By Time   Discussed case with neurology who will evaluate the patient in the ER Charanjit Woody MD 09/28 2046     Procedures             EKG Interpretation:      Interpreted by Joss Thompson and reviewed by Dr. Woody  Time reviewed: 1800  Symptoms at time of EKG: none    Rhythm: normal sinus   Rate: normal  Axis: normal  Ectopy: occasional PVC  Conduction: normal  ST Segments/ T Waves: No ST-T wave changes, nonspecific T wave abnormality   Q Waves: none  Comparison to prior: Unchanged    Clinical Impression: normal EKG        Results for orders placed or performed during the hospital encounter of 09/28/17   Head CT w/o contrast    Narrative    CT HEAD W/O CONTRAST 9/28/2017 5:50 PM    Provided History: r/u intracranial bleed    Comparison: Head CT from 6/24/2017.    Technique: Using multidetector thin collimation helical acquisition  technique, axial, coronal and sagittal CT images from the skull base  to the vertex were obtained without intravenous contrast.     Findings:    No " intracranial hemorrhage, mass effect, or midline shift. The  ventricles are proportionate to the cerebral sulci. No significant  change in appearance of left frontal temporal encephalomalacia,  consistent with chronic MCA territory infarction. The gray to white  matter differentiation of the cerebral hemispheres is otherwise  preserved. The basal cisterns are patent. Patchy subcortical and  periventricular white matter hypoattenuation is nonspecific, however  likely represents chronic small vessel ischemic disease.  Atherosclerotic calcifications of the intracranial internal carotid  arteries. Mild parenchymal volume loss.    The visualized paranasal sinuses are clear. The mastoid air cells are  clear. The bony calvarium is intact.       Impression    Impression:   1. No acute intracranial pathology.  2. Stable chronic left MCA territory infarction.  3. Mild diffuse cerebral volume loss and probable sequelae of chronic  small vessel ischemic disease.    I have personally reviewed the examination and initial interpretation  and I agree with the findings.    LAURYN TRINIDAD MD   Chest  XR, 1 view portable    Narrative    Exam:  XR CHEST PORT 1 VW, 9/28/2017 5:38 PM    History: dizzy    Comparison:  Chest radiograph from 3/25/2017.    Findings:  Single AP view of the chest. The cardiomediastinal  silhouette is within normal limits. No significant change in small  left pleural effusion with slightly decreased basilar atelectasis  and/or consolidation. No pneumothorax. Visualized upper abdomen is  unremarkable. Surgical clips in the left breast and left axilla. The  visualized upper abdomen is unremarkable.      Impression    Impression:    Unchanged small left pleural effusion with slightly decreased left  basilar atelectasis and/or consolidation.    I have personally reviewed the examination and initial interpretation  and I agree with the findings.    RAFAEL SYKES MD   CBC with platelets differential   Result Value Ref  Range    WBC 2.9 (L) 4.0 - 11.0 10e9/L    RBC Count 2.71 (L) 3.8 - 5.2 10e12/L    Hemoglobin 9.8 (L) 11.7 - 15.7 g/dL    Hematocrit 29.3 (L) 35.0 - 47.0 %     (H) 78 - 100 fl    MCH 36.2 (H) 26.5 - 33.0 pg    MCHC 33.4 31.5 - 36.5 g/dL    RDW 14.5 10.0 - 15.0 %    Platelet Count 219 150 - 450 10e9/L    Diff Method Automated Method     % Neutrophils 40.6 %    % Lymphocytes 40.0 %    % Monocytes 17.5 %    % Eosinophils 0.4 %    % Basophils 1.1 %    % Immature Granulocytes 0.4 %    Nucleated RBCs 0 0 /100    Absolute Neutrophil 1.2 (L) 1.6 - 8.3 10e9/L    Absolute Lymphocytes 1.1 0.8 - 5.3 10e9/L    Absolute Monocytes 0.5 0.0 - 1.3 10e9/L    Absolute Eosinophils 0.0 0.0 - 0.7 10e9/L    Absolute Basophils 0.0 0.0 - 0.2 10e9/L    Abs Immature Granulocytes 0.0 0 - 0.4 10e9/L    Absolute Nucleated RBC 0.0    Comprehensive metabolic panel   Result Value Ref Range    Sodium 142 133 - 144 mmol/L    Potassium 3.6 3.4 - 5.3 mmol/L    Chloride 108 94 - 109 mmol/L    Carbon Dioxide 28 20 - 32 mmol/L    Anion Gap 6 3 - 14 mmol/L    Glucose 151 (H) 70 - 99 mg/dL    Urea Nitrogen 23 7 - 30 mg/dL    Creatinine 1.20 (H) 0.52 - 1.04 mg/dL    GFR Estimate 44 (L) >60 mL/min/1.7m2    GFR Estimate If Black 53 (L) >60 mL/min/1.7m2    Calcium 9.1 8.5 - 10.1 mg/dL    Bilirubin Total 0.4 0.2 - 1.3 mg/dL    Albumin 3.3 (L) 3.4 - 5.0 g/dL    Protein Total 6.4 (L) 6.8 - 8.8 g/dL    Alkaline Phosphatase 37 (L) 40 - 150 U/L    ALT 14 0 - 50 U/L    AST 15 0 - 45 U/L   INR   Result Value Ref Range    INR 2.25 (H) 0.86 - 1.14   UA with Microscopic reflex to Culture   Result Value Ref Range    Color Urine Yellow     Appearance Urine Slightly Cloudy     Glucose Urine Negative NEG^Negative mg/dL    Bilirubin Urine Negative NEG^Negative    Ketones Urine 5 (A) NEG^Negative mg/dL    Specific Gravity Urine 1.017 1.003 - 1.035    Blood Urine Large (A) NEG^Negative    pH Urine 6.0 5.0 - 7.0 pH    Protein Albumin Urine 30 (A) NEG^Negative mg/dL     Urobilinogen mg/dL Normal 0.0 - 2.0 mg/dL    Nitrite Urine Positive (A) NEG^Negative    Leukocyte Esterase Urine Large (A) NEG^Negative    Source Catheterized Urine     WBC Urine >182 (H) 0 - 2 /HPF    RBC Urine >182 (H) 0 - 2 /HPF    WBC Clumps Present (A) NEG^Negative /HPF    Bacteria Urine Many (A) NEG^Negative /HPF    Transitional Epi 1 0 - 1 /HPF   EKG 12 lead   Result Value Ref Range    Interpretation ECG Click View Image link to view waveform and result        Labs Ordered and Resulted from Time of ED Arrival Up to the Time of Departure from the ED   CBC WITH PLATELETS DIFFERENTIAL - Abnormal; Notable for the following:        Result Value    WBC 2.9 (*)     RBC Count 2.71 (*)     Hemoglobin 9.8 (*)     Hematocrit 29.3 (*)      (*)     MCH 36.2 (*)     Absolute Neutrophil 1.2 (*)     All other components within normal limits   COMPREHENSIVE METABOLIC PANEL - Abnormal; Notable for the following:     Glucose 151 (*)     Creatinine 1.20 (*)     GFR Estimate 44 (*)     GFR Estimate If Black 53 (*)     Albumin 3.3 (*)     Protein Total 6.4 (*)     Alkaline Phosphatase 37 (*)     All other components within normal limits   INR - Abnormal; Notable for the following:     INR 2.25 (*)     All other components within normal limits   ROUTINE UA WITH MICROSCOPIC REFLEX TO CULTURE - Abnormal; Notable for the following:     Ketones Urine 5 (*)     Blood Urine Large (*)     Protein Albumin Urine 30 (*)     Nitrite Urine Positive (*)     Leukocyte Esterase Urine Large (*)     WBC Urine >182 (*)     RBC Urine >182 (*)     WBC Clumps Present (*)     Bacteria Urine Many (*)     All other components within normal limits   ORTHOSTATIC BLOOD PRESSURE AND PULSE   PULSE OXIMETRY NURSING   CARDIAC CONTINUOUS MONITORING   PERIPHERAL IV CATHETER   URINE CULTURE AEROBIC BACTERIAL       9/28/17 CXR:   Findings:  Single AP view of the chest. The cardiomediastinal  silhouette is within normal limits. No significant change in  small  left pleural effusion with slightly decreased basilar atelectasis  and/or consolidation. No pneumothorax. Visualized upper abdomen is  unremarkable. Surgical clips in the left breast and left axilla. The  visualized upper abdomen is unremarkable.     Impression:    Unchanged small left pleural effusion with slightly decreased left  basilar atelectasis and/or consolidation.       Assessments & Plan (with Medical Decision Making)   Helen Younger is a 77 year old female with PMH of left MCA, autonomic dysfunction and orthostatic hypotension, ER/ME-positive, HER-2 negative metastatic breast cancer, CKD, CVA, HTN who presents for further evaluation for recurrent falls likely due to autonomic dysfunction causing orthostatic hypotension. Patient found to have orthostatic vitals of 162/98 (lying), 135/99 (sitting), 94/71 (standing). Patient denies heart palpitations and EKG within normal limits. Patient with prior work-up for seizure like activity with EEG showing generalized slowing but no seizure like activity. Patient would benefit from neurology evaluation. Concern for patient safety at home the escalation in the frequency and severity of her falls, especially given patient is on warfarin.     Neurology consulted and eventually recommended hospitalization.    I have reviewed the nursing notes.    Medications   sodium chloride (PF) 0.9% PF flush 3 mL (not administered)   sodium chloride (PF) 0.9% PF flush 3 mL (not administered)   cefTRIAXone (ROCEPHIN) 1 g vial to attach to  mL bag for ADULTS or NS 50 mL bag for PEDS (not administered)   pending admin    I have reviewed the findings, diagnosis, and plan with the patient.    Final diagnoses:   Dysautonomia orthostatic hypotension syndrome (H)   Acute cystitis without hematuria       9/28/2017   [facility], [place], EMERGENCY DEPARTMENT    Joss Thompson MD      This data collected with the Resident working in the Emergency Department.  Patient was seen and  evaluated by myself and I repeated the history and physical exam with the patient.  The plan of care was discussed with them.  The key portions of the note including the entire assessment and plan reflect my documentation.    MD Bong Mendieta Ford Christian, MD  09/29/17 0041       Charanjit Woody MD  10/24/17 1013

## 2017-09-28 NOTE — ED NOTES
Bed: ED11  Expected date: 9/28/17  Expected time: 3:11 PM  Means of arrival: Ambulance  Comments:  Elaine 205  78 y/o female  Weakness, hypotension, autonomic dysreflexia   Triaged: yellow

## 2017-09-28 NOTE — TELEPHONE ENCOUNTER
Forms received from: [facility]   Phone number listed: [phone]   Fax listed: [phone]  Date received: 09/28/2017  Form description: OT 1 Week 1, 5 Month 1  Once forms are completed, please return to [facility] via fax.  Is patient requesting to be contacted when forms are completed: NA    Form placed: In provider's basket  Romina Pierce

## 2017-09-28 NOTE — IP AVS SNAPSHOT
` ` Patient Information     Patient Name Sex     Helen Purcell ([id number]) Female 1940       Room Bed    8879 6209-01      Patient Demographics     Address Phone E-mail Address    [address] [place] [address] [phone] (Home) *Preferred* [email or web address]      Patient Ethnicity & Race     Ethnic Group Patient Race    American White      Emergency Contact(s)     Name Relation Home Work Mobile    ARLEEN PURCELL Spouse [phone]    INEZ PURCELL Daughter [phone]      Documents on File        Status Date Received Description       Documents for the Patient    Privacy Notice - [facility] Received 11     Face Sheet Received () 09     External Medication Information Consent Accepted () 09     Insurance Card Received () 09     Insurance Card Received () 03/18/10 [employer] Grp:[id number]    Face Sheet Received () 04/13/10     Consent for Services - Hospital/Clinic Received () 10/27/10     Insurance Card Received () 11     External Medication Information Consent Accepted () 11     Consent for Services - Hospital/Clinic Received () 11     Insurance Card Received () 12     HIM TETO Authorization - File Only   [facility] My[facility] [employer] Auth. 2    External Medication Information Consent Patient Refused () 02/15/12     Consent for Services - Hospital/Clinic Received () 12     Privacy Notice - [facility]  12 ACKNOWLEDGMENT OF RECEIPT OF NOTICE OF PRIVACY PRACTICE    Consent for Services - [facility] Received 12 CONSENT    Consent for Services - [facility]  12 GENERAL CONSENT FORM: SHARED EHR - ENGLISH    Consent for EHR Access  13 Copied from existing Consent for services - C/HOD collected on 2012    [facility] Specified Other Received () 14 bor    External Medication Information Consent Accepted 13      Consent for Services - Hospital/Clinic Received () 13     Insurance Card Received () 14 [facility] Medicare Advantage    Insurance Card Received () 14 MEDICARE    Patient ID Received 17 Photo on file    Consent for Services - Hospital/Clinic Received () 14     Consent to Communicate Received 14     Insurance Card Received () 01/13/15 [facility]    Consent for Services - Hospital/Clinic Received () 05/19/15     Consent for Services/Privacy Notice - Hospital/Clinic Received 17     Consent for Services/Privacy Notice - Hospital/Clinic Received () 16     Consent for Services/Privacy Notice - Hospital/Clinic  () 16 CONSENT FOR SERVICES/PRIVACY NOTICE    Consent for Services/Privacy Notice - Hospital/Clinic-Esign       Insurance Card Received 10/18/16 [facility]    Advance Directives and Living Will Received 16 Health Care Directive 11/15/16    Advance Directives and Living Will Not Received  Validation of AD 11/15/16    Advance Directives and Living Will Received 16 POLST 16    Business/Insurance/Care Coordination/Health Form - Patient   [facility] [employer] Approval 2.27.17-2.27.18    HIM TETO Authorization  () 17 Authorization for batch EFRAIN 17-2    Business/Insurance/Care Coordination/Health Form - Patient  05/10/17 [employer] [facility] COPAY RELIEF PROGRAM DIAGNOSIS VERIFICATION FORM 17    Advance Directives and Living Will Received 17 POLST 17    Patient ID Received (Deleted) 14     Advance Directives and Living Will Received (Deleted) 12        Documents for the Encounter    CMS IM for Patient Signature Received 17     CMS IM for Patient Signature Received (Deleted) 17       Admission Information     Attending Provider Admitting Provider Admission Type Admission Date/Time    Dmitry Melara MD Howell, Michael Joseph, MD Emergency 17   1534    Discharge Date Hospital Service Auth/Cert Status Service Area     Neurology Incomplete [facility]    Unit Room/Bed Admission Status       UU U6A 6209/6209-01 Admission (Confirmed)       Admission     Complaint    Orthostatic hypotension      Hospital Account     Name Acct ID Class Status Primary Coverage    Helen Younger [id number] Inpatient Open UCARE - [employer] FOR SENIORS            Guarantor Account (for Hospital Account #[id number])     Name Relation to Pt Service Area Active? Acct Type    Helen Younger  FCS Yes Personal/Family    Address Phone          [address] [place] [address] [phone](H)              Coverage Information (for Hospital Account #[id number])     F/O Payor/Plan Precert #    UCARE/UCARE FOR SENIORS     Subscriber Subscriber #    Helen Younger [id number]    Address Phone    [address]  [place] [address] [phone]

## 2017-09-28 NOTE — PROGRESS NOTES
"Called pt's  in response to his [employer] message to Dr Baker. Patient's  states that pt is pretty much bedridden. She fell out of bed last night and he did manage to get her back in bed. She is incontinent. States that whenever he tries to get her out of bed she \"passes out\". He is able to get her to sit up at the side of the bed to eat but it is difficult. He says that things have been gradually getting worse but this past week things have gotten worse more suddenly. Only changes have been increase in Midodrine and addition of salt tablets. Patient's  has reached out to cardiology and PMD as well as us. I explained to pt's  that I will try getting a hold of Dr Baker but an ED visit is probably necessary given the recent changes. German agrees. He will need to call an ambulance to get her here. I told him that if I hear back from Dr Baker soon and she has other thoughts/recommendations I would call him back. German verbalized understanding and agreement with plan. German's cell [phone]    Paged Dr Baker and Dr Santos. No call back yet  "

## 2017-09-28 NOTE — LETTER
"Transition Communication Hand-off for Care Transitions to Next Level of Care Provider    Name: Helen Younger  MRN #: [id number]  Primary Care Provider: Arin Shahid     Primary Clinic: [address] [place] [id number]     Reason for Hospitalization:  Dysautonomia orthostatic hypotension syndrome (H) [G90.3]  Acute cystitis without hematuria [N30.00]  Admit Date/Time: 9/28/2017  3:34 PM  Discharge Date: 10/7/17  Payor Source: Payor: UCARE / Plan: UCARE FOR SENIORS / Product Type: HMO /              Reason for Communication Hand-off Referral:     Discharge Plan:    Social Work Services Progress Note     Hospital Day: 8     Collaborated with:  [facility], pt's      Data:  [facility] representatives met with pt,  and clergy this afternoon and completed a hospice informational.   has elected to sign pt into hospice.  [facility] will arrange for DME delivery (hospital bed, bedside table, mattress overlay) to be delivered to pt's home on 10/6/17 between the hours of 12 noon and 4pm.  [facility] (RN) will arrive to pt's home on 10/7/17 at 1pm to sign pt into hospice.       Intervention:  Spoke with [facility] RN and SW.  Confirmed above plan with pt's .  Arranged for [facility] (Katherine [phone]) to provide pt with stretcher transport to home on 10/7/17 at 11:30am.  Completed a \"Physicians Certification for Transportation\" form which has been placed in the front of pt's chart.  Communicated discharge plan to the 6A Charge Nurse.     Assessment: Pt's  voices understanding of the discharge plan and agreement with the discharge plan.     Plan:    Anticipated Disposition:  Home with hospice on 10/7/17 at 11:30am.    Barriers to d/c plan:  None identified at this time    Follow Up:  SW available should add'l needs arise.     SAVANA Ryder  Social Work, 6A  Phone:  [phone]  Pager:  " [phone]  10/5/2017

## 2017-09-29 PROBLEM — I95.1 ORTHOSTATIC HYPOTENSION: Status: ACTIVE | Noted: 2017-01-01

## 2017-09-29 NOTE — PHARMACY-ANTICOAGULATION SERVICE
Clinical Pharmacy - Warfarin Dosing Consult     Pharmacy has been consulted to manage this patient s warfarin therapy.  Indication: Atrial Fibrillation  Therapy Goal: INR 2-3  OP Anticoag Clinic:  anticoagulation clinic  Warfarin Prior to Admission: Yes  Warfarin PTA Regimen: 2 mg po daily  Significant drug interactions: simvastatin may increase the risk of bleeding   Recent documented change in oral intake/nutrition: Unknown  Dose Comments: INR 2.12 today, will resume home regimen and give 2 tonight    INR   Date Value Ref Range Status   09/29/2017 2.12 (H) 0.86 - 1.14 Final   09/28/2017 2.25 (H) 0.86 - 1.14 Final       Recommend warfarin 2 mg today.  Pharmacy will monitor Helen Younger daily and order warfarin doses to achieve specified goal.      Please contact pharmacy as soon as possible if the warfarin needs to be held for a procedure or if the warfarin goals change.

## 2017-09-29 NOTE — CONSULTS
Neurology H and P    Helen Younger    77 year old       Reason for consult: I was asked by emergency department staff to evaluate this patient for orthopedic hypotension.          HPI:   Most history provided by  and medical record    Ms. Younger is a 77 year old woman with h/o left MCA stroke and invasive ductal carcinoma who presented to the ED with orthostatic hypotension.  She has been following with neurology outpatient for episodes of syncope since they caused a hospitalization last year.  She was started on florinef during the hospitalization which was stopped for unknown reasons.  She was started on midodrine outpatient which was increased to 10 mg BID at last clinic visit due to increasing frequency and severity of her symptoms.  She was also started NaCl tabs.  Her  reports that her symptoms have only become worse since the last clinic visit.  He can no longer get her out of bed, which is a significant decline from 1 week ago.  She becomes unresponsive and rigid when she stands up.  Episodes last anywhere from ten seconds to five minutes, and it is unclear if returning her to supine position aborts the episodes.      She has had [place]autonom[place] [facility] [place] was consistent with dysautonomia but was nonspecific.  She has had EEG tilt table testing which was normal.           Past Medical History:     Past Medical History:   Diagnosis Date     CVA (cerebral infarction) 03/01/00    slurred speech, right facial droop partial hemiplagia     Diabetes mellitus      Fibromuscular dysplasia of renal artery (H)      Malignant neoplasm of breast (female), unspecified site 01/03    Breast cancer     MVR (mitral valve repair)      Unspecified essential hypertension               Past Surgical History:     Past Surgical History:   Procedure Laterality Date     BREAST LUMPECTOMY, RT/LT  04/06    Breast Lumpectomy RT/LT     BREAST LUMPECTOMY, RT/LT  06/06    redo for margins with radiation and chemo     C  NONSPECIFIC PROCEDURE  1998    left knee surg torn ligament              Social History:     Social History   Substance Use Topics     Smoking status: Never Smoker     Smokeless tobacco: Never Used     Alcohol use No              Family History:     Family History   Problem Relation Age of Onset     Hypertension Mother      Cardiovascular Mother      Prostate Cancer Father      Hypertension Father      Breast Cancer Maternal Grandmother      Cardiovascular Paternal Grandfather      Hypertension Brother      Depression Daughter      Hypertension Daughter      Psychotic Disorder Daughter      bipolar              Allergies:     Allergies   Allergen Reactions     Diuretic      Don't remember side effect     Zyrtec [Cetirizine Hcl]      Elevated blood pressure             Medications:     Current Facility-Administered Medications   Medication     sodium chloride (PF) 0.9% PF flush 3 mL     sodium chloride (PF) 0.9% PF flush 3 mL     Current Outpatient Prescriptions   Medication Sig     sodium chloride 1 GM tablet Take 1 tablet (1 g) by mouth daily     midodrine (PROAMATINE) 5 MG tablet Take 2 tablets (10 mg) by mouth 2 times daily     carvedilol (COREG) 3.125 MG tablet Take 1 tablet (3.125 mg) by mouth daily     POTASSIUM CHLORIDE 10 MEQ tablet TAKE 1 TABLET BY MOUTH TWICE A DAY     palbociclib (IBRANCE) 100 MG capsule CHEMO Take 1 capsule (100 mg) by mouth daily with food Take for 21 days, then 7 days off. Avoid grapefruit. Do not open/crush/chew capsule.     warfarin (JANTOVEN) 1 MG tablet Take 2 tablets (2 mg) by mouth daily Take 1 tablet (1mg) by mouth on Fridays. Take 2 tablets (2 mg) by mouth all other days of the week.     simvastatin (ZOCOR) 20 MG tablet Take 1 tablet (20 mg) by mouth At Bedtime (Patient taking differently: Take 20 mg by mouth At Bedtime )     bisacodyl (DULCOLAX) 5 MG EC tablet Take 1 tablet (5 mg) by mouth daily as needed for constipation     magnesium hydroxide (MILK OF MAGNESIA) 400 MG/5ML  "suspension Take 30-60 mLs by mouth daily as needed for constipation or heartburn     sennosides (SENOKOT) 8.6 MG tablet Take 1 tablet by mouth 2 times daily as needed for constipation May increase to 2 tabs twice daily if needed     letrozole (FEMARA) 2.5 MG tablet Take 1 tablet (2.5 mg) by mouth daily     order for DME Equipment being ordered: portable commode     order for DME Equipment being ordered:  Incontinence pads   Patient uses these tid     order for DME Equipment being ordered: wheeled walker     Multiple Vitamins-Minerals (OCUVITE ADULT FORMULA PO) Take 1 tablet by mouth daily.     VIACTIV OR Take 1 chew tab by mouth 2 times daily. One in the morning and one at bedtime.               Review of Systems:   Ten point review of systems negative except as otherwise noted in H&P          Physical Exam:   Vital signs:  Temp: 98.1  F (36.7  C) Temp src: Oral BP: 94/71   Heart Rate: 76 Resp: 21 SpO2: 96 % O2 Device: None (Room air)   Height: 157.5 cm (5' 2\") Weight: 81 kg (178 lb 9.2 oz)  Estimated body mass index is 32.66 kg/(m^2) as calculated from the following:    Height as of this encounter: 1.575 m (5' 2\").    Weight as of this encounter: 81 kg (178 lb 9.2 oz).    Constitutional : well-built  Head: atraumatic, anicteric. See neuroexam  Eyes: see neuroexam  CVC: S1/S2 rhythmic no murmurs  LUng: Clear. On room air. No respiratory distress.   Adomen: soft, non tender, bowel movements heard  Psychiatry: in good mood. Collaborative    Neuroexam  Alert and oriented to self, place, not date.  Follow commands inconsistently  CNs notable for slight facial asymmetry.  EOM intact, facial sensation intact.     No tremors  No dysmetria (arms and legs tested)  Strength testing limited by cooperation   Shoulder shrug 4/5 bilaterally.  Biceps 5/5 right, left limited by pain from IV.  Elbow extension 4/5 bilaterally.   strength 5/5 bilaterally.  Hip flexion 5/5 bilaterally.  Knee extension 5/5 bilaterally.  Knee flexion " 5/5 right, unable to follow commands on left.  Ankle dorsiflexion 5/5 bilaterally.  Reflexes 2+ symmetric in biceps, patellar, and achilles.  Toes upgoing on left, mute on right.  Sensory exam to light touch: preserved            Data:   All laboratory data reviewed  All cardiac studies reviewed by me.  All imaging studies reviewed by me.         Assessment and Plan:   #Dysautonomia  Ms. Younger's orthostatic hypotension has been resistant to treatment, though we still have room to increase.  We will decrease the dose of her carvedilol, and possibly stop tomorrow, though this will need to be discussed further.  It is likely that her current UTI is contributing somewhat to her symptoms.  -Increase midodrine to 10 mg TID  -Continue sodium chloride 1 gm daily  -Decrease carvedilol to 1 mg BID  -Consider restarting florinef    #UTI  -Continue ceftriaxone 1g IV daily  -f/u culture    #Breast Cancer  -continue home letrozole  -continue home Ibrance    #HLD  -Continue simvastatin 20 mg     #MVR  Mentioned in notes as reason for being on warfarin, though only mentioned twice total.  -Pharmacy consult to dose warfarin           Attestation:  This patient has been seen and evaluated by me, Hoa Santacruz.  Discussed with the house staff team or resident(s) and agree with the findings and plan in this note.      Hoa Santacruz  PGY2 Neurology  [id number]    Neurology Staff Addendum  I have seen and evaluated the patient on 9-29 and discussed with the resident team and agree with the documentation.  Challenging situation of fragile autonomic instability with severe orthostasis however treatment results in high baseline BPs.    Patient indicates that orthostatic symptoms improved at this time however recent BPs were over 200/100.  Currently carvedilol is on hold but once symptoms   Continue to be stable we could restart that.  IF BP continues to be dramatically elevated we will consult our Medicine colleagues.        HOA SAGASTUME  MD

## 2017-09-29 NOTE — PROGRESS NOTES
"[facility], [place]   Neurology Daily Note  Helen Younger  [id number]  09/29/2017    Subjective: Feeling okay today, has not been up and out of bed yet. German should be coming in later in the morning per her. Denies dizzy/lightheadedness/feeling like she might pass out. She also denies dysuria.    Per RN report, there was some pain with wiping after urinating this morning.    Objective   BP (!) 162/94 (BP Location: Right arm)  Temp 98.2  F (36.8  C) (Oral)  Resp 16  Ht 1.575 m (5' 2\")  Wt 81 kg (178 lb 9.2 oz)  SpO2 97%  BMI 32.66 kg/m2  General: pt laying comfortably in bed, breathing easily on ra, in NAD  HEENT: no oral ulcers  Chest: ctab  Heart: rrr  Abdomen: soft, nt, nd, +BS  Ext: no edema  Skin: no rashes  Neuro:   -MS: awake, alert, attentive, fully oriented, receptive language intact, broken expressive aphasia, can name and repeat  -CN: vff, perrla, eomi, facial sensation and movement intact b/l, unable to sustain upgaze, hearing intact to conversation, palate raises symmetrically, shoulder shrug and scm intact, tongue midline, no dysarthria  -Motor: tone increased R>L, bulk normal. No cogwheeling or rigidity.  --LUE: 5/5 shoulder abd, arm flex/ext, wrist flex/ext, finger flex/ext/abd/add  --RUE: 4/5 shoulder abd, arm flex/ext, wrist flex/ext, finger flex/ext/abd/add  --LLE: 5/5 hip flexor, leg flex/ext, plantar/dorsiflexion  --RLE: 5/5 hip flexor, leg flex/ext, plantar/dorsiflexion  -Reflexes: brisk and symmetric at achilles, patella, brachioradialis, and biceps.  -Coordination: intact to FNF, H2S b/l  -Gait: not examined    Investigations    Recent Labs   Lab Test  09/28/17   2326   07/14/16   0856   COLOR  Yellow   < >  Yellow   APPEARANCE  Slightly Cloudy   < >  Clear   URINEGLC  Negative   < >  Negative   URINEBILI  Negative   < >  Negative   URINEKETONE  5*   < >  Negative   SG  1.017   < >  1.030   UBLD  Large*   < >  Negative   URINEPH  6.0   < >  5.5   PROTEIN  " 30*   < >  Trace*   UROBILINOGEN   --    --   0.2   NITRITE  Positive*   < >  Negative   LEUKEST  Large*   < >  Negative   RBCU  >182*   < >  O - 2   WBCU  >182*   < >  O - 2    < > = values in this interval not displayed.     Prelim urine culture >100K GNRs, susceptibilities in progress    Recent Labs   Lab Test  09/28/17 1925 08/28/17   1340   NA  142  139   POTASSIUM  3.6  3.7   CHLORIDE  108  104   CO2  28  29   ANIONGAP  6  6   GLC  151*  180*   BUN  23  21   CR  1.20*  1.21*   SHERYL  9.1  9.4     Recent Labs   Lab Test  09/28/17 1925   WBC  2.9*   RBC  2.71*   HGB  9.8*   HCT  29.3*   MCV  108*   MCH  36.2*   MCHC  33.4   RDW  14.5   PLT  219     Cervicocerebral angiogram  Exam Date: 9/22/00  Procedure: IR CRT HD 2 VSL [STAR 9705]  Reason for Exam: LT CAROTID STENOSIS    Ordering Provider: EVIN GUERRERO  Reading Radiologist: LORRAINE BARDALES    Prior  to  the examination informed consent was obtained.   The  patient understood and accepted the risks, including but not limited to,  stroke and  damage at the puncture site.  Indications and risks were discussed. I did the study by a right femoral artery approach.       The  catheterization was done by Dr. Jorge.  The study  was  done  by  a right  femoral artery approach.  Injections were made into  both  common carotid arteries with imaging done of both the head and neck portions of both carotid circulations.       On  the left side, there is marked beating of the petrous portion of the left  internal  carotid  artery with a near complete  occlusion  in  the cavernous portion of the carotid artery.  There is also minimal  beating of  a  short segment of the left occipital artery.  In  light  of  these findings  I  believe  this  represents severe changes  of  fibromuscular dysplasia with a near complete occlusion.       The  right  carotid injection, shows a normal right carotid  bifurcation and  intracranial circulation.  There is collateral flow from  the  right carotid  circulation to the left side and there is appears  to  be  good collateral flow filling the left neural cerebral arteries since there is not  much  contrast entering the left neural cerebral  artery  from  the right  carotid  injection.  Dr. Powers was notified  of  this  report  by phone, at 4:30 pm on the day of the examination.       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   Minimal  changes  of fibromuscular dysplasia of the left occipital artery.    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 She was found to have a UTI that is likely further exacerbating this.    #syncopal episodes: secondary to profound orthostatic hypotension, dysautonomia, exacerbated currently by UTI  -continue increased midodrine 10 mg TID  -PT/OT (or whomever handles that) consulted to fit JOSE stockings  -continue salt tabs  -push oral fluids  -will work with PT/OT today to see if she can maintain CPP while upright, but this may take longer until UTI is treated    #UTI: UA at admission suggestive, ceftriaxone initiated, growing >100K GNRs, will narrow as able    FEN: no mIVF, push PO fluids, lytes wnl, regular diet  PPx: on therapeutic warfarin  Code: Full    Disposition Plan   Expected discharge in 1-2 days to prior living arrangement once she is able to maintain her blood pressure while upright.     Entered: Rosey Baker 09/29/2017, 11:26 AM     Patient was seen and discussed with Dr. Louis.    Rosey Baker MD  Neurology G1  [phone]    Neurology Staff Addendum  I have seen and evaluated the patient today and  discussed with the resident team and agree with the documentation.  Please see my addendum from the H and P also dated today.     HOA SAGASTUME MD

## 2017-09-29 NOTE — PLAN OF CARE
Problem: Patient Care Overview  Goal: Plan of Care/Patient Progress Review  Outcome: No Change  VSS. Neuros intact ex. expressive aphasia related to a previous stroke. Pt. transferred from ED at 0200. Admitted for further work up for syncopal episodes. PIV left forearm SL. Regular diet. Has not been out of bed and according to her  she has been bedfast since Saturday. Voided using bedpan.

## 2017-09-29 NOTE — PROGRESS NOTES
Care Coordinator Progress Note     Admission Date/Time:  9/28/2017  Attending MD:  Dr Rio Louis     Data  Chart reviewed, discussed with interdisciplinary team. In 6A Discharge Rounds nursing reported pt has a hx of falls, orthostatic hypotension, incontinent, up with assist of 2.   [facility] was seeing pt prior to admission for RN, HHA and home OT.    This afternoon informed by Dr Baker that  may be having caregiver burnout. Will need to speak with  if he is still able to care for pt. Doctors are adjusting medications, will order JOSE hose; to improve orthostatic hypotension.      Patient was admitted for:    Dysautonomia orthostatic hypotension syndrome (H)  Acute cystitis without hematuria.  Pt presented to the ER with generalized weakness; multiple falls likely related to orthostatic hypotension resulting in pt being essentially bedridden since 9-20. Prior to that she was able to get around with assist.     Concerns with insurance coverage for discharge needs: None. Pt's insurance is [employer].   Current Living Situation: Patient lives with spouse.  Support System: Supportive  Services Involved: Home Care. Had [facility] services prior to admission.   Transportation: TBD  Barriers to Discharge: dependent with mobility    Coordination of Care and Referrals  Chart reviewed, discussed in 6A Discharge Rounds.     Assessment  Orthostatic hypotension preventing pt from being mobile and able to do ADLs. Need to assess 's ability to continue caring for pt.       Plan  Anticipated Discharge Date:  TBD, when medically stable.   Anticipated Discharge Plan:  PT/OT consults.   --Meet with  & pt and determine if he is able to continue caring for her at this time. Will leave a message for the Weekend Care Coordinator to follow-up with .       Pepper Benitez RN Care Coordinator  [address], [facility]

## 2017-09-29 NOTE — CONSULTS
CARDIOLOGY CONSULT HISTORY AND PHYSICAL     Reason for consult: Evaluation of need for permanent pacemaker for dysautonomia  Requesting team: Neurology    HPI:  Mrs. Helen Younger is a 77-year old lady known to Dr. Tay for her PMHx of stroke, mitral valve repair, and breast cancer s/p lumpectomy who presented for inpatient evaluation of dysautonomia.     Briefly, Mrs. Younger suffered her stroke in 2000 and has since been living with her .  He assists her with most ADLs but she was able to ambulate independently for about 25 feet till about one year ago.      Mrs. Younger's exercise tolerance has decreased progressively in the year since due to multiple episodes of her falling over.  They are typified by her attempting to stand and then walk then usually falling over.  During the episodes, Mrs. Younger does not interact with anybody and her eyes remain open.  She usually has some myoclonic jerks and has had loss of urinary and/or fecal continence in recent weeks.  Her  notes that she usually has no recollection of these episodes and there really is no post-ictal period.  They are not preceded or succeeded by palpitations and they have never occurred at rest.  The frequency of these episodes increased to daily in the last two weeks. This prompted inpatient admission for further evaluation. Of note, she has sustained abrasions and contusions before but no major injury from falls.     Mrs. Younger recently underwent tilt table testing under Dr. Tay where she was noted to have severe orthostatic hypotension. There was also some level of chronotropic incompetence in the form of an inappropriate HR response to her orthostatic hypotension.    At the time of the consultation, Mrs. Younger did not have any complaints.     History obtained from  and daughter due to expressive aphasia. ROS otherwise negative.      PAST MEDICAL HISTORY:  - Stroke   - Left MCA stroke in 2000   - Residual RUE weakness and  expressive aphasia   - On warfarin since stroke   - Renal artery fibromuscular dysplasia  - Breast cancer   - Invasive ductal carcinoma of the left brest  - Mitral valve repair   - Listed in multiple places in her chart though no TTE evidence of this and the patient does not remember this  - Hypertension  - Parkinsonism     PAST SURGICAL HISTORY:  - Breast lumpectomy    FAMILY HISTORY:  - No premature CAD, valvular disorder, or arrhythmias     SOCIAL HISTORY:   - Never smoker  - No regular alcohol intake; has never used illicits    HOME MEDICATIONS:  Home cardiac meds: Carvedilol 3.125 mg BID, midodrine 10 mg BID, simvastatin 20 mg qHS, warfarin   Prior to Admission medications    Medication Sig Start Date End Date Taking? Authorizing Provider   sodium chloride 1 GM tablet Take 1 tablet (1 g) by mouth daily 9/20/17   Babar Santos MD   midodrine (PROAMATINE) 5 MG tablet Take 2 tablets (10 mg) by mouth 2 times daily 9/20/17   Babar Santos MD   carvedilol (COREG) 3.125 MG tablet Take 1 tablet (3.125 mg) by mouth daily 9/8/17   Justyn Alonso MD   POTASSIUM CHLORIDE 10 MEQ tablet TAKE 1 TABLET BY MOUTH TWICE A DAY 8/2/17   Arin Shahid MD   palbociclib (IBRANCE) 100 MG capsule CHEMO Take 1 capsule (100 mg) by mouth daily with food Take for 21 days, then 7 days off. Avoid grapefruit. Do not open/crush/chew capsule. 7/21/17   Jess Avalos PA-C   warfarin (JANTOVEN) 1 MG tablet Take 2 tablets (2 mg) by mouth daily Take 1 tablet (1mg) by mouth on Fridays. Take 2 tablets (2 mg) by mouth all other days of the week. 6/26/17   Lazaro Augustine APRN CNP   simvastatin (ZOCOR) 20 MG tablet Take 1 tablet (20 mg) by mouth At Bedtime  Patient taking differently: Take 20 mg by mouth At Bedtime  6/26/17   Lazaro Augustine APRN CNP   bisacodyl (DULCOLAX) 5 MG EC tablet Take 1 tablet (5 mg) by mouth daily as needed for constipation 6/26/17   Lazaro Augustine APRN CNP   magnesium  hydroxide (MILK OF MAGNESIA) 400 MG/5ML suspension Take 30-60 mLs by mouth daily as needed for constipation or heartburn 6/26/17   Lazaro Augustine APRN CNP   sennosides (SENOKOT) 8.6 MG tablet Take 1 tablet by mouth 2 times daily as needed for constipation May increase to 2 tabs twice daily if needed 6/26/17   Lazaro Augustine APRN CNP   letrozole (FEMARA) 2.5 MG tablet Take 1 tablet (2.5 mg) by mouth daily 6/26/17   Lazaro Augustine APRN CNP   order for DME Equipment being ordered: portable commode 4/17/17   Padmini Brunson PA-C   order for DME Equipment being ordered:  Incontinence pads   Patient uses these tid 7/14/16   Arin Shahid MD   order for DME Equipment being ordered: wheeled walker 2/2/16   Arin Shahid MD   Multiple Vitamins-Minerals (OCUVITE ADULT FORMULA PO) Take 1 tablet by mouth daily.    Reported, Patient   VIACTIV OR Take 1 chew tab by mouth 2 times daily. One in the morning and one at bedtime.     Reported, Patient       VITAL SIGNS:  Temp: 98.8  F (37.1  C) Temp src: Oral BP: (!) 136/97   Heart Rate: 87 Resp: 18 SpO2: 98 % O2 Device: None (Room air)      178 lbs 9.16 oz    Intake/Output Summary (Last 24 hours) at 09/29/17 1713  Last data filed at 09/29/17 0830   Gross per 24 hour   Intake              240 ml   Output              100 ml   Net              140 ml         PHYSICAL EXAM:  Gen: No distress  HEENT: MMM, no carotid bruits  Resp: No signs of resp distress, chest ausc with coarse crackles at bases (R>L) with reduced breath sounds bilaterally  CVS: No JVD, no heaves, S1+S2 without added sounds or murmurs  Abdo: ND, S, NT, no HSM, +BS  Extremities: Warm, well-perfused, no edema, good DP/PT/radial/popliteal/brachial pulses bilaterally  Neuro: GCS E4V5M6 with expressive aphasia.  Following most commands. CN III, IV, VI, VII motor, XI, XII intact. Power 4/5 right shoulder abduction/adduction, 5/5 left shoulder abduction/adduction, 5/5  elbow flexion/extension both UEs, 5/5 . 2/5 right hip flexion/extension, 5/5 left hip flexion/extension.    Labs:   Recent Labs   Lab Test  09/28/17   1925   HGB  9.8*   HCT  29.3*   WBC  2.9*   MCV  108*   MCH  36.2*   MCHC  33.4   RDW  14.5   PLT  219   NA  142   POTASSIUM  3.6   CHLORIDE  108   CO2  28   BUN  23   CR  1.20*   GLC  151*   SHERYL  9.1   ALBUMIN  3.3*   BILITOTAL  0.4   ALKPHOS  37*   AST  15   ALT  14       EKG: Rate approx 65 bpm, NSR, nl axis, nl intervals. PVC, perhaps of RV etiology    TTE 06/23/2017:  Borderline (EF 50-55%) reduced left ventricular function is present.  Global right ventricular function is normal.  Mild aortic insufficiency is present.  The inferior vena cava is normal. Estimated mean right atrial pressure is <3  mmHg.  Mild dilatation of the aorta is present. Ascending aorta 4.1 cm.  No pericardial effusion is present.  This study was compared with the study from 9/3/2015. The LVEF is marginally lower. The size of the ascendic aorta is similar.    Coronary angiogram 2014:            ASSESSMENT/PLAN:  Mrs. Helen Younger is a 77-year old lady with a PMHx of stroke and suspected dysautonomia with gradually decreasing exercise tolerance and increasing fall frequency who presented for an inpatient work-up for her dysautonomia.    Permanent pacemaker implantation for the type of chronotropic incompetence she has as the picture is more suggestive of a vasodepressive phenotype from orthostatic hypotension rather than a consistent and/or significant cardioinhibitory component from vasovagal syncope.  Her mild aortic regurgitation is also not severe enough by echocardiographic or clinical criteria to warrant further intervention.    - Dysautonomia   - No indication for PPM implantation   - Agree with alternate medical measures such as use of fludrocortisone, compression stockings/physical counterpressure maneuvers, salt tabs, gradual transfers    - I will let Dr. Tay know that   Aren is in the hospital     Staffed with Dr. Barlow. Thanks for involving us in Mrs. Younger's care, we will sign off.     Timmy Phillip   Cardiology Fellow  Pager [phone]

## 2017-09-29 NOTE — PROGRESS NOTES
ANTICOAGULATION FOLLOW-UP CLINIC VISIT    Patient Name:  Helen Younger  Date:  9/29/2017  Contact Type:  Documentation Only.  Patient had INR done at outside lab.  Marking flowsheet and follow up per usual.    SUBJECTIVE:        OBJECTIVE    INR   Date Value Ref Range Status   09/28/2017 2.25 (H) 0.86 - 1.14 Final       ASSESSMENT / PLAN  INR assessment THER    Recheck INR In: 2 WEEKS    INR Location Outside lab      Anticoagulation Summary as of 9/29/2017     INR goal 2.0-3.0   Today's INR 2.25 (9/28/2017)   Maintenance plan 2 mg (1 mg x 2) every day   Full instructions 2 mg every day   Weekly total 14 mg   No change documented Soumya Garza, RN   Plan last modified Soumya Garza, RN (6/6/2017)   Next INR check 10/13/2017   Target end date Indefinite    Indications   Long-term (current) use of anticoagulants [Z79.01] [Z79.01]  Cerebral infarction (H) [I63.9]         Anticoagulation Episode Summary     INR check location     Preferred lab     Send INR reminders to [facility]    Comments       Anticoagulation Care Providers     Provider Role Specialty Phone number    Arin Shahid MD  Internal Medicine [phone]            See the Encounter Report to view Anticoagulation Flowsheet and Dosing Calendar (Go to Encounters tab in chart review, and find the Anticoagulation Therapy Visit)    Dosage adjustment made based on physician directed care plan.    Soumya Garza, RN

## 2017-09-29 NOTE — MR AVS SNAPSHOT
Helen Younger   9/29/2017   Anticoagulation Therapy Visit    Description:  77 year old female   Provider:  Arin Shahid MD   Department:  [facility]           INR as of 9/29/2017     Today's INR 2.25 (9/28/2017)      Anticoagulation Summary as of 9/29/2017     INR goal 2.0-3.0   Today's INR 2.25 (9/28/2017)   Full instructions 2 mg every day   Next INR check 10/13/2017    Indications   Long-term (current) use of anticoagulants [Z79.01] [Z79.01]  Cerebral infarction (H) [I63.9]         Contact Numbers     [facility]  Please call [phone] or [phone]  to cancel and/or reschedule your appointment.   Please call [phone] with any problems or questions regarding your therapy          September 2017 Details    Sun Mon Tue Wed Thu Fri Sat          1               2                 3               4               5               6               7               8               9                 10               11               12               13               14               15               16                 17               18               19               20               21               22               23                 24               25               26               27               28               29      2 mg   See details      30      2 mg          Date Details   09/29 This INR check               How to take your warfarin dose     To take:  2 mg Take 2 of the 1 mg tablets.           October 2017 Details    Sun Mon Tue Wed Thu Fri Sat     1      2 mg         2      2 mg         3      2 mg         4      2 mg         5      2 mg         6      2 mg         7      2 mg           8      2 mg         9      2 mg         10      2 mg         11      2 mg         12      2 mg         13            14                 15               16               17               18               19               20               21                 22               23               24                25               26               27               28                 29               30               31                    Date Details   No additional details    Date of next INR:  10/13/2017         How to take your warfarin dose     To take:  2 mg Take 2 of the 1 mg tablets.

## 2017-09-29 NOTE — PROGRESS NOTES
[facility]  Patient is currently open to home care services with [facility].  The patient is currently receiving RN/HA/OT services.  [facility]  and team have been notified of patient admission.  [facility] liaison will continue to follow patient during stay.  If appropriate provide orders to resume home care at time of discharge.    Thank you  La Goldsmith RN, BSN  [facility] Liaison  [phone]

## 2017-09-30 NOTE — PLAN OF CARE
Problem: Patient Care Overview  Goal: Plan of Care/Patient Progress Review  Discharge Planner PT   Patient plan for discharge: TCU  Current status: Max Ax2 for bed mobility.  Min Ax1 to maintain sitting balance.  STS with mod Ax2.  Barriers to return to prior living situation: Functional decline in mobility.  OH with positional changes causing falls risk.  Poor symptom reporting due to expressive aphasia.    Recommendations for discharge: TCU vs SNF  Rationale for recommendations: PT recommends TCU in order to increase functional upright activity tolerance.  Per chart review,  experiencing caregiver burnout and may need placement if  no longer able to care for pt.          Entered by: Fortunato Polk 09/30/2017 5:09 PM

## 2017-09-30 NOTE — PROGRESS NOTES
Care Coordinator Progress Note     Admission Date/Time:  9/28/2017  Attending MD:  [place], *     Data  Chart reviewed, discussed with interdisciplinary team.   Patient was admitted for:    Dysautonomia orthostatic hypotension syndrome (H)  Acute cystitis without hematuria.    Concerns with insurance coverage for discharge needs:None  Current Living Situation: Patient lives with spouse.  Support System: Supportive  Services Involved: Home Care  Transportation: Family or Friend will provide  Barriers to Discharge: Safe discharge plan/Medical Clearance    Coordination of Care and Referrals: Provided patient/family with options for Provided alternative options for discharge if unable to return home.        Assessment  This CC made phone call to patients spouse Rony to discuss discharge planning. Rony states that patient has been at [facility] twice in the last 6 months and the confinement did not help her condition. Spouse states that patients issue is not the need for physical therapy to increase endurance but that patient has an autonomic dysfunction that causes her to be extremely dizzy and unbalanced resulting in several falls. Spouse states that he has been unable to assist patient with getting to the bathroom or to ambulate at all so she has been pretty much bedridden for several weeks. Spouse states he is unwilling to consider LTC placement and is hoping that the doctors can fix whatever is wrong with patient so he can bring her home.  This CC suggested that a SW could meet with him about exploring some other options (IE: Elderly Waiver Program) in hopes pf obtaining some PCA hours. Spouse states that they have already explored this with a SW previously and they do not qualify for anything. Spouse sates he will be at the hospital this afternoon and will discuss current POC with patients physicians. Spouse states that he will receive some additional emotional support from his daughter  who is arriving from [place] today.     Plan  Anticipated Discharge Date:  TBD  Anticipated Discharge Plan:  CC will continue to follow and assist with facilitation of discharge needs. Unable to determine DC plan at this time.    Fatemeh Melara RN BSN [facility] Care Coordinator  [phone]

## 2017-09-30 NOTE — PLAN OF CARE
Problem: Patient Care Overview  Goal: Plan of Care/Patient Progress Review  OT 6A:  Holding OT consult.  Acknowledge order placed for OT eval and treat.  Upon chart review and discussion with PT, pt currently with orthostatic hypotension limiting performance in ADLs.  Will hold OT at this time and reassess 10-2.

## 2017-09-30 NOTE — PLAN OF CARE
"Problem: Syncope (Adult)  Goal: Optimal Emotional/Functional [place]  Patient will demonstrate the desired outcomes by discharge/transition of care.   D/I: patient remains on 6A for syncope monitoring  Neuro- oriented x self, family, \"[facility]\" setting with choices. Pt lethargic at times, has expressive aphasia and generalized weakness from previous stroke in 2000.   CV- blood pressure varies, MDs aware of hypotension and htn entire shift. Orthostatic pressure apparent again today during PT session, MDs aware.  Pulm- on RA with sats in mid-upper 90s.  GI- Tolerated regular diet with fair appetite, needs tray set-up. No BM this shift.  - voided x 5 today, incontinent each time. Hydrating well with PO intake.  Skin- intact ex generalized skin sensitivity to touch at baseline, per patient's daughter.  Gtts- sl'd  ID- chemo precautions, takes oral chemo. Pt has UTI, treating with cefdinir starting at 2000.  Labs- WNL  Pain- denied today  Activity- up with heavy 2 to stand x 2-3 minutes today.  Drains-n/a  Social-daughter at bedside, helpful and supportive.  CRRT-n/a  See flow sheets for further details and assessments.  A: read PT notes for specifics regarding Orthostatic htn episode today. Pt is probable discharge to TCU when medically ready.  P: continue to monitor, notify MD of significant changes.          "

## 2017-09-30 NOTE — PROGRESS NOTES
"[facility], [place]   Neurology Daily Note  Helen Younger  [id number]  09/30/2017    Subjective: Denies any complaints this am.    Per RN, has exudate from R eye (pt denies pain/discomfort related to this, but winces a little with inspection); and was markedly hypotensive with transfer/activity with PT. Symptomatic initially, then with stockings on, dropped from 180/190s to 100s but was not dizzy.    Objective   /85  Pulse 66  Temp 98  F (36.7  C) (Oral)  Resp 16  Ht 1.575 m (5' 2\")  Wt 81 kg (178 lb 9.2 oz)  SpO2 96%  BMI 32.66 kg/m2  General: pt laying comfortably in bed, breathing easily on ra, in NAD  HEENT: no oral ulcers, no injection of sclera or conjunctiva, opaque light green discharge matting R eye  Chest: ctab  Heart: rrr  Abdomen: soft, nt, nd, +BS  Ext: no edema  Skin: no rashes  Neuro:   -MS: awake, alert, attentive, oriented to self and hospital but difficult to assess due to aphasia, receptive language intact, marked expressive aphasia, can name and repeat  -CN: vff, perrla, eomi, facial sensation and movement intact b/l, unable to sustain upgaze, hearing intact to conversation, palate raises symmetrically, shoulder shrug and scm intact, tongue midline, no dysarthria  -Motor: tone increased R>L, bulk normal. No cogwheeling or rigidity.  --LUE: 5/5 shoulder abd, arm flex/ext, wrist flex/ext, finger flex/ext/abd/add  --RUE: 4/5 shoulder abd, arm flex/ext, wrist flex/ext, finger flex/ext/abd/add  --LLE: 5/5 hip flexor, leg flex/ext, plantar/dorsiflexion  --RLE: 5/5 hip flexor, leg flex/ext, plantar/dorsiflexion  -Reflexes: brisk and symmetric at achilles, patella, brachioradialis, and biceps.  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 She was found to have a UTI that is likely further exacerbating this.    #syncopal episodes: secondary to profound orthostatic hypotension, dysautonomia, exacerbated currently by UTI  -continue holding carvedilol, will assess whether she will tolerate restarting this either prior to d/c or as an outpt  -continue midodrine 10 mg TID  -working toward thigh high compressive stockings, currently has knee high, which seem to be helping. PT/OT/RN coordinating  -continue salt tabs  -push oral fluids  -PT/OT as tolerated  -Cardiology consulted later in the day yesterday, appreciate their willingness to evaluate, PPM not indicated at this time    Ultimately, Ms. Younger needs to be stable enough to be somewhat independent in the home (may require a temporary TCU d/c) and then follow up as an outpatient with a referral to Dr. Elkins in Neurophysiology/Autonomics at [facility].    #UTI: UCx resulted  -will narrow ceftriaxone IV to cefdinir PO for 7 days    #eye discharge: Eye appears possibly infected, if so, most likely to be self-limited viral conjunctivitis. Also, if bacterial currently on abx (admittedly not topical). Will continue to monitor and if fails to clear, consider drops vs ophtho/IM consult    FEN: no mIVF, push PO fluids, lytes wnl, regular diet  PPx: on therapeutic warfarin  Code: Full    Disposition Plan   Expected discharge in 1-2 days to prior living arrangement once she is able to maintain her blood pressure while upright.     Entered: Rosey Baker 09/30/2017, 8:03 AM     Patient was seen and discussed with Dr. Melara.    Rosey Baker MD  Neurology PGY-2  [phone]

## 2017-09-30 NOTE — PLAN OF CARE
Problem: Syncope (Adult)  Goal: Identify Related Risk Factors and Signs and Symptoms  Related risk factors and signs and symptoms are identified upon initiation of Human Response Clinical Practice Guideline (CPG).   Outcome: No Change  VS with HTN, MD Santacruz notified; pt also noted to have severe orthostatic hypotension. Oriented only to self. Denies pain. Incontinent this shift, brief in place. BS+. PIV SL. Up with A2/GB per report, needs activity order on charge list. Reg diet. Cont to monitor and with POC.

## 2017-09-30 NOTE — PROGRESS NOTES
09/30/17 1600   Quick Adds   Type of Visit Initial PT Evaluation   Living Environment   Lives With spouse   Living Arrangements house   Home Accessibility stairs to enter home;stairs within home   Number of Stairs to Enter Home 8   Number of Stairs Within Home 10   Stair Railings at Home inside, present at both sides   Living Environment Comment  available to assist at home 24 hours.  Subjective taken from RN note.  Pt unable to communicate PLOF due to expressive aphasia.  Mainly communicates through yes/no questions.  Per chart review, pt's  may be expiriencing caregiver burnout.   not present during eval.    Self-Care   Usual Activity Tolerance fair   Current Activity Tolerance poor   Regular Exercise no   Activity/Exercise/Self-Care Comment Due to OH, pt recently bed bound and expiriences many falls.     Functional Level Prior   Ambulation 3-->assistive equipment and person   Transferring 3-->assistive equipment and person   Toileting 3-->assistive equipment and person   Bathing 3-->assistive equipment and person   Dressing 3-->assistive equipment and person   Eating 3-->assistive equipment and person   Communication 2-->difficulty speaking (not related to language barrier)   Swallowing 0-->swallows foods/liquids without difficulty   Cognition 2 - difficulty with organizing thoughts   Fall history within last six months yes   Number of times patient has fallen within last six months 10   Which of the above functional risks had a recent onset or change? ambulation;transferring;fall history   Prior Functional Level Comment mod independent household ambulator with FWW, recently has had numerous falls   General Information   Onset of Illness/Injury or Date of Surgery - Date 09/28/17   Referring Physician Rosey Baker MD   Patient/Family Goals Statement unable to state PT goal   Pertinent History of Current Problem (include personal factors and/or comorbidities that impact the POC) 77  year old female with PMH of left MCA, autonomic dysfunction and orthostatic hypotension, ER/NM-positive, HER-2 negative metastatic breast cancer, CKD, CVA, HTN who presents for further evaluation for recurrent falls.   Precautions/Limitations fall precautions   Heart Disease Risk Factors Age;Medical history;Overweight;Lack of physical activity;High blood pressure   General Observations Pt demonstrates discomfort with BP on L arm.  Difficulty communicating due to expressive aphasia from previous CVA.    Cognitive Status Examination   Orientation person   Level of Consciousness lethargic/somnolent   Follows Commands and Answers Questions 50% of the time   Personal Safety and Judgment impaired   Memory impaired   Posture    Posture Kyphosis;Forward head position   Range of Motion (ROM)   ROM Comment WFL   Strength   Strength Comments weakn throughout, unable to test due to poor command following   Bed Mobility   Bed Mobility Comments Max Ax1 from supine<>sit   Transfer Skills   Transfer Comments mod Ax2 from STS   Gait   Gait Comments unable   Balance   Balance Comments poor   General Therapy Interventions   Planned Therapy Interventions balance training;bed mobility training;gait training;neuromuscular re-education;strengthening;transfer training;risk factor education;home program guidelines;progressive activity/exercise   Clinical Impression   Criteria for Skilled Therapeutic Intervention yes, treatment indicated   PT Diagnosis impaired functional mobility   Influenced by the following impairments OH, weakness, expressive aphasia   Functional limitations due to impairments IND mobility   Clinical Presentation Evolving/Changing   Clinical Presentation Rationale new onset of OH, multiple falls recently   Clinical Decision Making (Complexity) Moderate complexity   Therapy Frequency` 3 times/week   Predicted Duration of Therapy Intervention (days/wks) 10/7/17   Anticipated Discharge Disposition Transitional Care Facility  "  Risk & Benefits of therapy have been explained Yes   Patient, Family & other staff in agreement with plan of care Yes   [facility] AM-PAC TM \"6 Clicks\"   2016, Trustees of [facility], under license to [employer].  All rights reserved.   6 Clicks Short Forms Basic Mobility Inpatient Short Form   [facility] AM-PAC  \"6 Clicks\" V.2 Basic Mobility Inpatient Short Form   1. Turning from your back to your side while in a flat bed without using bedrails? 2 - A Lot   2. Moving from lying on your back to sitting on the side of a flat bed without using bedrails? 2 - A Lot   3. Moving to and from a bed to a chair (including a wheelchair)? 1 - Total   4. Standing up from a chair using your arms (e.g., wheelchair, or bedside chair)? 2 - A Lot   5. To walk in hospital room? 1 - Total   6. Climbing 3-5 steps with a railing? 1 - Total   Basic Mobility Raw Score (Score out of 24.Lower scores equate to lower levels of function) 9   Total Evaluation Time   Total Evaluation Time (Minutes) 10     "

## 2017-10-01 NOTE — PLAN OF CARE
Problem: Syncope (Adult)  Goal: Identify Related Risk Factors and Signs and Symptoms  Related risk factors and signs and symptoms are identified upon initiation of Human Response Clinical Practice Guideline (CPG).   Outcome: No Change  VS with HTN, also noted to have orthostatic hypotension. Oriented only to self. Denies pain. Incontinent this shift, brief in place. BS+. PIV SL. Up with A2/GB while working with therapy. Reg diet. Cont to monitor and with POC.

## 2017-10-01 NOTE — PROGRESS NOTES
"[facility], [place]   Neurology Daily Note  Helen Younger  [id number]  10/01/2017    Subjective: Seemingly comfortable & lying in bed this morning. Difficult to assess further due to aphasia.    BPs variable yesterday; range -221.    Objective   BP (!) 190/104 (BP Location: Right arm)  Pulse 65  Temp 98.8  F (37.1  C) (Axillary)  Resp 18  Ht 1.575 m (5' 2\")  Wt 81 kg (178 lb 9.2 oz)  SpO2 98%  BMI 32.66 kg/m2  General: pt laying comfortably in bed, breathing easily on ra, in NAD  HEENT: no oral ulcers, no injection of sclera or conjunctiva, opaque light green discharge matting R eye  Chest: ctab  Heart: rrr  Abdomen: soft, nt, nd, +BS  Ext: no edema  Skin: no rashes  Neuro:   -MS: awake & alert, able to say name & location, not able to verbalize why she is in the hospital. Follows 1-step commands, but has difficulty with multistep commands. Can repeat sentence but makes some errors. Can name high-frequency objects, but has difficulty with low frequency objects. Minimal spontaneous speech & only responds with 1-2 words.   -CN: vff, perrla, eomi, facial sensation and movement intact b/l, unable to sustain upgaze, hearing intact to conversation, palate raises symmetrically, shoulder shrug and scm intact, tongue midline. Mild dysarthria.  -Motor: tone increased R>L, bulk normal. No cogwheeling or rigidity.  --LUE: 5/5 shoulder abd, arm flex/ext, wrist flex/ext, finger flex/ext/abd/add  --RUE: 4/5 shoulder abd, arm flex/ext, wrist flex/ext, finger flex/ext/abd/add  --LLE: 5/5 hip flexor, leg flex/ext, plantar/dorsiflexion  --RLE: 5/5 hip flexor, leg flex/ext, plantar/dorsiflexion  -Reflexes: slightly brisker on R side compared to left.  -Coordination: intact to FNF b/l.  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 She was found to have a UTI that is likely further exacerbating this.    #syncopal episodes: secondary to profound orthostatic hypotension, dysautonomia, exacerbated currently by UTI  -continue holding carvedilol, will assess whether she will tolerate restarting this either prior to d/c or as an outpt  -continue midodrine 10 mg TID  -working toward thigh high compressive stockings, currently has knee high, which seem to be helping. PT/OT/RN coordinating  -continue salt tabs  -push oral fluids  -PT/OT as tolerated  -Cardiology consulted, appreciate their willingness to evaluate, PPM not indicated at this time  -F/U with Dr. Gomes in Autonomic clinic.    #UTI: UCx resulted  -narrow ceftriaxone IV to cefdinir PO for 7 days    #eye discharge: Eye appears possibly infected, if so, most likely to be self-limited viral conjunctivitis. Also, if bacterial currently on abx (admittedly not topical). Will continue to monitor and if fails to clear, consider drops vs ophtho/IM consult.    Chronic, stable  # breast cancer - home palbocicilib, letrozole  # HLD - simvastatin  # hx L MCA stroke - continue warfarin for secondary stroke prevention    FEN: no mIVF, push PO fluids, lytes wnl, regular diet  PPx: on therapeutic warfarin  Code: Full    Disposition Plan   Expected discharge in 1-2 days to home vs TCU once she is able to maintain her blood pressure while upright. Appreciate PT/OT recs regarding disposition.     Entered: Lupe Lyn 10/01/2017, 7:23 AM     Patient was seen and discussed with Dr. Melara.    Lupe Lyn  G[facility] Neurology

## 2017-10-01 NOTE — PLAN OF CARE
Problem: Patient Care Overview  Goal: Plan of Care/Patient Progress Review  Discharge Planner PT   Patient plan for discharge: unknown  Current status: Max Ax2 for all transfer/ bed mobility.  Assists minimally with rolling.  Able to sit at EOB with CGAx1.    Barriers to return to prior living situation: Severe OH with change in positioning, hypertensive at rest.  Despite supine ex, thigh high ace wraps/ compression stockings and ABD binder, supine to sit BP shows difference from (systolic) 194 to 146.  Pt not safe to further mobilize.  However pt does appear comfortable sitting at EOB.    Recommendations for discharge: TCU pending medically stable to mobilize. IP PT will hold until Tuesday for any medical improvements.    Rationale for recommendations: In order to maximize mobility despite OH.  OT eval indicated due to recent change in fine motor skills and ability to feed self.         Entered by: Fortunato Polk 10/01/2017 5:53 PM

## 2017-10-02 PROBLEM — Z82.0 FAMILY HISTORY OF PARKINSONISM: Status: ACTIVE | Noted: 2017-01-01

## 2017-10-02 PROBLEM — Z92.89 H/O ELECTROENCEPHALOGRAM: Status: ACTIVE | Noted: 2017-01-01

## 2017-10-02 NOTE — PLAN OF CARE
Problem: Patient Care Overview  Goal: Plan of Care/Patient Progress Review  OT evaluation completed, treatment initiated.     Discharge Planner OT   Patient plan for discharge: None stated  Current status: Patient with expressive aphasia and requires increased time for processing, likely baseline cognition. Patient distractible with auditory and visual distractions during session, OT attempting to decrease environmental stimuli to improve performance. Patient demos decreased problem-solving through self-feeding, requires min-mod cues for appropriate use of utensils as patient attempts to eat mashed potates and gravy with fingers to avoid FM coordination of handling fork/knife.  Barriers to return to prior living situation: Decreased FM coordination, cognition, orthostatic hypotension limiting OOB activity  Recommendations for discharge: TCU  Rationale for recommendations: Increase participation in ADLs.       Entered by: Ruchi Gavin 10/02/2017 3:46 PM

## 2017-10-02 NOTE — PLAN OF CARE
Problem: Syncope (Adult)  Goal: Identify Related Risk Factors and Signs and Symptoms  Related risk factors and signs and symptoms are identified upon initiation of Human Response Clinical Practice Guideline (CPG).   Outcome: No Change  VS with HTN, also noted to have orthostatic hypotension. Oriented only to self. Denies pain. Incontinent this shift, brief in place. BS+. Pt taking oral chemo med, on chemo precautions. PIV SL. Up with A2/GB while working with therapy. Reg diet. Cont to monitor and with POC.

## 2017-10-02 NOTE — PROGRESS NOTES
10/01/17 1700   Quick Adds   Type of Visit Initial Occupational Therapy Evaluation   Living Environment   Lives With spouse   Living Arrangements house   Home Accessibility stairs to enter home;stairs within home   Number of Stairs to Enter Home 8   Number of Stairs Within Home 10   Stair Railings at Home inside, present at both sides   Living Environment Comment OT obtaining home information through yes/no questions with patient from notes taken from PT evaluation note. Pt very limited in communication 2/2 expressive aphasia and increased processing time.   Self-Care   Dominant Hand right   Usual Activity Tolerance fair   Current Activity Tolerance poor   Regular Exercise no   Functional Level Prior   Ambulation 3-->assistive equipment and person   Transferring 3-->assistive equipment and person   Toileting 3-->assistive equipment and person   Bathing 3-->assistive equipment and person   Dressing 3-->assistive equipment and person   Eating 3-->assistive equipment and person   Communication 2-->difficulty speaking (not related to language barrier)   Swallowing 0-->swallows foods/liquids without difficulty   Cognition 2 - difficulty with organizing thoughts   Fall history within last six months yes   Number of times patient has fallen within last six months 10   Prior Functional Level Comment Information taken from PT note as pt very distractible during evaluation.   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 She was found to have a UTI on admission likely exacerbating hypotension   Precautions/Limitations fall precautions   Weight-Bearing Status - LUE full weight-bearing   Weight-Bearing Status - RUE full weight-bearing   Weight-Bearing Status - LLE full weight-bearing   Weight-Bearing Status - RLE full weight-bearing   Cognitive Status Examination   Orientation person;place   Level of Consciousness confused;lethargic/somnolent   Able to Follow Commands mild impairment   Personal Safety (Cognitive) mild impairment   Memory impaired   Attention Quiet environment required;Sustained attention impaired   Organization/Problem Solving Problem solving impaired   Cognitive Comment Pt demos cognitive deficits leading to difficulty with evaluation as patient perseverating on meal tray once is has arrived, unable to answer eval questions between eating   Visual Perception   Visual Perception Comments Pt reports difficulty with vision, with increased questioning pt reports she has glasses but they are not present at hospital.   Range of Motion (ROM)   ROM Comment NT 2/2 decreased command following and perseveration on food   Hand Strength   Hand Strength Comments Limited demo'd functionally with self-feeding   Coordination   Fine Motor Coordination Limited demo'd functionally with self-feeding   Activities of Daily Living Analysis   Impairments Contributing to Impaired Activities of Daily Living cognition impaired;coordination impaired;strength decreased   General Therapy Interventions   Planned Therapy Interventions ADL retraining;cognition;fine motor coordination training   Clinical Impression   Criteria for Skilled Therapeutic Interventions Met yes, treatment indicated   OT Diagnosis Decreased IND in ADLs   Influenced by the following impairments Altered cognition, decreased FM corodination and strength for ADLs   Assessment of Occupational Performance 3-5  "Performance Deficits   Identified Performance Deficits Dressing, self-feeding, safety   Clinical Decision Making (Complexity) Low complexity   Therapy Frequency 3 times/wk   Predicted Duration of Therapy Intervention (days/wks) 2 weeks   Anticipated Equipment Needs at Discharge (Unsure what is present in home, will ask  when availa)   Anticipated Discharge Disposition Transitional Care Facility   Risks and Benefits of Treatment have been explained. Yes   Patient, Family & other staff in agreement with plan of care Yes   Clinical Impression Comments Limited evaluation information due to patient's cognition and perseveration on food tray, will continue to follow up.   [facility]-PAC TM \"6 Clicks\"   2016, Trustees of [facility], under license to [employer].  All rights reserved.   6 Clicks Short Forms Basic Mobility Inpatient Short Form   [facility]-PAC  \"6 Clicks\" V.2 Basic Mobility Inpatient Short Form   1. Turning from your back to your side while in a flat bed without using bedrails? 2 - A Lot   2. Moving from lying on your back to sitting on the side of a flat bed without using bedrails? 2 - A Lot   3. Moving to and from a bed to a chair (including a wheelchair)? 1 - Total   4. Standing up from a chair using your arms (e.g., wheelchair, or bedside chair)? 2 - A Lot   5. To walk in hospital room? 1 - Total   6. Climbing 3-5 steps with a railing? 1 - Total   Basic Mobility Raw Score (Score out of 24.Lower scores equate to lower levels of function) 9   Total Evaluation Time   Total Evaluation Time (Minutes) 13     "

## 2017-10-02 NOTE — PROGRESS NOTES
"[facility], [place]   Neurology Daily Note    Helen Younger  [id number]  10/02/2017    Subjective Data:  Blood pressure 188-194 systolic overnight    Objective Data:   BP (!) 194/106  Pulse 64  Temp 98.2  F (36.8  C) (Oral)  Resp 16  Ht 1.575 m (5' 2\")  Wt 81 kg (178 lb 9.2 oz)  SpO2 97%  BMI 32.66 kg/m2  SpO2 data collected on RA     Neurologic:  General: pt laying comfortably in bed and in NAD  HEENT: no oral ulcers, no injection of sclera or conjunctiva  Chest: no respiratory distress  Heart: RRR  Ext: no edema  Skin: no rashes  Neuro:   -MS: awake & alert, able to say name & location, not able to verbalize why she is in the hospital. Follows 1-step commands, but has difficulty with multistep commands. Can repeat sentence but makes some errors. Can name most objects. Minimal spontaneous speech & only responds with 1-2 words.   -CN: PERRL, EOMI, facial sensation and movement intact b/l, hearing intact to conversation, palate raises symmetrically, shoulder shrug and scm intact, tongue midline. Mild dysarthria.  -Motor: tone increased R>L  --LUE: 5/5 shoulder abd, arm flex/ext, wrist flex/ext, finger flex/ext/abd/add  --RUE: 4/5 shoulder abd, arm flex/ext, wrist flex/ext, finger flex/ext/abd/add  --LLE: 5/5 hip flexor, leg flex/ext, plantar/dorsiflexion  --RLE: 5/5 hip flexor, leg flex/ext, plantar/dorsiflexion  -Reflexes: slightly brisker on R side compared to left.  -Coordination: intact to FNF b/l.    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 She was found to have a UTI on admission likely exacerbating hypotension.     #Syncopal episodes: secondary to profound orthostatic hypotension, dysautonomia, exacerbated currently by UTI.  Unclear etiology for severe dysautonomia though MSA remains in the differential.   - Paraneoplastic panel for causes of dysautonomia as patient does have history of breast cancer  - continue holding carvedilol, will assess whether she will tolerate restarting this either prior to d/c or as an outpt  - Decrease midodrine to 5 mg TID as blood pressure was too elevated overnight  - thigh high compressive stockings, currently has knee high, which seem to be helping. PT/OT/RN coordinating  - continue salt tabs  - Encourage oral fluids  - PT/OT as tolerated  - Cardiology consulted, appreciate their willingness to evaluate, PPM not indicated at this time  - F/U with Dr. Gomes in Autonomic clinic.     #UTI: UCx resulted  -Cefdinir PO day 3/7     #Eye discharge: improved. Most likely viral conjunctivitis. Will continue to monitor.     Chronic, stable  # breast cancer - home palbocicilib, letrozole  # HLD - simvastatin  # hx L MCA stroke - continue warfarin for secondary stroke prevention     FEN: no mIVF, push PO fluids, regular diet  PPx: on therapeutic warfarin  Code: Full    Dispo: Likely TCU on discharge    Patient was seen and discussed with attending neurologist, Dr. Kelton Macedo MD  Neurology PGY2  [phone]

## 2017-10-02 NOTE — PLAN OF CARE
Problem: Patient Care Overview  Goal: Plan of Care/Patient Progress Review  Patient was just repositioned now, BP has been high, notified MD who stated that, that they are aware of that, and the meds she is taking will continue to make BP high, but the last BP check this afternoon is about 140/88, better than the rest, alert and mildly confused, good appetite, no skin breakdown, incontinent, continue to monitor.

## 2017-10-02 NOTE — PLAN OF CARE
"Problem: Patient Care Overview  Goal: Discharge Needs Assessment  Goal: Optimal Emotional/Functional [place]  Patient will demonstrate the desired outcomes by discharge/transition of care.   D/I: patient remains on 6A for syncope monitoring  Neuro- oriented x self, family, \"[facility]\" setting with choices. Pt lethargic at times, has expressive aphasia and generalized weakness from previous stroke in 2000.   CV- blood pressure varies, MDs aware of hypotension and htn entire shift. Orthostatic pressure apparent again today during PT session, MDs aware.  Pulm- on RA with sats in mid-upper 90s.  GI- Tolerated regular diet with fair appetite, needs tray set-up. No BM this shift.  - voided x 5 today, incontinent each time. Hydrating well with PO intake.  Skin- intact ex generalized skin sensitivity to touch at baseline, per patient's daughter.  Gtts- sl'd  ID- chemo precautions, takes oral chemo. Pt has UTI, treating with cefdinir starting at 2000.  Labs- WNL  Pain- denied today  Activity- up with heavy 2 to stand, dangle today x 2.  Drains-n/a  Social-daughter at bedside, helpful and supportive.  CRRT-n/a  See flow sheets for further details and assessments.  A: read PT notes for specifics regarding Orthostatic htn episode today, similar event as yesterday. Pt is probable discharge to TCU when medically ready.  P: continue to monitor, notify MD of significant changes.      "

## 2017-10-03 NOTE — PLAN OF CARE
Problem: Patient Care Overview  Goal: Plan of Care/Patient Progress Review  [id number]  Low grade temp, 99.6. BP taken on L forearm, 154/83 lying on right side. Neuro status unchanged. Displays expressive aphasia. Able to respond with one word responses. Did display difficulty w/ swallowing potassium pill, rolled pill around in mouth. Swallows clear liquids without issue. Incontinent of urine x1. Had a large, hard BM. Could benefit from stool softeners. Turning q2-4h. Total linen change and gown change done.

## 2017-10-03 NOTE — PLAN OF CARE
Problem: Patient Care Overview  Goal: Plan of Care/Patient Progress Review  Outcome: No Change  No change from previous note. Pt d/o time and situation. Slow to respond, responds with 1-2 words. Generalized weakness. No pain. Family present and supportive. Regular diet, family assisted with ordering. IV SL. Will d/c to TCU when placement found. Continue with POC.

## 2017-10-03 NOTE — PLAN OF CARE
Problem: Patient Care Overview  Goal: Plan of Care/Patient Progress Review  Outcome: No Change   BP was hypo this am, MD notified, refer to flowsheet for details.  Neuros: A&Ox2.  Speaks 1-2 words (softly) d/t expressive aphasia.  Hx: Stroke in 2000.  Right side < Left side.  Follows most commands.  Pamela regular diet well. Needs tray set-up, able to feed self with assist.  Turned/repositioned Q2hrs.  Incontinent urine with each turn.  Getting treatment for a UTI.  On Chemo precautions.  Family at bedside.  Continue with current POC.

## 2017-10-03 NOTE — PLAN OF CARE
Problem: Patient Care Overview  Goal: Plan of Care/Patient Progress Review  VSS except hypotensive when sitting up with PT this evening. Disoriented to time and situation. Neuro unchanged: Expressive aphasia (one-two word responses; does not respond intermittently); follows most commands, R side < L side. Denies N/V and pain. Regular diet; tray set up, able to eat most of meal with a fork on her own. Incontinent of urine. T&R Q2. Chemo precautions. Plan for TCU placement, yet family continues to be concerned with BP's and med management prior to discharge. Continue to monitor and follow current POC.

## 2017-10-03 NOTE — PLAN OF CARE
Problem: Patient Care Overview  Goal: Plan of Care/Patient Progress Review  Outcome: No Change  A&O x1-2, speaks in 1-2 words at a time d/t expressive aphasia. Right side weaker than left from stroke in 2000. Able to follow most commands. VSS except BP elevated, MD aware and will follow up in am. Assisted to turn and reposition Q 2-3 hours. Incontinent of urine x2- currently with UTI.  Regular diet, chemo precautions.  Plan: Continue with POC

## 2017-10-03 NOTE — PROGRESS NOTES
"[facility], [place]   Neurology Daily Note    Helen Younger  [id number]  10/03/2017    Subjective Data:  BP 180s systolic overnight. Hypotensive when changing positions.     Objective Data:   /72  Pulse 65  Temp 97.9  F (36.6  C) (Oral)  Resp 16  Ht 1.575 m (5' 2\")  Wt 81 kg (178 lb 9.2 oz)  SpO2 97%  BMI 32.66 kg/m2  SpO2 data collected on RA     Neurologic:  General: pt laying comfortably in bed and in NAD  HEENT: no oral ulcers, no injection of sclera or conjunctiva  Chest: no respiratory distress  Heart: RRR  Ext: no edema  Skin: no rashes  Psych: likely pseudobulbar affect  Neuro:   -MS: awake & alert, able to say name & location, not able to verbalize why she is in the hospital. Follows 1-step commands, but has difficulty with multistep commands. Can repeat sentence but makes some errors. Can name most objects. Minimal spontaneous speech & only responds with 1-2 words.   -CN: PERRL, EOMI, facial sensation and movement intact b/l, hearing intact to conversation, palate raises symmetrically, shoulder shrug and scm intact, tongue midline. Mild dysarthria.  -Motor: tone increased R>L  --LUE: 5/5 shoulder abd, arm flex/ext, wrist flex/ext, finger flex/ext/abd/add  --RUE: 4/5 shoulder abd, arm flex/ext, wrist flex/ext, finger flex/ext/abd/add  --LLE: 5/5 hip flexor, leg flex/ext, plantar/dorsiflexion  --RLE: 5/5 hip flexor, leg flex/ext, plantar/dorsiflexion  -Reflexes: slightly brisker on R side compared to left.  -Coordination: intact to FNF b/l.    Labs:  Recent Labs   Lab Test  09/28/17   1925  08/28/17   1340  07/25/17   1424   HGB  9.8*  10.4*  9.6*   WBC  2.9*  2.8*  2.0*   PLT  219  179  195   NA  142  139  140   POTASSIUM  3.6  3.7  3.6   CR  1.20*  1.21*  1.21*   BUN  23  21  17   GLC  151*  180*  182*     Imaging: No new imaging     Assessment and Plan:  Helen Younger is a 77 year old year old female h/o remote L MCA stroke secondary to petrous ICA fibromuscular " dysplasia with residual expressive aphasia who presented 9/28/2017 with worsening of ongoing severe dysautonomia manifested as invariable orthostatic hypotension and spells of syncope/near syncope whenever upright. She was found to have a UTI on admission likely exacerbating hypotension. Overall episodes of hypotension have minimally improved despite medical management. Medically stable for discharge to TCU. Discussed with  today and he will be having a family meeting tonight to discuss hospice vs TCU.      #Syncopal episodes: secondary to profound orthostatic hypotension, dysautonomia, exacerbated currently by UTI.  Unclear etiology for severe dysautonomia though MSA remains in the differential.   - Discussed with Dr. Gomes today and will start mestinon 30mg q8hr as this will not have as much an affect on supine BP as midodrine.     - Paraneoplastic panel for causes of dysautonomia as patient does have history of breast cancer  - Continue holding carvedilol, will assess whether she will tolerate restarting this either prior to d/c or as an outpt  - Continue midodrine 5 mg TID   - thigh high compressive stockings, currently has knee high, which seem to be helping. PT/OT/RN coordinating  - continue salt tabs  - Encourage oral fluids  - PT/OT as tolerated  - Cardiology consulted, appreciate their willingness to evaluate, PPM not indicated at this time  - F/U with Dr. Gomes in Autonomic clinic.      #UTI: UCx resulted  -Cefdinir PO day 4/7      #Eye discharge: improved. Most likely viral conjunctivitis. Will continue to monitor.      Chronic, stable  # breast cancer - home palbocicilib, letrozole. Family to discuss possibility of pursuing hospice tonight.  # HLD - simvastatin  # hx L MCA stroke - continue warfarin for secondary stroke prevention      FEN: no mIVF, push PO fluids, regular diet  PPx: on therapeutic warfarin  Code: Full (will discuss code status this afternoon with family)     Dispo:  Likely TCU on discharge vs hospice.     Patient was seen and discussed with attending neurologist, Dr. Kelton Macedo MD  Neurology PGY2  [phone]

## 2017-10-03 NOTE — PLAN OF CARE
Problem: Patient Care Overview  Goal: Plan of Care/Patient Progress Review  Discharge Planner PT   Patient plan for discharge: Family is agreeable to TCU once medically stable.  Per CC,  may be interested in hospice as well.   Current status: Max Ax2 with bed mobility. Able to maintain sitting with SBAx1.  Medically, unable to tolerate standing today due to OH, however decreased drop in BP from supine<>sit.  Supine /92, initially sits with systolic BP:122, following sitting x 2-3 min and HEP, pt BP drops to 102/79 and is symptomatic (fatigue, dizzy).    Barriers to return to prior living situation: OH, cannot tolerate more than few min in sitting before drop in BP. Not medically appropriate for additional activity at this time, although OH does show improvements today.    Recommendations for discharge: TCU vs hospice  Rationale for recommendations: Pt significantly below baseline functional mobility.         Entered by: Fortunato Polk 10/03/2017 5:03 PM

## 2017-10-04 NOTE — PLAN OF CARE
Problem: Patient Care Overview  Goal: Plan of Care/Patient Progress Review  PT 6A: Cancel.  Scheduled care conference today.  Will reschedule tomorrow.

## 2017-10-04 NOTE — PLAN OF CARE
Problem: Patient Care Overview  Goal: Plan of Care/Patient Progress Review  Outcome: No Change  A&O x1-2, speaks in 1-2 words at a time d/t expressive aphasia. Right side weaker than left from stroke in 2000. Able to follow most commands. VSS, assisted to turn and reposition Q 2-3 hours. Incontinent of urine x2- currently with UTI. BM x1.  Regular diet, chemo precautions. No signs of orthostatic hypotention overnight, but was not upright or OOB.  Plan: Continue with POC. Possible d/c to care center today as originally planned if new meds are effective.

## 2017-10-04 NOTE — PROGRESS NOTES
"[facility], [place]   Neurology Daily Note    Helen Younger  [id number]  10/04/2017    Subjective Data:  Blood pressure more stable in supine position overnight.     Objective Data:   /75  Pulse 65  Temp 97.8  F (36.6  C) (Axillary)  Resp 16  Ht 1.575 m (5' 2\")  Wt 81 kg (178 lb 9.2 oz)  SpO2 96%  BMI 32.66 kg/m2  SpO2 data collected on RA     Neurologic:  General: Patient lying comfortably in bed and in NAD  HEENT: no oral ulcers, no injection of sclera or conjunctiva  Chest: no respiratory distress  Heart: RRR  Ext: no edema  Skin: no rashes  Psych: likely pseudobulbar affect  Neuro:   -MS: awake & alert, able to say name & location, not able to verbalize why she is in the hospital. Follows 1-step commands, but has difficulty with multistep commands. Can repeat sentence but makes some errors. Can name most objects. Minimal spontaneous speech & only responds with 1-2 words.   -CN: PERRL, EOMI, facial sensation and movement intact b/l, hearing intact to conversation, palate raises symmetrically, shoulder shrug and scm intact, tongue midline. Mild dysarthria.  -Motor: tone increased R>L  --LUE: 5/5 shoulder abd, arm flex/ext, wrist flex/ext, finger flex/ext/abd/add  --RUE: 4/5 shoulder abd, arm flex/ext, wrist flex/ext, finger flex/ext/abd/add  --LLE: 5/5 hip flexor, leg flex/ext, plantar/dorsiflexion  --RLE: 5/5 hip flexor, leg flex/ext, plantar/dorsiflexion  -Reflexes: slightly brisker on R side compared to left.  -Coordination: intact to FNF b/l.    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 She was found to have a UTI on admission likely exacerbating hypotension. Overall episodes of hypotension have minimally improved despite medical management. Hypertension in supine position improved with mestinon. Medically stable for discharge to TCU. Will discuss with  today about goals of care and hospice vs TCU.      #Syncopal episodes: secondary to profound orthostatic hypotension, dysautonomia, exacerbated currently by UTI.  Unclear etiology for severe dysautonomia though MSA remains in the differential.   - Discussed with Dr. Gomes and started mestinon. Increase to 60mg q8hr as this will not have as much an affect on supine BP as midodrine.     - Paraneoplastic panel for causes of dysautonomia as patient does have history of breast cancer  - Continue holding carvedilol, will assess whether she will tolerate restarting this either prior to d/c or as an outpt  - Continue midodrine 5 mg TID   - Thigh high compressive stockings  - Continue salt tabs  - Encourage oral fluids  - PT/OT as tolerated  - Cardiology consulted, appreciate their willingness to evaluate, PPM not indicated at this time  - F/U with Dr. Gomes in Autonomic clinic.      #UTI: UCx resulted  -Cefdinir PO day 5/7      #Eye discharge: improved. Most likely viral conjunctivitis. Will continue to monitor.      Chronic, stable  # breast cancer - home palbocicilib, letrozole. Family to discuss possibility of pursuing hospice.  # HLD - simvastatin  # hx L MCA stroke - continue warfarin for secondary stroke prevention      FEN: no mIVF, push PO fluids, regular diet  PPx: on therapeutic warfarin  Code: Full       Dispo: Likely TCU on discharge vs hospice.      Patient was seen and discussed with attending  neurologist, Dr. Kelton Macedo MD  Neurology PGY2  [phone]

## 2017-10-04 NOTE — PROGRESS NOTES
Social Work Services Progress Note    Hospital Day: 7    Collaborated with:  , daughter (Evonne), SNF Admissions Coordinators, Dr. Zepeda    Data:  Per Neurology, pt is medically ready for discharge.    Intervention:  SW placed follow up calls to :  1.  [facility] - spoke with Gertrudis in Admissions who indicates that pt's chemo medication is a barrier to acceptance as it cost $500.00 per day.  2.  [facility] - per Sera, Admissions, pt is assessed and approved for admit.    Met with pt's  and daughter who report that they met last night to discuss planning for pt.  Hospice was discussed and they are not sure they want to pursue this.  They are unsure about how pt feels about discontinuing some of her meds and when they approached pt about this, pt was unsure.  Pt's  indicates that pt's chemo has helped to stabilize pt.  Family members feel that pt is still experiencing quality in her life as she is fullfilled by interactions with her family.   is concerned about whether or not a TCU stay will add value as he indicates that pt's blood pressure is not under control which limits what the therapist can do with pt and also limits progress.   feels that pt is not medically ready for discharge due to the problems with blood pressure.   discussed pt's autonomic dysfunction and possible UTI.  Per , chemo was initiated 1/2016 and is provided by the [facility].  Pt's  states that he may appeal the discharge if he does not feel that pt is medically ready for discharge.  Per discussion, pt's  would like an opportunity to talk with Neurology to discuss pt's BP and pt's  would like to meet with hospice to further discuss in order to aide him in decision making in regards to discharge planning.    SW contacted Dr. Zepeda who indicates that Neurology will meet with pt's  today to discuss his concerns related to pt's  BP.    SW phoned India Mercer ([facility], [facility] liaison) and left a message for her in regards to scheduling a hospice informational.     anticipates being at the hospital by 11am tomorrow.      Assessment:  Pt's  does not feel that pt is medically ready for discharge at this time.  Pt's  is in the decision making process in regards to disposition.    Plan:    Anticipated Disposition:  To be determined    Barriers to d/c plan:  Pt's  has concerns related to pt's blood pressure.    Follow Up:  SW will continue to follow.    SAVANA Ryder  Social Work, 6A  Phone:  [phone]  Pager:  [phone]  10/5/2017      SAVANA Ryder  Social Work, 6A  Phone:  [phone]  Pager:  [phone]  10/5/2017

## 2017-10-04 NOTE — PLAN OF CARE
Problem: Patient Care Overview  Goal: Plan of Care/Patient Progress Review  OT 6A: Cancel. Patient with care conference this PM, will reschedule OT treatment for 10/5 pending appropriateness for continued therapy.

## 2017-10-05 NOTE — PROGRESS NOTES
[facility]:  S/Rn/Writer met with pt, spouse German and their friend,Carmela Yari Feldmanalpesh . Provided education on hospice services and philosophy. Provided contact information/information on hospice.  B/Patient is 77 year old  female with syncopal episodes secondary to Dysautonomia, breast cancer currently at [facility]. Spouse had good conversation with oncology team that supports comfort focus, stopping chemotherapy. Had past stay at [facility]. Spouse indicates adult dtrs, patient and spouse are all in agreement for the plan of comfort care at home with hospice services. Writer ordered hospital bed/siderail, obt, and VANGIE overlay to be delivered on 10/6/17 between 12&4pm spoke with Zayda/JEFFRY. Transport likely will need to be arranged, barrier will be getting patient up the steps as not a wide stairwell per spouse. Team updated floor nurse and Ebony, .   A/Patient in agreement with hospice said to team /this sounds good/. Spouse appropriately tearful. Support of  during visit.  R/Admit team to meet at patient home on 10/7/17 at 1:00pm.   [facility] to deliver equipment on 10/6/17 12&4pm.  Thank you for this referral. Please call for questions or change in plan.

## 2017-10-05 NOTE — PROGRESS NOTES
"Social Work Services Progress Note    Hospital Day: 8    Collaborated with:  [facility], pt's     Data:  [facility] representatives met with pt,  and clergy this afternoon and completed a hospice informational.   has elected to sign pt into hospice.  [facility] will arrange for DME delivery (hospital bed, bedside table, mattress overlay) to be delivered to pt's home on 10/6/17 between the hours of 12 noon and 4pm.  [facility] (RN) will arrive to pt's home on 10/7/17 at 1pm to sign pt into hospice.      Intervention:  Spoke with [facility] RN and SW.  Confirmed above plan with pt's .  Arranged for [facility] (Katherine [phone]) to provide pt with stretcher transport to home on 10/7/17 at 11:30am.  Completed a \"Physicians Certification for Transportation\" form which has been placed in the front of pt's chart.  Communicated discharge plan to the 6A Charge Nurse.    Assessment: Pt's  voices understanding of the discharge plan and agreement with the discharge plan.    Plan:    Anticipated Disposition:  Home with hospice on 10/7/17 at 11:30am.    Barriers to d/c plan:  None identified at this time    Follow Up:  SW available should add'l needs arise.    SAVANA Ryder  Social Work, 6A  Phone:  [phone]  Pager:  [phone]  10/5/2017        "

## 2017-10-05 NOTE — PROGRESS NOTES
"[facility], [place]   Neurology Daily Note    Helen Younger  [id number]  10/05/2017    Subjective Data:  No acute events overnight    Objective Data:   /86  Pulse 62  Temp 99.1  F (37.3  C) (Oral)  Resp 16  Ht 1.575 m (5' 2\")  Wt 81 kg (178 lb 9.2 oz)  SpO2 96%  BMI 32.66 kg/m2  SpO2 data collected on RA     General: Patient lying comfortably in bed and in NAD  HEENT: no oral ulcers, no injection of sclera or conjunctiva  Chest: no respiratory distress  Heart: RRR  Ext: no edema  Skin: no rashes  Psych: likely pseudobulbar affect  Neuro:   -MS: awake, alert, able to say name & location, not able to verbalize why she is in the hospital. Follows 1-step commands. Can name most objects. Minimal spontaneous speech and only responds with 1-2 words.   -CN: PERRL, EOMI, facial sensation and movement intact b/l, hearing intact to conversation, palate raises symmetrically, shoulder shrug and scm intact, tongue midline. Mild dysarthria.  -Motor: tone increased R>L  --LUE: 5/5 shoulder abd, arm flex/ext, wrist flex/ext, finger flex/ext/abd/add  --RUE: 4/5 shoulder abd, arm flex/ext, wrist flex/ext, finger flex/ext/abd/add  --LLE: 5/5 hip flexor, leg flex/ext, plantar/dorsiflexion  --RLE: 5/5 hip flexor, leg flex/ext, plantar/dorsiflexion  -Reflexes: slightly brisker on R side compared to left.    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 She was found to have a UTI on admission likely exacerbating hypotension. Overall episodes of hypotension have minimally improved despite medical management. Hypertension in supine position improved with mestinon. Medically stable for discharge to TCU. Will discuss with  today about goals of care and hospice vs TCU.      #Syncopal episodes: secondary to profound orthostatic hypotension, dysautonomia, exacerbated currently by UTI.  Unclear etiology for severe dysautonomia though MSA remains in the differential.   - Discussed with Dr. Gomes and started mestinon. Increase to 90mg q8hr as this will not have as much an affect on supine BP as midodrine.     - Paraneoplastic panel for causes of dysautonomia as patient does have history of breast cancer  - Continue holding carvedilol, will assess whether she will tolerate restarting this either prior to d/c or as an outpt  - Continue midodrine 5 mg TID   - Thigh high compressive stockings  - Continue salt tabs  - Encourage oral fluids  - PT/OT as tolerated  - Cardiology consulted, appreciate their willingness to evaluate, PPM not indicated at this time  - F/U with Dr. Gomes in Autonomic clinic.      #UTI: UCx resulted  -Cefdinir PO day 7/7      #Eye discharge: improved. Most likely viral conjunctivitis. Will continue to monitor.      Chronic, stable  # breast cancer - home palbocicilib, letrozole.  coming today to discuss possibility of pursuing hospice.  # HLD - simvastatin  # hx L MCA stroke - continue warfarin for secondary stroke prevention      FEN: no mIVF, push PO fluids, regular diet  PPx: on therapeutic warfarin  Code: Full        Dispo: Discussion with  today again about hospice vs TCU      Patient was seen and discussed with attending neurologist, Dr. Kelton Macedo MD  Neurology PGY2  [phone]

## 2017-10-05 NOTE — PLAN OF CARE
Problem: Patient Care Overview  Goal: Plan of Care/Patient Progress Review  Discharge Planner OT   Patient plan for discharge: TCU at time of treatment, since huddling with team members this PM patient to discharge home with hospice.  Current status: Patient performs self-feeding min A from OT for one bite, performs hair combing min A with max encouragement. Patient limited by fatigue.  Barriers to return to prior living situation: Decreased strength/endurance, cognition  Recommendations for discharge: Home with hospice  Rationale for recommendations: Higher level of care       Entered by: Ruchi Gavin 10/05/2017 3:17 PM      Per chart review and discussion with other care team members, patient to discharge home with hospice. PT to continue to follow for evaluation of mobility. OT was following for FM and self-feeding, patient to receive assist from home care hospice with these ADLs upon discharge.     Occupational Therapy Discharge Summary     Reason for therapy discharge:    Discharged to Home with hospice     Progress towards therapy goal(s). See goals on Care Plan in Epic electronic health record for goal details.  Goals partially met.  Barriers to achieving goals:   limited tolerance for therapy.     Therapy recommendation(s):    No further therapy is recommended.

## 2017-10-05 NOTE — PROGRESS NOTES
Social Work Services Progress Note    Hospital Day: 8    Collaborated with:  Dr. Macedo, [facility] ([facility] [phone]), Jolie Farley ([facility] [phone]).      Data:  SW is following for disposition planning.    Intervention:  SW spoke with Dr. Zepeda who confirms that he met with pt and family yesterday to discuss their concerns related to pt's BP, who indicates that pt's Mestinon was increased and who anticipates readiness for discharge tomorrow.  SW phoned [facility] ([facility] [phone]) and arranged for a hospice informational to take place at the hospital (with pt/) today at 1pm.  SW received a call from Jolie Farley ([facility] [phone]) who indicated that pt's  contacted her and indicated that he plans to take pt home with hospice.  SW met with pt,  and pt's home .   SW updated pt,  and  in regards to hospice informational today at 1pm.   confirms desire to take pt home with hospice and he voices awareness that pt may be ready for discharge tomorrow.   SW confirmed that SW spoke with Jolie Farley from the [facility].      Assessment:  Pt's  is leaning toward home with hospice.    Plan:    Anticipated Disposition: Await outcome of hospice informational.    Barriers to d/c plan:  None identified at this time.    Follow Up:  SW will continue to follow.    SAVANA Ryder  Social Work, 6A  Phone:  [phone]  Pager:  [phone]  10/5/2017

## 2017-10-05 NOTE — CONSULTS
This writer and THELMA Lynn met with patient,  Jeremy and  Carmela. Hospice philosophy and what hospice covers and does not cover were discussed. Jeremy expressed that Helen would like to go home and not a care center, Jeremy is in agreement with this plan. Per Dr Dmitry Melara, he would like to try increasing the medication to keep her BP up tonight and will not be ready for discharge until tomorrow after 3 pm. We discussed this with  and patient and elected to have equipment delivered tomorrow between 12-4pm the [place] Saturday morning and hospice to meet at the home at 1pm Saturday to go over medications and cares. Please call hospice if there are any changes in plan of care. [phone]    Patient will need go home with the following medications...    Atropine drops 1%, give 2-4 drops every 2 hours prn secretions #1 bottle  Acetaminophen supps, give 650mg rectall every 4 hours prnfever #2 supps  Bisacodyl Supps 10mg, give daily to twice daily prn constipation #2 supps  Lorazepam 0.5mg tablets, give 1/2-1 tablet every 4 hours prn anxiety #30  Morphine 20 mg /ml give 2.5mg-5mg every 2 hours prn pain or sob,  15mL bottle  Haloperidol 0.5mg tablets give 1-2 tablets every 6 hours prn agitation, nausea restlessness #30  Senna 1-2 tablets 1-2 times daily prn constipation #100

## 2017-10-05 NOTE — PLAN OF CARE
Problem: Patient Care Overview  Goal: Plan of Care/Patient Progress Review  Discharge Planner PT   Patient plan for discharge: home with hospice   Current status: Pt inconsistently following simple commands throughout session. BP obtained in supine on L forearm: 147/72 (WV 41). Pt performed rolling in bed with min A and inc cues - performed supine>sit with min A for management of trunk - BP in sitting 132/84 (WV 86). With RN present, pt performed sit<>stand transfers with min A x 2 - tolerated standing for ~2' while BP obtained: 91/31 (). Returned to supine with min A for management of LEs. BP in supine: 156/78 (WV 43).   Barriers to return to prior living situation: OH   Recommendations for discharge: as planned   Rationale for recommendations: Dec activity tolerance secondary to OH unsafe for most OOB mobility secondary to symptomatic OH/        Entered by: Sherron White 10/05/2017 4:52 PM

## 2017-10-05 NOTE — PLAN OF CARE
Problem: Patient Care Overview  Goal: Plan of Care/Patient Progress Review  Outcome: No Change  A&Oxself. AVSS. Pt denies pain. PMH: L MCA, breast cancer, UTI. Neuro s: expressive aphasia, BUE 5/5, BLE 4/5, R grasp < L grasp, follows commands. Pt turned and repositioned every 2 hours; incontinent of urine. Brief in place; nadege cares done. Blanchable redness noted to R buttock; barrier cream prn. Up with 2/lift to chair yesterday with nursing; close monitoring of BP while up. Pt awoke scared overnight; asking staff to call police. Writer ensured pt she was safe and stayed with her until she felt comfortable again. Pt responds best when cares are explained to her prior to performing. Regular diet. Pt takes pills one at a time with sprite (dislikes water). Chemo precautions. PIV SL. Plan for TCU at [place]. Continue POC.

## 2017-10-05 NOTE — PLAN OF CARE
Pt admitted for severe orthostatic hypotension. &O x1-2, minimally speaks in 1-2 words at a time d/t expressive aphasia. Able to follow one step commands. IV SL. Incont of urine. No BM. BP stable when in bed. Pt in chair for dinner. Lifted with ceiling lift. BP elevated. See flow sheet. SBP remained above 140. Family at BS. Cont POC

## 2017-10-05 NOTE — PROGRESS NOTES
Returned call to patient's  German from [employer] message requesting patient to be placed on hospice after discussing with their two daughter's. Current plan is to DC patient to a TCU at the [facility]. Patient's  asking if it is possible to have patient at home with hospice providing care. Have paged inpatient  to discuss patient's families request for hospice at home.  Answered all patient's 's questions and verbalized understanding. Jolie Farley RN, BSN.

## 2017-10-06 NOTE — PLAN OF CARE
Problem: Patient Care Overview  Goal: Interdisciplinary Rounds/Family Conf  D/I: patient remains on 6A for monitoring of syncope and orthostatic hypotension  Neuro- unchanged, see flowsheet. O x self only. Expressive aphasia. Not impulsive  CV- Pt runs between 50s-90s at rest, NSR  Pulm- RA Sats in upper 90s. LS clear  GI- Tolerated regular diet, pt may need supervision at times if coordination is poor intermittently  - Voiding regularly, incontinent.  Skin- SL'd PIV to RUE  Gtts- SL'd  ID- n/a  Labs- WNL  Pain- denied, no signs/symptoms of pain  Activity- up with heavy 2 to stand, orthostatic hypotension  Drains-n/a  Social-  and daughters involved  CRRT-n/a  See flow sheets for further details and assessments.  A: Home hospice tomorrow, discharge per  arrangement.  P: continue to monitor, notify MD of significant changes.

## 2017-10-06 NOTE — PLAN OF CARE
Problem: Patient Care Overview  Goal: Plan of Care/Patient Progress Review  Discharge Planner PT   Patient plan for discharge: home with hospice  Current status: Attempted to see patient this morning for mobility and orthostatics, however patient at rest had BP of 104/61 and 101/63 prior to movement or change in position. Nursing requested to wait and return later as able due to low BP's in supine.   Barriers to return to prior living situation: orthostatic hypotension  Recommendations for discharge: home with hospice   Rationale for recommendations: Dec activity tolerance secondary to orthostatics and low BP's unsafe for most OOB mobility secondary to symptomatic orthostatic hypotension       Entered        Entered by: Mandy Masters 10/06/2017 11:29 AM

## 2017-10-06 NOTE — DISCHARGE SUMMARY
[facility], [place]    Neurology Discharge Summary    Date of Admission: 9/28/2017  Date of Discharge: 10/7/2017  Disposition: Discharged to home hospice  Primary Care Physician: Arin Shahid      Admission Diagnosis:   Orthostatic hypotension    Discharge Diagnosis:   Orthostatic hypotension    Problem Leading to Hospitalization (from [facility]):   Ms. Younger is a 77 year old woman with h/o left MCA stroke and invasive ductal carcinoma who presented to the ED with orthostatic hypotension.  She has been following with neurology outpatient for episodes of syncope since they caused a hospitalization last year.  She was started on florinef during the hospitalization which was stopped for unknown reasons.  She was started on midodrine outpatient which was increased to 10 mg BID at last clinic visit due to increasing frequency and severity of her symptoms.  She was also started NaCl tabs.  Her  reports that her symptoms have only become worse since the last clinic visit.  He can no longer get her out of bed, which is a significant decline from 1 week ago.  She becomes unresponsive and rigid when she stands up.  Episodes last anywhere from ten seconds to five minutes, and it is unclear if returning her to supine position aborts the episodes.       She has had dysautonomia testing at [facility] which was consistent with dysautonomia but was nonspecific.  She has had EEG tilt table testing which was normal.    Please see H&P dated 9/28/2017 for further details about presentation.    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 She was found to have a UTI on admission likely exacerbating  hypotension. Overall episodes of hypotension have slightly improved since starting mestinon in addition to midodrine. Plan to also continue compressive stockings and salt tabs. Unclear etiology for severe dysautonomia though MSA and paraneoplastic syndrome remains in the differential.  After discussion with  the patient would not have wanted to go to TCU and plan is to discharge home to hospice with history of breast cancer.       Other problems addressed during this hospitalization:      #UTI: completed 7 day course of cefdinir      #Eye discharge:resolved. Most likely viral conjunctivitis.      Chronic, stable  # breast cancer - home palbocicilib, letrozole. After discussion with , patient will be discharged home on hospice. Plan to continue chemotherapy agents for now.  will again discuss this with hospice team.   Per hospice recs: meds ordered on discharge.  Atropine drops 1%, give 2-4 drops every 2 hours prn secretions #1 bottle  Acetaminophen supps, give 650mg rectall every 4 hours prnfever #2 supps  Bisacodyl Supps 10mg, give daily to twice daily prn constipation #2 supps  Lorazepam 0.5mg tablets, give 1/2-1 tablet every 4 hours prn anxiety #30  Morphine 20 mg /ml give 2.5mg-5mg every 2 hours prn pain or sob,  15mL bottle  Haloperidol 0.5mg tablets give 1-2 tablets every 6 hours prn agitation, nausea restlessness #30  Senna 1-2 tablets 1-2 times daily prn constipation #100    # HLD - DC simvastatin as patient is on comfort cares  # hx L MCA stroke - continue warfarin for secondary stroke prevention. Discussed with  and will continue for now.     PERTINENT INVESTIGATIONS    Imaging:     Head CT:1. No acute intracranial pathology. 2. Stable chronic left MCA territory infarction. 3. Mild diffuse cerebral volume loss and probable sequelae of chronic small vessel ischemic disease.    CXR: Unchanged small left pleural effusion with slightly decreased left basilar atelectasis and/or  consolidation.    PHYSICAL EXAMINATION  Vital Signs:  B/P: 120/75, T: 98.1, P: 63, R: 16    General: Patient lying comfortably in bed and in NAD  Chest: no respiratory distress  Heart: RRR  Ext: no edema  Skin: no rashes  Neuro:   -MS: awake, alert, able to say name & location. Minimal spontaneous speech and only responds with 1-2 words.   -CN: PERRL, EOMI, facial sensation and movement intact b/l, hearing intact to conversation, palate raises symmetrically, shoulder shrug and scm intact, tongue midline. Mild dysarthria.  -Motor: moving all extremities spontaneously  -Reflexes: slightly brisker on R side compared to left.    Medications    Current Discharge Medication List      START taking these medications    Details   morphine 10 MG/5ML solution Take 1.25-2.5 mLs (2.5-5 mg) by mouth every 2 hours as needed for moderate to severe pain  Qty: 15 mL, Refills: 0    Associated Diagnoses: Malignant neoplasm of breast in female, estrogen receptor positive, unspecified laterality, unspecified site of breast (H)      LORazepam (ATIVAN) 0.5 MG tablet Take 0.5-1 tablets (0.25-0.5 mg) by mouth every 4 hours as needed for anxiety  Qty: 60 tablet, Refills: 0    Associated Diagnoses: SOB (shortness of breath)      acetaminophen (TYLENOL) 650 MG Suppository Place 1 suppository (650 mg) rectally every 4 hours as needed for fever  Qty: 60 suppository, Refills: 1    Associated Diagnoses: Malignant neoplasm of lower-outer quadrant of female breast, estrogen receptor negative, unspecified laterality (H)      pyridostigmine (MESTINON) 60 MG tablet Take 2 tablets (120 mg) by mouth 3 times daily  Qty: 90 tablet, Refills: 1    Associated Diagnoses: Dysautonomia orthostatic hypotension syndrome (H)      haloperidol (HALDOL) 0.5 MG tablet Take 1-2 tablets (0.5-1 mg) by mouth every 6 hours as needed for agitation  Qty: 60 tablet, Refills: 0    Associated Diagnoses: Malignant neoplasm of breast in female, estrogen receptor positive,  unspecified laterality, unspecified site of breast (H)      bisacodyl (DULCOLAX) 10 MG Suppository Place 1 suppository (10 mg) rectally 2 times daily as needed for constipation  Qty: 30 suppository, Refills: 1    Associated Diagnoses: Malignant neoplasm of breast in female, estrogen receptor positive, unspecified laterality, unspecified site of breast (H)      senna-docusate (SENOKOT-S;PERICOLACE) 8.6-50 MG per tablet Take 1-2 tablets by mouth 2 times daily as needed (constipation )  Qty: 100 tablet, Refills: 1    Associated Diagnoses: Malignant neoplasm of breast in female, estrogen receptor positive, unspecified laterality, unspecified site of breast (H)      atropine 1 % ophthalmic solution Take 2-4 drops by mouth, place under tongue or place inside cheek every 2 hours as needed for secretions  Qty: 1 Bottle, Refills: 1    Associated Diagnoses: Malignant neoplasm of breast in female, estrogen receptor positive, unspecified laterality, unspecified site of breast (H)         CONTINUE these medications which have CHANGED    Details   midodrine (PROAMATINE) 5 MG tablet Take 1 tablet (5 mg) by mouth 3 times daily (with meals)  Qty: 90 tablet, Refills: 0    Associated Diagnoses: Dysautonomia orthostatic hypotension syndrome (H)         CONTINUE these medications which have NOT CHANGED    Details   sodium chloride 1 GM tablet Take 1 tablet (1 g) by mouth daily  Qty: 30 tablet, Refills: 3    Associated Diagnoses: Dysautonomia      POTASSIUM CHLORIDE 10 MEQ tablet TAKE 1 TABLET BY MOUTH TWICE A DAY  Qty: 90 tablet, Refills: 3    Associated Diagnoses: Hypertension goal BP (blood pressure) < 140/90      palbociclib (IBRANCE) 100 MG capsule CHEMO Take 1 capsule (100 mg) by mouth daily with food Take for 21 days, then 7 days off. Avoid grapefruit. Do not open/crush/chew capsule.  Qty: 21 capsule, Refills: 2    Associated Diagnoses: Malignant neoplasm of female breast (H); Pleural effusion      warfarin (JANTOVEN) 1 MG tablet  Take 2 tablets (2 mg) by mouth daily Take 1 tablet (1mg) by mouth on Fridays. Take 2 tablets (2 mg) by mouth all other days of the week.  Qty: 20 tablet    Associated Diagnoses: History of stroke      letrozole (FEMARA) 2.5 MG tablet Take 1 tablet (2.5 mg) by mouth daily  Qty: 90 tablet, Refills: 3    Comments: Patient will take her home supply.  Associated Diagnoses: Malignant neoplasm of female breast, unspecified estrogen receptor status, unspecified laterality, unspecified site of breast (H)      !! order for DME Equipment being ordered: portable commode  Qty: 1 Units, Refills: 0    Associated Diagnoses: Physical deconditioning      !! order for DME Equipment being ordered:  Incontinence pads   Patient uses these tid  Qty: 100 each, Refills: 3    Associated Diagnoses: Mixed incontinence      !! order for DME Equipment being ordered: wheeled walker  Qty: 1 each, Refills: 0    Associated Diagnoses: Malignant neoplasm of female breast, unspecified laterality, unspecified site of breast; Pleural effusion      VIACTIV OR Take 1 chew tab by mouth 2 times daily. One in the morning and one at bedtime.     Associated Diagnoses: Chest pain       !! - Potential duplicate medications found. Please discuss with provider.      STOP taking these medications       carvedilol (COREG) 3.125 MG tablet Comments:   Reason for Stopping:         simvastatin (ZOCOR) 20 MG tablet Comments:   Reason for Stopping:         bisacodyl (DULCOLAX) 5 MG EC tablet Comments:   Reason for Stopping:         magnesium hydroxide (MILK OF MAGNESIA) 400 MG/5ML suspension Comments:   Reason for Stopping:         sennosides (SENOKOT) 8.6 MG tablet Comments:   Reason for Stopping:         Multiple Vitamins-Minerals (OCUVITE ADULT FORMULA PO) Comments:   Reason for Stopping:               Additional recommendations and follow up:      MED THERAPY MANAGE REFERRAL     Reason for your hospital stay   Orthostatic hypotension     Adult [facility] Follow-up and  recommended labs and tests   Follow up with hospice today.    Follow up with PCP as needed    Appointments on [place] and/or [facility] (with [facility] or [facility] provider or service). Call [phone] if you haven't heard regarding these appointments within 7 days of discharge.     Activity   Your activity upon discharge: activity as tolerated     When to contact your care team   Contact hospice care team with any questions or concerns.     Discharge Instructions   1) Follow up with hospice care team today  2) Follow up with PCP as needed     DNR/DNI     Diet   Follow this diet upon discharge: Orders Placed This Encounter     Combination Diet Regular Diet Adult       Patient was seen and discussed with the Attending, Dr. Louis.    Silvino Macedo MD  Neurology PGY2  [phone]      Neurology Staff Addendum  I have seen and evaluated the patient today and discussed with the resident team.  Greater than 30 minutes were spent in discharge preparation.       HOA LOUIS MD

## 2017-10-06 NOTE — PROGRESS NOTES
Reviewed nutrition risk factors due to LOS. Pt is tolerating a Regular diet, eating fair per nursing documentation; % of meals. Noted plan is to d/c home on hospice soon. RD will sign off at this time, unless consulted.       Porter Roque RD, LD  Neuro ICU  Pager: [phone]

## 2017-10-06 NOTE — PROGRESS NOTES
"[facility], [place]   Neurology Daily Note    Helen Younger  [id number]  10/06/2017    Subjective Data:  Discussion with  yesterday and would like to pursue home hospice    Objective Data:   /75 (BP Location: Left arm)  Pulse 63  Temp 98.1  F (36.7  C) (Axillary)  Resp 16  Ht 1.575 m (5' 2\")  Wt 81 kg (178 lb 9.2 oz)  SpO2 94%  BMI 32.66 kg/m2  SpO2 data collected on RA    General: Patient lying comfortably in bed and in NAD  HEENT: no oral ulcers, no injection of sclera or conjunctiva  Chest: no respiratory distress  Heart: RRR  Ext: no edema  Skin: no rashes  Psych: likely pseudobulbar affect  Neuro:   -MS: awake, alert, able to say name & location. Minimal spontaneous speech and only responds with 1-2 words.   -CN: PERRL, EOMI, facial sensation and movement intact b/l, hearing intact to conversation, palate raises symmetrically, shoulder shrug and scm intact, tongue midline. Mild dysarthria.  -Motor: moving all extremities spontaneously  -Reflexes: slightly brisker on R side compared to left.    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 She was found to have a UTI on admission likely exacerbating hypotension. Overall episodes of hypotension have minimally improved despite medical management. Hypertension in supine position improved with mestinon. Plan to discharge to " home hospice tomorrow      #Syncopal episodes: secondary to profound orthostatic hypotension, dysautonomia, exacerbated currently by UTI.  Unclear etiology for severe dysautonomia though MSA remains in the differential.   - Discussed with Dr. Gomes and started mestinon. Increase to 120mg q8hr as this will not have as much an affect on supine BP as midodrine.     - Paraneoplastic panel for causes of dysautonomia as patient does have history of breast cancer  - Continue holding carvedilol, will assess whether she will tolerate restarting this either prior to d/c or as an outpt  - Continue midodrine 5 mg TID   - Thigh high compressive stockings  - Continue salt tabs  - Encourage oral fluids  - PT/OT as tolerated  - Cardiology consulted, appreciate their willingness to evaluate, PPM not indicated at this time      #UTI: treated with cefdinir      #Eye discharge: improved. Most likely viral conjunctivitis. Will continue to monitor.      Chronic, stable  # breast cancer - home palbocicilib, letrozole.  coming today to discuss possibility of pursuing hospice.  # HLD - simvastatin  # hx L MCA stroke - continue warfarin for secondary stroke prevention      FEN: no mIVF, push PO fluids, regular diet  PPx: on therapeutic warfarin  Code: Full       Dispo: Home hospice tomorrow      Patient was seen and discussed with attending neurologist, Dr. Kelton Macedo MD  Neurology PGY2  [phone]

## 2017-10-06 NOTE — PLAN OF CARE
A&Oxself. AVSS; pt refused HS BP check and attempts at BP overnight. Pt denies pain. PMH: L MCA, breast cancer, UTI. Pt very withdrawn this shift; declining to participate in much of assessments. Pt refused her HS pills last evening despite multiple attempts. Pt turned and repositioned every 2 hours; incontinent of bowel and bladder. Brief in place; nadege cares done. Blanchable redness noted to R buttock; barrier cream prn. Regular diet. Chemo precautions. PIV SL. Plan to DC home with hospice on Saturday. Continue POC.

## 2017-10-07 NOTE — PLAN OF CARE
"Problem: Patient Care Overview  Goal: Plan of Care/Patient Progress Review  Outcome: Adequate for Discharge Date Met:  10/07/17  Pt here d/t stroke, vs ex HTN (w/in parameters), neuros include: d/o x3, a/o to self only, expressive aphasia, 4/5 throughout L>R, denies N/T, per RN (writer) pt's swallowing abilities w/meds isn't safe, she cheeks her pills, and uses excessive water, RN offered apple sauce/pudding and pt declined. Pt code status changed this morning from \"full\" to \"DNR/DNI\", paperwork provided to EMT transport team. Pt is regular diet w/ tray set-up, poor PO intake, incontinent of bowel/bladder, no BM this shift, denies pain, PIV removed per d/c orders.  @ bedside, supportive w/cares, AO2 w/repositioning Q2, pt was lifted to [facility] stretcher w/AO4. Verbal report provided to EMT (Jimy), pt was taken home w/hospice, personal (chemo) medication given to .       "

## 2017-10-07 NOTE — PLAN OF CARE
Problem: Patient Care Overview  Goal: Plan of Care/Patient Progress Review  Physical Therapy Discharge Summary     Reason for therapy discharge:    Discharged to home with home hospice     Progress towards therapy goal(s). See goals on Care Plan in Epic electronic health record for goal details.  Goals partially met.  Barriers to achieving goals:   limited tolerance for therapy and discharge from facility.     Therapy recommendation(s):    No further therapy is recommended.  Pt's orthotsatic hypotension limits her ability to safely participate in therapies for gait training, and pt will have assist for all mobility from home hospice.

## 2017-10-07 NOTE — PLAN OF CARE
Problem: Patient Care Overview  Goal: Plan of Care/Patient Progress Review  Outcome: No Change  Pt admitted on 6A for VEEG. VSS. VEEG leads in place. No events witnessed or reported. C/o HA, given Ibuprofen with relief. Zofran x1 given for nausea. Neuros intact. A&Ox4. Regular diet tolerating well. Tj counts started. Good PO intake. PIV TKO between Abx. Voiding spont. Up with 1 and GB in halls. Ambulated x1 with staff. Pt complaint with seizure precautions. Pt on continuous cardiac monitoring, NSR, Sinus sujit, 47-60's. Mepilex to L heel. Primapore to mid lower back. Significant other at bedside and supportive of cares. Continue to monitor and follow POC.

## 2017-10-07 NOTE — PLAN OF CARE
Problem: Patient Care Overview  Goal: Plan of Care/Patient Progress Review  Outcome: No Change    10/06/17 2212   OTHER   Plan Of Care Reviewed With patient   Plan of Care Review   Progress no change      Patient is disoriented to time, place and situation.  PT was unable to work with PT due to orthostatic hypotension.  Incontinent of urine x 2.  Wears a diaper.  On Regular diet with good appetite.  PIV SL'd.  Denies pain and nausea.  Plan: Discharge tomorrow to home hospice.  Continue with plan of care.

## 2017-10-07 NOTE — PLAN OF CARE
Problem: Patient Care Overview  Goal: Plan of Care/Patient Progress Review  Outcome: No Change  VSS except HTN within parameters. Oriented to self only. Neuros include generalized weakness, forgetful and expressive aphasia. Regular diet. PIV SL. Voiding spont, incontinent at times. Turn and reposition q2hrs. Up with heavy assist of 2. Pt has orthostatic HTN when standing. Plan to d/c at 1130 via stretcher home on hospice. Pt calls out for help, educated on call light. Bed alarm on at all times. Continue to monitor and follow POC.

## 2017-10-09 NOTE — PROGRESS NOTES
Spoke to patient's  German regarding recent discharge and has signed on to hospice. Patient's  did not receive the discharge papers or list of medications to be given when patient arrived home. Patient's  does not have some of the medications from the hospital. Patient's  asking about the Letrozole and ibrance as she has not taken any since Friday or Saturday last week. Spoke to hospice nurse about medications that some are at the [facility] in [place] and was told not to pick those medications except through hospice. Hospice nurse will call patient's  about medications and when she will be out to visit in their home. Will send message to the Pharm D pool with the stopping of Ibrance and let Dr Landis know about hospice and stopping breast cancer treatment.  Answered all patient's 's questions and verbalized understanding. Jolie Farley RN, BSN.

## 2017-10-09 NOTE — PROGRESS NOTES
Patient was Discharged to Hospice Care so no Post Discharge Follow Up call is needed            Follow-up Appointments           Adult [facility] Follow-up and recommended labs and tests         Follow up with hospice today.

## 2017-10-09 NOTE — TELEPHONE ENCOUNTER
MTM referral from: Transitions of Care (recent hospital discharge or ED visit)    MTM referral outreach attempt #1 on October 9, 2017 at 2:07 PM      Outcome: Patient is not interested at this time because they were discharged to home hospice care, will route to MTM Pharmacist/Provider as an FYI. Thank you for the referral.     Lynette Chandler MTM Coordinator

## 2017-10-09 NOTE — TELEPHONE ENCOUNTER
I talked to Dr Persaud with hospice care.  We reviewed Helen's case.  With her metastatic breast cancer, h/o stroke, and autonomic dysfunction, we discussed that hospice is reasonable.  I recommended discontinuing ibrance and femara.  Family has been in agreement, in communication with my nurse reynaldo.    Keisha Landis

## 2017-10-10 NOTE — TELEPHONE ENCOUNTER
Forms received from: [facility]   Phone number listed: [phone]   Fax listed: [phone]  Date received: 10/10/2017  Form description: Hospice Orders  Once forms are completed, please return to [facility] via fax.  Is patient requesting to be contacted when forms are completed: NA    Form placed: In provider's basket  Romina Pierce

## 2017-10-10 NOTE — PROGRESS NOTES
Clinic Care Coordination Contact    Situation: Patient chart reviewed by care coordinator.    Background: Patient was active with [facility]    Assessment: Patient is now active with [facility]    Plan/Recommendations: Care coordination will do no further outreaches at this time.    BHAKTI Cardenas  Care Navigator  [facility]  [phone]

## 2017-10-13 NOTE — TELEPHONE ENCOUNTER
Forms received from: [facility]   Phone number listed: [phone]   Fax listed: [phone]  Date received: 10/13/2017  Form description: Hospice Certification of Terminal Illness  Once forms are completed, please return to [facility] via fax.  Is patient requesting to be contacted when forms are completed: NA    Form placed: In provider's basket  Romina Pierce

## 2017-10-16 NOTE — MR AVS SNAPSHOT
Helen LONDON Younger   10/16/2017   Anticoagulation Therapy Visit    Description:  77 year old female   Provider:  Arin Shahid MD   Department:  [facility] Nurse           INR as of 10/16/2017     Today's INR 2.38 (10/7/2017)      Anticoagulation Summary as of 10/16/2017     INR goal 2.0-3.0   Today's INR 2.38 (10/7/2017)   Full instructions 2 mg every day   Next INR check 10/23/2017    Indications   Long-term (current) use of anticoagulants [Z79.01] [Z79.01]  Cerebral infarction (H) [I63.9]         Contact Numbers     [facility]  Please call [phone] or [phone]  to cancel and/or reschedule your appointment.   Please call [phone] with any problems or questions regarding your therapy          October 2017 Details    Sun Mon Tue Wed Thu Fri Sat     1               2               3               4               5               6               7                 8               9               10               11               12               13               14                 15               16      2 mg   See details      17      2 mg         18      2 mg         19      2 mg         20      2 mg         21      2 mg           22      2 mg         23            24               25               26               27               28                 29               30               31                    Date Details   10/16 This INR check       Date of next INR:  10/23/2017         How to take your warfarin dose     To take:  2 mg Take 2 of the 1 mg tablets.

## 2017-10-16 NOTE — PROGRESS NOTES
ANTICOAGULATION FOLLOW-UP CLINIC VISIT    Patient Name:  Helen Younger  Date:  10/16/2017  Contact Type:  Documentation Only.  this patient was in hospital and d/c home with homecare.  the ibrance and femara were discontinued.  unclear if every 2 week INR will be done at homecare.  call  if no INR reported.    SUBJECTIVE:     Patient Findings     Positives No Problem Findings (hospice)           OBJECTIVE    INR   Date Value Ref Range Status   10/07/2017 2.38 (H) 0.86 - 1.14 Final       ASSESSMENT / PLAN  INR assessment THER    Recheck INR In: 2 WEEKS    INR Location [facility] INR      Anticoagulation Summary as of 10/16/2017     INR goal 2.0-3.0   Today's INR    Maintenance plan 2 mg (1 mg x 2) every day   Full instructions 2 mg every day   Weekly total 14 mg   No change documented Soumya Garza RN   Plan last modified Soumya Garza RN (6/6/2017)   Next INR check 10/23/2017   Target end date Indefinite    Indications   Long-term (current) use of anticoagulants [Z79.01] [Z79.01]  Cerebral infarction (H) [I63.9]         Anticoagulation Episode Summary     INR check location     Preferred lab     Send INR reminders to  [facility]    Comments       Anticoagulation Care Providers     Provider Role Specialty Phone number    Arin Shahid MD  Internal Medicine [phone]            See the Encounter Report to view Anticoagulation Flowsheet and Dosing Calendar (Go to Encounters tab in chart review, and find the Anticoagulation Therapy Visit)    Dosage adjustment made based on physician directed care plan.    Soumya Garza RN

## 2017-10-18 PROBLEM — G90.1 DYSAUTONOMIA (H): Chronic | Status: ACTIVE | Noted: 2017-01-01

## 2017-10-18 PROBLEM — I89.0 LYMPHEDEMA OF BOTH LOWER EXTREMITIES: Status: ACTIVE | Noted: 2017-01-01

## 2017-10-18 PROBLEM — G90.3 NEUROGENIC ORTHOSTATIC HYPOTENSION (H): Chronic | Status: ACTIVE | Noted: 2017-01-01

## 2017-10-25 NOTE — TELEPHONE ENCOUNTER
Nurse recieved In-Basket message as below:    Attempted to reach Dinorah to inquire without success. Will attempt again.

## 2017-10-25 NOTE — TELEPHONE ENCOUNTER
----- Message from Vinh Tilley LPN sent at 10/25/2017 10:15 AM CDT -----  Regarding: Need updated diagnoses   Voicemail or Incoming Calls: Call recieved: Today at 10:19am    Caller name: Dinorah [phone], [facility]      Treating provider/specialty: Tom    Nurse:    Best time to return call: here untl 5pm    Message left? Can leave detailed messages    Description of issue/question:  Caller needs an updated diagnose that covers by insurance like lymphadema, ROM, swollen, edema- not the actual medical diagnose

## 2017-10-26 NOTE — MR AVS SNAPSHOT
Helen Younger   10/26/2017   Anticoagulation Therapy Visit    Description:  77 year old female   Provider:  Arin Shahid MD   Department:  [facility]           INR as of 10/26/2017     Today's INR No new INR was available at the time of this encounter.      Anticoagulation Summary as of 10/26/2017     INR goal 2.0-3.0   Today's INR No new INR was available at the time of this encounter.   Full instructions 2 mg every day   Next INR check     Indications   Long-term (current) use of anticoagulants [Z79.01] [Z79.01]  Cerebral infarction (H) [I63.9]         Anticoagulation Episode Summary     Resolved date 10/26/2017    Resolved reason Outside MD      Contact Numbers     [facility]  Please call [phone] or [phone]  to cancel and/or reschedule your appointment.   Please call [phone] with any problems or questions regarding your therapy

## 2017-10-28 NOTE — PROGRESS NOTES
ORAL CHEMOTHERAPY DISCONTINUATION       Primary Oncologist:  Dr. Landis  Primary Oncology Clinic: [facility]   Cancer Diagnosis:  Breast Cancer   Therapy History:  Ibrance 100mg once daily for 21 days then 7 days off  2/316  Therapy Ended On:  10/9/2017  Reason For Discontinuation: patient enrolled into hospice    Additional Notes:  Thank you for the opportunity to be a part in the care of this patient's oral chemotherapy. The oncology pharmacy will no longer be following this patient for oral chemotherapy. If there are any questions or the plan changes, feel free to contact us.     Yanelis Akers, Student Pharmacy Intern [phone]

## 2017-10-31 PROBLEM — I89.0 LYMPHEDEMA: Status: ACTIVE | Noted: 2017-01-01

## 2017-11-10 NOTE — TELEPHONE ENCOUNTER
Forms received from: [facility]   Phone number listed: [phone]   Fax listed: [phone]  Date received: 11/10/2017  Form description: Hospice Plan of Care  Once forms are completed, please return to [facility] via fax.  Is patient requesting to be contacted when forms are completed: NA    Form placed: In provider's basket  Romina Pierce

## 2017-11-28 NOTE — TELEPHONE ENCOUNTER
Oral Chemotherapy Monitoring Program   Placed call to patient in follow up of Ibrance (palbociclib) therapy.   Spoke to Rony, he reports Ibrance has been discontinued as Helen has been transitioned to hospice care. Ibrance was started Feb 2016. Close to Therapy Management Oral Chemotherapy Monitoring program.     Thank you for the opportunity to participate in the care of the above patient,     Rio Freeman, PharmD  Therapy Management Oncology Pharmacist  [facility]   Phone: [phone]

## 2018-01-01 ENCOUNTER — TELEPHONE (OUTPATIENT)
Dept: FAMILY MEDICINE | Facility: CLINIC | Age: 78
End: 2018-01-01

## 2018-01-01 ENCOUNTER — MEDICAL CORRESPONDENCE (OUTPATIENT)
Dept: HEALTH INFORMATION MANAGEMENT | Facility: CLINIC | Age: 78
End: 2018-01-01

## 2018-01-01 LAB — INTERPRETATION ECG - MUSE: NORMAL

## 2018-01-26 NOTE — TELEPHONE ENCOUNTER
Forms received from: [facility]   Phone number listed: [phone]   Fax listed: [phone]  Date received: 01/26/2018  Form description: Hospice Plan of Care  Once forms are completed, please return to [facility] via fax.  Is patient requesting to be contacted when forms are completed: NA    Form placed: In provider's basket  Romina Pierce

## 2018-03-23 NOTE — TELEPHONE ENCOUNTER
Forms received from: [facility]   Phone number listed: [phone]   Fax listed: [phone]  Date received: 03/23/2018  Form description: Detailed Written Order-CPAP supplies  Once forms are completed, please return to [facility] via fax.  Is patient requesting to be contacted when forms are completed: NA    Form placed: In provider's basket  Romina Pierce

## 2018-05-04 ENCOUNTER — CARE COORDINATION (OUTPATIENT)
Dept: ONCOLOGY | Facility: CLINIC | Age: 78
End: 2018-05-04

## 2018-05-09 ENCOUNTER — TELEPHONE (OUTPATIENT)
Dept: FAMILY MEDICINE | Facility: CLINIC | Age: 78
End: 2018-05-09

## 2018-05-09 NOTE — TELEPHONE ENCOUNTER
Forms received from: [facility]   Phone number listed: [phone]   Fax listed: [phone]  Date received: 05/09/2018  Form description: Hospice Plan of Care  Once forms are completed, please return to [facility] via fax.  Is patient requesting to be contacted when forms are completed: NA    Form placed: In provider's basket  Romina Pierce

## 2018-05-10 ENCOUNTER — MEDICAL CORRESPONDENCE (OUTPATIENT)
Dept: HEALTH INFORMATION MANAGEMENT | Facility: CLINIC | Age: 78
End: 2018-05-10

## 2019-09-06 ENCOUNTER — DOCUMENTATION ONLY (OUTPATIENT)
Dept: OPHTHALMOLOGY | Facility: CLINIC | Age: 79
End: 2019-09-06

## 2021-01-05 NOTE — TELEPHONE ENCOUNTER
Problem: Adult Inpatient Plan of Care  Goal: Absence of Hospital-Acquired Illness or Injury  Intervention: Identify and Manage Fall Risk  Recent Flowsheet Documentation  Taken 1/5/2021 0425 by Eliz Pickett RN  Safety Promotion/Fall Prevention: safety round/check completed  Taken 1/4/2021 2016 by Eliz Pickett, RN  Safety Promotion/Fall Prevention: safety round/check completed  Taken 1/4/2021 1915 by Eliz Pickett RN  Safety Promotion/Fall Prevention: safety round/check completed   Goal Outcome Evaluation:            Reason for call:  Patient reporting a symptom    Symptom or request: low heart rate, seen by  Home Care, dropping down to 40's with activity, sat for 10 minutes then back to 66.    Duration (how long have symptoms been present):     Have you been treated for this before? Yes    Additional comments: Rod from  Home Care, PT saw patient today,  did not want patient to go to the ED, said that symptoms resolve, please call  Rony    Phone Number patient can be reached at:  Home number on file [phone] (home)    Best Time:  any    Can we leave a detailed message on this number:  YES    Call taken on 3/10/2017 at 12:04 PM by Renay Chapin

## 2021-02-27 NOTE — PROGRESS NOTES
ANTICOAGULATION FOLLOW-UP CLINIC VISIT    Patient Name:  Helen Younger  Date:  8/17/2017  Contact Type:  Telephone/ [facility] home care nurse calling to report INR a day early due to schedule conflict.  she denies symptoms of bleeding or clotting.  no medicine changes.  no other concerns today.    SUBJECTIVE: [email or web address] Phone # [phone]     Patient Findings     Positives No Problem Findings           OBJECTIVE    INR   Date Value Ref Range Status   08/17/2017 2.6  Final       ASSESSMENT / PLAN  INR assessment THER    Recheck INR In: 2 WEEKS    INR Location [facility] INR      Anticoagulation Summary as of 8/17/2017     INR goal 2.0-3.0   Today's INR 2.6   Maintenance plan 2 mg (1 mg x 2) every day   Full instructions 2 mg every day   Weekly total 14 mg   No change documented Soumya Garza RN   Plan last modified Soumya Garza RN (6/6/2017)   Next INR check 9/1/2017   Target end date     Indications   Long-term (current) use of anticoagulants [Z79.01] [Z79.01]  Cerebral infarction (H) [I63.9]         Anticoagulation Episode Summary     INR check location     Preferred lab     Send INR reminders to  [facility]    Comments       Anticoagulation Care Providers     Provider Role Specialty Phone number    Arin Shahid MD  Internal Medicine [phone]            See the Encounter Report to view Anticoagulation Flowsheet and Dosing Calendar (Go to Encounters tab in chart review, and find the Anticoagulation Therapy Visit)    Dosage adjustment made based on physician directed care plan.    Soumya Garza RN               
Current every day smoker

## 2021-06-13 NOTE — TELEPHONE ENCOUNTER
1.  Date/reason for appt:8/9/17, brain atrophy  2.  Referring provider: YULI SALTER  3.  Call to patient (Yes / No - short description): No, referred   4.  Previous care at / records requested from:  5.  Other:    
13-Jun-2021 15:21

## 2021-09-07 NOTE — MR AVS SNAPSHOT
Helen Younger   4/28/2017   Anticoagulation Therapy Visit    Description:  77 year old female   Provider:  Arin Shahid MD   Department:  [facility] Nurse           INR as of 4/28/2017     Today's INR 3.1! (4/26/2017)      Anticoagulation Summary as of 4/28/2017     INR goal 2.0-3.0   Today's INR 3.1! (4/26/2017)   Full instructions 1 mg on Fri; 2 mg all other days   Next INR check 5/8/2017    Indications   Long-term (current) use of anticoagulants [Z79.01] [Z79.01]  Cerebral infarction (H) [I63.9]         Contact Numbers     [facility]  Please call [phone] or [phone]  to cancel and/or reschedule your appointment.   Please call [phone] with any problems or questions regarding your therapy          April 2017 Details    Sun Mon Tue Wed Thu Fri Sat           1                 2               3               4               5               6               7               8                 9               10               11               12               13               14               15                 16               17               18               19               20               21               22                 23               24               25               26               27               28      1 mg   See details      29      2 mg           30      2 mg                Date Details   04/28 This INR check               How to take your warfarin dose     To take:  1 mg Take 1 of the 1 mg tablets.    To take:  2 mg Take 2 of the 1 mg tablets.           May 2017 Details    Sun Mon Tue Wed Thu Fri Sat      1      2 mg         2      2 mg         3      2 mg         4      2 mg         5      1 mg         6      2 mg           7      2 mg         8            9               10               11               12               13                 14               15               16               17               18               19               20                 21                22               23               24               25               26               27                 28               29               30               31                   Date Details   No additional details    Date of next INR:  5/8/2017         How to take your warfarin dose     To take:  1 mg Take 1 of the 1 mg tablets.    To take:  2 mg Take 2 of the 1 mg tablets.            Report given to HAYDEE Ovalle (ED holding). RN aware of PMH, reason for stay, and has no further questions.

## 2022-08-24 NOTE — PROGRESS NOTES
ANTICOAGULATION FOLLOW-UP CLINIC VISIT    Patient Name:  Helen Younger  Date:  10/26/2017  Contact Type:  Telephone/ I called patient /  to inquire about INR / Warfarin therapy.  German reports patient is moved to hospice with a Dr Eason managing her warfarin therapy.  They did INR yesterday and was 2.0 and he kept her on 2mg warfarin daily.  I advised I am discharing patient from [facility] program as she is being followed by outside MD.    SUBJECTIVE: See above.     Patient Findings     Positives Other complaints (Hospice)           OBJECTIVE    INR   Date Value Ref Range Status   10/07/2017 2.38 (H) 0.86 - 1.14 Final       ASSESSMENT / PLAN  No question data found.  Anticoagulation Summary as of 10/26/2017     INR goal 2.0-3.0   Today's INR No new INR was available at the time of this encounter.   Maintenance plan 2 mg (1 mg x 2) every day   Full instructions 2 mg every day   Weekly total 14 mg   Plan last modified Soumya Garza RN (6/6/2017)   Next INR check    Target end date Indefinite    Indications   Long-term (current) use of anticoagulants [Z79.01] [Z79.01]  Cerebral infarction (H) [I63.9]         Anticoagulation Episode Summary     INR check location     Preferred lab     Resolved date 10/26/2017    Resolved reason Outside MD    Send INR reminders to [facility]    Comments       Anticoagulation Care Providers     Provider Role Specialty Phone number    Arin Shahid MD  Internal Medicine [phone]            See the Encounter Report to view Anticoagulation Flowsheet and Dosing Calendar (Go to Encounters tab in chart review, and find the Anticoagulation Therapy Visit)    Dosage adjustment made based on physician directed care plan.    Soumya Garza RN                Pending in another encounter    Duplicate      For Pharmacy [facility] in place:   Recommendation Provided To:    Intervention Detail: Discontinued Rx: 1, reason: Duplicate Therapy  Gap Closed?:   Intervention Accepted By:   Time Spent (min): 5

## 2023-01-01 NOTE — TELEPHONE ENCOUNTER
SKN & PT/OT ordered as requested per Leslie Dyad 5 standing orders for Home Care, Assisted Living or Nursing Home Evaluations and Treatments, Mammogram (Reflex diagnostic), FIT test, Colonoscopy.  Called and informed Edwardo Means of verbal okay.    Kamla Fofana RN  [facility]     Statement Selected

## 2025-02-07 NOTE — PROGRESS NOTES
"Social Work Services Progress Note    Hospital Day: 6  Date of Initial Social Work Evaluation:  RNCC assessment completed on 9/20/17   Collaborated with:  Patients  Rony, Neurology Team, TCU facilities     Data:  Patient is a 77 year old female, who medically should be ready to DC tomorrow (10/4).     Intervention:    Per chart review, PT/OT rec that patient discharge to a TCU. SW called pt's  to discuss DC. Per chart review patients  had previously told RNCC that he did not want patient to go to a TCU but patients  reported that he would now be open to patient going to a TCU of his choosing. SW explained to patients  that Medicare list of TCU facilities can be provided to patients  but pt's  requested that TCU choices be sent to [facility] and [facility]. Pt's  reported that patient was last in [facility] in July and believed she has used up aprox. 48 of her TCU days.    Patients  did bring up Hospice to  and mentioned that he was meeting with his daughters tonight to have a discussion about Hospice. Patients  mentioned that he cared about pt's \"quality of life.\" SW provided active listening and support as pt's  discussed hospice with SW. Pt's  mentioned that he was not certain hospice was the route that he wanted to go and that is why he was having a conversation with his daughters. SW did provide some education to pt's  regarding the services Hospice provides and the private pay for SNF or Hospice residence. Pt's  stated that he would likely want patient to dc home on hospice and he would be the caregiver for patient. Per request of pt's , THELMA did provide update to Neurology team that pt's  was considering Hospice as an option. The Neurology team will touch base with patient's . Pt's , Rony is aware that SW will provide assistance with Hospice and/or TCU. Per " Neurology plan that patient will be ready to DC on Wednesday, 10/4/17.     Referrals Pending: (TCU's were chosen by pt's )    - [facility]: Referral faxed  PH: [phone]  F: [phone]      - [facility]: Referral faxed-Patient has been accepted. Per [facility] pt discharged previously on July 15th, so pt has been out of the TCU for longer than 60 days.   PH: [phone]  F: [phone]    Assessment:  Patient's  is having a discussion with his daughters tonight regarding Hospice and quality of life for patient. Pt's  open to TCU placement if he decides that hospice is not the right decision.     Plan:    Anticipated Disposition:  TCU vs. Home w/ Hospice (Family considering hospice)    Barriers to d/c plan:  Medical stability, DC plan, bed availability     Follow Up:  SW continuing to follow for DC planning.     SAVANA Kong  [facility] , Coverage 6A  Pgr: [phone]           show

## (undated) RX ORDER — SODIUM CHLORIDE 9 MG/ML
INJECTION, SOLUTION INTRAVENOUS
Status: DISPENSED
Start: 2017-01-01